# Patient Record
Sex: FEMALE | Race: WHITE | NOT HISPANIC OR LATINO | Employment: FULL TIME | URBAN - METROPOLITAN AREA
[De-identification: names, ages, dates, MRNs, and addresses within clinical notes are randomized per-mention and may not be internally consistent; named-entity substitution may affect disease eponyms.]

---

## 2017-01-10 ENCOUNTER — OFFICE VISIT (OUTPATIENT)
Dept: NEUROLOGY | Facility: CLINIC | Age: 42
End: 2017-01-10

## 2017-01-10 DIAGNOSIS — G56.02 CARPAL TUNNEL SYNDROME OF LEFT WRIST: Primary | ICD-10-CM

## 2017-01-10 NOTE — PROGRESS NOTES
[facility]  Electrodiagnostic Laboratory  Nerve Conduction & EMG Report          Patient: Irma Evans YOB: 1975  Patient ID: [id number] Age: 41 Years 0 Months  Gender: Female        Referring Physician: Liz Baker MD    History & Examination:    41 year old patient with left hand numbness. Query carpal tunnel syndrome.     Techniques: Motor and sensory conduction studies were done with surface recording electrodes.  Temperature was monitored and recorded throughout the study. Upper extremities were maintained at a temperature of 32 degrees Centigrade or higher. EMG was done with a concentric needle electrode.        Results:    Left median antidromic sensory NCS showed a markedly attenuated conduction velocity and SNAP amplitude. Left median orthodromic mixed NCS with stimulation at the palm showed no response. Left ulnar antidromic sensory and orthodromic mixed NCSs were normal. Left median motor NCS showed prolonged distal latency, normal CMAP amplitudes and conduction velocity. Left ulnar motor NCS was normal. Left median and ulnar second lumbrical/palmar interossei comparison motor NCSs showed a difference between the median and ulnar distal latency of 4.16 ms (normal is <0.5 ms). Left median F-wave latencies were mildly prolonged; left ulnar F-wave latencies were normal. EMG of left APB showed increased insertional activity and occasional fasciculations, but MUP morphology and recruitment patterns were normal.     Interpretation:    Left median neuropathy at the wrist as seen in carpal tunnel syndrome, of moderate severity.     EMG Physician:    Kieran Dickson MD        Sensory NCS      Nerve / Sites Rec. Site Onset Peak Ref. NP Amp Ref. PP Amp Dist Cj Ref. Temp     ms ms ms  V  V  V cm m/s m/s  C   L MEDIAN - Dig II Anti      Wrist Dig II 5.68 6.35  1.8 10.0  14 24.7 48.0 33   L ULNAR - Dig V Anti      Wrist Dig V 2.14 2.86  23.5 8.0 15.8 12.5 58.5  48.0 32.1   L MEDIAN - Ulnar - Palmar      Median Wrist NR NR        31.5      Ulnar Wrist 1.04 1.41 2.40 23.1  39.2 8 76.8  30.8       Motor NCS      Nerve / Sites Rec. Site Lat Ref. Amp Ref. Rel Amp Dist Cj Ref. Dur. Area Temp.     ms ms mV mV % cm m/s m/s ms %  C   L MEDIAN - APB      Wrist APB 6.20 4.40 7.5 5.0 100 8   8.85 100 31.5      Elbow APB 10.47  7.3  97.4 22 51.5 48.0 8.91 99 32.1   L ULNAR - ADM      Wrist ADM 2.29 3.50 11.8 5.0 100 8   8.07 100 32.1      B.Elbow ADM 5.26  11.9  100 17 57.3 48.0 8.39 97.4 32.2      A.Elbow ADM 7.14  11.8  99.3 10 53.3 48.0 8.65 99.1 32.5   L MEDIAN - II Lumb      Median II Lumb 6.61  1.7  100 10   7.60 100 31.7      Ulnar Palm Int 2.45  6.8  406 10   6.09 275 31.7       F  Wave      Nerve Min F Lat Max F Lat Mean FLat Temp.    ms ms ms  C   L MEDIAN 29.22 30.63 29.99 32.2   L ULNAR 25.16 25.73 25.44 32.5       EMG Summary Table     Spontaneous MUAP Recruitment    IA Fib Fasc H.F. Amp Dur. PPP Pattern   L. ABD POLL BREVIS Increased None 1+ None N N None Normal

## 2017-01-10 NOTE — Clinical Note
1/10/2017       RE: Irma Evans  [address] [facility] [facility] [address]     Dear Colleague,    Thank you for referring your patient, Irma Evans, to the [facility] EMG at [facility]. Please see a copy of my visit note below.          [facility]  Electrodiagnostic Laboratory  Nerve Conduction & EMG Report          Patient: Irma Evans YOB: 1975  Patient ID: [id number] Age: 41 Years 0 Months  Gender: Female        Referring Physician: Liz Baker MD    History & Examination:    41 year old patient with left hand numbness. Query carpal tunnel syndrome.     Techniques: Motor and sensory conduction studies were done with surface recording electrodes.  Temperature was monitored and recorded throughout the study. Upper extremities were maintained at a temperature of 32 degrees Centigrade or higher. EMG was done with a concentric needle electrode.        Results:    Left median antidromic sensory NCS showed a markedly attenuated conduction velocity and SNAP amplitude. Left median orthodromic mixed NCS with stimulation at the palm showed no response. Left ulnar antidromic sensory and orthodromic mixed NCSs were normal. Left median motor NCS showed prolonged distal latency, normal CMAP amplitudes and conduction velocity. Left ulnar motor NCS was normal. Left median and ulnar second lumbrical/palmar interossei comparison motor NCSs showed a difference between the median and ulnar distal latency of 4.16 ms (normal is <0.5 ms). Left median F-wave latencies were mildly prolonged; left ulnar F-wave latencies were normal. EMG of left APB showed increased insertional activity and occasional fasciculations, but MUP morphology and recruitment patterns were normal.     Interpretation:    Left median neuropathy at the wrist as seen in carpal tunnel syndrome, of moderate severity.     EMG Physician:    Kieran Dickson MD        Sensory  NCS      Nerve / Sites Rec. Site Onset Peak Ref. NP Amp Ref. PP Amp Dist Cj Ref. Temp     ms ms ms  V  V  V cm m/s m/s  C   L MEDIAN - Dig II Anti      Wrist Dig II 5.68 6.35  1.8 10.0  14 24.7 48.0 33   L ULNAR - Dig V Anti      Wrist Dig V 2.14 2.86  23.5 8.0 15.8 12.5 58.5 48.0 32.1   L MEDIAN - Ulnar - Palmar      Median Wrist NR NR        31.5      Ulnar Wrist 1.04 1.41 2.40 23.1  39.2 8 76.8  30.8       Motor NCS      Nerve / Sites Rec. Site Lat Ref. Amp Ref. Rel Amp Dist Cj Ref. Dur. Area Temp.     ms ms mV mV % cm m/s m/s ms %  C   L MEDIAN - APB      Wrist APB 6.20 4.40 7.5 5.0 100 8   8.85 100 31.5      Elbow APB 10.47  7.3  97.4 22 51.5 48.0 8.91 99 32.1   L ULNAR - ADM      Wrist ADM 2.29 3.50 11.8 5.0 100 8   8.07 100 32.1      B.Elbow ADM 5.26  11.9  100 17 57.3 48.0 8.39 97.4 32.2      A.Elbow ADM 7.14  11.8  99.3 10 53.3 48.0 8.65 99.1 32.5   L MEDIAN - II Lumb      Median II Lumb 6.61  1.7  100 10   7.60 100 31.7      Ulnar Palm Int 2.45  6.8  406 10   6.09 275 31.7       F  Wave      Nerve Min F Lat Max F Lat Mean FLat Temp.    ms ms ms  C   L MEDIAN 29.22 30.63 29.99 32.2   L ULNAR 25.16 25.73 25.44 32.5       EMG Summary Table     Spontaneous MUAP Recruitment    IA Fib Fasc H.F. Amp Dur. PPP Pattern   L. ABD POLL BREVIS Increased None 1+ None N N None Normal                              Again, thank you for allowing me to participate in the care of your patient.      Sincerely,    Kieran Dickson MD

## 2017-01-10 NOTE — Clinical Note
January 10, 2017      Trevin Evans  [address] [facility] [address]        Dear Trevin    Thank you for getting your care at [facility]. Please see below for your test results.  This result came to me, rather than Dr. Parks, so I am sending you the result note.  Looks like the test confirms you have carpal tunnel syndrome and so I would keep the appointment you have with the sports medicine team to discuss options, including possible surgery.     Resulted Orders   Needle EMG Each Extremity w/Paraspinal Area Limited (73868)    [facility]  Electrodiagnostic Laboratory  Nerve Conduction & EMG Report    Patient: Irma Evans   YOB: 1975  Patient ID: [id number]   Age: 41 Years 0 Months  Gender: Female    Referring Physician: Liz Baker MD    History & Examination:    41 year old patient with left hand numbness. Query carpal tunnel  syndrome.     Techniques: Motor and sensory conduction studies were done with  surface recording electrodes.  Temperature was monitored and  recorded throughout the study. Upper extremities were maintained  at a temperature of 32 degrees Centigrade or higher. EMG was done  with a concentric needle electrode.     Results:    Left median antidromic sensory NCS showed a markedly attenuated  conduction velocity and SNAP amplitude. Left median orthodromic  mixed NCS with stimulation at the palm showed no response. Left  ulnar antidromic sensory and orthodromic mixed NCSs were normal.  Left median motor NCS showed prolonged distal latency, normal  CMAP amplitudes and conduction velocity. Left ulnar motor NCS was  normal. Left median and ulnar second lumbrical/palmar interossei  comparison motor NCSs showed a difference between the median and  ulnar distal latency of 4.16 ms (normal is <0.5 ms). Left median  F-wave latencies were mildly prolonged; left ulnar F-wave  latencies were normal. EMG of left APB showed  increased  insertional activity and occasional fasciculations, but MUP  morphology and recruitment patterns were normal.     Interpretation:    Left median neuropathy at the wrist as seen in carpal tunnel  syndrome, of moderate severity.     EMG Physician:    Kieran Dickson MD         If you have any concerns about these results please call and leave a message for me or send a MyChart message to the clinic.    Sincerely,    Grace Bennett MD

## 2017-01-17 ENCOUNTER — OFFICE VISIT (OUTPATIENT)
Dept: FAMILY MEDICINE | Facility: CLINIC | Age: 42
End: 2017-01-17

## 2017-01-17 VITALS
TEMPERATURE: 98 F | RESPIRATION RATE: 14 BRPM | DIASTOLIC BLOOD PRESSURE: 78 MMHG | SYSTOLIC BLOOD PRESSURE: 124 MMHG | HEART RATE: 69 BPM

## 2017-01-17 DIAGNOSIS — G56.02 CARPAL TUNNEL SYNDROME OF LEFT WRIST: Primary | ICD-10-CM

## 2017-01-17 DIAGNOSIS — G56.02 CARPAL TUNNEL SYNDROME OF LEFT WRIST: ICD-10-CM

## 2017-01-17 NOTE — MR AVS SNAPSHOT
After Visit Summary   1/17/2017    Irma Evans    MRN: [id number]           Patient Information     Date Of Birth          1975        Visit Information        Provider Department      1/17/2017 8:00 AM Kristan Turner MD [facility]        Today's Diagnoses     Carpal tunnel syndrome of left wrist    -  1        Follow-ups after your visit        Additional Services     [facility], PT, HAND AND CHIROPRACTIC REFERRAL - [facility]       **This order will print in the [facility] Scheduling Office**    Physical Therapy, Hand Therapy and Chiropractic Care are available through:    *[facility]  *[facility]  *[facility]    Call one number to schedule at any of the above locations: [phone].    Your provider has referred you to: Hand Therapy    Indication/Reason for Referral: left hand pain, carpal tunnel  Onset of Illness: 6-8 months ago  Therapy Orders: Evaluate and Treat  Special Programs: None  Special Request: None    Alyssa Shah      Additional Comments for the Therapist or Chiropractor: left carpal tunnel wrist injection performed    Please be aware that coverage of these services is subject to the terms and limitations of your health insurance plan.  Call member services at your health plan with any benefit or coverage questions.      Please bring the following to your appointment:    *Your personal calendar for scheduling future appointments  *Comfortable clothing                  Follow-up notes from your care team     Return in about 4 weeks (around 2/14/2017), or if symptoms worsen or fail to improve.      Who to contact     Please call your clinic at [phone] to:    Ask questions about your health    Make or cancel appointments    Discuss your medicines    Learn about your test results    Speak to your doctor   If you have compliments or concerns about an experience at your clinic, or if you wish to file a  complaint, please contact [facility] Patient Relations at [phone] or email us at [email or web address]         Additional Information About Your Visit        [employer] Information     [employer] gives you secure access to your electronic health record. If you see a primary care provider, you can also send messages to your care team and make appointments. If you have questions, please call your primary care clinic.  If you do not have a primary care provider, please call [phone] and they will assist you.      [employer] is an electronic gateway that provides easy, online access to your medical records. With [employer], you can request a clinic appointment, read your test results, renew a prescription or communicate with your care team.     To access your existing account, please contact your [facility] or call [phone] for assistance.        Care EveryWhere ID     This is your Care EveryWhere ID. This could be used by other organizations to access your [place] medical records  [id number]        Your Vitals Were     Pulse Temperature Respirations             69 98  F (36.7  C) (Oral) 14          Blood Pressure from Last 3 Encounters:   01/17/17 124/78   12/30/16 131/84   12/07/16 127/85    Weight from Last 3 Encounters:   12/07/16 271 lb 3.2 oz (123.016 kg)   11/08/16 262 lb 12.8 oz (119.205 kg)   10/19/16 261 lb (118.389 kg)              We Performed the Following     [facility], PT, HAND AND CHIROPRACTIC REFERRAL - [facility]        Primary Care Provider Office Phone # Fax #    Lizcelso Baker [phone]       [facility] [address] [address]        Thank you!     Thank you for choosing [facility]  for your care. Our goal is always to provide you with excellent care. Hearing back from our patients is one way we can continue to improve our services. Please take a few minutes to complete the written  "survey that you may receive in the mail after your visit with us. Thank you!             Your Updated Medication List - Protect others around you: Learn how to safely use, store and throw away your medicines at www.disposemymeds.org.          This list is accurate as of: 1/17/17  8:46 AM.  Always use your most recent med list.                   Brand Name Dispense Instructions for use    * amphetamine-dextroamphetamine 20 MG per 24 hr capsule    ADDERALL XR    60 capsule    Take 2 capsules (40 mg) by mouth daily       * amphetamine-dextroamphetamine 20 MG per 24 hr capsule   Start taking on:  1/30/2017    ADDERALL XR    60 capsule    Take 2 capsules (40 mg) by mouth daily       FLUoxetine 20 MG capsule    PROzac    90 capsule    Take 1 capsule (20 mg) by mouth daily       fluticasone 50 MCG/ACT spray    FLONASE    48 g    Spray 2 sprays into both nostrils daily       folic acid 1 MG tablet    FOLVITE    100 tablet    Take 1 tablet (1 mg) by mouth daily       minocycline 100 MG capsule    MINOCIN/DYNACIN    60 capsule    Take 1 capsule (100 mg) by mouth 2 times daily       montelukast 10 MG tablet    SINGULAIR    90 tablet    Take 1 tablet (10 mg) by mouth nightly as needed       * Needle (Disp) 18G X 1-1/2\" Misc    BD DISP NEEDLES    30 each    Use to administer IM medication as instructed       * needle (disp) 18G X 1\" Misc     15 each    1 each once a week       * Needle (Disp) 23G X 1\" Misc    BD DISP NEEDLE    30 each    Use to administer IM medication as instructed       * Needle (Disp) 23G X 1\" Misc     15 each    Use to inject testosterone into muscle weekly       olopatadine 0.1 % ophthalmic solution    PATANOL    5 mL    Place 1 drop into both eyes 2 times daily       polyethylene glycol powder    MIRALAX    850 g    Take 17 g (1 capful) by mouth daily       psyllium 0.52 G capsule     540 capsule    Take 1-2 capsules (0.52-1.04 g) by mouth daily       Sharps Container Misc     1 each    Dispose sharps    "    simvastatin 20 MG tablet    ZOCOR    90 tablet    Take 1 tablet (20 mg) by mouth daily       syringe (disposable) 1 ML Misc    BD TUBERCULIN SYRINGE    30 each    BD 1ml Tuberculing syringe SLIP TIP (no needle attached). Use to administer IM medications as instructed       testosterone cypionate 200 MG/ML injection    DEPOTESTOTERONE CYPIONATE    10 mL    Inject 0.4 mLs (80 mg) into the muscle once a week In cottonseed oil ok       topiramate 50 MG tablet    TOPAMAX    270 tablet    Take 3 tablets (150 mg) by mouth daily       triamcinolone 0.1 % cream    KENALOG    80 g    Apply sparingly to affected area two times daily as needed       * Notice:  This list has 6 medication(s) that are the same as other medications prescribed for you. Read the directions carefully, and ask your doctor or other care provider to review them with you.

## 2017-01-17 NOTE — PROGRESS NOTES
HPI:       Irma Evans is a right handed 41 year old who presents for the following  Patient presents with:  Musculoskeletal Problem: follow up from 1/10/17, possible injection  Pt will evidence of moderate carpal tunnel, left hand.  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  Gripping with left hand and  hard when driving.  Left hand white knuckle, had a car service, noticing hand issues with driving.  Happens daily.  Moved Mom back from [place], everyone in the family has had carpal tunnel surgery.  Pt has a history of prediabetes.  Pt reports that was going to be removed off problem list by Dr. Baker.    Concern: Left carpal tunnel    Description of the problem :Left hand with tingling and numbness, falls asleep at night and driving     When did it start?: 6-8 months    Intensity: moderate, 5/10    Progression of Symptoms:  worsening    Therapies Tried wearing wrist braces at night    What has worked?  Brace works while its on            No  was used for  this visit.      Problem, Medication and Allergy Lists were reviewed and are current.  Patient is an established patient of this clinic.         Review of Systems:   Review of Systems          Physical Exam:     Patient Vitals for the past 24 hrs:   BP Temp Temp src Pulse Resp Weight   01/17/17 0811 124/78 mmHg 98  F (36.7  C) Oral 69 14 -     There is no weight on file to calculate BMI.  Vitals were reviewed and were normal     Physical Exam  +Phalen's, + Tinel's, symptoms of numbness and tingling into second and third fingers  Full ROM of wrists and hands  Radial n., Median n., ulna n. intact      Results:      Results from the last 24 hoursNo results found for this or any previous visit (from the past 24 hour(s)).  Assessment and Plan     1. Carpal tunnel syndrome of left wrist  Left wrist carpal tunnel injection- risks and benefits discussed, no steroid allergies known, consented, prepped, anatomy located, 1 cc 40mg kenalog, 2 cc 1%  lidocaine, carpal tunnel injection delivered without any nerve symptoms towards 4th finger, full ROM of hand, no bleeding or complications, band aid  Brace and cut back on activities post injection for 7-10 days  Order for hand therapy  Recommended f/u with Dr. Parks in 4 weeks    There are no discontinued medications.  Options for treatment and follow-up care were reviewed with the patient. Irma Evans  engaged in the decision making process and verbalized understanding of the options discussed and agreed with the final plan.    Kristan Turner MD

## 2017-01-26 DIAGNOSIS — F64.9 GENDER IDENTITY DISORDER: Primary | ICD-10-CM

## 2017-01-26 NOTE — TELEPHONE ENCOUNTER
Date of last visit at clinic: 11.17.17    Please complete refill and CLOSE ENCOUNTER.  Closing the encounter signifies the refill is complete.

## 2017-01-27 RX ORDER — TESTOSTERONE CYPIONATE 200 MG/ML
80 INJECTION, SOLUTION INTRAMUSCULAR WEEKLY
Qty: 10 ML | Refills: 2 | Status: SHIPPED | OUTPATIENT
Start: 2017-01-27 | End: 2017-01-27

## 2017-01-27 RX ORDER — TESTOSTERONE CYPIONATE 200 MG/ML
80 INJECTION, SOLUTION INTRAMUSCULAR WEEKLY
Qty: 10 ML | Refills: 2 | Status: SHIPPED | OUTPATIENT
Start: 2017-01-27 | End: 2017-09-07

## 2017-01-27 NOTE — TELEPHONE ENCOUNTER
Please process per controlled substance protocol. I have reviewed pt's chart and this refill is appropriate. Please provide rx to preceptor to sign, then provide to patient's pharmacy and notify patient.   Liz Baker MD

## 2017-01-27 NOTE — TELEPHONE ENCOUNTER
Script printed for preceptor, Dr. Burks to sign. Faxed to patient's pharmacy. Fax successful.    Soniya Higginbotham RN

## 2017-02-20 ENCOUNTER — TELEPHONE (OUTPATIENT)
Dept: FAMILY MEDICINE | Facility: CLINIC | Age: 42
End: 2017-02-20

## 2017-02-20 DIAGNOSIS — F90.9 ATTENTION DEFICIT HYPERACTIVITY DISORDER (ADHD), UNSPECIFIED ADHD TYPE: ICD-10-CM

## 2017-02-20 NOTE — TELEPHONE ENCOUNTER
[facility] phone call message- patient requesting a refill:    Full Medication Name: amphetamine-dextroamphetamine (ADDERALL XR) 20 MG per 24 hr capsule    Dose:     Pharmacy confirmed as   [facility] - [place], [place] - [address]  [address]  [facility] [address]  Phone: [phone] Fax: [phone]    Additional Comments: patient is requesting refill of above medication.     OK to leave a message on voice mail? Yes    Primary language: English      needed? No    Call taken on February 20, 2017 at 12:29 PM by Shahida Taylor

## 2017-02-20 NOTE — TELEPHONE ENCOUNTER
Date of last visit at clinic: 1/17/2017    Please complete refill and CLOSE ENCOUNTER.  Closing the encounter signifies the refill is complete.

## 2017-02-22 RX ORDER — DEXTROAMPHETAMINE SACCHARATE, AMPHETAMINE ASPARTATE MONOHYDRATE, DEXTROAMPHETAMINE SULFATE AND AMPHETAMINE SULFATE 5; 5; 5; 5 MG/1; MG/1; MG/1; MG/1
40 CAPSULE, EXTENDED RELEASE ORAL DAILY
Qty: 60 CAPSULE | Refills: 0 | Status: SHIPPED | OUTPATIENT
Start: 2017-02-22 | End: 2017-04-21

## 2017-02-22 RX ORDER — DEXTROAMPHETAMINE SACCHARATE, AMPHETAMINE ASPARTATE MONOHYDRATE, DEXTROAMPHETAMINE SULFATE AND AMPHETAMINE SULFATE 5; 5; 5; 5 MG/1; MG/1; MG/1; MG/1
20 CAPSULE, EXTENDED RELEASE ORAL DAILY
Qty: 60 CAPSULE | Refills: 0 | Status: SHIPPED | OUTPATIENT
Start: 2017-03-23 | End: 2017-03-31

## 2017-02-22 NOTE — TELEPHONE ENCOUNTER
Scripts printed for preceptor, Dr. IESHA Dumont, to sign. Placed in lockbox to await . Called patient to update, unable to reach. Left VM that requested scripts are ready to be picked up at clinic.    Soniya Higginbotham RN

## 2017-02-22 NOTE — TELEPHONE ENCOUNTER
Please process per controlled substance protocol. I have reviewed pt's chart and this refill (2 months, 2 separate fills) is appropriate. Please provide rx to preceptor to sign, then provide to patient's pharmacy and notify patient.   Liz Baker MD

## 2017-03-30 ENCOUNTER — TELEPHONE (OUTPATIENT)
Dept: FAMILY MEDICINE | Facility: CLINIC | Age: 42
End: 2017-03-30

## 2017-03-30 DIAGNOSIS — F90.9 ATTENTION DEFICIT HYPERACTIVITY DISORDER (ADHD), UNSPECIFIED ADHD TYPE: ICD-10-CM

## 2017-03-30 NOTE — TELEPHONE ENCOUNTER
Message routed to PCP to update if appropriate. 2/22 script is written as requested, 3/23 script is written for one tablet.    Soniya Higginbotham RN

## 2017-03-30 NOTE — TELEPHONE ENCOUNTER
[facility] phone call message- medication clarification/question:    Full Medication Name: amphetamine-dextroamphetamine (ADDERALL XR) 20 MG per 24 hr capsule    Question: patient states the medication instruction says to take 1 tablet 20 mg per day but pt was suppose to take 2 tablets per day. Patient is requesting new prescription with correct instructions.      Pharmacy confirmed as    [email or web address] [address] [place] [address] [address]VE [address] AT [facility] [address]: Yes    OK to leave a message on voice mail? Yes    Primary language: English      needed? No    Call taken on March 30, 2017 at 2:25 PM by Shahida Taylor

## 2017-03-31 RX ORDER — DEXTROAMPHETAMINE SACCHARATE, AMPHETAMINE ASPARTATE MONOHYDRATE, DEXTROAMPHETAMINE SULFATE AND AMPHETAMINE SULFATE 5; 5; 5; 5 MG/1; MG/1; MG/1; MG/1
40 CAPSULE, EXTENDED RELEASE ORAL DAILY
Qty: 60 CAPSULE | Refills: 0 | Status: SHIPPED | OUTPATIENT
Start: 2017-03-31 | End: 2017-04-21

## 2017-03-31 NOTE — TELEPHONE ENCOUNTER
Message routed to PCP at high priority to address instructions for 3/23 script and request for next month's refill.    Soniya Higginbotham RN

## 2017-03-31 NOTE — TELEPHONE ENCOUNTER
Patient calling to check the status of the request for the script to be re-written correctly.  Patient states they are out of their medication and would like to pick that up as soon as possible.  Additionally since the patient will be returning to the clinic to  the corrected script, they are requesting to  next months as well.  He states that usually he picks up 2 scripts at a time usually and this would save our patient a trip.  Please call the patient to advise.  Thank you!

## 2017-03-31 NOTE — TELEPHONE ENCOUNTER
Preceptor amended March script. Additional refills to be addressed by PCP.    Soniya Higginbotham RN

## 2017-04-19 DIAGNOSIS — F41.1 GAD (GENERALIZED ANXIETY DISORDER): ICD-10-CM

## 2017-04-19 DIAGNOSIS — J31.0 CHRONIC RHINITIS: ICD-10-CM

## 2017-04-19 DIAGNOSIS — F90.9 ATTENTION DEFICIT HYPERACTIVITY DISORDER (ADHD), UNSPECIFIED ADHD TYPE: ICD-10-CM

## 2017-04-19 DIAGNOSIS — E78.5 HYPERLIPIDEMIA LDL GOAL <100: Primary | ICD-10-CM

## 2017-04-19 DIAGNOSIS — E11.9 TYPE 2 DIABETES MELLITUS WITHOUT COMPLICATION (H): ICD-10-CM

## 2017-04-19 RX ORDER — DEXTROAMPHETAMINE SACCHARATE, AMPHETAMINE ASPARTATE MONOHYDRATE, DEXTROAMPHETAMINE SULFATE AND AMPHETAMINE SULFATE 5; 5; 5; 5 MG/1; MG/1; MG/1; MG/1
40 CAPSULE, EXTENDED RELEASE ORAL DAILY
Qty: 60 CAPSULE | Refills: 0 | OUTPATIENT
Start: 2017-04-19

## 2017-04-19 NOTE — TELEPHONE ENCOUNTER
[facility] phone call message- patient requesting a refill:    Full Medication Name: Adderall XR 20mg caps 24 hour    Dose: Take two capsules by mouth daily.    Pharmacy confirmed as     : Yes    Additional Comments:   Patient will  hard copy.  Please call when ready     OK to leave a message on voice mail? Yes    Primary language: English      needed? No    Call taken on April 19, 2017 at 8:36 AM by Fanta Estrada

## 2017-04-19 NOTE — TELEPHONE ENCOUNTER
Request for medication refill:    Date of last visit at clinic: 1/17/17    Please complete refill if appropriate and CLOSE ENCOUNTER.    Closing the encounter signifies the refill is complete.    If refill has been denied, please complete the smart phrase .smirefuse and route it to the [facility] RN TRIAGE pool to inform the patient and the pharmacy.    Aleksandra Higgins

## 2017-04-20 NOTE — TELEPHONE ENCOUNTER
Medication Refill Denied  Reason: Patient needs: provider visit with dr. Parks - had dose change in [place] and needs reassessment. If stable could make a plan to get 6 months worth of meds  Provider: I have not called the patient about the Rx denial, please call or use Identifiedt. Trevin is germán - but hopefully you can help him sched at same time as outreach  PCS: Please notify the pharmacy  RN: Please contact the patient to explain reasoning provided above and to schedule the patient for a provider visit with Charly.    RN may not order temporary refill so that the patient will not run out of medication prior to the scheduled visit.    Liz Baker MD

## 2017-04-20 NOTE — TELEPHONE ENCOUNTER
Patient returned phone call. Patient was upset that he had to make an appointment. I offered times when Dr. Parks is available through May but those times didn't work for the patient. Patient became upset again and declined to schedule an appointment with another provider. They requested that Dr. Larson call them back.

## 2017-04-21 RX ORDER — DEXTROAMPHETAMINE SACCHARATE, AMPHETAMINE ASPARTATE MONOHYDRATE, DEXTROAMPHETAMINE SULFATE AND AMPHETAMINE SULFATE 5; 5; 5; 5 MG/1; MG/1; MG/1; MG/1
40 CAPSULE, EXTENDED RELEASE ORAL DAILY
Qty: 60 CAPSULE | Refills: 0 | Status: SHIPPED | OUTPATIENT
Start: 2017-04-21 | End: 2017-04-21

## 2017-04-21 RX ORDER — DEXTROAMPHETAMINE SACCHARATE, AMPHETAMINE ASPARTATE MONOHYDRATE, DEXTROAMPHETAMINE SULFATE AND AMPHETAMINE SULFATE 5; 5; 5; 5 MG/1; MG/1; MG/1; MG/1
40 CAPSULE, EXTENDED RELEASE ORAL DAILY
Qty: 60 CAPSULE | Refills: 0 | Status: SHIPPED | OUTPATIENT
Start: 2017-05-21 | End: 2017-05-31

## 2017-04-21 RX ORDER — DEXTROAMPHETAMINE SACCHARATE, AMPHETAMINE ASPARTATE MONOHYDRATE, DEXTROAMPHETAMINE SULFATE AND AMPHETAMINE SULFATE 5; 5; 5; 5 MG/1; MG/1; MG/1; MG/1
40 CAPSULE, EXTENDED RELEASE ORAL DAILY
Qty: 60 CAPSULE | Refills: 0 | Status: SHIPPED | OUTPATIENT
Start: 2017-05-21 | End: 2017-04-21

## 2017-04-21 RX ORDER — DEXTROAMPHETAMINE SACCHARATE, AMPHETAMINE ASPARTATE MONOHYDRATE, DEXTROAMPHETAMINE SULFATE AND AMPHETAMINE SULFATE 5; 5; 5; 5 MG/1; MG/1; MG/1; MG/1
40 CAPSULE, EXTENDED RELEASE ORAL DAILY
Qty: 60 CAPSULE | Refills: 0 | Status: SHIPPED | OUTPATIENT
Start: 2017-04-21 | End: 2017-05-31

## 2017-04-21 NOTE — TELEPHONE ENCOUNTER
Talked with patient, he just started new job where hours are 8:30am-5pm.  Called Liz Baker and discussed situation with her.  Patient doing well on new dose.  No concerns.  Will give refill of medication until Trevin able to make an appointment in the next month or two.    Prescriptions pended.  Will route to triage RN to print, have preceptor sign, and leave up front for patient to .    Aurora Parks, DO  Pager [phone]

## 2017-04-21 NOTE — TELEPHONE ENCOUNTER
Script printed for preceptor, Dr. Riddle, to sign. Script placed in RN lockbox to await patinet .    Soniya Higginbotham RN

## 2017-04-24 NOTE — TELEPHONE ENCOUNTER
Date of last visit at clinic: 1-17-17    Please complete refill and CLOSE ENCOUNTER.  Closing the encounter signifies the refill is complete.

## 2017-04-25 RX ORDER — SIMVASTATIN 20 MG
20 TABLET ORAL AT BEDTIME
Qty: 90 TABLET | Refills: 3 | Status: SHIPPED | OUTPATIENT
Start: 2017-04-25 | End: 2017-10-04

## 2017-04-25 RX ORDER — FLUTICASONE PROPIONATE 50 MCG
2 SPRAY, SUSPENSION (ML) NASAL DAILY
Qty: 48 G | Refills: 3 | Status: SHIPPED | OUTPATIENT
Start: 2017-04-25 | End: 2022-08-17

## 2017-05-25 ENCOUNTER — TELEPHONE (OUTPATIENT)
Dept: FAMILY MEDICINE | Facility: CLINIC | Age: 42
End: 2017-05-25

## 2017-05-25 DIAGNOSIS — J30.1 SEASONAL ALLERGIC RHINITIS DUE TO POLLEN: ICD-10-CM

## 2017-05-25 DIAGNOSIS — L70.0 ACNE VULGARIS: ICD-10-CM

## 2017-05-30 RX ORDER — MONTELUKAST SODIUM 10 MG/1
10 TABLET ORAL
Qty: 90 TABLET | Refills: 3 | Status: SHIPPED | OUTPATIENT
Start: 2017-05-30 | End: 2019-06-21

## 2017-05-30 RX ORDER — MINOCYCLINE HYDROCHLORIDE 100 MG/1
100 CAPSULE ORAL 2 TIMES DAILY
Qty: 60 CAPSULE | Refills: 3 | Status: SHIPPED | OUTPATIENT
Start: 2017-05-30 | End: 2017-10-04

## 2017-05-31 ENCOUNTER — OFFICE VISIT (OUTPATIENT)
Dept: FAMILY MEDICINE | Facility: CLINIC | Age: 42
End: 2017-05-31

## 2017-05-31 ENCOUNTER — TELEPHONE (OUTPATIENT)
Dept: FAMILY MEDICINE | Facility: CLINIC | Age: 42
End: 2017-05-31

## 2017-05-31 VITALS
TEMPERATURE: 98.6 F | BODY MASS INDEX: 43.77 KG/M2 | WEIGHT: 263 LBS | OXYGEN SATURATION: 98 % | SYSTOLIC BLOOD PRESSURE: 125 MMHG | RESPIRATION RATE: 18 BRPM | HEART RATE: 83 BPM | DIASTOLIC BLOOD PRESSURE: 83 MMHG

## 2017-05-31 DIAGNOSIS — G56.01 CARPAL TUNNEL SYNDROME OF RIGHT WRIST: ICD-10-CM

## 2017-05-31 DIAGNOSIS — F64.0 GENDER DYSPHORIA IN ADOLESCENT AND ADULT: Primary | ICD-10-CM

## 2017-05-31 DIAGNOSIS — F90.9 ATTENTION DEFICIT HYPERACTIVITY DISORDER (ADHD), UNSPECIFIED ADHD TYPE: ICD-10-CM

## 2017-05-31 DIAGNOSIS — Z13.9 SCREENING FOR CONDITION: ICD-10-CM

## 2017-05-31 LAB
% GRANULOCYTES: 64.8 %G (ref 40–75)
ALBUMIN SERPL-MCNC: 4.1 MG/DL (ref 3.8–5)
ALP SERPL-CCNC: 69.4 U/L (ref 31.7–110.5)
ALT SERPL-CCNC: 116.8 U/L (ref 0–45)
AST SERPL-CCNC: 55.6 U/L (ref 0–45)
BILIRUB SERPL-MCNC: 0.5 MG/DL (ref 0.2–1.3)
BILIRUBIN DIRECT: 0.2 MG/DL (ref 0.1–0.3)
CHOLEST SERPL-MCNC: 140.6 MG/DL (ref 0–200)
CHOLEST/HDLC SERPL: 4.8 {RATIO} (ref 0–5)
GLUCOSE SERPL-MCNC: 115 MG'DL (ref 70–99)
GRANULOCYTES #: 5.1 K/UL (ref 1.6–8.3)
HBA1C MFR BLD: 5.6 % (ref 4.1–5.7)
HCT VFR BLD AUTO: 52.8 % (ref 35–47)
HDLC SERPL-MCNC: 29.6 MG/DL
HEMOGLOBIN: 17.3 G/DL (ref 11.7–15.7)
LDLC SERPL CALC-MCNC: 88 MG/DL (ref 0–129)
LYMPHOCYTES # BLD AUTO: 2.1 K/UL (ref 0.8–5.3)
LYMPHOCYTES NFR BLD AUTO: 26.5 %L (ref 20–48)
MCH RBC QN AUTO: 31.6 PG (ref 26.5–35)
MCHC RBC AUTO-ENTMCNC: 32.8 G/DL (ref 32–36)
MCV RBC AUTO: 96.4 FL (ref 78–100)
MID #: 0.7 K/UL (ref 0–2.2)
MID %: 8.7 %M (ref 0–20)
PLATELET # BLD AUTO: 126 K/UL (ref 150–450)
PROT SERPL-MCNC: 7.1 G/DL (ref 6.8–8.8)
RBC # BLD AUTO: 5.48 M/UL (ref 3.8–5.2)
TRIGL SERPL-MCNC: 114.5 MG/DL (ref 0–150)
TSH SERPL DL<=0.005 MIU/L-ACNC: 1.95 MU/L (ref 0.4–4)
VLDL CHOLESTEROL: 22.9 MG/DL (ref 7–32)
WBC # BLD AUTO: 7.9 K/UL (ref 4–11)

## 2017-05-31 RX ORDER — DEXTROAMPHETAMINE SACCHARATE, AMPHETAMINE ASPARTATE MONOHYDRATE, DEXTROAMPHETAMINE SULFATE AND AMPHETAMINE SULFATE 5; 5; 5; 5 MG/1; MG/1; MG/1; MG/1
40 CAPSULE, EXTENDED RELEASE ORAL DAILY
Qty: 60 CAPSULE | Refills: 0 | Status: SHIPPED | OUTPATIENT
Start: 2017-07-30 | End: 2017-08-28

## 2017-05-31 RX ORDER — DEXTROAMPHETAMINE SACCHARATE, AMPHETAMINE ASPARTATE MONOHYDRATE, DEXTROAMPHETAMINE SULFATE AND AMPHETAMINE SULFATE 5; 5; 5; 5 MG/1; MG/1; MG/1; MG/1
40 CAPSULE, EXTENDED RELEASE ORAL DAILY
Qty: 60 CAPSULE | Refills: 0 | Status: SHIPPED | OUTPATIENT
Start: 2017-05-31 | End: 2017-08-28

## 2017-05-31 RX ORDER — DEXTROAMPHETAMINE SACCHARATE, AMPHETAMINE ASPARTATE MONOHYDRATE, DEXTROAMPHETAMINE SULFATE AND AMPHETAMINE SULFATE 5; 5; 5; 5 MG/1; MG/1; MG/1; MG/1
40 CAPSULE, EXTENDED RELEASE ORAL DAILY
Qty: 60 CAPSULE | Refills: 0 | Status: SHIPPED | OUTPATIENT
Start: 2017-06-30 | End: 2017-05-31

## 2017-05-31 NOTE — TELEPHONE ENCOUNTER
Called patient to schedule sports med appointment. No answer, left voicemail.      Reason: right wrist carpal tunnel (new)  Urgency of Appointment: Next Available  Length of Problem: Sub-Acute (3-12 weeks since onset).  What are you requesting be done? Management Recommendations and Injection

## 2017-05-31 NOTE — PROGRESS NOTES
HPI:       Irma Evans is a 41 year old who presents for the following  Patient presents with:  Recheck Medication  Musculoskeletal Problem: Right wrist pain      Trevin came in months ago for left carpal tunnel.  He didn't do official hand therapy.  He looked up online exercises and did those which helped for a few hours after doing it.  He had an injection on 1/17/17 which helped but he's started noticing tingling come back recently.    Today he comes back complaining of right wrist pain x 1 month.  Similar to his left wrist symptoms except for more pain instead of just the tingling.  He has never had his right wrist diagnosed.  He tried to do hand exercises but it only helped for a little bit and then came back so he went to buy a brace for his hand which helped a good amount.  He has to wear the brace all the time on his right wrist or else it starts to hurt.  Family history of early carpal tunnel requiring surgery at age 40s in mother.      Also complaining of excessive sweating since he started ADD medications (first ritalin and now adderall).  Sheets at night are wet.  During day with new job drenched in sweat.    Adderall f/u  40mg dose is better.  Lasting most of day.  No sleep problems at night.  Appetite is good.  Work and friends think going ok.    Had therapist, quit therapist because of the hours when started new job.  Going ok.       ADHD Follow-Up (Adult)    Concerns    Changes since last visit: No.  40mg dose going well.  Status since last visit: Improving  Taking controlled (daily) medications as prescribed: Yes  Sleep: no problems  Adult ADHD Self-Reporting form given to patient?:  No  Currently in counseling: Had therapist but just quit because the hours were not doable with new job.    Medication Benefits:   Controlled symptoms: Distractability, Frustration tolerance, Accepting limits  Uncontrolled symptoms:  Attention span    Medication Side Effects:  Reports:  Excessive  sweating  Sleep Problems? no  Appetite Suppression? no  ++++++++++++++++++++++++++++++++++++++++++++++++ Employer Concerns/Feedback: Improving  Coworker Concerns:   Improving  Home/Family Concerns: Improving      +++++++++++++++++++++++++++++++++++++++++           Adherence and Exercise  Medication side effects: excessive sweating  How often is a medication missed? Rarely  Are you able to follow the treatment plan? Yes  Exercise: his work is physical (unloading boxes, etc)  Problem, Medication and Allergy Lists were reviewed and are current.  Patient is an established patient of this clinic.         Review of Systems:   Review of Systems          Physical Exam:   Patient Vitals for the past 24 hrs:   BP Temp Temp src Pulse Resp SpO2 Weight   05/31/17 0806 125/83 98.6  F (37  C) Oral 83 18 98 % 263 lb (119.3 kg)     Body mass index is 43.77 kg/(m^2).  Vitals were reviewed and were normal     Physical Exam   Constitutional: She is oriented to person, place, and time. She appears well-developed and well-nourished. No distress.   Cardiovascular: Normal rate and regular rhythm.    No murmur heard.  Pulmonary/Chest: Effort normal and breath sounds normal. She has no wheezes.   Musculoskeletal: She exhibits no edema.   Right wrist exam: normal strength.  Normal range of motion.  positive Phalen's test.   Neurological: She is alert and oriented to person, place, and time.   Skin:   Sweaty   Psychiatric: She has a normal mood and affect. Her behavior is normal. Judgment and thought content normal.       Results:      Results from the last 24 hours  Results for orders placed or performed in visit on 05/31/17 (from the past 24 hour(s))   Hepatic Panel   Result Value Ref Range    Albumin 4.1 3.8 - 5.0 mg/dL    Alkaline Phosphatase 69.4 31.7 - 110.5 U/L    .8 (H) 0.0 - 45.0 U/L    AST 55.6 (H) 0.0 - 45.0 U/L    Bilirubin Direct 0.2 0.1 - 0.3 mg/dL    Bilirubin Total 0.5 0.2 - 1.3 mg/dL    Protein Total 7.1 6.8 - 8.8 g/dL    CBC with Diff Plt   Result Value Ref Range    WBC 7.9 4.0 - 11.0 K/uL    Lymphocytes # 2.1 0.8 - 5.3 K/uL    % Lymphocytes 26.5 20.0 - 48.0 %L    Mid # 0.7 0.0 - 2.2 K/uL    Mid % 8.7 0.0 - 20.0 %M    GRANULOCYTES # 5.1 1.6 - 8.3 K/uL    % Granulocytes 64.8 40.0 - 75.0 %G    RBC 5.48 (H) 3.80 - 5.20 M/uL    Hemoglobin 17.3 (H) 11.7 - 15.7 g/dL    Hematocrit 52.8 (H) 35.0 - 47.0 %    MCV 96.4 78.0 - 100.0 fL    MCH 31.6 26.5 - 35.0 pg    MCHC 32.8 32.0 - 36.0 g/dL    Platelets 126.0 (L) 150.0 - 450.0 K/uL   Lipid Cascade   Result Value Ref Range    Cholesterol 140.6 0.0 - 200.0 mg/dL    Cholesterol/HDL Ratio 4.8 0.0 - 5.0    HDL Cholesterol 29.6 (L) >40.0 mg/dL    LDL Cholesterol Calculated 88 0 - 129 mg/dL    Triglycerides 114.5 0.0 - 150.0 mg/dL    VLDL Cholesterol 22.9 7.0 - 32.0 mg/dL   Glucose   Result Value Ref Range    Glucose 115.0 (H) 70.0 - 99.0 mg'dL   Hemoglobin A1c ([facility])   Result Value Ref Range    Hemoglobin A1C 5.6 4.1 - 5.7 %     Assessment and Plan     Gender dysphoria in adolescent and adult  Doing well on 80mg weekly testosterone.  Mid-shot (last shot 3-4 days ago).  Due for labs.  -     Testosterone Total  -     Hepatic Panel  -     CBC with Diff Plt  -     Lipid Cascade  -     Glucose    Carpal tunnel syndrome of right wrist  Positive phalen's.  EMG ordered for the right wrist.  Recommend follow up with Dr. Turner regarding potential injection of the right wrist vs other management.  Right wrist splint given.    -     [facility] ([facility]); Future  -     [facility] Medicine Clinic-[facility] INTERNAL REFERRAL  -     WRIST SPLINT    Attention deficit hyperactivity disorder (ADHD), unspecified ADHD type  Doing well on 40mg daily dose.  3 month supply given.  -     amphetamine-dextroamphetamine (ADDERALL XR) 20 MG per 24 hr capsule; Take 2 capsules (40 mg) by mouth daily  -     amphetamine-dextroamphetamine (ADDERALL XR) 20 MG per 24 hr capsule; Take 2 capsules (40 mg) by mouth daily  -      amphetamine-dextroamphetamine (ADDERALL XR) 20 MG per 24 hr capsule; Take 2 capsules (40 mg) by mouth daily    Screening for condition  -     Hemoglobin A1c ([facility])  -     TSH with free T4 reflex  -     Neisseria gonorrhoeae PCR  -     Chlamydia trachomatis PCR  -     Anti Treponema  -     HIV Antigen Antibody Combo  -     Hepatitis B Surface Antibody  -     Hepatitis B surface antigen  -     Hepatitis C antibody      Follow up in 3 months for ADD follow-up.    Follow up with sports medicine after right hand EMG.      There are no discontinued medications.  Options for treatment and follow-up care were reviewed with the patient. Irma Evans  engaged in the decision making process and verbalized understanding of the options discussed and agreed with the final plan.    Lenin Parks, DO

## 2017-05-31 NOTE — LETTER
June 5, 2017    Irma Evans  [address] [address]      Dear: Irma Evans    You were recently referred for a Sports Medicine appointment by your primary care provider at [facility].  We have called twice to schedule this appointment, but have been unable to reach you.  If you are interested in receiving this service, please call [facility] at [phone].  We would be happy to schedule this appointment for you.      Thank you.      Sincerely,    Patient Representative    [facility]  Hours:  Monday-Friday 8:00 am - 5:00 pm   Phone Number: [phone]

## 2017-05-31 NOTE — MR AVS SNAPSHOT
After Visit Summary   5/31/2017    Irma Evans    MRN: [id number]           Patient Information     Date Of Birth          1975        Visit Information        Provider Department      5/31/2017 8:00 AM Lenin Parks DO [facility]        Today's Diagnoses     Gender dysphoria in adolescent and adult    -  1    Carpal tunnel syndrome of right wrist        Attention deficit hyperactivity disorder (ADHD), unspecified ADHD type        Screening for condition          Care Instructions    Here is the plan from today's visit    1. Carpal tunnel syndrome of right wrist  - EMG ([facility]); Future  - Sports Medicine Clinic-[facility] INTERNAL REFERRAL    2. Attention deficit hyperactivity disorder (ADHD), unspecified ADHD type  - amphetamine-dextroamphetamine (ADDERALL XR) 20 MG per 24 hr capsule; Take 2 capsules (40 mg) by mouth daily  Dispense: 60 capsule; Refill: 0  - amphetamine-dextroamphetamine (ADDERALL XR) 20 MG per 24 hr capsule; Take 2 capsules (40 mg) by mouth daily  Dispense: 60 capsule; Refill: 0    3. HRT labs  - Testosterone Total  - Hepatic Panel  - CBC with Diff Plt  - Lipid Cascade  - Glucose    4. Screening for condition  - Hemoglobin A1c ([facility])  - TSH with free T4 reflex  - Neisseria gonorrhoeae PCR  - Chlamydia trachomatis PCR  - Anti Treponema  - HIV Antigen Antibody Combo  - Hepatitis B Surface Antibody  - Hepatitis B surface antigen  - Hepatitis C antibody        Follow up plan: in 3 months or as needed      Thank you for coming to [facility] today.  Lab Testing:  **If you had lab testing today and your results are reassuring or normal they will be mailed to you or sent through [employer] within 7 days.   **If the lab tests need quick action we will call you with the results.  The phone number we will call with results is # [phone] (home) [phone] (work). If this is not the best number please call our clinic and change the  number.  Medication Refills:  If you need any refills please call your pharmacy and they will contact us.   If you need to  your refill at a new pharmacy, please contact the new pharmacy directly. The new pharmacy will help you get your medications transferred faster.   Scheduling:  If you have any concerns about today's visit or wish to schedule another appointment please call our office during normal business hours [phone] (8-5:00 M-F)  If a referral was made to a [facility] and you don't get a call from central scheduling please call [phone].  If a Mammogram was ordered for you at [facility] call [phone] to schedule or change your appointment.  If you had an XRay/CT/Ultrasound/MRI ordered the number is [phone] to schedule or change your radiology appointment.   Medical Concerns:  If you have urgent medical concerns please call [phone] at any time of the day.  If you have a medical emergency please call [phone].            Follow-ups after your visit        Additional Services     Sports Medicine Clinic-[facility] INTERNAL REFERRAL       Reason: right wrist carpal tunnel (new)  Urgency of Appointment: Next Available  Length of Problem: Sub-Acute (3-12 weeks since onset).  What are you requesting be done? Management Recommendations and Injection                  Future tests that were ordered for you today     Open Future Orders        Priority Expected Expires Ordered    EMG ([facility]) Routine  5/26/2018 5/31/2017            Who to contact     Please call your clinic at [phone] to:    Ask questions about your health    Make or cancel appointments    Discuss your medicines    Learn about your test results    Speak to your doctor   If you have compliments or concerns about an experience at your clinic, or if you wish to file a complaint, please contact [facility] Patient Relations at [phone] or email us at  [email or web address]         Additional Information About Your Visit        [employer] Information     [employer] gives you secure access to your electronic health record. If you see a primary care provider, you can also send messages to your care team and make appointments. If you have questions, please call your primary care clinic.  If you do not have a primary care provider, please call [phone] and they will assist you.      [employer] is an electronic gateway that provides easy, online access to your medical records. With [employer], you can request a clinic appointment, read your test results, renew a prescription or communicate with your care team.     To access your existing account, please contact your [facility] or call [phone] for assistance.        Care EveryWhere ID     This is your Care EveryWhere ID. This could be used by other organizations to access your [facility] medical records  [id number]        Your Vitals Were     Pulse Temperature Respirations Pulse Oximetry Breastfeeding? BMI (Body Mass Index)    83 98.6  F (37  C) (Oral) 18 98% No 43.77 kg/m2       Blood Pressure from Last 3 Encounters:   05/31/17 125/83   01/17/17 124/78   12/30/16 131/84    Weight from Last 3 Encounters:   05/31/17 263 lb (119.3 kg)   12/07/16 271 lb 3.2 oz (123 kg)   11/08/16 262 lb 12.8 oz (119.2 kg)              We Performed the Following     Anti Treponema     CBC with Diff Plt     Chlamydia trachomatis PCR     Glucose     Hemoglobin A1c ([facility])     Hepatic Panel     Hepatitis B Surface Antibody     Hepatitis B surface antigen     Hepatitis C antibody     HIV Antigen Antibody Combo     Lipid [place]     Neisseria gonorrhoeae PCR     [facility] INTERNAL REFERRAL     Testosterone Total     TSH with free T4 reflex          Where to get your medicines      Some of these will need a paper prescription and others can be bought over the counter.  Ask your  "nurse if you have questions.     Bring a paper prescription for each of these medications     amphetamine-dextroamphetamine 20 MG per 24 hr capsule    amphetamine-dextroamphetamine 20 MG per 24 hr capsule          Primary Care Provider Office Phone # Fax #    Lizcelso Baker [phone]       [facility] [address] [facility] [address]        Thank you!     Thank you for choosing [facility]  for your care. Our goal is always to provide you with excellent care. Hearing back from our patients is one way we can continue to improve our services. Please take a few minutes to complete the written survey that you may receive in the mail after your visit with us. Thank you!             Your Updated Medication List - Protect others around you: Learn how to safely use, store and throw away your medicines at www.disposemymeds.org.          This list is accurate as of: 5/31/17  8:34 AM.  Always use your most recent med list.                   Brand Name Dispense Instructions for use    * amphetamine-dextroamphetamine 20 MG per 24 hr capsule    ADDERALL XR    60 capsule    Take 2 capsules (40 mg) by mouth daily       * amphetamine-dextroamphetamine 20 MG per 24 hr capsule   Start taking on:  6/30/2017    ADDERALL XR    60 capsule    Take 2 capsules (40 mg) by mouth daily       FLUoxetine 20 MG capsule    PROzac    90 capsule    Take 1 capsule (20 mg) by mouth daily       fluticasone 50 MCG/ACT spray    FLONASE    48 g    Spray 2 sprays into both nostrils daily       folic acid 1 MG tablet    FOLVITE    100 tablet    Take 1 tablet (1 mg) by mouth daily       minocycline 100 MG capsule    MINOCIN/DYNACIN    60 capsule    Take 1 capsule (100 mg) by mouth 2 times daily       montelukast 10 MG tablet    SINGULAIR    90 tablet    Take 1 tablet (10 mg) by mouth nightly as needed       * Needle (Disp) 18G X 1-1/2\" Misc    BD DISP NEEDLES    30 each    Use to administer IM medication as " "instructed       * needle (disp) 18G X 1\" Misc     15 each    1 each once a week       * Needle (Disp) 23G X 1\" Misc    BD DISP NEEDLE    30 each    Use to administer IM medication as instructed       * Needle (Disp) 23G X 1\" Misc     15 each    Use to inject testosterone into muscle weekly       olopatadine 0.1 % ophthalmic solution    PATANOL    5 mL    Place 1 drop into both eyes 2 times daily       polyethylene glycol powder    MIRALAX    850 g    Take 17 g (1 capful) by mouth daily       psyllium 0.52 G capsule     540 capsule    Take 1-2 capsules (0.52-1.04 g) by mouth daily       Sharps Container Misc     1 each    Dispose sharps       simvastatin 20 MG tablet    ZOCOR    90 tablet    Take 1 tablet (20 mg) by mouth At Bedtime       syringe (disposable) 1 ML [facility]    BD TUBERCULIN SYRINGE    30 each    BD 1ml Tuberculing syringe SLIP TIP (no needle attached). Use to administer IM medications as instructed       testosterone cypionate 200 MG/ML injection    DEPOTESTOTERONE    10 mL    Inject 0.4 mLs (80 mg) into the muscle once a week In cottonseed oil ok       topiramate 50 MG tablet    TOPAMAX    270 tablet    Take 3 tablets (150 mg) by mouth daily       triamcinolone 0.1 % cream    KENALOG    80 g    Apply sparingly to affected area two times daily as needed       * Notice:  This list has 6 medication(s) that are the same as other medications prescribed for you. Read the directions carefully, and ask your doctor or other care provider to review them with you.      "

## 2017-05-31 NOTE — PATIENT INSTRUCTIONS
Here is the plan from today's visit    1. Carpal tunnel syndrome of right wrist  - EMG ([facility]); Future  - Sports Medicine Clinic-[facility] INTERNAL REFERRAL    2. Attention deficit hyperactivity disorder (ADHD), unspecified ADHD type  - amphetamine-dextroamphetamine (ADDERALL XR) 20 MG per 24 hr capsule; Take 2 capsules (40 mg) by mouth daily  Dispense: 60 capsule; Refill: 0  - amphetamine-dextroamphetamine (ADDERALL XR) 20 MG per 24 hr capsule; Take 2 capsules (40 mg) by mouth daily  Dispense: 60 capsule; Refill: 0    3. HRT labs  - Testosterone Total  - Hepatic Panel  - CBC with Diff Plt  - Lipid Cascade  - Glucose    4. Screening for condition  - Hemoglobin A1c ([facility])  - TSH with free T4 reflex  - Neisseria gonorrhoeae PCR  - Chlamydia trachomatis PCR  - Anti Treponema  - HIV Antigen Antibody Combo  - Hepatitis B Surface Antibody  - Hepatitis B surface antigen  - Hepatitis C antibody        Follow up plan: in 3 months or as needed      Thank you for coming to [facility] today.  Lab Testing:  **If you had lab testing today and your results are reassuring or normal they will be mailed to you or sent through [employer] within 7 days.   **If the lab tests need quick action we will call you with the results.  The phone number we will call with results is # [phone] (home) [phone] (work). If this is not the best number please call our clinic and change the number.  Medication Refills:  If you need any refills please call your pharmacy and they will contact us.   If you need to  your refill at a new pharmacy, please contact the new pharmacy directly. The new pharmacy will help you get your medications transferred faster.   Scheduling:  If you have any concerns about today's visit or wish to schedule another appointment please call our office during normal business hours [phone] (8-5:00 M-F)  If a referral was made to a [facility] and you don't get a call  from central scheduling please call [phone].  If a Mammogram was ordered for you at [facility] call [phone] to schedule or change your appointment.  If you had an XRay/CT/Ultrasound/MRI ordered the number is [phone] to schedule or change your radiology appointment.   Medical Concerns:  If you have urgent medical concerns please call [phone] at any time of the day.  If you have a medical emergency please call [phone].

## 2017-06-01 LAB
C TRACH DNA SPEC QL NAA+PROBE: NORMAL
HBV SURFACE AB SERPL IA-ACNC: 59.92 M[IU]/ML
HBV SURFACE AG SERPL QL IA: NONREACTIVE
HCV AB SERPL QL IA: NORMAL
HIV 1+2 AB+HIV1 P24 AG SERPL QL IA: NORMAL
N GONORRHOEA DNA SPEC QL NAA+PROBE: NORMAL
SPECIMEN SOURCE: NORMAL
SPECIMEN SOURCE: NORMAL
T PALLIDUM IGG+IGM SER QL: NEGATIVE

## 2017-06-01 NOTE — PROGRESS NOTES
Preceptor Attestation:   Patient seen and discussed with the resident. Assessment and plan reviewed with resident and agreed upon.   Supervising Physician:  Lisandro Hopkins MD  [facility]

## 2017-06-02 LAB — TESTOST SERPL-MCNC: 467 NG/DL (ref 8–60)

## 2017-06-05 NOTE — TELEPHONE ENCOUNTER
Second call out attempt to schedule sports med appointment. No answer, left voicemail. Sending letter.

## 2017-06-28 ENCOUNTER — OFFICE VISIT (OUTPATIENT)
Dept: NEUROLOGY | Facility: CLINIC | Age: 42
End: 2017-06-28

## 2017-06-28 DIAGNOSIS — G56.01 CARPAL TUNNEL SYNDROME OF RIGHT WRIST: ICD-10-CM

## 2017-06-28 NOTE — LETTER
6/28/2017       RE: Irma Evans  [address] [facility] [address]     Dear Colleague,    Thank you for referring your patient, Irma Evans, to the [facility] EMG at [facility]. Please see a copy of my visit note below.        [facility]  Electrodiagnostic Laboratory    Nerve Conduction & EMG Report          Patient: Roxie Evans YOB: 1975  Patient ID: [id number] Age: 41 Years 5 Months  Gender: Female        History & Examination:  41 year old with pain and numbness in right hand. Onset a couple months ago. Eval for focal neuropathy.     Techniques: Motor and sensory conduction studies were done with surface recording electrodes. EMG was done with a concentric needle electrode.      Results:  Nerve conduction studies:  1. Right median-D2 sensory responses shows normal amplitude and severe CV slowing.   2. Right ulnar-D5 and radial sensory responses were normal.   3. Right median ulnar palmar interlatency difference is prolonged.   4. Right median-APB motor response shows moderately prolonged DL, normal amplitude, and normal CV in the forearm.   5. Right ulnar-ADM motor response is normal.     Needle EMG of selected proximal and distal right arm muscles was performed as tabulated below. No abnormal spontaneous activity was observed in the sampled muscles. Motor unit potential morphology and recruitment patterns were normal.     Interpretation:  This is an abnormal study. There is electrophysiologic evidence of a moderate right-sided median neuropathy at the wrist (e.g, carpal tunnel syndrome). Clinical correlation is recommended.     Caleb Anderson MD  Department of Neurology               Sensory NCS      Nerve / Sites Rec. Site Onset Peak Ref. NP Amp Ref. PP Amp Dist Cj Ref. Temp     ms ms ms  V  V  V cm m/s m/s  C   R MEDIAN - Dig II Anti      Wrist Dig II 4.01 5.31  11.2 10.0 18.9 13 32.4 48.0 33.4   R ULNAR - Dig V Anti       Wrist Dig V 2.19 2.76  17.1 8.0 14.9 12.5 57.1 48.0 33.5   R RADIAL - Snuff      Forearm Snuff 1.56 2.08  20.8 15.0 29.4 12.5 80.0 48.0 33.4   R MEDIAN - Ulnar - Palmar      Median Wrist 1.72 2.34 2.40 3.5  6.5 8 46.5  33      Ulnar Wrist 1.09 1.61 2.40 8.9  10.3 8 73.1  33.5       Motor NCS      Nerve / Sites Rec. Site Lat Ref. Amp Ref. Rel Amp Dist Cj Ref. Dur. Area Temp.     ms ms mV mV % cm m/s m/s ms %  C   R MEDIAN - APB      Wrist APB 6.15 4.40 8.8 5.0 100 8   8.18 100 32.9      Elbow APB 10.42  8.8  100 22 51.5 48.0 8.07 93.5 33.1   R ULNAR - ADM      Wrist ADM 2.29 3.50 9.6 5.0 100 8   7.66 100 32.7      B.Elbow ADM 5.42  9.3  96.9 19.5 62.4 48.0 7.45 96.9 32.5      A.Elbow ADM 6.98  9.7  101 10 64.0 48.0 7.40 96.2 32.4       F  Wave      Nerve Min F Lat Max F Lat Mean FLat Temp.    ms ms ms  C   R MEDIAN 26.46 31.82 29.20 33.5       EMG Summary Table     Spontaneous MUAP Recruitment    IA Fib Fasc H.F. Amp Dur. PPP Pattern   R. ABD POLL BREVIS Normal None None None N N None Normal   R. FIRST D INTEROSS Normal None None None N N None Normal   R. EXT INDICIS Normal None None None N N None Normal   R. BICEPS Normal None None None N N None Normal   R. TRICEPS Normal None None None N N None Normal                          Again, thank you for allowing me to participate in the care of your patient.      Sincerely,    Caleb Anderson MD

## 2017-06-28 NOTE — PROGRESS NOTES
[facility]  Electrodiagnostic Laboratory    Nerve Conduction & EMG Report          Patient: Roxie Evans YOB: 1975  Patient ID: [id number] Age: 41 Years 5 Months  Gender: Female        History & Examination:  41 year old with pain and numbness in right hand. Onset a couple months ago. Eval for focal neuropathy.     Techniques: Motor and sensory conduction studies were done with surface recording electrodes. EMG was done with a concentric needle electrode.      Results:  Nerve conduction studies:  1. Right median-D2 sensory responses shows normal amplitude and severe CV slowing.   2. Right ulnar-D5 and radial sensory responses were normal.   3. Right median ulnar palmar interlatency difference is prolonged.   4. Right median-APB motor response shows moderately prolonged DL, normal amplitude, and normal CV in the forearm.   5. Right ulnar-ADM motor response is normal.     Needle EMG of selected proximal and distal right arm muscles was performed as tabulated below. No abnormal spontaneous activity was observed in the sampled muscles. Motor unit potential morphology and recruitment patterns were normal.     Interpretation:  This is an abnormal study. There is electrophysiologic evidence of a moderate right-sided median neuropathy at the wrist (e.g, carpal tunnel syndrome). Clinical correlation is recommended.     Caleb Anderson MD  Department of Neurology               Sensory NCS      Nerve / Sites Rec. Site Onset Peak Ref. NP Amp Ref. PP Amp Dist Cj Ref. Temp     ms ms ms  V  V  V cm m/s m/s  C   R MEDIAN - Dig II Anti      Wrist Dig II 4.01 5.31  11.2 10.0 18.9 13 32.4 48.0 33.4   R ULNAR - Dig V Anti      Wrist Dig V 2.19 2.76  17.1 8.0 14.9 12.5 57.1 48.0 33.5   R RADIAL - Snuff      Forearm Snuff 1.56 2.08  20.8 15.0 29.4 12.5 80.0 48.0 33.4   R MEDIAN - Ulnar - Palmar      Median Wrist 1.72 2.34 2.40 3.5  6.5 8 46.5  33      Ulnar Wrist 1.09 1.61 2.40 8.9  10.3 8 73.1   33.5       Motor NCS      Nerve / Sites Rec. Site Lat Ref. Amp Ref. Rel Amp Dist Cj Ref. Dur. Area Temp.     ms ms mV mV % cm m/s m/s ms %  C   R MEDIAN - APB      Wrist APB 6.15 4.40 8.8 5.0 100 8   8.18 100 32.9      Elbow APB 10.42  8.8  100 22 51.5 48.0 8.07 93.5 33.1   R ULNAR - ADM      Wrist ADM 2.29 3.50 9.6 5.0 100 8   7.66 100 32.7      B.Elbow ADM 5.42  9.3  96.9 19.5 62.4 48.0 7.45 96.9 32.5      A.Elbow ADM 6.98  9.7  101 10 64.0 48.0 7.40 96.2 32.4       F  Wave      Nerve Min F Lat Max F Lat Mean FLat Temp.    ms ms ms  C   R MEDIAN 26.46 31.82 29.20 33.5       EMG Summary Table     Spontaneous MUAP Recruitment    IA Fib Fasc H.F. Amp Dur. PPP Pattern   R. ABD POLL BREVIS Normal None None None N N None Normal   R. FIRST D INTEROSS Normal None None None N N None Normal   R. EXT INDICIS Normal None None None N N None Normal   R. BICEPS Normal None None None N N None Normal   R. TRICEPS Normal None None None N N None Normal

## 2017-07-18 ENCOUNTER — OFFICE VISIT (OUTPATIENT)
Dept: FAMILY MEDICINE | Facility: CLINIC | Age: 42
End: 2017-07-18

## 2017-07-18 VITALS
RESPIRATION RATE: 18 BRPM | WEIGHT: 261.2 LBS | DIASTOLIC BLOOD PRESSURE: 75 MMHG | BODY MASS INDEX: 43.47 KG/M2 | HEART RATE: 77 BPM | SYSTOLIC BLOOD PRESSURE: 111 MMHG | OXYGEN SATURATION: 100 % | TEMPERATURE: 98.2 F

## 2017-07-18 DIAGNOSIS — G56.01 CARPAL TUNNEL SYNDROME OF RIGHT WRIST: Primary | ICD-10-CM

## 2017-07-18 ASSESSMENT — ENCOUNTER SYMPTOMS
NUMBNESS: 0
ARTHRALGIAS: 1
WEAKNESS: 0

## 2017-07-18 NOTE — PROGRESS NOTES
HPI:       Irma Evans is a 41 year old who presents for the following  Patient presents with:  Musculoskeletal Problem: right carpaltunle pain    Symptoms started 3-4 months ago.   June 28th: EMG with Dr Anderson: similar findings as on the left hand. Last time got a cortisone shot to the left wrist which helped in 1/17. Doing exercises at home as well as using a splint. States that this time it is more painful and less numb. Has a physically demending job so is hoping to get an injection today. Stating that would be able to incorporate hand therapy in his schedule.     Problem, Medication and Allergy Lists were reviewed and are current.  Patient is an established patient of this clinic.         Review of Systems:   Review of Systems   Musculoskeletal: Positive for arthralgias (R wrist).   Neurological: Negative for weakness and numbness.   All other systems reviewed and are negative.            Physical Exam:   Patient Vitals for the past 24 hrs:   BP Temp Temp src Pulse Resp SpO2 Weight   07/18/17 0806 111/75 98.2  F (36.8  C) Oral 77 18 100 % 261 lb 3.2 oz (118.5 kg)     Body mass index is 43.47 kg/(m^2).  Vitals were reviewed and were normal     Physical Exam   Constitutional: She appears well-developed and well-nourished. No distress.   HENT:   Head: Normocephalic and atraumatic.   Eyes: Conjunctivae are normal.   Neck: Normal range of motion. Neck supple.   Musculoskeletal:        Right wrist: She exhibits tenderness (in the thumb musculature).        Left wrist: Normal.        Arms:  Skin: She is not diaphoretic.       PROCEDURE:  R carpal tunnel injection    After a discussion of risks, benefits and side effects of procedure, informed patient consent was obtained.  The right wrist was prepped.    INJECTION:  Using 2.5 cc of 1 % lidocaine mixed with 20 mg of Kenolog, the R carpal tunnel was successfully injected without complication.  Patient did not experience pain relief following  injection.    Invasive Procedure Safety Checklist completed by Dr Turner and this writer    Dr Turner was performing the procedure.     Results:     No pending results.   Assessment and Plan     1. Carpal tunnel syndrome of right wrist. S/p splint usage and home exercise. H/o great symptoms improvement on the L wrist for the same reason.     - see details in procedure note.   - Medium Joint/Bursa injection and/or drainage - Unilateral (Elbow, TM, Wrist, Ankle) [20605]  - triamcinolone acetonide (KENALOG) 10 MG/ML injection; 2 mLs (20 mg) by INTRA-ARTICULAR route once for 1 dose  Dispense: 2 mL; Refill: 0  - TRIAMCINOLONE ACET INJ NOS  - Hand therapy referral   - letter for work provided: light duty for this week.   - follow up in 1 months after the injection      There are no discontinued medications.  Options for treatment and follow-up care were reviewed with the patient. Irma Evans  engaged in the decision making process and verbalized understanding of the options discussed and agreed with the final plan.    Opal Matos MD  [facility],  G2 Family Medicine Resident  #[id number]

## 2017-07-18 NOTE — PROGRESS NOTES
Preceptor Attestation:   Patient seen and discussed with the resident. Assessment and plan reviewed with resident and agreed upon.  I performed this right carpal tunnel injection.  Pt informed of bleeding, infection, median n damage.  Pt denied any parethesias at time of injection, band aid placed, no complications   Supervising Physician:  Kristan Turner MD  [facility]

## 2017-07-18 NOTE — MR AVS SNAPSHOT
After Visit Summary   7/18/2017    Irma Evans    MRN: [id number]           Patient Information     Date Of Birth          1975        Visit Information        Provider Department      7/18/2017 8:00 AM Kristan Turner MD [facility]        Today's Diagnoses     Carpal tunnel syndrome of right wrist    -  1       Follow-ups after your visit        Additional Services     [facility] PT, HAND, AND CHIROPRACTIC REFERRAL       **This order will print in the [facility] Scheduling Office**    Physical Therapy, Hand Therapy and Chiropractic Care are available through:    *[facility]  *[facility]  *[facility]    Call one number to schedule at any of the above locations: [phone].    Your provider has referred you to: Hand Therapy    Indication/Reason for Referral: R carpal tunnel syndrome  Onset of Illness: 3-4 months ago  Therapy Orders: Evaluate and Treat  Special Programs: None  Special Request: None    Alyssa Shah      Additional Comments for the Therapist or Chiropractor: S/p R carpal tunnel injection on 7/18/17.     Please be aware that coverage of these services is subject to the terms and limitations of your health insurance plan.  Call member services at your health plan with any benefit or coverage questions.      Please bring the following to your appointment:    *Your personal calendar for scheduling future appointments  *Comfortable clothing                  Follow-up notes from your care team     Return in about 4 weeks (around 8/15/2017) for after R carpal tunnel injection.      Who to contact     Please call your clinic at [phone] to:    Ask questions about your health    Make or cancel appointments    Discuss your medicines    Learn about your test results    Speak to your doctor   If you have compliments or concerns about an experience at your clinic, or if you wish to file a complaint, please  contact [facility] Patient Relations at [phone] or email us at [email or web address]         Additional Information About Your Visit        GFRAN[employer]harkristie Information     [employer] gives you secure access to your electronic health record. If you see a primary care provider, you can also send messages to your care team and make appointments. If you have questions, please call your primary care clinic.  If you do not have a primary care provider, please call [phone] and they will assist you.      [employer] is an electronic gateway that provides easy, online access to your medical records. With [employer], you can request a clinic appointment, read your test results, renew a prescription or communicate with your care team.     To access your existing account, please contact your [facility] or call [phone] for assistance.        Care EveryWhere ID     This is your Care EveryWhere ID. This could be used by other organizations to access your [facility] medical records  [id number]        Your Vitals Were     Pulse Temperature Respirations Pulse Oximetry Breastfeeding? BMI (Body Mass Index)    77 98.2  F (36.8  C) (Oral) 18 100% No 43.47 kg/m2       Blood Pressure from Last 3 Encounters:   07/18/17 111/75   05/31/17 125/83   01/17/17 124/78    Weight from Last 3 Encounters:   07/18/17 261 lb 3.2 oz (118.5 kg)   05/31/17 263 lb (119.3 kg)   12/07/16 271 lb 3.2 oz (123 kg)              We Performed the Following     [facility], HAND, AND CHIROPRACTIC REFERRAL     Medium Joint/Bursa injection and/or drainage - Unilateral (Elbow, TM, Wrist, Ankle) [94853]     TRIAMCINOLONE ACET INJ NOS          Today's Medication Changes          These changes are accurate as of: 7/18/17 11:59 PM.  If you have any questions, ask your nurse or doctor.               Start taking these medicines.        Dose/Directions    triamcinolone acetonide 10 MG/ML injection   Commonly known  as:  KENALOG   Used for:  Carpal tunnel syndrome of right wrist   Started by:  Kristan Turner MD        Dose:  20 mg   2 mLs (20 mg) by INTRA-ARTICULAR route once for 1 dose   Quantity:  2 mL   Refills:  0            Where to get your medicines      Some of these will need a paper prescription and others can be bought over the counter.  Ask your nurse if you have questions.     You don't need a prescription for these medications     triamcinolone acetonide 10 MG/ML injection                Primary Care Provider Office Phone # Fax #    Lizcelso Baker [phone]       [facility] [address] [address]        Equal Access to Services     [facility] DARLINE : Hadii stephany guerra hadasho Sochapito, waaxda luqadaha, qaybta kaalmada meenuyada, bogdan gary . So [facility] [phone].    ATENCIÓN: Si habla español, tiene a schuler disposición servicios gratuitos de asistencia lingüística. [facility] [phone].    We comply with applicable federal civil rights laws and [place] laws. We do not discriminate on the basis of race, color, national origin, age, disability sex, sexual orientation or gender identity.            Thank you!     Thank you for choosing [facility]  for your care. Our goal is always to provide you with excellent care. Hearing back from our patients is one way we can continue to improve our services. Please take a few minutes to complete the written survey that you may receive in the mail after your visit with us. Thank you!             Your Updated Medication List - Protect others around you: Learn how to safely use, store and throw away your medicines at www.disposemymeds.org.          This list is accurate as of: 7/18/17 11:59 PM.  Always use your most recent med list.                   Brand Name Dispense Instructions for use Diagnosis    * amphetamine-dextroamphetamine 20 MG per 24 hr capsule    ADDERALL XR    60 capsule     "Take 2 capsules (40 mg) by mouth daily    Attention deficit hyperactivity disorder (ADHD), unspecified ADHD type       * amphetamine-dextroamphetamine 20 MG per 24 hr capsule   Start taking on:  7/30/2017    ADDERALL XR    60 capsule    Take 2 capsules (40 mg) by mouth daily    Attention deficit hyperactivity disorder (ADHD), unspecified ADHD type       FLUoxetine 20 MG capsule    PROzac    90 capsule    Take 1 capsule (20 mg) by mouth daily    SHANA (generalized anxiety disorder)       fluticasone 50 MCG/ACT spray    FLONASE    48 g    Spray 2 sprays into both nostrils daily    Chronic rhinitis       folic acid 1 MG tablet    FOLVITE    100 tablet    Take 1 tablet (1 mg) by mouth daily    HPV test positive       minocycline 100 MG capsule    MINOCIN/DYNACIN    60 capsule    Take 1 capsule (100 mg) by mouth 2 times daily    Acne vulgaris       montelukast 10 MG tablet    SINGULAIR    90 tablet    Take 1 tablet (10 mg) by mouth nightly as needed    Seasonal allergic rhinitis due to pollen       * Needle (Disp) 18G X 1-1/2\" Misc    BD DISP NEEDLES    30 each    Use to administer IM medication as instructed    Medication management       * needle (disp) 18G X 1\" Misc     15 each    1 each once a week    Gender identity disorder       * Needle (Disp) 23G X 1\" Misc    BD DISP NEEDLE    30 each    Use to administer IM medication as instructed    Medication management       * Needle (Disp) 23G X 1\" Misc     15 each    Use to inject testosterone into muscle weekly    Gender identity disorder       olopatadine 0.1 % ophthalmic solution    PATANOL    5 mL    Place 1 drop into both eyes 2 times daily    Chronic allergic conjunctivitis       polyethylene glycol powder    MIRALAX    850 g    Take 17 g (1 capful) by mouth daily    Constipation, unspecified constipation type       psyllium 0.52 G capsule     540 capsule    Take 1-2 capsules (0.52-1.04 g) by mouth daily    Constipation, unspecified constipation type       Sharps " Container Misc     1 each    Dispose sharps    Gender dysphoria       simvastatin 20 MG tablet    ZOCOR    90 tablet    Take 1 tablet (20 mg) by mouth At Bedtime    Hyperlipidemia LDL goal <100       syringe (disposable) 1 ML Misc    BD TUBERCULIN SYRINGE    30 each    BD 1ml Tuberculing syringe SLIP TIP (no needle attached). Use to administer IM medications as instructed    Medication management       testosterone cypionate 200 MG/ML injection    DEPOTESTOTERONE    10 mL    Inject 0.4 mLs (80 mg) into the muscle once a week In cottonseed oil ok    Gender identity disorder       topiramate 50 MG tablet    TOPAMAX    270 tablet    Take 3 tablets (150 mg) by mouth daily    Morbid obesity due to excess calories (H)       triamcinolone 0.1 % cream    KENALOG    80 g    Apply sparingly to affected area two times daily as needed    Dermatitis       triamcinolone acetonide 10 MG/ML injection    KENALOG    2 mL    2 mLs (20 mg) by INTRA-ARTICULAR route once for 1 dose    Carpal tunnel syndrome of right wrist       * Notice:  This list has 6 medication(s) that are the same as other medications prescribed for you. Read the directions carefully, and ask your doctor or other care provider to review them with you.

## 2017-07-18 NOTE — LETTER
[facility]   [address],  [address]  [facility] [address]  [phone]    2017    Irma Evans  [address] [place] [address] [id number] [phone] (home)     : 1975      To Whom it may concern:    Irma Evans was seen in our Clinic Department today, 2017.  We recommend light duty work for the next week due to performed procedure.     If you have any question please do not hesitate to contact us.   Sincerely,        Opal Matos MD

## 2017-08-27 ENCOUNTER — MYC MEDICAL ADVICE (OUTPATIENT)
Dept: FAMILY MEDICINE | Facility: CLINIC | Age: 42
End: 2017-08-27

## 2017-08-27 DIAGNOSIS — F90.9 ATTENTION DEFICIT HYPERACTIVITY DISORDER (ADHD), UNSPECIFIED ADHD TYPE: ICD-10-CM

## 2017-08-28 RX ORDER — DEXTROAMPHETAMINE SACCHARATE, AMPHETAMINE ASPARTATE MONOHYDRATE, DEXTROAMPHETAMINE SULFATE AND AMPHETAMINE SULFATE 5; 5; 5; 5 MG/1; MG/1; MG/1; MG/1
40 CAPSULE, EXTENDED RELEASE ORAL DAILY
Qty: 60 CAPSULE | Refills: 0 | Status: SHIPPED | OUTPATIENT
Start: 2017-09-27 | End: 2017-10-04

## 2017-08-28 RX ORDER — DEXTROAMPHETAMINE SACCHARATE, AMPHETAMINE ASPARTATE MONOHYDRATE, DEXTROAMPHETAMINE SULFATE AND AMPHETAMINE SULFATE 5; 5; 5; 5 MG/1; MG/1; MG/1; MG/1
40 CAPSULE, EXTENDED RELEASE ORAL DAILY
Qty: 60 CAPSULE | Refills: 0 | Status: SHIPPED | OUTPATIENT
Start: 2017-08-28 | End: 2017-10-04

## 2017-08-28 NOTE — TELEPHONE ENCOUNTER
Date of last visit at clinic: 7/18/17    Please complete refill and CLOSE ENCOUNTER.  Closing the encounter signifies the refill is complete.      Lupe Mosley RN

## 2017-08-30 NOTE — TELEPHONE ENCOUNTER
Pt picked up both prescriptions. RN obtained from lock box and check ID and signed out    Annabelle Dawn RN

## 2017-09-05 ENCOUNTER — TELEPHONE (OUTPATIENT)
Dept: FAMILY MEDICINE | Facility: CLINIC | Age: 42
End: 2017-09-05

## 2017-09-05 NOTE — TELEPHONE ENCOUNTER
Called patient and offered a follow up appointment with OPAL Younger. If patient returns call, please transfer to Chanda to schedule.

## 2017-09-07 DIAGNOSIS — F64.9 GENDER IDENTITY DISORDER: ICD-10-CM

## 2017-09-07 RX ORDER — TESTOSTERONE CYPIONATE 200 MG/ML
80 INJECTION, SOLUTION INTRAMUSCULAR WEEKLY
Qty: 10 ML | Refills: 2 | Status: SHIPPED | OUTPATIENT
Start: 2017-09-07 | End: 2018-11-29

## 2017-09-07 NOTE — TELEPHONE ENCOUNTER
Request for medication refill:    Date of last visit at clinic: 07/18/17    Please complete refill if appropriate and CLOSE ENCOUNTER.    Closing the encounter signifies the refill is complete.    If refill has been denied, please complete the smart phrase .smirefuse and route it to the [facility] RN TRIAGE pool to inform the patient and the pharmacy.    Shabnam Vela, CMA

## 2017-09-08 ENCOUNTER — OFFICE VISIT (OUTPATIENT)
Dept: FAMILY MEDICINE | Facility: CLINIC | Age: 42
End: 2017-09-08

## 2017-09-08 VITALS
HEART RATE: 107 BPM | OXYGEN SATURATION: 96 % | WEIGHT: 257 LBS | TEMPERATURE: 98.3 F | SYSTOLIC BLOOD PRESSURE: 130 MMHG | BODY MASS INDEX: 42.77 KG/M2 | DIASTOLIC BLOOD PRESSURE: 87 MMHG | RESPIRATION RATE: 18 BRPM

## 2017-09-08 DIAGNOSIS — M94.0 COSTOCHONDRITIS: Primary | ICD-10-CM

## 2017-09-08 RX ORDER — NAPROXEN 500 MG/1
500 TABLET ORAL 2 TIMES DAILY WITH MEALS
Qty: 60 TABLET | Refills: 1 | Status: SHIPPED | OUTPATIENT
Start: 2017-09-08 | End: 2017-10-04

## 2017-09-08 ASSESSMENT — ENCOUNTER SYMPTOMS
DIZZINESS: 0
SHORTNESS OF BREATH: 0
HEADACHES: 0
CHEST TIGHTNESS: 0
JOINT SWELLING: 0
EYES NEGATIVE: 1
ARTHRALGIAS: 1
WHEEZING: 0
DYSURIA: 0
ABDOMINAL PAIN: 0
DYSPHORIC MOOD: 0
PALPITATIONS: 0
CONSTIPATION: 0
COUGH: 0
FEVER: 0
NAUSEA: 0
DIARRHEA: 0
CHILLS: 0

## 2017-09-08 NOTE — MR AVS SNAPSHOT
After Visit Summary   9/8/2017    Irma Evans    MRN: [id number]           Patient Information     Date Of Birth          1975        Visit Information        Provider Department      9/8/2017 4:20 PM Lenin Parks DO [facility]        Today's Diagnoses     Costochondritis    -  1      Care Instructions    Here is the plan from today's visit    1. Costochondritis  - naproxen (NAPROSYN) 500 MG tablet; Take 1 tablet (500 mg) by mouth 2 times daily (with meals)  Dispense: 60 tablet; Refill: 1      Please call or return to clinic if your symptoms don't go away.    Follow up plan: with Liz Link (appt in 1 month)      Thank you for coming to [facility] today.  Lab Testing:  **If you had lab testing today and your results are reassuring or normal they will be mailed to you or sent through [employer] within 7 days.   **If the lab tests need quick action we will call you with the results.  The phone number we will call with results is # [phone] (home) . If this is not the best number please call our clinic and change the number.  Medication Refills:  If you need any refills please call your pharmacy and they will contact us.   If you need to  your refill at a new pharmacy, please contact the new pharmacy directly. The new pharmacy will help you get your medications transferred faster.   Scheduling:  If you have any concerns about today's visit or wish to schedule another appointment please call our office during normal business hours [phone] (8-5:00 M-F)  If a referral was made to a [facility] and you don't get a call from central scheduling please call [phone].  If a Mammogram was ordered for you at [facility] call [phone] to schedule or change your appointment.  If you had an XRay/CT/Ultrasound/MRI ordered the number is [phone] to schedule or change your radiology appointment.   Medical  Concerns:  If you have urgent medical concerns please call [phone] at any time of the day.  If you have a medical emergency please call 911.          Costochondritis    Costochondritis is inflammation of a rib or the cartilage that connects a rib to your breastbone (sternum). It causes tenderness, and sometimes chest pain may be sharp or aching, or it may feel like pressure. Pain may get worse with deep breathing, movement, or exercise. In some cases, the pain is mistaken for a heart attack. Despite this, the condition is not serious. Read on to learn more about the condition and how it can be treated.  What causes costochondritis?  The cause of costochondritis is not completely clear, but it may happen after a chest injury, chest infection, or coughing episode. Some physical activities can sometimes lead to costochondritis. Large-breasted women may be more likely to have the condition. Often, the reason for the inflammation is unknown.  Diagnosing costochondritis  There is no test for costochondritis. The condition is diagnosed by the symptoms you have. Your healthcare provider will perform a physical exam. He or she will ask you about your symptoms and examine your chest for tenderness. In some cases, tests are done to rule out more serious problems. These tests may include imaging tests such as chest X-ray, CT scan, or an ECG.  Treating costochondritis  If an underlying cause is found, treatment for that will likely relieve the problem. Costochondritis often goes away on its own. The course of the condition varies from person to person. It usually lasts from weeks to months. In some cases, mild symptoms continue for months to years. To ease symptoms:    Take medicine as directed. These relieve pain and swelling. Ibuprofen or other NSAIDs are often recommended. In some cases, you may be given prescription medicine, such as muscle relaxants.    Avoid activities that put stress on the chest or spine.    Apply a  heating pad (set to warm, not too high, heat) to the breastbone several times a day.    Perform stretching exercises as directed.  Call the healthcare provider right away if you have any of the following:    Pain that is not relieved by medicine    Shortness of breath    Lightheadedness, dizziness, or fainting    Feeling of irregular heartbeat or fast pulse  Anyone with chest pain should see a healthcare provider, especially those who are older and may be at risk for heart disease.   Date Last Reviewed: 10/1/2016    2850-9283 [employer]. [address] [place] [address]. All rights reserved. This information is not intended as a substitute for professional medical care. Always follow your healthcare professional's instructions.                Follow-ups after your visit        Your next 10 appointments already scheduled     Oct 04, 2017  9:00 AM CDT   Return Visit with [facility] ([facility])    [address] [place] [address],  [address]  [place] [address]   [phone]              Who to contact     Please call your clinic at [phone] to:    Ask questions about your health    Make or cancel appointments    Discuss your medicines    Learn about your test results    Speak to your doctor   If you have compliments or concerns about an experience at your clinic, or if you wish to file a complaint, please contact [facility] Patient Relations at [phone] or email us at [email or web address]         Additional Information About Your Visit        MyChart Information     Epic!hart gives you secure access to your electronic health record. If you see a primary care provider, you can also send messages to your care team and make appointments. If you have questions, please call your primary care clinic.  If you do not have a primary care provider, please call [phone] and they will assist you.      [employer] is an  electronic gateway that provides easy, online access to your medical records. With [facility], you can request a clinic appointment, read your test results, renew a prescription or communicate with your care team.     To access your existing account, please contact your [facility] or call [phone] for assistance.        Care EveryWhere ID     This is your Care EveryWhere ID. This could be used by other organizations to access your [place] medical records  [id number]        Your Vitals Were     Pulse Temperature Respirations Pulse Oximetry Breastfeeding? BMI (Body Mass Index)    107 98.3  F (36.8  C) (Oral) 18 96% No 42.77 kg/m2       Blood Pressure from Last 3 Encounters:   09/08/17 130/87   07/18/17 111/75   05/31/17 125/83    Weight from Last 3 Encounters:   09/08/17 257 lb (116.6 kg)   07/18/17 261 lb 3.2 oz (118.5 kg)   05/31/17 263 lb (119.3 kg)              Today, you had the following     No orders found for display         Today's Medication Changes          These changes are accurate as of: 9/8/17  5:09 PM.  If you have any questions, ask your nurse or doctor.               Start taking these medicines.        Dose/Directions    naproxen 500 MG tablet   Commonly known as:  NAPROSYN   Used for:  Costochondritis   Started by:  Lenin Parks DO        Dose:  500 mg   Take 1 tablet (500 mg) by mouth 2 times daily (with meals)   Quantity:  60 tablet   Refills:  1            Where to get your medicines      [facility] [address] [place] [address] [address]VE [address] AT [place] [address] [address] [facility] [address]     Phone:  [phone]     naproxen 500 MG tablet                Primary Care Provider Office Phone # Fax #    Liz Baker [phone]       [address] [address]        Equal Access to Services     WILDA GREGORIO AH: najma Velasquez,  jeffry timelo nilebogdan dodson ah. So [facility] [phone].    ATENCIÓN: Si dedra garner, tiene a schuler disposición servicios gratuitos de asistencia lingüística. Katia al [phone].    We comply with applicable federal civil rights laws and [place] laws. We do not discriminate on the basis of race, color, national origin, age, disability sex, sexual orientation or gender identity.            Thank you!     Thank you for choosing [facility]  for your care. Our goal is always to provide you with excellent care. Hearing back from our patients is one way we can continue to improve our services. Please take a few minutes to complete the written survey that you may receive in the mail after your visit with us. Thank you!             Your Updated Medication List - Protect others around you: Learn how to safely use, store and throw away your medicines at www.disposemymeds.org.          This list is accurate as of: 9/8/17  5:09 PM.  Always use your most recent med list.                   Brand Name Dispense Instructions for use Diagnosis    * amphetamine-dextroamphetamine 20 MG per 24 hr capsule    ADDERALL XR    60 capsule    Take 2 capsules (40 mg) by mouth daily    Attention deficit hyperactivity disorder (ADHD), unspecified ADHD type       * amphetamine-dextroamphetamine 20 MG per 24 hr capsule   Start taking on:  9/27/2017    ADDERALL XR    60 capsule    Take 2 capsules (40 mg) by mouth daily    Attention deficit hyperactivity disorder (ADHD), unspecified ADHD type       FLUoxetine 20 MG capsule    PROzac    90 capsule    Take 1 capsule (20 mg) by mouth daily    SHANA (generalized anxiety disorder)       fluticasone 50 MCG/ACT spray    FLONASE    48 g    Spray 2 sprays into both nostrils daily    Chronic rhinitis       folic acid 1 MG tablet    FOLVITE    100 tablet    Take 1 tablet (1 mg) by mouth daily    HPV test positive       minocycline 100 MG capsule     "MINOCIN/DYNACIN    60 capsule    Take 1 capsule (100 mg) by mouth 2 times daily    Acne vulgaris       montelukast 10 MG tablet    SINGULAIR    90 tablet    Take 1 tablet (10 mg) by mouth nightly as needed    Seasonal allergic rhinitis due to pollen       naproxen 500 MG tablet    NAPROSYN    60 tablet    Take 1 tablet (500 mg) by mouth 2 times daily (with meals)    Costochondritis       needle (disp) 18G X 1\" Misc     15 each    1 each once a week    Gender identity disorder       Needle (Disp) 23G X 1\" Misc     15 each    Use to inject testosterone into muscle weekly    Gender identity disorder       olopatadine 0.1 % ophthalmic solution    PATANOL    5 mL    Place 1 drop into both eyes 2 times daily    Chronic allergic conjunctivitis       polyethylene glycol powder    MIRALAX    850 g    Take 17 g (1 capful) by mouth daily    Constipation, unspecified constipation type       psyllium 0.52 G capsule     540 capsule    Take 1-2 capsules (0.52-1.04 g) by mouth daily    Constipation, unspecified constipation type       Sharps Container Misc     1 each    Dispose sharps    Gender dysphoria       simvastatin 20 MG tablet    ZOCOR    90 tablet    Take 1 tablet (20 mg) by mouth At Bedtime    Hyperlipidemia LDL goal <100       syringe (disposable) 1 ML Misc    BD TUBERCULIN SYRINGE    30 each    BD 1ml Tuberculing syringe SLIP TIP (no needle attached). Use to administer IM medications as instructed    Medication management       testosterone cypionate 200 MG/ML injection    DEPOTESTOTERONE    10 mL    Inject 0.4 mLs (80 mg) into the muscle once a week In cottonseed oil ok    Gender identity disorder       topiramate 50 MG tablet    TOPAMAX    270 tablet    Take 3 tablets (150 mg) by mouth daily    Morbid obesity due to excess calories (H)       triamcinolone 0.1 % cream    KENALOG    80 g    Apply sparingly to affected area two times daily as needed    Dermatitis       * Notice:  This list has 2 medication(s) that are " the same as other medications prescribed for you. Read the directions carefully, and ask your doctor or other care provider to review them with you.

## 2017-09-08 NOTE — PROGRESS NOTES
Preceptor Attestation:   Patient seen and discussed with the resident. Assessment and plan reviewed with resident and agreed upon.   Supervising Physician:  Nemesio Noriega MD  [facility]

## 2017-09-08 NOTE — PROGRESS NOTES
HPI:       Irma Evans is a 41 year old who presents for the following  Patient presents with:  Rib Pain      Left Rib Pain      Onset: 9/2 (6 days ago). Patient woke up Saturday morning with this pain.     Injury?  No, He cannot recall any injury, excessive coughing or recent illness. He does state his allergies have been acting up, but denies any cold symptoms.    Description:   Location(s): Left lower ribs  Character: Sharp    Intensity: mild at rest, 7/10 with coughing and deep breathing    Progression of Symptoms: better    Accompanying Signs & Symptoms:  Other symptoms: none    History:   Previous similar pain: no      Worsened by    Overuse?: Yes Details: worse with movement    Morning Stiffness?:no    Alleviating factors:  Improved by: rest/inactivity and massage  Therapies Tried and outcome: Tylenol without much relief. Percocet with moderate relief.    Problem, Medication and Allergy Lists were reviewed and are current.  Patient is an established patient of this clinic.         Review of Systems:   Review of Systems   Constitutional: Negative for chills and fever.   Eyes: Negative.    Respiratory: Negative for cough, chest tightness, shortness of breath and wheezing.    Cardiovascular: Negative for chest pain, palpitations and leg swelling.   Gastrointestinal: Negative for abdominal pain, constipation, diarrhea and nausea.   Genitourinary: Negative for dysuria.   Musculoskeletal: Positive for arthralgias (pain along left lower ribs). Negative for joint swelling.   Skin: Negative for rash.   Neurological: Negative for dizziness and headaches.   Psychiatric/Behavioral: Negative for dysphoric mood.             Physical Exam:   Patient Vitals for the past 24 hrs:   BP Temp Temp src Pulse Resp SpO2 Weight   09/08/17 1634 130/87 - - - - - -   09/08/17 1633 (!) 137/92 98.3  F (36.8  C) Oral 107 18 96 % 257 lb (116.6 kg)     Body mass index is 42.77 kg/(m^2).  Vitals were reviewed.  BP elevated but  repeat BP normal.       Physical Exam   Constitutional: He appears well-developed and well-nourished. No distress.   HENT: Head: Normocephalic and atraumatic.   Eyes: Conjunctivae and EOM are normal. Pupils are equal, round, and reactive to light.   Cardiovascular: Normal rate, regular rhythm and normal heart sounds.  Exam reveals no gallop.  No murmur heard.  Pulmonary/Chest: Effort normal and breath sounds normal. No respiratory distress.   Abdominal: Soft. Bowel sounds are normal. There is no tenderness.   Musculoskeletal: Normal range of motion. He exhibits tenderness (along inferior border of left 10th rib). No edema or deformity.       Results:     No labs ordered.  Assessment and Plan     1. Costochondritis  Pain most likely due to inflammation of the muscles vs cartilage of the ribs. Tenderness noted with palpation of 10th rib. No history of trauma or fall so doubt fracture. Has already started to improve and will likely resolve on its own.  Recommend naproxen for pain relief.  - naproxen (NAPROSYN) 500 MG tablet; Take 1 tablet (500 mg) by mouth 2 times daily (with meals)  Dispense: 60 tablet; Refill: 1  - follow up with Dr. Liz Baker in 1 month    There are no discontinued medications.  Options for treatment and follow-up care were reviewed with the patient. Irma Evans  engaged in the decision making process and verbalized understanding of the options discussed and agreed with the final plan.    Lenin Parks,     This note is scribed by Christina Terry, medical student on behalf of LENIN PARKS.

## 2017-09-08 NOTE — PATIENT INSTRUCTIONS
Here is the plan from today's visit    1. Costochondritis  - naproxen (NAPROSYN) 500 MG tablet; Take 1 tablet (500 mg) by mouth 2 times daily (with meals)  Dispense: 60 tablet; Refill: 1      Please call or return to clinic if your symptoms don't go away.    Follow up plan: with Liz Link (appt in 1 month)      Thank you for coming to [facility] today.  Lab Testing:  **If you had lab testing today and your results are reassuring or normal they will be mailed to you or sent through [employer] within 7 days.   **If the lab tests need quick action we will call you with the results.  The phone number we will call with results is # [phone] (home) . If this is not the best number please call our clinic and change the number.  Medication Refills:  If you need any refills please call your pharmacy and they will contact us.   If you need to  your refill at a new pharmacy, please contact the new pharmacy directly. The new pharmacy will help you get your medications transferred faster.   Scheduling:  If you have any concerns about today's visit or wish to schedule another appointment please call our office during normal business hours [phone] (8-5:00 M-F)  If a referral was made to a [facility] and you don't get a call from central scheduling please call [phone].  If a Mammogram was ordered for you at [facility] call [phone] to schedule or change your appointment.  If you had an XRay/CT/Ultrasound/MRI ordered the number is [phone] to schedule or change your radiology appointment.   Medical Concerns:  If you have urgent medical concerns please call [phone] at any time of the day.  If you have a medical emergency please call [phone].          Costochondritis    Costochondritis is inflammation of a rib or the cartilage that connects a rib to your breastbone (sternum). It causes tenderness, and sometimes chest pain may be sharp or aching, or it may feel like  pressure. Pain may get worse with deep breathing, movement, or exercise. In some cases, the pain is mistaken for a heart attack. Despite this, the condition is not serious. Read on to learn more about the condition and how it can be treated.  What causes costochondritis?  The cause of costochondritis is not completely clear, but it may happen after a chest injury, chest infection, or coughing episode. Some physical activities can sometimes lead to costochondritis. Large-breasted women may be more likely to have the condition. Often, the reason for the inflammation is unknown.  Diagnosing costochondritis  There is no test for costochondritis. The condition is diagnosed by the symptoms you have. Your healthcare provider will perform a physical exam. He or she will ask you about your symptoms and examine your chest for tenderness. In some cases, tests are done to rule out more serious problems. These tests may include imaging tests such as chest X-ray, CT scan, or an ECG.  Treating costochondritis  If an underlying cause is found, treatment for that will likely relieve the problem. Costochondritis often goes away on its own. The course of the condition varies from person to person. It usually lasts from weeks to months. In some cases, mild symptoms continue for months to years. To ease symptoms:    Take medicine as directed. These relieve pain and swelling. Ibuprofen or other NSAIDs are often recommended. In some cases, you may be given prescription medicine, such as muscle relaxants.    Avoid activities that put stress on the chest or spine.    Apply a heating pad (set to warm, not too high, heat) to the breastbone several times a day.    Perform stretching exercises as directed.  Call the healthcare provider right away if you have any of the following:    Pain that is not relieved by medicine    Shortness of breath    Lightheadedness, dizziness, or fainting    Feeling of irregular heartbeat or fast pulse  Anyone with  chest pain should see a healthcare provider, especially those who are older and may be at risk for heart disease.   Date Last Reviewed: 10/1/2016    9967-1981 [employer] [address] [place] [address], [place] [address]. All rights reserved. This information is not intended as a substitute for professional medical care. Always follow your healthcare professional's instructions.

## 2017-09-25 PROBLEM — E88.810 DYSMETABOLIC SYNDROME X: Status: ACTIVE | Noted: 2017-09-25

## 2017-10-04 ENCOUNTER — OFFICE VISIT (OUTPATIENT)
Dept: FAMILY MEDICINE | Facility: CLINIC | Age: 42
End: 2017-10-04

## 2017-10-04 VITALS
RESPIRATION RATE: 20 BRPM | OXYGEN SATURATION: 100 % | HEART RATE: 82 BPM | SYSTOLIC BLOOD PRESSURE: 121 MMHG | TEMPERATURE: 98.4 F | DIASTOLIC BLOOD PRESSURE: 86 MMHG | BODY MASS INDEX: 43.5 KG/M2 | WEIGHT: 261.4 LBS

## 2017-10-04 DIAGNOSIS — Z23 NEEDS FLU SHOT: ICD-10-CM

## 2017-10-04 DIAGNOSIS — F90.2 ATTENTION DEFICIT HYPERACTIVITY DISORDER (ADHD), COMBINED TYPE: Primary | ICD-10-CM

## 2017-10-04 DIAGNOSIS — L70.0 ACNE VULGARIS: ICD-10-CM

## 2017-10-04 DIAGNOSIS — M77.11 LATERAL EPICONDYLITIS OF RIGHT ELBOW: ICD-10-CM

## 2017-10-04 DIAGNOSIS — E88.810 DYSMETABOLIC SYNDROME X: ICD-10-CM

## 2017-10-04 DIAGNOSIS — Z23 ENCOUNTER FOR IMMUNIZATION: ICD-10-CM

## 2017-10-04 PROBLEM — G56.01 CARPAL TUNNEL SYNDROME OF RIGHT WRIST: Status: RESOLVED | Noted: 2017-07-18 | Resolved: 2017-10-04

## 2017-10-04 RX ORDER — DEXTROAMPHETAMINE SACCHARATE, AMPHETAMINE ASPARTATE, DEXTROAMPHETAMINE SULFATE AND AMPHETAMINE SULFATE 5; 5; 5; 5 MG/1; MG/1; MG/1; MG/1
20 TABLET ORAL 2 TIMES DAILY
Qty: 60 TABLET | Refills: 0 | Status: SHIPPED | OUTPATIENT
Start: 2017-12-29 | End: 2018-01-12

## 2017-10-04 RX ORDER — DEXTROAMPHETAMINE SACCHARATE, AMPHETAMINE ASPARTATE, DEXTROAMPHETAMINE SULFATE AND AMPHETAMINE SULFATE 5; 5; 5; 5 MG/1; MG/1; MG/1; MG/1
20 TABLET ORAL 2 TIMES DAILY
Qty: 60 TABLET | Refills: 0 | Status: SHIPPED | OUTPATIENT
Start: 2017-11-30 | End: 2017-12-19

## 2017-10-04 RX ORDER — DEXTROAMPHETAMINE SACCHARATE, AMPHETAMINE ASPARTATE, DEXTROAMPHETAMINE SULFATE AND AMPHETAMINE SULFATE 5; 5; 5; 5 MG/1; MG/1; MG/1; MG/1
20 TABLET ORAL 2 TIMES DAILY
Qty: 60 TABLET | Refills: 0 | Status: SHIPPED | OUTPATIENT
Start: 2017-10-31 | End: 2017-11-30

## 2017-10-04 RX ORDER — MINOCYCLINE HYDROCHLORIDE 100 MG/1
100 CAPSULE ORAL 2 TIMES DAILY
Qty: 180 CAPSULE | Refills: 3 | Status: SHIPPED | OUTPATIENT
Start: 2017-10-04 | End: 2018-11-15

## 2017-10-04 ASSESSMENT — ENCOUNTER SYMPTOMS
NERVOUS/ANXIOUS: 0
APPETITE CHANGE: 0
AGITATION: 0
HEADACHES: 0
DYSPHORIC MOOD: 0
ARTHRALGIAS: 1
ACTIVITY CHANGE: 1
JOINT SWELLING: 1
SLEEP DISTURBANCE: 0

## 2017-10-04 NOTE — PATIENT INSTRUCTIONS
Here is the plan from today's visit    1. Attention deficit hyperactivity disorder (ADHD), combined type    -3 MONTH SUPPLY                                                               [place]  Message     10 days in advance for 3 Month Script for up to 1 year     -Complete the resent month supply until gone.    -IF SA is not effective consider 5 mg  In afternoon.    - amphetamine-dextroamphetamine (ADDERALL) 20 MG per tablet; Take 1 tablet (20 mg) by mouth 2 times daily  Dispense: 60 tablet; Refill: 0  - amphetamine-dextroamphetamine (ADDERALL) 20 MG per tablet; Take 1 tablet (20 mg) by mouth 2 times daily  Dispense: 60 tablet; Refill: 0  - amphetamine-dextroamphetamine (ADDERALL) 20 MG per tablet; Take 1 tablet (20 mg) by mouth 2 times daily  Dispense: 60 tablet; Refill: 0    2. Acne vulgaris    -Restart Medication    -Derm is an alternative option    - minocycline (MINOCIN/DYNACIN) 100 MG capsule; Take 1 capsule (100 mg) by mouth 2 times daily  Dispense: 180 capsule; Refill: 3    3. Lateral epicondylitis of right elbow    -Tiger Balm    -Icing     -Wear the band          Please call or return to clinic if your symptoms don't go away.    Follow up plan      Please make a clinic appointment for follow up with me (LINK, DASHAWN BURKS) in 1 Year for Medication.    Thank you for coming to [facility] today.  Lab Testing:  **If you had lab testing today and your results are reassuring or normal they will be mailed to you or sent through [employer] within 7 days.   **If the lab tests need quick action we will call you with the results.  The phone number we will call with results is # [phone] (home) . If this is not the best number please call our clinic and change the number.  Medication Refills:  If you need any refills please call your pharmacy and they will contact us.   If you need to  your refill at a new pharmacy, please contact the new pharmacy directly. The new pharmacy will help you get your  medications transferred faster.   Scheduling:  If you have any concerns about today's visit or wish to schedule another appointment please call our office during normal business hours [phone] (8-5:00 M-F)  If a referral was made to a [facility] and you don't get a call from central scheduling please call [phone].  If a Mammogram was ordered for you at [facility] call [phone] to schedule or change your appointment.  If you had an XRay/CT/Ultrasound/MRI ordered the number is [phone] to schedule or change your radiology appointment.   Medical Concerns:  If you have urgent medical concerns please call [phone] at any time of the day.  If you have a medical emergency please call [phone].

## 2017-10-04 NOTE — PROGRESS NOTES
HPI:       Trevin james a 41 year old who presents for the following  Patient presents with:  RECHECK: ADD         ADD Follow-Up (Adult)   Concerns   Changes since last visit: Stable and None  Taking controlled (daily) medications as prescribed: Yes  Sleep: no problems  Adult ADHD Self-Reporting form given to patient?:  Yes - very often scores in areas of tasks requiring a lot of thought, tasks that are boring, and feeling driven by a motor. Others are sometimes to often. Would rate all symptoms in very often category previous to med.   Currently in counseling: No    Medication Benefits:   Controlled symptoms: Attention span, ability to complete tasks, manage a difficult and changing schedule, less fidgety, able to remember better  Uncontrolled symptoms:  Sweating increased,States drenched, this is new. Has to change shirts x2 during day    Medication Side Effects:  Reports:  Sweating  Sleep Problems? no  Appetite Suppression? No  No weight loss  ++++++++++++++++++++++++++++++++++++++++++++++++    Employer Concerns/Feedback: Works early morning and Late Nights but able to manage. Employer loves him and work ethic.  Coworker Concerns:   Stable  Home/Family Concerns: Stable. Partner is famous, touring. Difficult, but managing.     Adult ADHD Self-Reporting form reviewed score 15 scores are in the darkly shaded boxes, out of 18. This is highly indicative of ADHD and could indicate that the patient could beneift from enhanced control pharmacologically, however he now has to change his adderal XR to short acting due to insurance change to employers insurance and no coverage to XR. Ritalin short and long acting previously were ineffective.          Symptoms     1.Controlled-by motor, improved attention span,Pt states 70% improvement over prior to meds    2.Uncontrolled- Driven by motor, unable to focus on boring stuff.      Adherence and Exercise  Medication side effects: yes the increased sweating  How often is a  medication missed? Never  Exercise:walking  4-5 days/week for an average of 30,000 steps.          Concern: Elbow Pain    Right Elbow-Patient states sore with ROM. This occurs intermit.Pt states 4 months. Lifting a ton at work, Using tiger balm helps.       Problem, Medication and Allergy Lists were reviewed and are current.  Patient is   an established patient of this clinic., I reviewed  Past Medical History, Family History and Social History..,   Stopped multiple meds, see below.   Past Medical History:   Diagnosis Date     Broken foot 1-2012     Depressive disorder      Hypertension      Migraines since [employer]     Patient overweight     BMI 56     Seasonal allergies     spring   ,   Family History     Problem (# of Occurrences) Relation (Name,Age of Onset)    Alcohol/Drug (2) Mother, Father    Arthritis (2) Mother, Maternal Grandfather    Asthma (1) Mother    Depression (1) Mother: and many on mom's side of family    GASTROINTESTINAL DISEASE (1) Sister: CROHN disease    Hypertension (4) Mother, Father, Maternal Uncle, Maternal Grandfather    Obesity (1) Father    Thyroid Disease (1) Mother: s/p removal       Negative family history of: CANCER       and   Social History     Social History     Social History     Marital status: Single     Spouse name: N/A     Number of children: N/A     Years of education: N/A     Social History Main Topics     Smoking status: Former Smoker     Packs/day: 1.00     Years: 12.00     Types: Cigarettes     Smokeless tobacco: Never Used      Comment: quit 6/29/13     Alcohol use Yes      Comment: Occ     Drug use: Yes     Special: Marijuana      Comment: THC only     Sexual activity: Not Currently     Partners: Female, Male     Birth control/ protection: Implant, None     Other Topics Concern     Parent/Sibling W/ Cabg, Mi Or Angioplasty Before 65f 55m? No     Social History Narrative    Works in event planning/set up. Lifts a lot at work.    Frequent exercise.        Review of  Systems   Constitutional: Positive for activity change (more, exercising). Negative for appetite change.   Musculoskeletal: Positive for arthralgias and joint swelling (toes, but realized it was due to toe walking up ramps carrying heavy loads. now resolved).   Skin: Negative for rash.        Acne worse, stopped minocycline due to cost     Neurological: Negative for headaches.   Psychiatric/Behavioral: Negative for agitation, dysphoric mood and sleep disturbance. The patient is not nervous/anxious.              Physical Exam:   Patient Vitals for the past 24 hrs:   BP Temp Temp src Pulse Resp SpO2 Weight   10/04/17 0911 121/86 98.4  F (36.9  C) Oral 82 20 100 % 261 lb 6.4 oz (118.6 kg)     Body mass index is 43.5 kg/(m^2).  Vitals were reviewed and were normal  Wt Readings from Last 10 Encounters:   10/04/17 261 lb 6.4 oz (118.6 kg)   09/08/17 257 lb (116.6 kg)   07/18/17 261 lb 3.2 oz (118.5 kg)   05/31/17 263 lb (119.3 kg)   12/07/16 271 lb 3.2 oz (123 kg)   11/08/16 262 lb 12.8 oz (119.2 kg)   10/19/16 261 lb (118.4 kg)   09/29/16 258 lb (117 kg)   09/15/16 257 lb (116.6 kg)   07/26/16 257 lb 1.6 oz (116.6 kg)     Pronouns should be HE/HIM  Physical Exam   Constitutional: She appears well-developed and well-nourished. No distress.   Musculoskeletal:        Right forearm: She exhibits tenderness (Right epicondyle. but FROM) and bony tenderness. She exhibits no swelling, no edema, no deformity and no laceration.   Psychiatric: She has a normal mood and affect. Her speech is normal. Judgment and thought content normal. Her affect is not inappropriate. She is hyperactive. She exhibits normal recent memory.         Results:     Hep b immune by serology  Assessment and Plan     Trevin was seen today for recheck.    Diagnoses and all orders for this visit:    Attention deficit hyperactivity disorder (ADHD), combined type  -     amphetamine-dextroamphetamine (ADDERALL) 20 MG per tablet; Take 1 tablet (20 mg) by mouth 2  "times daily  -     amphetamine-dextroamphetamine (ADDERALL) 20 MG per tablet; Take 1 tablet (20 mg) by mouth 2 times daily  -     amphetamine-dextroamphetamine (ADDERALL) 20 MG per tablet; Take 1 tablet (20 mg) by mouth 2 times daily  -3 MONTH SUPPLY   MYCHART  Message 10 days in advance for 3 Month Script for up to 1 year from today  -Complete the current [place] adderall supply until gone., then start SA  -IF SA is not effective consider additional 5 mg  In afternoon.  Will document in problem list for other providers    Acne vulgaris  -     minocycline (MINOCIN/DYNACIN) 100 MG capsule; Take 1 capsule (100 mg) by mouth 2 times daily  -Restart Medication, cheaper at [employer]  -Derm is an alternative option    Lateral epicondylitis of right elbow  -Tiger Balm  -Icing   -Wear the band    Encounter for immunization  -     ADMIN VACCINE, INITIAL  -     FLU VAC PRESRV FREE QUAD SPLIT VIR 3+YRS IM, 0.5 mL dosage    Needs flu shot, health care maintenance  Refills as below  -     needle, disp, 18G X 1\" MISC; 1 each once a week  -     Needle, Disp, 23G X 1\" MISC; Use to inject testosterone into muscle weekly  -     syringe, disposable, (BD TUBERCULIN SYRINGE) 1 ML MISC; BD 1ml Tuberculing syringe SLIP TIP (no needle attached). Use to administer IM medications as instructed    Dysmetabolic syndrome X  DC Simvastatin, topamax and others below. NO LONGER DIABETIC. Reviewed 3 yrs of A1C's in 4-5 range. Changed dx to dysmetbolic syndrome and educated pt on it.   Continue exrcise  Still at risk for DM recurrence    F/u one year for ADD  F/u May 2018 for testosterone management        Medications Discontinued During This Encounter   Medication Reason     minocycline (MINOCIN/DYNACIN) 100 MG capsule Reorder     naproxen (NAPROSYN) 500 MG tablet      polyethylene glycol (MIRALAX) powder      psyllium 0.52 G capsule      simvastatin (ZOCOR) 20 MG tablet      topiramate (TOPAMAX) 50 MG tablet      folic acid (FOLVITE) 1 MG tablet      " "amphetamine-dextroamphetamine (ADDERALL XR) 20 MG per 24 hr capsule      amphetamine-dextroamphetamine (ADDERALL XR) 20 MG per 24 hr capsule      needle, disp, 18G X 1\" MISC Reorder     Needle, Disp, 23G X 1\" MISC Reorder     syringe, disposable, (BD TUBERCULIN SYRINGE) 1 ML MISC Reorder     I spent 49 min face to face with the patient and >50% was spent counselling the patient about the above medical conditions, educating patient  .      Options for treatment and follow-up care were reviewed with the patient. Irma Evans  engaged in the decision making process and verbalized understanding of the options discussed and agreed with the final plan.    Liz Baker MD      "

## 2017-10-04 NOTE — MR AVS SNAPSHOT
After Visit Summary   10/4/2017    Irma Evans    MRN: [id number]           Patient Information     Date Of Birth          1975        Visit Information        Provider Department      10/4/2017 9:00 AM Liz Raygoza MD [facility]        Today's Diagnoses     Attention deficit hyperactivity disorder (ADHD), combined type    -  1    Acne vulgaris        Lateral epicondylitis of right elbow        Encounter for immunization          Care Instructions    Here is the plan from today's visit    1. Attention deficit hyperactivity disorder (ADHD), combined type    -3 MONTH SUPPLY                                                               [place]  Message     10 days in advance for 3 Month Script for up to 1 year     -Complete the resent month supply until gone.    -IF SA is not effective consider 5 mg  In afternoon.    - amphetamine-dextroamphetamine (ADDERALL) 20 MG per tablet; Take 1 tablet (20 mg) by mouth 2 times daily  Dispense: 60 tablet; Refill: 0  - amphetamine-dextroamphetamine (ADDERALL) 20 MG per tablet; Take 1 tablet (20 mg) by mouth 2 times daily  Dispense: 60 tablet; Refill: 0  - amphetamine-dextroamphetamine (ADDERALL) 20 MG per tablet; Take 1 tablet (20 mg) by mouth 2 times daily  Dispense: 60 tablet; Refill: 0    2. Acne vulgaris    -Restart Medication    -Derm is an alternative option    - minocycline (MINOCIN/DYNACIN) 100 MG capsule; Take 1 capsule (100 mg) by mouth 2 times daily  Dispense: 180 capsule; Refill: 3    3. Lateral epicondylitis of right elbow    -Tiger Balm    -Icing     -Wear the band          Please call or return to clinic if your symptoms don't go away.    Follow up plan      Please make a clinic appointment for follow up with me (LIZ RAYGOZA) in 1 Year for Medication.    Thank you for coming to [facility] today.  Lab Testing:  **If you had lab testing today and your results are reassuring or normal they will be mailed to you or  sent through [employer] within 7 days.   **If the lab tests need quick action we will call you with the results.  The phone number we will call with results is # [phone] (home) . If this is not the best number please call our clinic and change the number.  Medication Refills:  If you need any refills please call your pharmacy and they will contact us.   If you need to  your refill at a new pharmacy, please contact the new pharmacy directly. The new pharmacy will help you get your medications transferred faster.   Scheduling:  If you have any concerns about today's visit or wish to schedule another appointment please call our office during normal business hours [phone] (8-5:00 M-F)  If a referral was made to a [facility] and you don't get a call from central scheduling please call [phone].  If a Mammogram was ordered for you at [facility] call [phone] to schedule or change your appointment.  If you had an XRay/CT/Ultrasound/MRI ordered the number is [phone] to schedule or change your radiology appointment.   Medical Concerns:  If you have urgent medical concerns please call [phone] at any time of the day.  If you have a medical emergency please call [phone].            Follow-ups after your visit        Who to contact     Please call your clinic at [phone] to:    Ask questions about your health    Make or cancel appointments    Discuss your medicines    Learn about your test results    Speak to your doctor   If you have compliments or concerns about an experience at your clinic, or if you wish to file a complaint, please contact [facility] Patient Relations at [phone] or email us at [email or web address]         Additional Information About Your Visit        [employer] Information     [employer] gives you secure access to your electronic health record. If you see a primary care provider, you can also send messages to  your care team and make appointments. If you have questions, please call your primary care clinic.  If you do not have a primary care provider, please call [phone] and they will assist you.      [employer] is an electronic gateway that provides easy, online access to your medical records. With [employer], you can request a clinic appointment, read your test results, renew a prescription or communicate with your care team.     To access your existing account, please contact your [facility] or call [phone] for assistance.        Care EveryWhere ID     This is your Care EveryWhere ID. This could be used by other organizations to access your [place] medical records  [id number]        Your Vitals Were     Pulse Temperature Respirations Pulse Oximetry BMI (Body Mass Index)       82 98.4  F (36.9  C) (Oral) 20 100% 43.5 kg/m2        Blood Pressure from Last 3 Encounters:   10/04/17 121/86   09/08/17 130/87   07/18/17 111/75    Weight from Last 3 Encounters:   10/04/17 261 lb 6.4 oz (118.6 kg)   09/08/17 257 lb (116.6 kg)   07/18/17 261 lb 3.2 oz (118.5 kg)              We Performed the Following     ADMIN VACCINE, INITIAL     FLU VAC PRESRV FREE QUAD SPLIT VIR 3+YRS IM, 0.5 mL dosage          Today's Medication Changes          These changes are accurate as of: 10/4/17  9:59 AM.  If you have any questions, ask your nurse or doctor.               Start taking these medicines.        Dose/Directions    * amphetamine-dextroamphetamine 20 MG per tablet   Commonly known as:  ADDERALL   Used for:  Attention deficit hyperactivity disorder (ADHD), combined type   Replaces:  amphetamine-dextroamphetamine 20 MG per 24 hr capsule   Started by:  Liz Baker MD        Dose:  20 mg   Start taking on:  10/31/2017   Take 1 tablet (20 mg) by mouth 2 times daily   Quantity:  60 tablet   Refills:  0       * amphetamine-dextroamphetamine 20 MG per tablet   Commonly known as:  ADDERALL   Used for:   Attention deficit hyperactivity disorder (ADHD), combined type   Replaces:  amphetamine-dextroamphetamine 20 MG per 24 hr capsule   Started by:  Liz Baker MD        Dose:  20 mg   Start taking on:  11/30/2017   Take 1 tablet (20 mg) by mouth 2 times daily   Quantity:  60 tablet   Refills:  0       * amphetamine-dextroamphetamine 20 MG per tablet   Commonly known as:  ADDERALL   Used for:  Attention deficit hyperactivity disorder (ADHD), combined type   Started by:  Liz Baker MD        Dose:  20 mg   Start taking on:  12/29/2017   Take 1 tablet (20 mg) by mouth 2 times daily   Quantity:  60 tablet   Refills:  0       * Notice:  This list has 3 medication(s) that are the same as other medications prescribed for you. Read the directions carefully, and ask your doctor or other care provider to review them with you.      Stop taking these medicines if you haven't already. Please contact your care team if you have questions.     amphetamine-dextroamphetamine 20 MG per 24 hr capsule   Commonly known as:  ADDERALL XR   Replaced by:  amphetamine-dextroamphetamine 20 MG per tablet   Stopped by:  Liz Baker MD           amphetamine-dextroamphetamine 20 MG per 24 hr capsule   Commonly known as:  ADDERALL XR   Replaced by:  amphetamine-dextroamphetamine 20 MG per tablet   Stopped by:  Liz Baker MD           folic acid 1 MG tablet   Commonly known as:  FOLVITE   Stopped by:  Liz Baker MD           naproxen 500 MG tablet   Commonly known as:  NAPROSYN   Stopped by:  Liz Baker MD           polyethylene glycol powder   Commonly known as:  MIRALAX   Stopped by:  Liz Baker MD           psyllium 0.52 G capsule   Stopped by:  Liz Baker MD           simvastatin 20 MG tablet   Commonly known as:  ZOCOR   Stopped by:  Liz Baker MD           topiramate 50 MG tablet   Commonly known as:  TOPAMAX   Stopped by:  Liz Baker MD                Where to get your medicines      Some of these will need a paper  "prescription and others can be bought over the counter.  Ask your nurse if you have questions.     Bring a paper prescription for each of these medications     amphetamine-dextroamphetamine 20 MG per tablet    amphetamine-dextroamphetamine 20 MG per tablet    amphetamine-dextroamphetamine 20 MG per tablet    minocycline 100 MG capsule    needle (disp) 18G X 1\" Misc    Needle (Disp) 23G X 1\" Misc    syringe (disposable) 1 ML Misc                Primary Care Provider Office Phone # Fax #    Liz Baker [phone]       [address] [address]        Equal Access to Services     [facility]: Hadii aad ku hadasho Soomaali, waaxda luqadaha, qaybta kaalmada adeegyada, bogdan gary . So [facility] [phone].    ATENCIÓN: Si habla español, tiene a schuler disposición servicios gratuitos de asistencia lingüística. [facility] [phone].    We comply with applicable federal civil rights laws and [place] laws. We do not discriminate on the basis of race, color, national origin, age, disability, sex, sexual orientation, or gender identity.            Thank you!     Thank you for choosing [facility]  for your care. Our goal is always to provide you with excellent care. Hearing back from our patients is one way we can continue to improve our services. Please take a few minutes to complete the written survey that you may receive in the mail after your visit with us. Thank you!             Your Updated Medication List - Protect others around you: Learn how to safely use, store and throw away your medicines at www.disposemymeds.org.          This list is accurate as of: 10/4/17  9:59 AM.  Always use your most recent med list.                   Brand Name Dispense Instructions for use Diagnosis    * amphetamine-dextroamphetamine 20 MG per tablet   Start taking on:  10/31/2017    ADDERALL    60 tablet    Take 1 tablet (20 mg) by mouth 2 times daily    " "Attention deficit hyperactivity disorder (ADHD), combined type       * amphetamine-dextroamphetamine 20 MG per tablet   Start taking on:  11/30/2017    ADDERALL    60 tablet    Take 1 tablet (20 mg) by mouth 2 times daily    Attention deficit hyperactivity disorder (ADHD), combined type       * amphetamine-dextroamphetamine 20 MG per tablet   Start taking on:  12/29/2017    ADDERALL    60 tablet    Take 1 tablet (20 mg) by mouth 2 times daily    Attention deficit hyperactivity disorder (ADHD), combined type       FLUoxetine 20 MG capsule    PROzac    90 capsule    Take 1 capsule (20 mg) by mouth daily    SHANA (generalized anxiety disorder)       fluticasone 50 MCG/ACT spray    FLONASE    48 g    Spray 2 sprays into both nostrils daily    Chronic rhinitis       minocycline 100 MG capsule    MINOCIN/DYNACIN    180 capsule    Take 1 capsule (100 mg) by mouth 2 times daily    Acne vulgaris       montelukast 10 MG tablet    SINGULAIR    90 tablet    Take 1 tablet (10 mg) by mouth nightly as needed    Seasonal allergic rhinitis due to pollen       needle (disp) 18G X 1\" Misc     15 each    1 each once a week        Needle (Disp) 23G X 1\" Misc     15 each    Use to inject testosterone into muscle weekly        olopatadine 0.1 % ophthalmic solution    PATANOL    5 mL    Place 1 drop into both eyes 2 times daily    Chronic allergic conjunctivitis       Sharps Container Misc     1 each    Dispose sharps    Gender dysphoria       syringe (disposable) 1 ML Misc    BD TUBERCULIN SYRINGE    15 each    BD 1ml Tuberculing syringe SLIP TIP (no needle attached). Use to administer IM medications as instructed        testosterone cypionate 200 MG/ML injection    DEPOTESTOTERONE    10 mL    Inject 0.4 mLs (80 mg) into the muscle once a week In cottonseed oil ok    Gender identity disorder       triamcinolone 0.1 % cream    KENALOG    80 g    Apply sparingly to affected area two times daily as needed    Dermatitis       * Notice:  This " list has 3 medication(s) that are the same as other medications prescribed for you. Read the directions carefully, and ask your doctor or other care provider to review them with you.

## 2017-11-15 DIAGNOSIS — M94.0 COSTOCHONDRITIS: ICD-10-CM

## 2017-11-15 NOTE — TELEPHONE ENCOUNTER
Request for medication refill:    Date of last visit at clinic: 10-4-17    Please complete refill if appropriate and CLOSE ENCOUNTER.    Closing the encounter signifies the refill is complete.    If refill has been denied, please complete the smart phrase .smirefuse and route it to the [facility] RN TRIAGE pool to inform the patient and the pharmacy.    Janki Haro MA

## 2017-11-20 RX ORDER — NAPROXEN 500 MG/1
500 TABLET ORAL 2 TIMES DAILY PRN
Qty: 60 TABLET | Refills: 1 | Status: SHIPPED | OUTPATIENT
Start: 2017-11-20 | End: 2018-01-15

## 2017-12-04 DIAGNOSIS — G56.03 BILATERAL CARPAL TUNNEL SYNDROME: Primary | ICD-10-CM

## 2017-12-05 ENCOUNTER — OFFICE VISIT (OUTPATIENT)
Dept: FAMILY MEDICINE | Facility: CLINIC | Age: 42
End: 2017-12-05

## 2017-12-05 VITALS
WEIGHT: 273.4 LBS | SYSTOLIC BLOOD PRESSURE: 152 MMHG | DIASTOLIC BLOOD PRESSURE: 106 MMHG | RESPIRATION RATE: 18 BRPM | TEMPERATURE: 98.3 F | BODY MASS INDEX: 45.5 KG/M2 | OXYGEN SATURATION: 99 % | HEART RATE: 90 BPM

## 2017-12-05 DIAGNOSIS — Z20.2 EXPOSURE TO SYPHILIS: Primary | ICD-10-CM

## 2017-12-05 ASSESSMENT — ENCOUNTER SYMPTOMS
CONSTIPATION: 0
CHEST TIGHTNESS: 0
HEMATURIA: 0
POLYDIPSIA: 0
SLEEP DISTURBANCE: 0
DIFFICULTY URINATING: 0
DYSURIA: 0
COLOR CHANGE: 0
FREQUENCY: 0
ABDOMINAL PAIN: 0
ARTHRALGIAS: 0
MYALGIAS: 0
NUMBNESS: 0
DIARRHEA: 0
FEVER: 0
SHORTNESS OF BREATH: 0
FATIGUE: 0
ABDOMINAL DISTENTION: 0
DYSPHORIC MOOD: 0
VOMITING: 0
NERVOUS/ANXIOUS: 0
PALPITATIONS: 0
BLOOD IN STOOL: 0
COUGH: 0
EYES NEGATIVE: 1

## 2017-12-05 NOTE — PROGRESS NOTES
HPI:       Trevin Evans is a 41 year old who presents for the following  Patient presents with:  STD: Exposure to Syphillis       Concern: STD Check     1. Exposure Syphilis Trevin was recently informed that a sexual partner of his has been diagnosed with syphilis, he denies any lesions or symptoms of discharge, pain or dysuria.  Anand is a transgender male who has used testosterone for 4 years and has had no surgery.  He declined a genital exam.           Problem, Medication and Allergy Lists were reviewed and are current.  Patient is an established patient of this clinic.         Review of Systems:   Review of Systems   Constitutional: Negative for fatigue and fever.   HENT: Negative.    Eyes: Negative.  Negative for visual disturbance.   Respiratory: Negative for cough, chest tightness and shortness of breath.    Cardiovascular: Negative for chest pain and palpitations.   Gastrointestinal: Negative for abdominal distention, abdominal pain, blood in stool, constipation, diarrhea and vomiting.   Endocrine: Negative for polydipsia and polyuria.   Genitourinary: Negative for decreased urine volume, difficulty urinating, dysuria, frequency, genital sores, hematuria, pelvic pain, urgency and vaginal bleeding.   Musculoskeletal: Negative for arthralgias and myalgias.   Skin: Negative for color change and rash.   Neurological: Negative for numbness.   Psychiatric/Behavioral: Negative for dysphoric mood and sleep disturbance. The patient is not nervous/anxious.              Physical Exam:   Patient Vitals for the past 24 hrs:   BP Temp Temp src Pulse Resp SpO2 Weight   12/05/17 1616 (!) 150/106 98.3  F (36.8  C) Oral 90 18 99 % 273 lb 6.4 oz (124 kg)   12/05/17 1612 (!) 150/106 98.3  F (36.8  C) Oral 90 18 99 % 273 lb 6.4 oz (124 kg)     Body mass index is 45.5 kg/(m^2).  Vitals were reviewed and were normal except BP was high-likely related to stress last visit bp was 121/86 will monitor.     Physical Exam    Constitutional: She is oriented to person, place, and time. She appears well-developed and well-nourished. No distress.   Cardiovascular: Normal rate.    Genitourinary:   Genitourinary Comments: Genital exam declined by patient. Advised to follow up with provider of choice.   Neurological: She is alert and oriented to person, place, and time.   Skin: Skin is warm and dry. She is not diaphoretic.   Psychiatric: She has a normal mood and affect. Her behavior is normal. Thought content normal.   Nursing note and vitals reviewed.        Results:     Results for STI testing are pending.  Assessment and Plan       1. Exposure to syphilis  -CDC recommends immediate treatment regardless of serology.  - Neisseria gonorrhoeae PCR  - Chlamydia trachomatis PCR  - Anti Treponema  - HIV Antigen Antibody Combo  - Hepatitis C Screen Reflex to HCV RNA Quant and Genotype  - Benzathine PCN (Bicillin LA) 2.4 million U/ML IM (Charge)  - INJECTION INTRAMUSCULAR OR SUB-Q    I'll send the results or call with any positives,       Please call or return to clinic if your symptoms don't go away.    Follow up plan  follow up as needed        There are no discontinued medications.  Options for treatment and follow-up care were reviewed with the patient. Irma Evans  engaged in the decision making process and verbalized understanding of the options discussed and agreed with the final plan.    Nemesio Noriega MD

## 2017-12-05 NOTE — PATIENT INSTRUCTIONS
Here is the plan from today's visit    1. Exposure to syphilis  I'll send you the results,   - Neisseria gonorrhoeae PCR  - Chlamydia trachomatis PCR  - Anti Treponema  - HIV Antigen Antibody Combo  - Hepatitis C Screen Reflex to HCV RNA Quant and Genotype      Please call or return to clinic if your symptoms don't go away.    Follow up plan  follow up as needed    Thank you for coming to [facility] today.  Lab Testing:  **If you had lab testing today and your results are reassuring or normal they will be mailed to you or sent through [employer] within 7 days.   **If the lab tests need quick action we will call you with the results.  The phone number we will call with results is # [phone] (home) . If this is not the best number please call our clinic and change the number.  Medication Refills:  If you need any refills please call your pharmacy and they will contact us.   If you need to  your refill at a new pharmacy, please contact the new pharmacy directly. The new pharmacy will help you get your medications transferred faster.   Scheduling:  If you have any concerns about today's visit or wish to schedule another appointment please call our office during normal business hours [phone] (8-5:00 M-F)  If a referral was made to a [facility] and you don't get a call from central scheduling please call [phone].  If a Mammogram was ordered for you at [facility] call [phone] to schedule or change your appointment.  If you had an XRay/CT/Ultrasound/MRI ordered the number is [phone] to schedule or change your radiology appointment.   Medical Concerns:  If you have urgent medical concerns please call [phone] at any time of the day.

## 2017-12-05 NOTE — NURSING NOTE
I administered the following to Irma JOSE ALEJANDRO Evans.    MEDICATION: Bicillin LA 1.2  ROUTE: IM  SITE: LUQ - Gluteus, RUQ Gluteus  DOSE: 4ML  LOT #: [id number]  :  [employer]  EXPIRATION DATE:   4/20  NDC#: 22511-487-79     Was entire vial of medication used? Yes    Name of provider who requested the injection: Dr Noriega  Name of provider on site (faculty or community preceptor) at the time of performing the injection: Dr Dimitri Higgins, [facility]

## 2017-12-05 NOTE — MR AVS SNAPSHOT
After Visit Summary   12/5/2017    Irma Evans    MRN: [id number]           Patient Information     Date Of Birth          1975        Visit Information        Provider Department      12/5/2017 4:20 PM Nemesio Noriega MD [facility]        Today's Diagnoses     Exposure to syphilis    -  1      Care Instructions    Here is the plan from today's visit    1. Exposure to syphilis  I'll send you the results,   - Neisseria gonorrhoeae PCR  - Chlamydia trachomatis PCR  - Anti Treponema  - HIV Antigen Antibody Combo  - Hepatitis C Screen Reflex to HCV RNA Quant and Genotype      Please call or return to clinic if your symptoms don't go away.    Follow up plan  follow up as needed    Thank you for coming to [facility] today.  Lab Testing:  **If you had lab testing today and your results are reassuring or normal they will be mailed to you or sent through [facility] within 7 days.   **If the lab tests need quick action we will call you with the results.  The phone number we will call with results is # [phone] (home) . If this is not the best number please call our clinic and change the number.  Medication Refills:  If you need any refills please call your pharmacy and they will contact us.   If you need to  your refill at a new pharmacy, please contact the new pharmacy directly. The new pharmacy will help you get your medications transferred faster.   Scheduling:  If you have any concerns about today's visit or wish to schedule another appointment please call our office during normal business hours [phone] (8-5:00 M-F)  If a referral was made to a [facility] and you don't get a call from central scheduling please call [phone].  If a Mammogram was ordered for you at [facility] call [phone] to schedule or change your appointment.  If you had an XRay/CT/Ultrasound/MRI ordered the number is [phone] to schedule or change  your radiology appointment.   Medical Concerns:  If you have urgent medical concerns please call [phone] at any time of the day.              Follow-ups after your visit        Follow-up notes from your care team     Return in about 4 weeks (around 1/2/2018).      Who to contact     Please call your clinic at [phone] to:    Ask questions about your health    Make or cancel appointments    Discuss your medicines    Learn about your test results    Speak to your doctor   If you have compliments or concerns about an experience at your clinic, or if you wish to file a complaint, please contact [facility] Patient Relations at [phone] or email us at [email or web address]         Additional Information About Your Visit        [employer] Information     [employer] gives you secure access to your electronic health record. If you see a primary care provider, you can also send messages to your care team and make appointments. If you have questions, please call your primary care clinic.  If you do not have a primary care provider, please call [phone] and they will assist you.      [employer] is an electronic gateway that provides easy, online access to your medical records. With [employer], you can request a clinic appointment, read your test results, renew a prescription or communicate with your care team.     To access your existing account, please contact your [facility] or call [phone] for assistance.        Care EveryWhere ID     This is your Care EveryWhere ID. This could be used by other organizations to access your [place] medical records  [id number]        Your Vitals Were     Pulse Temperature Respirations Pulse Oximetry BMI (Body Mass Index)       90 98.3  F (36.8  C) (Oral) 18 99% 45.5 kg/m2        Blood Pressure from Last 3 Encounters:   12/05/17 (!) 152/106   10/04/17 121/86   09/08/17 130/87    Weight from Last 3 Encounters:    12/05/17 273 lb 6.4 oz (124 kg)   10/04/17 261 lb 6.4 oz (118.6 kg)   09/08/17 257 lb (116.6 kg)              We Performed the Following     Anti Treponema     Benzathine PCN (Bicillin LA) 2.4 million U/ML IM (Charge)     Chlamydia trachomatis PCR     Hepatitis C Screen Reflex to HCV RNA Quant and Genotype     HIV Antigen Antibody Combo     INJECTION INTRAMUSCULAR OR SUB-Q     Neisseria gonorrhoeae PCR          Today's Medication Changes          These changes are accurate as of: 12/5/17  5:23 PM.  If you have any questions, ask your nurse or doctor.               Stop taking these medicines if you haven't already. Please contact your care team if you have questions.     olopatadine 0.1 % ophthalmic solution   Commonly known as:  PATANOL   Stopped by:  Nemesio Noriega MD                    Primary Care Provider Office Phone # Fax #    Liz Baker [phone]       [address] [address]        Equal Access to Services     [facility]: Hadii stephany ku hadasho Soomaali, waaxda luqadaha, qaybta kaalmada adeegyada, waxay malcolm haysola gary . So [facility] [phone].    ATENCIÓN: Si dedra esphortencia, tiene a schuler disposición servicios gratuitos de asistencia lingüística. Llame al [phone].    We comply with applicable federal civil rights laws and [place] laws. We do not discriminate on the basis of race, color, national origin, age, disability, sex, sexual orientation, or gender identity.            Thank you!     Thank you for choosing [facility]  for your care. Our goal is always to provide you with excellent care. Hearing back from our patients is one way we can continue to improve our services. Please take a few minutes to complete the written survey that you may receive in the mail after your visit with us. Thank you!             Your Updated Medication List - Protect others around you: Learn how to safely use, store and throw away your  "medicines at www.disposemymeds.org.          This list is accurate as of: 12/5/17  5:23 PM.  Always use your most recent med list.                   Brand Name Dispense Instructions for use Diagnosis    * amphetamine-dextroamphetamine 20 MG per tablet    ADDERALL    60 tablet    Take 1 tablet (20 mg) by mouth 2 times daily    Attention deficit hyperactivity disorder (ADHD), combined type       * amphetamine-dextroamphetamine 20 MG per tablet   Start taking on:  12/29/2017    ADDERALL    60 tablet    Take 1 tablet (20 mg) by mouth 2 times daily    Attention deficit hyperactivity disorder (ADHD), combined type       FLUoxetine 20 MG capsule    PROzac    90 capsule    Take 1 capsule (20 mg) by mouth daily    SHANA (generalized anxiety disorder)       fluticasone 50 MCG/ACT spray    FLONASE    48 g    Spray 2 sprays into both nostrils daily    Chronic rhinitis       minocycline 100 MG capsule    MINOCIN/DYNACIN    180 capsule    Take 1 capsule (100 mg) by mouth 2 times daily    Acne vulgaris       montelukast 10 MG tablet    SINGULAIR    90 tablet    Take 1 tablet (10 mg) by mouth nightly as needed    Seasonal allergic rhinitis due to pollen       naproxen 500 MG tablet    NAPROSYN    60 tablet    Take 1 tablet (500 mg) by mouth 2 times daily as needed for moderate pain    Costochondritis       needle (disp) 18G X 1\" Misc     15 each    1 each once a week        Needle (Disp) 23G X 1\" Misc     15 each    Use to inject testosterone into muscle weekly        Sharps Container Misc     1 each    Dispose sharps    Gender dysphoria       syringe (disposable) 1 ML Misc    BD TUBERCULIN SYRINGE    15 each    BD 1ml Tuberculing syringe SLIP TIP (no needle attached). Use to administer IM medications as instructed        testosterone cypionate 200 MG/ML injection    DEPOTESTOTERONE    10 mL    Inject 0.4 mLs (80 mg) into the muscle once a week In cottonseed oil ok    Gender identity disorder       triamcinolone 0.1 % cream    " KENALOG    80 g    Apply sparingly to affected area two times daily as needed    Dermatitis       * Notice:  This list has 2 medication(s) that are the same as other medications prescribed for you. Read the directions carefully, and ask your doctor or other care provider to review them with you.

## 2017-12-06 LAB — HIV 1+2 AB+HIV1 P24 AG SERPL QL IA: NONREACTIVE

## 2017-12-07 LAB
C TRACH DNA SPEC QL NAA+PROBE: NEGATIVE
HCV AB SERPL QL IA: NONREACTIVE
N GONORRHOEA DNA SPEC QL NAA+PROBE: NEGATIVE
SPECIMEN SOURCE: NORMAL
SPECIMEN SOURCE: NORMAL
T PALLIDUM IGG+IGM SER QL: NEGATIVE

## 2017-12-08 NOTE — TELEPHONE ENCOUNTER
APPT INFO    Date /Time: 12/14/17 4:30PM   Reason for Appt: Bilat CTS    Ref Provider/Clinic: Dr. Liz Baker   Are there internal records? If yes, list: Yes -     [facility] -- Dr. Turner, Lenin Parks, , Dr. Liz Baker  [facility] -- Dr. Dickson and Dr. Anderson    EMG report 6/28/17 in [facility]   Patient Contact (Y/N) & Call Details: No - pt is referred   Action: --

## 2017-12-14 ENCOUNTER — OFFICE VISIT (OUTPATIENT)
Dept: ORTHOPEDICS | Facility: CLINIC | Age: 42
End: 2017-12-14
Payer: COMMERCIAL

## 2017-12-14 ENCOUNTER — PRE VISIT (OUTPATIENT)
Dept: ORTHOPEDICS | Facility: CLINIC | Age: 42
End: 2017-12-14

## 2017-12-14 VITALS — HEIGHT: 66 IN

## 2017-12-14 DIAGNOSIS — G56.03 BILATERAL CARPAL TUNNEL SYNDROME: Primary | ICD-10-CM

## 2017-12-14 DIAGNOSIS — G56.02 CARPAL TUNNEL SYNDROME OF LEFT WRIST: Primary | ICD-10-CM

## 2017-12-14 RX ORDER — CEFAZOLIN SODIUM 1 G/50ML
3 SOLUTION INTRAVENOUS
Status: CANCELLED | OUTPATIENT
Start: 2017-12-14

## 2017-12-14 ASSESSMENT — ENCOUNTER SYMPTOMS
SEIZURES: 0
TINGLING: 1
MEMORY LOSS: 0
TREMORS: 0
LOSS OF CONSCIOUSNESS: 0
HEADACHES: 0
SPEECH CHANGE: 0
WEAKNESS: 1
PARALYSIS: 0
DISTURBANCES IN COORDINATION: 0
NUMBNESS: 1
DIZZINESS: 0

## 2017-12-14 NOTE — NURSING NOTE
Teaching Flowsheet   Relevant Diagnosis: Left carpal tunnel syndrome  Teaching Topic: Left open carpal tunnel release     Person(s) involved in teaching:   Patient     Motivation Level:  Asks Questions: Yes  Eager to Learn: Yes  Cooperative: Yes  Receptive (willing/able to accept information): Yes  Any cultural factors/Mosque beliefs that may influence understanding or compliance? No       Patient demonstrates understanding of the following:  Reason for the appointment, diagnosis and treatment plan: Yes  Knowledge of proper use of medications and conditions for which they are ordered (with special attention to potential side effects or drug interactions): Yes  Which situations necessitate calling provider and whom to contact: Yes       Teaching Concerns Addressed:   None at this time. Patient just had a physical a couple weeks ago, he will call and get this updated.      Proper use and care of  (medical equip, care aids, etc.): Yes  Nutritional needs and diet plan: Yes  Pain management techniques: Yes  Wound Care: Yes  How and/when to access community resources: Yes     Instructional Materials Used/Given: Pre-operative teaching packet and anti-bacterial soap.     Time spent with patient: 10 minutes.

## 2017-12-14 NOTE — MR AVS SNAPSHOT
"              After Visit Summary   12/14/2017    Irma Evans    MRN: [id number]           Patient Information     Date Of Birth          1975        Visit Information        Provider Department      12/14/2017 4:30 PM Khoi Reyes MD [facility]        Today's Diagnoses     Bilateral carpal tunnel syndrome    -  1       Follow-ups after your visit        Who to contact     Please call your clinic at [phone] to:    Ask questions about your health    Make or cancel appointments    Discuss your medicines    Learn about your test results    Speak to your doctor   If you have compliments or concerns about an experience at your clinic, or if you wish to file a complaint, please contact [facility] Patient Relations at [phone] or email us at [email or web address]         Additional Information About Your Visit        MyChart Information     [employer] gives you secure access to your electronic health record. If you see a primary care provider, you can also send messages to your care team and make appointments. If you have questions, please call your primary care clinic.  If you do not have a primary care provider, please call [phone] and they will assist you.      [employer] is an electronic gateway that provides easy, online access to your medical records. With [employer] you can request a clinic appointment, read your test results, renew a prescription or communicate with your care team.     To access your existing account, please contact your [facility] or call [phone] for assistance.        Care EveryWhere ID     This is your Care EveryWhere ID. This could be used by other organizations to access your [place] medical records  [id number]        Your Vitals Were     Height                   5' 6.34\"            Blood Pressure from Last 3 Encounters:   12/05/17 (!) 152/106   10/04/17 121/86   09/08/17 130/87    " Weight from Last 3 Encounters:   12/14/17 (P) 273 lb 5.9 oz   12/05/17 273 lb 6.4 oz   10/04/17 261 lb 6.4 oz              Today, you had the following     No orders found for display       Primary Care Provider Office Phone # Fax #    Liz Baker [phone]       [address] [address]        Equal Access to Services     [facility] : Hadii aad ku hadasho Soomaali, waaxda luqadaha, qaybta kaalmada adeegyada, waxay idiin hayaan adeeg justosh la'aan ah. So [facility] [phone].    ATENCIÓN: Si habla español, tiene a schuler disposición servicios gratuitos de asistencia lingüística. Llame al [phone].    We comply with applicable federal civil rights laws and [place] laws. We do not discriminate on the basis of race, color, national origin, age, disability, sex, sexual orientation, or gender identity.            Thank you!     Thank you for choosing [facility]  for your care. Our goal is always to provide you with excellent care. Hearing back from our patients is one way we can continue to improve our services. Please take a few minutes to complete the written survey that you may receive in the mail after your visit with us. Thank you!             Your Updated Medication List - Protect others around you: Learn how to safely use, store and throw away your medicines at www.disposemymeds.org.          This list is accurate as of: 12/14/17 11:59 PM.  Always use your most recent med list.                   Brand Name Dispense Instructions for use Diagnosis    * amphetamine-dextroamphetamine 20 MG per tablet    ADDERALL    60 tablet    Take 1 tablet (20 mg) by mouth 2 times daily    Attention deficit hyperactivity disorder (ADHD), combined type       * amphetamine-dextroamphetamine 20 MG per tablet   Start taking on:  12/29/2017    ADDERALL    60 tablet    Take 1 tablet (20 mg) by mouth 2 times daily    Attention deficit hyperactivity disorder (ADHD), combined type        "FLUoxetine 20 MG capsule    PROzac    90 capsule    Take 1 capsule (20 mg) by mouth daily    SHANA (generalized anxiety disorder)       fluticasone 50 MCG/ACT spray    FLONASE    48 g    Spray 2 sprays into both nostrils daily    Chronic rhinitis       minocycline 100 MG capsule    MINOCIN/DYNACIN    180 capsule    Take 1 capsule (100 mg) by mouth 2 times daily    Acne vulgaris       montelukast 10 MG tablet    SINGULAIR    90 tablet    Take 1 tablet (10 mg) by mouth nightly as needed    Seasonal allergic rhinitis due to pollen       naproxen 500 MG tablet    NAPROSYN    60 tablet    Take 1 tablet (500 mg) by mouth 2 times daily as needed for moderate pain    Costochondritis       needle (disp) 18G X 1\" Misc     15 each    1 each once a week        Needle (Disp) 23G X 1\" Misc     15 each    Use to inject testosterone into muscle weekly        Sharps Container Misc     1 each    Dispose sharps    Gender dysphoria       syringe (disposable) 1 ML Misc    BD TUBERCULIN SYRINGE    15 each    BD 1ml Tuberculing syringe SLIP TIP (no needle attached). Use to administer IM medications as instructed        testosterone cypionate 200 MG/ML injection    DEPOTESTOTERONE    10 mL    Inject 0.4 mLs (80 mg) into the muscle once a week In cottonseed oil ok    Gender identity disorder       triamcinolone 0.1 % cream    KENALOG    80 g    Apply sparingly to affected area two times daily as needed    Dermatitis       * Notice:  This list has 2 medication(s) that are the same as other medications prescribed for you. Read the directions carefully, and ask your doctor or other care provider to review them with you.      "

## 2017-12-14 NOTE — PROGRESS NOTES
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 Is having sleep issues. Is also finding that he is having weakness as well in the past few weeks. Overall symptoms have been going on for 2 years. Steroid injection to the L wrist helped with the symptoms for about 4 months. R hand steroid injection gave no relief. Still wears night time splints. Has tried NSAID's with no relief in symptoms. No cervical spine issues. Many family members have had carpal tunnel release surgery.    MEDS:   Prior to Admission medications    Medication Sig Start Date End Date Taking? Authorizing Provider   naproxen (NAPROSYN) 500 MG tablet Take 1 tablet (500 mg) by mouth 2 times daily as needed for moderate pain 11/20/17  Yes Liz Baker MD   minocycline (MINOCIN/DYNACIN) 100 MG capsule Take 1 capsule (100 mg) by mouth 2 times daily 10/4/17  Yes Liz Baker MD   needle, disp, 18G X 1\" MISC 1 each once a week 10/4/17  Yes Liz Baker MD   Needle, Disp, 23G X 1\" MISC Use to inject testosterone into muscle weekly 10/4/17  Yes Liz Baker MD   syringe, disposable, (BD TUBERCULIN SYRINGE) 1 ML MISC BD 1ml Tuberculing syringe SLIP TIP (no needle attached). Use to administer IM medications as instructed 10/4/17  Yes Liz Baker MD   amphetamine-dextroamphetamine (ADDERALL) 20 MG per tablet Take 1 tablet (20 mg) by mouth 2 times daily 11/30/17 12/30/17 Yes Liz Baker MD   amphetamine-dextroamphetamine (ADDERALL) 20 MG per tablet Take 1 tablet (20 mg) by mouth 2 times daily 12/29/17 1/28/18 Yes Liz Baker MD   testosterone cypionate (DEPOTESTOTERONE) 200 MG/ML injection Inject 0.4 mLs (80 mg) into the muscle once a week In cottonseed oil ok 9/7/17  Yes Liz Baker MD   montelukast (SINGULAIR) 10 MG tablet Take 1 tablet (10 mg) by mouth nightly as needed " "5/30/17  Yes Nemesio Noriega MD   FLUoxetine (PROZAC) 20 MG capsule Take 1 capsule (20 mg) by mouth daily 4/25/17  Yes Liz Baker MD   fluticasone (FLONASE) 50 MCG/ACT spray Spray 2 sprays into both nostrils daily 4/25/17  Yes Liz Baker MD   Sharps Container MISC Dispose sharps 1/28/16  Yes Liz Baker MD   triamcinolone (KENALOG) 0.1 % cream Apply sparingly to affected area two times daily as needed 10/1/14  Yes Liz Baker MD       ALLERGIES: No Known Allergies    PMH:   Past Medical History:   Diagnosis Date     Broken foot 1-2012     Depressive disorder      Hypertension      Migraines since [employer]     Patient overweight     BMI 56     Seasonal allergies     spring       PSH:   Past Surgical History:   Procedure Laterality Date     C OPEN RX ANKLE DISLOCATN+FIXATN  01/09    right       SH:   Social History   Substance Use Topics     Smoking status: Former Smoker     Packs/day: 1.00     Years: 12.00     Types: Cigarettes     Smokeless tobacco: Never Used      Comment: quit 6/29/13     Alcohol use Yes      Comment: Occ       FH:   Family History   Problem Relation Age of Onset     Arthritis Mother      Alcohol/Drug Mother      Asthma Mother      Depression Mother      and many on mom's side of family     Hypertension Mother      Thyroid Disease Mother      s/p removal     Alcohol/Drug Father      Hypertension Father      Obesity Father      GASTROINTESTINAL DISEASE Sister      CROHN disease     Hypertension Maternal Uncle      Hypertension Maternal Grandfather      Arthritis Maternal Grandfather      CANCER No family hx of        ROS: Denies chest pain, shortness of breath, MI, CVA, diabetes, DVT, PE, and bleeding disorders.    PHYSICAL EXAMINATION: Ht 1.685 m (5' 6.34\")  Wt (P) 124 kg (273 lb 5.9 oz)  BMI (P) 43.67 kg/m2  General: In no acute distress.  On exam of the patient's left hand, there is no thenar atrophy.  At rest patient has tingling in his left thumb, index finger, long finger, and " radial aspect of the ring finger.  There is a positive Tinel sign at the volar wrist.  Carpal compression exam worsened symptoms within 10 seconds.  There is no Tinel's or tenderness along the volar proximal forearm.  There is no tenderness along the neck.  Patient is able to make a left composite fist and extend all digits.  On exam of the patient's right hand, there is no thenar atrophy.  At rest patient has tingling in his right thumb, index finger, long finger, and radial aspect of the ring finger.  There is no Tinel's at the volar wrist on the right.  Right carpal compression exam worsened symptoms within 10 seconds.  There is no Tinel's or tenderness along the right volar proximal forearm.    January 10, 2017 nerve conduction studies were reviewed.  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    ASSESSMENT: Bilateral carpal tunnel syndrome, left worse than right.    PLAN: Given that the patient has failed conservative treatment, he is a candidate for carpal tunnel release starting with the left side.  Risks benefits and alternatives were discussed for open carpal tunnel release.  I explained the risks to the surrounding structures including the tendons and nerves, especially the recurrent motor branch of the median nerve.  I also explained that patient may have postoperative pillar pain.  We had a discussion of open versus endoscopic carpal tunnel release and that I do not perform endoscopic carpal tunnel releases.  I offered referral to a partner who does perform endoscopic carpal tunnel releases, but the patient was not interested.  We will go ahead and perform the left open carpal tunnel release first followed by the right.  Photos and consents were obtained today.    Total time spent with patient was 30 min of which greater than 50% was in counseling.

## 2017-12-14 NOTE — NURSING NOTE
"Reason For Visit:   Chief Complaint   Patient presents with     Consult     Bialteral carpal tunnel syndrome.       Primary MD: Liz Baker  Ref. MD: Liz Baker    Age: 41 year old    ?  No      Ht 1.685 m (5' 6.34\")  Wt (P) 124 kg (273 lb 5.9 oz)  BMI (P) 43.67 kg/m2      Pain Assessment  Patient Currently in Pain: Yes  0-10 Pain Scale:  (5-6)  Primary Pain Location: Hand (left bothers more)  Pain Orientation: Left, Right  Pain Descriptors: Numbness, Pins and needles, Aching, Constant  Alleviating Factors: Other (comment) (bracing a little bit, left injection)  Aggravating Factors: Other (comment) (driving, using phone, sleeping)               QuickDASH Assessment  No flowsheet data found.       Current Outpatient Prescriptions   Medication Sig Dispense Refill     naproxen (NAPROSYN) 500 MG tablet Take 1 tablet (500 mg) by mouth 2 times daily as needed for moderate pain 60 tablet 1     minocycline (MINOCIN/DYNACIN) 100 MG capsule Take 1 capsule (100 mg) by mouth 2 times daily 180 capsule 3     needle, disp, 18G X 1\" MISC 1 each once a week 15 each 3     Needle, Disp, 23G X 1\" MISC Use to inject testosterone into muscle weekly 15 each 3     syringe, disposable, (BD TUBERCULIN SYRINGE) 1 ML MISC BD 1ml Tuberculing syringe SLIP TIP (no needle attached). Use to administer IM medications as instructed 15 each 3     amphetamine-dextroamphetamine (ADDERALL) 20 MG per tablet Take 1 tablet (20 mg) by mouth 2 times daily 60 tablet 0     [START ON 12/29/2017] amphetamine-dextroamphetamine (ADDERALL) 20 MG per tablet Take 1 tablet (20 mg) by mouth 2 times daily 60 tablet 0     testosterone cypionate (DEPOTESTOTERONE) 200 MG/ML injection Inject 0.4 mLs (80 mg) into the muscle once a week In cottonseed oil ok 10 mL 2     montelukast (SINGULAIR) 10 MG tablet Take 1 tablet (10 mg) by mouth nightly as needed 90 tablet 3     FLUoxetine (PROZAC) 20 MG capsule Take 1 capsule (20 mg) by mouth daily 90 capsule 3     " fluticasone (FLONASE) 50 MCG/ACT spray Spray 2 sprays into both nostrils daily 48 g 3     Sharps Container MISC Dispose sharps 1 each 3     triamcinolone (KENALOG) 0.1 % cream Apply sparingly to affected area two times daily as needed 80 g 2       No Known Allergies    Iona Haley, ATC

## 2017-12-14 NOTE — LETTER
"12/14/2017       RE: Irma Evans  [address] [facility] [id number]     Dear Colleague,    Thank you for referring your patient, Irma Evans, to the [facility] at [facility]. Please see a copy of my visit note below.    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 Is having sleep issues. Is also finding that he is having weakness as well in the past few weeks. Overall symptoms have been going on for 2 years. Steroid injection to the L wrist helped with the symptoms for about 4 months. R hand steroid injection gave no relief. Still wears night time splints. Has tried NSAID's with no relief in symptoms. No cervical spine issues. Many family members have had carpal tunnel release surgery.    MEDS:   Prior to Admission medications    Medication Sig Start Date End Date Taking? Authorizing Provider   naproxen (NAPROSYN) 500 MG tablet Take 1 tablet (500 mg) by mouth 2 times daily as needed for moderate pain 11/20/17  Yes Liz Baker MD   minocycline (MINOCIN/DYNACIN) 100 MG capsule Take 1 capsule (100 mg) by mouth 2 times daily 10/4/17  Yes Liz Baker MD   needle, disp, 18G X 1\" MISC 1 each once a week 10/4/17  Yes Liz Baker MD   Needle, Disp, 23G X 1\" MISC Use to inject testosterone into muscle weekly 10/4/17  Yes Liz Baker MD   syringe, disposable, (BD TUBERCULIN SYRINGE) 1 ML MISC BD 1ml Tuberculing syringe SLIP TIP (no needle attached). Use to administer IM medications as instructed 10/4/17  Yes Liz Baker MD   amphetamine-dextroamphetamine (ADDERALL) 20 MG per tablet Take 1 tablet (20 mg) by mouth 2 times daily 11/30/17 12/30/17 Yes Liz Baker MD   amphetamine-dextroamphetamine (ADDERALL) 20 MG per tablet Take 1 tablet (20 mg) by mouth 2 times daily " 12/29/17 1/28/18 Yes Liz Baker MD   testosterone cypionate (DEPOTESTOTERONE) 200 MG/ML injection Inject 0.4 mLs (80 mg) into the muscle once a week In cottonseed oil ok 9/7/17  Yes Liz Baker MD   montelukast (SINGULAIR) 10 MG tablet Take 1 tablet (10 mg) by mouth nightly as needed 5/30/17  Yes Nemesio Noriega MD   FLUoxetine (PROZAC) 20 MG capsule Take 1 capsule (20 mg) by mouth daily 4/25/17  Yes Liz Baker MD   fluticasone (FLONASE) 50 MCG/ACT spray Spray 2 sprays into both nostrils daily 4/25/17  Yes Liz Baker MD   Sharps Container MISC Dispose sharps 1/28/16  Yes Liz Baker MD   triamcinolone (KENALOG) 0.1 % cream Apply sparingly to affected area two times daily as needed 10/1/14  Yes Liz Baker MD       ALLERGIES: No Known Allergies    PMH:   Past Medical History:   Diagnosis Date     Broken foot 1-2012     Depressive disorder      Hypertension      Migraines since [employer]     Patient overweight     BMI 56     Seasonal allergies     spring       PSH:   Past Surgical History:   Procedure Laterality Date     C OPEN RX ANKLE DISLOCATN+FIXATN  01/09    right       SH:   Social History   Substance Use Topics     Smoking status: Former Smoker     Packs/day: 1.00     Years: 12.00     Types: Cigarettes     Smokeless tobacco: Never Used      Comment: quit 6/29/13     Alcohol use Yes      Comment: Occ       FH:   Family History   Problem Relation Age of Onset     Arthritis Mother      Alcohol/Drug Mother      Asthma Mother      Depression Mother      and many on mom's side of family     Hypertension Mother      Thyroid Disease Mother      s/p removal     Alcohol/Drug Father      Hypertension Father      Obesity Father      GASTROINTESTINAL DISEASE Sister      CROHN disease     Hypertension Maternal Uncle      Hypertension Maternal Grandfather      Arthritis Maternal Grandfather      CANCER No family hx of        ROS: Denies chest pain, shortness of breath, MI, CVA, diabetes, DVT, PE, and bleeding  "disorders.    PHYSICAL EXAMINATION: Ht 1.685 m (5' 6.34\")  Wt (P) 124 kg (273 lb 5.9 oz)  BMI (P) 43.67 kg/m2  General: In no acute distress.  On exam of the patient's left hand, there is no thenar atrophy.  At rest patient has tingling in his left thumb, index finger, long finger, and radial aspect of the ring finger.  There is a positive Tinel sign at the volar wrist.  Carpal compression exam worsened symptoms within 10 seconds.  There is no Tinel's or tenderness along the volar proximal forearm.  There is no tenderness along the neck.  Patient is able to make a left composite fist and extend all digits.  On exam of the patient's right hand, there is no thenar atrophy.  At rest patient has tingling in his right thumb, index finger, long finger, and radial aspect of the ring finger.  There is no Tinel's at the volar wrist on the right.  Right carpal compression exam worsened symptoms within 10 seconds.  There is no Tinel's or tenderness along the right volar proximal forearm.    January 10, 2017 nerve conduction studies were reviewed.  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    ASSESSMENT: Bilateral carpal tunnel syndrome, left worse than right.    PLAN: Given that the patient has failed conservative treatment, he is a candidate for carpal tunnel release starting with the left side.  Risks benefits and alternatives were discussed for open carpal tunnel release.  I explained the risks to the surrounding structures including the tendons and nerves, especially the recurrent motor branch of the median nerve.  I also explained that patient may have postoperative pillar pain.  We had a discussion of open versus endoscopic carpal tunnel release and that I do not perform endoscopic carpal tunnel releases.  I offered referral to a partner who does perform endoscopic carpal tunnel releases, but the patient was not interested.  We will go ahead and perform the left open carpal tunnel release " first followed by the right.  Photos and consents were obtained today.    Total time spent with patient was 30 min of which greater than 50% was in counseling.    Again, thank you for allowing me to participate in the care of your patient.      Sincerely,    Khoi Reyes MD

## 2017-12-18 ENCOUNTER — OFFICE VISIT (OUTPATIENT)
Dept: FAMILY MEDICINE | Facility: CLINIC | Age: 42
End: 2017-12-18
Payer: COMMERCIAL

## 2017-12-18 ENCOUNTER — ANESTHESIA EVENT (OUTPATIENT)
Dept: SURGERY | Facility: AMBULATORY SURGERY CENTER | Age: 42
End: 2017-12-18

## 2017-12-18 VITALS
OXYGEN SATURATION: 98 % | HEART RATE: 104 BPM | RESPIRATION RATE: 18 BRPM | BODY MASS INDEX: 43.2 KG/M2 | SYSTOLIC BLOOD PRESSURE: 138 MMHG | WEIGHT: 270.4 LBS | TEMPERATURE: 98.5 F | DIASTOLIC BLOOD PRESSURE: 101 MMHG

## 2017-12-18 DIAGNOSIS — R73.9 HYPERGLYCEMIA: ICD-10-CM

## 2017-12-18 DIAGNOSIS — R03.0 ELEVATED BLOOD PRESSURE READING WITHOUT DIAGNOSIS OF HYPERTENSION: ICD-10-CM

## 2017-12-18 DIAGNOSIS — K75.81 NASH (NONALCOHOLIC STEATOHEPATITIS): ICD-10-CM

## 2017-12-18 DIAGNOSIS — Z01.818 PREOP GENERAL PHYSICAL EXAM: Primary | ICD-10-CM

## 2017-12-18 DIAGNOSIS — D58.2 ELEVATED HEMOGLOBIN (H): ICD-10-CM

## 2017-12-18 LAB
APTT PPP: 30 SEC (ref 22–37)
HBA1C MFR BLD: 5.4 % (ref 4.1–5.7)
INR PPP: 1.01 (ref 0.86–1.14)

## 2017-12-18 RX ORDER — LISINOPRIL 5 MG/1
5 TABLET ORAL DAILY
Qty: 30 TABLET | Refills: 1 | Status: SHIPPED | OUTPATIENT
Start: 2017-12-18 | End: 2018-02-27

## 2017-12-18 NOTE — PROGRESS NOTES
[facility]  [address] [place] [address],  [address]  [facility] [address]  Phone: [phone]  Fax: [phone]    12/18/2017    Adult PRE-OP Evaluation:    Irma Evans, 1975 presents for pre-operative evaluation and assessment as requested by Dr. Reyes, prior to undergoing surgery/procedure for treatment of  Carpel Tunnel .    Proposed procedure: Repair Left Hand Carpal Tunnel    Date of Surgery/ Procedure: 12/19/17  Hospital/Surgical Facility:    [facility]      [address]      Fax: [phone]      Phone: [phone]       Primary Physician: Liz Baker  Type of Anesthesia Anticipated: General  History of anesthesia complications: NONE  History of  abnormal bleeding: NONE   History of blood transfusions: NO  Patient has a Health Care Directive or Living Will:  NO    Preoperative Questions   1. NO - Do you have a history of heart attack, stroke, stent, bypass or surgery on an artery in the head, neck, heart or legs?  2. NO - Do you ever have any pain or discomfort in your chest?  3. NO - Have you ever had a severe pain across the front of your chest lasting for half an hour or more?  4. NO - Do you have a history of Congestive Heart Failure?  5. NO - Are you troubled by shortness of breath when: walking on the level/ up a slight hill/ at night?  6. NO - Does your chest ever sound wheezy or whistling?  7. NO - Do you currently have a cold, bronchitis or other respiratory infection?  8. NO - Have you had a cold, bronchitis or other respiratory infection within the last 2 weeks?  9. NO - Do you usually have a cough?  10. NO - Do you sometimes get pains in the calves of your legs when you walk?  11. NO - Do you or anyone in your family have previous history of blood clots?  12. NO - Do you or does anyone in your family have a serious bleeding problem such as prolonged bleeding following surgeries or cuts?  13. NO - Have you ever had problems with anemia or been told to  "take iron pills?  14. NO - Have you had any abnormal blood loss such as black, tarry or bloody stools, or abnormal vaginal bleeding?  15. NO - Have you ever had a blood transfusion?  16. NO - Have you or any of your relatives ever had problems with anesthesia?  17. NO - Do you have sleep apnea, excessive snoring or daytime drowsiness?  18. NO - Do you have any prosthetic heart valves?  19. NO - Do you have prosthetic joints?  20. NO - Is there any chance that you may be pregnant?  Patient Active Problem List   Diagnosis     Hypertension goal BP (blood pressure) < 130/80     Generalized anxiety disorder     BMI 40.0-44.9, adult (H)     Gender dysphoria     Asymptomatic cholelithiasis     CASSIDY (nonalcoholic steatohepatitis)     Nexplanon insertion     Acne vulgaris     HPV test positive-cotesting due Oct 2015     Cat allergies     Attention deficit hyperactivity disorder (ADHD), combined type     Carpal tunnel syndrome of left wrist     Dysmetabolic syndrome X     Bilateral carpal tunnel syndrome         Current Outpatient Prescriptions on File Prior to Visit:  naproxen (NAPROSYN) 500 MG tablet Take 1 tablet (500 mg) by mouth 2 times daily as needed for moderate pain   minocycline (MINOCIN/DYNACIN) 100 MG capsule Take 1 capsule (100 mg) by mouth 2 times daily   needle, disp, 18G X 1\" MISC 1 each once a week   Needle, Disp, 23G X 1\" MISC Use to inject testosterone into muscle weekly   syringe, disposable, (BD TUBERCULIN SYRINGE) 1 ML MISC BD 1ml Tuberculing syringe SLIP TIP (no needle attached). Use to administer IM medications as instructed   amphetamine-dextroamphetamine (ADDERALL) 20 MG per tablet Take 1 tablet (20 mg) by mouth 2 times daily   [START ON 12/29/2017] amphetamine-dextroamphetamine (ADDERALL) 20 MG per tablet Take 1 tablet (20 mg) by mouth 2 times daily   testosterone cypionate (DEPOTESTOTERONE) 200 MG/ML injection Inject 0.4 mLs (80 mg) into the muscle once a week In cottonseed oil ok   montelukast " (SINGULAIR) 10 MG tablet Take 1 tablet (10 mg) by mouth nightly as needed   FLUoxetine (PROZAC) 20 MG capsule Take 1 capsule (20 mg) by mouth daily   fluticasone (FLONASE) 50 MCG/ACT spray Spray 2 sprays into both nostrils daily   Sharps Container MISC Dispose sharps   triamcinolone (KENALOG) 0.1 % cream Apply sparingly to affected area two times daily as needed     No current facility-administered medications on file prior to visit.     OTC products: None, except as noted above    No Known Allergies  Latex Allergy: NO    Social History     Social History     Marital status: Single     Spouse name: N/A     Number of children: N/A     Years of education: N/A     Social History Main Topics     Smoking status: Former Smoker     Packs/day: 1.00     Years: 12.00     Types: Cigarettes     Smokeless tobacco: Never Used      Comment: quit 6/29/13     Alcohol use Yes      Comment: Occ     Drug use: Yes     Special: Marijuana      Comment: THC only     Sexual activity: Not Currently     Partners: Female, Male     Birth control/ protection: Implant, None     Other Topics Concern     Parent/Sibling W/ Cabg, Mi Or Angioplasty Before 65f 55m? No     Social History Narrative    Works in event planning/set up. Lifts a lot at work.    Frequent exercise.        REVIEW OF SYSTEMS:   Constitutional, HEENT, cardiovascular, pulmonary, gi and gu systems are negative, except as otherwise noted.      EXAM:   Patient Vitals for the past 24 hrs:   BP Temp Temp src Pulse Resp SpO2 Weight   12/18/17 1615 (!) 138/101 98.5  F (36.9  C) Oral 104 18 98 % 270 lb 6.4 oz (122.7 kg)     Body mass index is 43.2 kg/(m^2).  GENERAL: healthy, alert and no distress  EYES: Eyes grossly normal to inspection, extraocular movements - intact, and PERRL  HENT: ear canals- normal; TMs- normal; Nose- normal; Mouth- no ulcers, no lesions, slight erythema of throat  NECK: no tenderness, no adenopathy, no asymmetry, no masses, no stiffness; thyroid- normal to  palpation  RESP: lungs clear to auscultation - no rales, no rhonchi, no wheezes  CV: regular rates and rhythm, normal S1 S2, no S3 or S4 and no murmur, no click or rub -  ABDOMEN: soft, no tenderness, no  hepatosplenomegaly, no masses, normal bowel sounds  MS: extremities- no gross deformities noted, no edema  SKIN: no suspicious lesions, no rashes  NEURO: strength and tone- normal, sensory exam- grossly normal, mentation- intact, speech- normal, reflexes- symmetric  BACK: no CVA tenderness, no paralumbar tenderness  PSYCH: Alert and oriented times 3; speech- coherent , normal rate and volume; able to articulate logical thoughts  LYMPHATICS: ant. cervical- normal, post. cervical- normal    DIAGNOSTICS:      Labs Drawn and in Process:   Unresulted Labs Ordered in the Past 30 Days of this Admission     No orders found from 10/19/2017 to 12/19/2017.          RISK ASSESSMENT:     Cardiovascular Risk:  -Patient is able to participate in strenuous activities without chest pain.  -The patient does not have chest pain with exertion.  -Patient does not have a history of congestive heart failure.    -The patient does not have a history of stroke and does not have a history of valvular disease.    Pulmonary Risk:  -In terms of risk factors for pulmonary complication, the patient has no risk factors    Perioperative Complications:  -The patient does not have a history of bleeding or clotting problems in the past.    -The patient has not had complications from surgeries.    -The patient does not have a family history of any anesthesia or surgical complications.      IMPRESSION:   Reason for surgery/procedure: Left hand carpal tunnel    The proposed surgical procedure is considered LOW risk.    For above listed surgery and anesthesia:   Patient is at low risk for surgery/procedure and perioperative/procedure complications.    RECOMMENDATIONS:     Labs:  Hgb, K+, Creatininge, A1c and Coags    Fasting:  Must be NPO for 6 hours  preoperatively.    Preop Plan:  --Approval given to proceed with proposed procedure, without further diagnostic evaluation    Medications:  Patient should take their regular medications the morning of surgery unless otherwise instructed.    Hold aspirin 7 days prior to surgery/ has been holding naproxen.    Has had some elevated hgb, lft's, glucose in past--will recheck today but shouldn't affect surgery.    BP is up.  Has been on lisinopril in past.  Will restart 5mg.    F/u with Dr Baker in about a month.  >25 minutes were spent with the pt with>50% in counseling and coordination of care    Aleksandra Higgins    Please contact our office if there are any further questions or information required about this patient.

## 2017-12-18 NOTE — LETTER
December 19, 2017      Irma JOSE ALEJANDRO Nathan  [address] [facility] [address]        Dear Irma,    Thank you for getting your care at [facility]. Please see below for your test results.    Resulted Orders   Comprehensive Metabolic Panel ([facility])   Result Value Ref Range    Albumin 4.2 3.8 - 5.0 mg/dL    Alkaline Phosphatase 50.2 31.7 - 110.5 U/L    ALT 41.1 0.0 - 45.0 U/L    AST 30.3 0.0 - 45.0 U/L    Bilirubin Total <0.4 0.2 - 1.3 mg/dL    Urea Nitrogen 15.1 7.0 - 19.0 mg/dL    Calcium 9.4 8.5 - 10.1 mg/dL    Chloride 102.6 98.0 - 110.0 mmol/L    Carbon Dioxide 27.7 20.0 - 32.0 mmol/L    Creatinine 0.7 0.5 - 1.0 mg/dL    Glucose 96.3 70.0 - 99.0 mg'dL    Potassium 3.7 3.3 - 4.5 mmol/dL    Sodium 137.8 132.6 - 141.4 mmol/L    Protein Total 7.0 6.8 - 8.8 g/dL    GFR Estimate >90 >60.0 mL/min/1.7 m2    GFR Estimate If Black >90 >60.0 mL/min/1.7 m2   Hemoglobin A1c ([facility])   Result Value Ref Range    Hemoglobin A1C 5.4 4.1 - 5.7 %   INR   Result Value Ref Range    INR 1.01 0.86 - 1.14   Partial thromboplastin time   Result Value Ref Range    PTT 30 22 - 37 sec     These tests look good.  Not irritation of the liver like we saw last time.  Your kidneys are doing well.  Your blood clotting (INR/PTT) was normal, as was your testing for diabetes (Hgb A1c).  If you have any concerns about these results please call and leave a message for me or send a ProTip message to the clinic.  Plan to follow up with Liz Baker MD in about 4 weeks    Sincerely,    Vitaly Monique MD

## 2017-12-18 NOTE — PATIENT INSTRUCTIONS
Presurgery Checklist  You are scheduled to have surgery. The healthcare staff will try to make your stay comfortable. Use the guidelines below to remind yourself what to do before surgery. Be sure to follow any specific pre-op instructions from your surgeon or nurse.   Preparing for Surgery  Ask your surgeon if you ll need a blood transfusion during surgery and if so, how to prepare for it. In some cases, you can donate blood before surgery. If needed, this blood can be given back (transfused) to you during or after surgery.  If you are having abdominal surgery, ask what you need to do to clear your bowel.  Tell your surgeon if you have allergies to any medications or foods.  Arrange for an adult family member or friend to drive you home after surgery. If possible, have someone ready to help you at home as you recover.  Call the surgeon if you get a cold, fever, sore throat, diarrhea, or other health problem just before surgery. Your surgeon can decide whether or not to postpone the surgery.  Medications  Tell your surgeon about all medications you take, including prescription and over-the-counter products such as herbal remedies and vitamins. Ask if you should continue taking them.  If you take ibuprofen, naproxen, or  blood thinners  such as aspirin, clopidogrel (Plavix), or warfarin (Coumadin), ask your surgeon whether you should stop taking them and how long before surgery you should stop.  You may be told to take antibiotics just before surgery to prevent infection. If so, follow instructions carefully on how to take them.  If you are told to take medications called anticoagulants to prevent blood clots after surgery, be sure to follow the instructions on how to take them.  Stop Smoking  If you smoke, healing may take longer. So at least 2 week(s) before surgery, stop smoking.  Bathing or Showering Before Surgery  If instructed, wash with antibacterial soap. Afterward, do not use lotions or powders.  If you  are having surgery on the head, you may be asked to shampoo with antibacterial soap. Follow instructions for doing so.  Do Not Remove Hair from the Surgery Site  Do not shave hair from the incision site, unless you are given specific instructions to do so. Usually, if hair needs to be removed, it will be done at the hospital right before surgery.  Don t Eat or Drink  Your doctor will tell you when to stop eating and drinking. If you do not follow your doctor's instructions, your procedure may be postponed or rescheduled for another day.  If your surgeon tells you to continue any medications, take them with small sips of water.  You can brush your teeth and rinse your mouth, but don t swallow any water.  Day of Surgery  Do not wear makeup. Do not use perfume, deodorant, or hairspray. Remove nail polish and artificial nails.  Leave jewelry (including rings), watches, and other valuables at home.  Be sure to bring health insurance cards or forms and a photo ID.  Bring a list of your medications (include the name, dose, how often you take them, and the time last dose was taken).  Arrive on time at the hospital or surgery facility.  Presurgery Checklist  You are scheduled to have surgery. The healthcare staff will try to make your stay comfortable. Use the guidelines below to remind yourself what to do before surgery. Be sure to follow any specific pre-op instructions from your surgeon or nurse.   Preparing for Surgery  Ask your surgeon if you ll need a blood transfusion during surgery and if so, how to prepare for it. In some cases, you can donate blood before surgery. If needed, this blood can be given back (transfused) to you during or after surgery.  If you are having abdominal surgery, ask what you need to do to clear your bowel.  Tell your surgeon if you have allergies to any medications or foods.  Arrange for an adult family member or friend to drive you home after surgery. If possible, have someone ready to help  you at home as you recover.  Call the surgeon if you get a cold, fever, sore throat, diarrhea, or other health problem just before surgery. Your surgeon can decide whether or not to postpone the surgery.  Medications  Tell your surgeon about all medications you take, including prescription and over-the-counter products such as herbal remedies and vitamins. Ask if you should continue taking them.  If you take ibuprofen, naproxen, or  blood thinners  such as aspirin, clopidogrel (Plavix), or warfarin (Coumadin), ask your surgeon whether you should stop taking them and how long before surgery you should stop.  You may be told to take antibiotics just before surgery to prevent infection. If so, follow instructions carefully on how to take them.  If you are told to take medications called anticoagulants to prevent blood clots after surgery, be sure to follow the instructions on how to take them.  Stop Smoking  If you smoke, healing may take longer. So at least 2 week(s) before surgery, stop smoking.  Bathing or Showering Before Surgery  If instructed, wash with antibacterial soap. Afterward, do not use lotions or powders.  If you are having surgery on the head, you may be asked to shampoo with antibacterial soap. Follow instructions for doing so.  Do Not Remove Hair from the Surgery Site  Do not shave hair from the incision site, unless you are given specific instructions to do so. Usually, if hair needs to be removed, it will be done at the hospital right before surgery.  Don t Eat or Drink  Your doctor will tell you when to stop eating and drinking. If you do not follow your doctor's instructions, your procedure may be postponed or rescheduled for another day.  If your surgeon tells you to continue any medications, take them with small sips of water.  You can brush your teeth and rinse your mouth, but don t swallow any water.  Day of Surgery  Do not wear makeup. Do not use perfume, deodorant, or hairspray. Remove nail  polish and artificial nails.  Leave jewelry (including rings), watches, and other valuables at home.  Be sure to bring health insurance cards or forms and a photo ID.  Bring a list of your medications (include the name, dose, how often you take them, and the time last dose was taken).  Arrive on time at the hospital or surgery facility.

## 2017-12-18 NOTE — MR AVS SNAPSHOT
After Visit Summary   12/18/2017    Irma Evans    MRN: [id number]           Patient Information     Date Of Birth          1975        Visit Information        Provider Department      12/18/2017 4:20 PM Vitaly Monique MD [facility]        Today's Diagnoses     Preop general physical exam    -  1    Elevated blood pressure reading without diagnosis of hypertension        Elevated hemoglobin (H)        Hyperglycemia        CASSIDY (nonalcoholic steatohepatitis)          Care Instructions      Presurgery Checklist  You are scheduled to have surgery. The healthcare staff will try to make your stay comfortable. Use the guidelines below to remind yourself what to do before surgery. Be sure to follow any specific pre-op instructions from your surgeon or nurse.   Preparing for Surgery  Ask your surgeon if you ll need a blood transfusion during surgery and if so, how to prepare for it. In some cases, you can donate blood before surgery. If needed, this blood can be given back (transfused) to you during or after surgery.  If you are having abdominal surgery, ask what you need to do to clear your bowel.  Tell your surgeon if you have allergies to any medications or foods.  Arrange for an adult family member or friend to drive you home after surgery. If possible, have someone ready to help you at home as you recover.  Call the surgeon if you get a cold, fever, sore throat, diarrhea, or other health problem just before surgery. Your surgeon can decide whether or not to postpone the surgery.  Medications  Tell your surgeon about all medications you take, including prescription and over-the-counter products such as herbal remedies and vitamins. Ask if you should continue taking them.  If you take ibuprofen, naproxen, or  blood thinners  such as aspirin, clopidogrel (Plavix), or warfarin (Coumadin), ask your surgeon whether you should stop taking them and how long before surgery you  should stop.  You may be told to take antibiotics just before surgery to prevent infection. If so, follow instructions carefully on how to take them.  If you are told to take medications called anticoagulants to prevent blood clots after surgery, be sure to follow the instructions on how to take them.  Stop Smoking  If you smoke, healing may take longer. So at least 2 week(s) before surgery, stop smoking.  Bathing or Showering Before Surgery  If instructed, wash with antibacterial soap. Afterward, do not use lotions or powders.  If you are having surgery on the head, you may be asked to shampoo with antibacterial soap. Follow instructions for doing so.  Do Not Remove Hair from the Surgery Site  Do not shave hair from the incision site, unless you are given specific instructions to do so. Usually, if hair needs to be removed, it will be done at the hospital right before surgery.  Don t Eat or Drink  Your doctor will tell you when to stop eating and drinking. If you do not follow your doctor's instructions, your procedure may be postponed or rescheduled for another day.  If your surgeon tells you to continue any medications, take them with small sips of water.  You can brush your teeth and rinse your mouth, but don t swallow any water.  Day of Surgery  Do not wear makeup. Do not use perfume, deodorant, or hairspray. Remove nail polish and artificial nails.  Leave jewelry (including rings), watches, and other valuables at home.  Be sure to bring health insurance cards or forms and a photo ID.  Bring a list of your medications (include the name, dose, how often you take them, and the time last dose was taken).  Arrive on time at the hospital or surgery facility.  Presurgery Checklist  You are scheduled to have surgery. The healthcare staff will try to make your stay comfortable. Use the guidelines below to remind yourself what to do before surgery. Be sure to follow any specific pre-op instructions from your surgeon or  nurse.   Preparing for Surgery  Ask your surgeon if you ll need a blood transfusion during surgery and if so, how to prepare for it. In some cases, you can donate blood before surgery. If needed, this blood can be given back (transfused) to you during or after surgery.  If you are having abdominal surgery, ask what you need to do to clear your bowel.  Tell your surgeon if you have allergies to any medications or foods.  Arrange for an adult family member or friend to drive you home after surgery. If possible, have someone ready to help you at home as you recover.  Call the surgeon if you get a cold, fever, sore throat, diarrhea, or other health problem just before surgery. Your surgeon can decide whether or not to postpone the surgery.  Medications  Tell your surgeon about all medications you take, including prescription and over-the-counter products such as herbal remedies and vitamins. Ask if you should continue taking them.  If you take ibuprofen, naproxen, or  blood thinners  such as aspirin, clopidogrel (Plavix), or warfarin (Coumadin), ask your surgeon whether you should stop taking them and how long before surgery you should stop.  You may be told to take antibiotics just before surgery to prevent infection. If so, follow instructions carefully on how to take them.  If you are told to take medications called anticoagulants to prevent blood clots after surgery, be sure to follow the instructions on how to take them.  Stop Smoking  If you smoke, healing may take longer. So at least 2 week(s) before surgery, stop smoking.  Bathing or Showering Before Surgery  If instructed, wash with antibacterial soap. Afterward, do not use lotions or powders.  If you are having surgery on the head, you may be asked to shampoo with antibacterial soap. Follow instructions for doing so.  Do Not Remove Hair from the Surgery Site  Do not shave hair from the incision site, unless you are given specific instructions to do so.  Usually, if hair needs to be removed, it will be done at the hospital right before surgery.  Don t Eat or Drink  Your doctor will tell you when to stop eating and drinking. If you do not follow your doctor's instructions, your procedure may be postponed or rescheduled for another day.  If your surgeon tells you to continue any medications, take them with small sips of water.  You can brush your teeth and rinse your mouth, but don t swallow any water.  Day of Surgery  Do not wear makeup. Do not use perfume, deodorant, or hairspray. Remove nail polish and artificial nails.  Leave jewelry (including rings), watches, and other valuables at home.  Be sure to bring health insurance cards or forms and a photo ID.  Bring a list of your medications (include the name, dose, how often you take them, and the time last dose was taken).  Arrive on time at the hospital or surgery facility.          Follow-ups after your visit        Your next 10 appointments already scheduled     Dec 26, 2017  6:15 PM CST   (Arrive by 6:00 PM)   Return Plastic Surgery with Khoi Reyes MD   [facility] ([email or web address])    [address] [place] [address] [phone]           Do not wear perfume.              Who to contact     Please call your clinic at [phone] to:    Ask questions about your health    Make or cancel appointments    Discuss your medicines    Learn about your test results    Speak to your doctor   If you have compliments or concerns about an experience at your clinic, or if you wish to file a complaint, please contact [facility] Patient Relations at [phone] or email us at [email or web address]         Additional Information About Your Visit        [employer] Information     [employer] gives you secure access to your electronic health record. If you see a primary care provider, you can also send messages to  your care team and make appointments. If you have questions, please call your primary care clinic.  If you do not have a primary care provider, please call [phone] and they will assist you.      "[employer] is an electronic gateway that provides easy, online access to your medical records. With "[employer] you can request a clinic appointment, read your test results, renew a prescription or communicate with your care team.     To access your existing account, please contact your [facility] or call [phone] for assistance.        Care EveryWhere ID     This is your Care EveryWhere ID. This could be used by other organizations to access your [place] medical records  [id number]        Your Vitals Were     Pulse Temperature Respirations Pulse Oximetry BMI (Body Mass Index)       104 98.5  F (36.9  C) (Oral) 18 98% 43.2 kg/m2        Blood Pressure from Last 3 Encounters:   12/19/17 125/82   12/18/17 (!) 138/101   12/05/17 (!) 152/106    Weight from Last 3 Encounters:   12/19/17 265 lb (120.2 kg)   12/18/17 270 lb 6.4 oz (122.7 kg)   12/14/17 (P) 273 lb 5.9 oz (124 kg)              We Performed the Following     CBC with platelets differential     Comprehensive Metabolic Panel ([facility])     Hemoglobin A1c ([facility])     INR     Partial thromboplastin time          Today's Medication Changes          These changes are accurate as of: 12/18/17 11:59 PM.  If you have any questions, ask your nurse or doctor.               Start taking these medicines.        Dose/Directions    lisinopril 5 MG tablet   Commonly known as:  PRINIVIL/ZESTRIL   Used for:  Elevated blood pressure reading without diagnosis of hypertension   Started by:  Vitaly Monique MD        Dose:  5 mg   Take 1 tablet (5 mg) by mouth daily   Quantity:  30 tablet   Refills:  1            Where to get your medicines      These medications were sent to [facility] [address] [place] [address], [place] - [address] [place] [address] [place] [facility]  [place] [address] [facility], [facility] [address]     Phone:  [phone]     lisinopril 5 MG tablet                Primary Care Provider Office Phone # Fax #    Liz Baker [phone]       [address] [place] [id number]        Equal Access to Services     WILDA GREGORIO : Hadii aad ku hadasho Soomaali, waaxda luqadaha, qaybta kaalmada adeegyada, waxay bekahin hayaan adeeg justolamin lanathalia . So [facility] [phone].    ATENCIÓN: Si habla español, tiene a schuler disposición servicios gratuitos de asistencia lingüística. [facility] [phone].    We comply with applicable federal civil rights laws and [place] laws. We do not discriminate on the basis of race, color, national origin, age, disability, sex, sexual orientation, or gender identity.            Thank you!     Thank you for choosing [facility]  for your care. Our goal is always to provide you with excellent care. Hearing back from our patients is one way we can continue to improve our services. Please take a few minutes to complete the written survey that you may receive in the mail after your visit with us. Thank you!             Your Updated Medication List - Protect others around you: Learn how to safely use, store and throw away your medicines at www.disposemymeds.org.          This list is accurate as of: 12/18/17 11:59 PM.  Always use your most recent med list.                   Brand Name Dispense Instructions for use Diagnosis    amphetamine-dextroamphetamine 20 MG per tablet   Start taking on:  12/29/2017    ADDERALL    60 tablet    Take 1 tablet (20 mg) by mouth 2 times daily    Attention deficit hyperactivity disorder (ADHD), combined type       FLUoxetine 20 MG capsule    PROzac    90 capsule    Take 1 capsule (20 mg) by mouth daily    SHANA (generalized anxiety disorder)       fluticasone 50 MCG/ACT spray    FLONASE    48 g    Spray 2 sprays into both nostrils daily    Chronic rhinitis        "lisinopril 5 MG tablet    PRINIVIL/ZESTRIL    30 tablet    Take 1 tablet (5 mg) by mouth daily    Elevated blood pressure reading without diagnosis of hypertension       minocycline 100 MG capsule    MINOCIN/DYNACIN    180 capsule    Take 1 capsule (100 mg) by mouth 2 times daily    Acne vulgaris       montelukast 10 MG tablet    SINGULAIR    90 tablet    Take 1 tablet (10 mg) by mouth nightly as needed    Seasonal allergic rhinitis due to pollen       naproxen 500 MG tablet    NAPROSYN    60 tablet    Take 1 tablet (500 mg) by mouth 2 times daily as needed for moderate pain    Costochondritis       needle (disp) 18G X 1\" Misc     15 each    1 each once a week        Needle (Disp) 23G X 1\" Misc     15 each    Use to inject testosterone into muscle weekly        oxyCODONE-acetaminophen 5-325 MG per tablet    PERCOCET    20 tablet    Take 1-2 tablets by mouth every 4 hours as needed for pain    Carpal tunnel syndrome of left wrist       Sharps Container Misc     1 each    Dispose sharps    Gender dysphoria       syringe (disposable) 1 ML Misc    BD TUBERCULIN SYRINGE    15 each    BD 1ml Tuberculing syringe SLIP TIP (no needle attached). Use to administer IM medications as instructed        testosterone cypionate 200 MG/ML injection    DEPOTESTOTERONE    10 mL    Inject 0.4 mLs (80 mg) into the muscle once a week In cottonseed oil ok    Gender identity disorder       triamcinolone 0.1 % cream    KENALOG    80 g    Apply sparingly to affected area two times daily as needed    Dermatitis         "

## 2017-12-19 ENCOUNTER — HOSPITAL ENCOUNTER (OUTPATIENT)
Facility: AMBULATORY SURGERY CENTER | Age: 42
End: 2017-12-19
Payer: COMMERCIAL

## 2017-12-19 ENCOUNTER — SURGERY (OUTPATIENT)
Age: 42
End: 2017-12-19

## 2017-12-19 ENCOUNTER — ANESTHESIA (OUTPATIENT)
Dept: SURGERY | Facility: AMBULATORY SURGERY CENTER | Age: 42
End: 2017-12-19

## 2017-12-19 VITALS
HEIGHT: 66 IN | HEART RATE: 70 BPM | RESPIRATION RATE: 16 BRPM | DIASTOLIC BLOOD PRESSURE: 97 MMHG | OXYGEN SATURATION: 100 % | SYSTOLIC BLOOD PRESSURE: 129 MMHG | BODY MASS INDEX: 42.59 KG/M2 | TEMPERATURE: 97.3 F | WEIGHT: 265 LBS

## 2017-12-19 DIAGNOSIS — G56.02 CARPAL TUNNEL SYNDROME OF LEFT WRIST: Primary | ICD-10-CM

## 2017-12-19 LAB
ALBUMIN SERPL-MCNC: 4.2 MG/DL (ref 3.8–5)
ALP SERPL-CCNC: 50.2 U/L (ref 31.7–110.5)
ALT SERPL-CCNC: 41.1 U/L (ref 0–45)
AST SERPL-CCNC: 30.3 U/L (ref 0–45)
BASOPHILS # BLD AUTO: 0 10E9/L (ref 0–0.2)
BASOPHILS NFR BLD AUTO: 0.5 %
BILIRUB SERPL-MCNC: <0.4 MG/DL (ref 0.2–1.3)
BUN SERPL-MCNC: 15.1 MG/DL (ref 7–19)
CALCIUM SERPL-MCNC: 9.4 MG/DL (ref 8.5–10.1)
CHLORIDE SERPLBLD-SCNC: 102.6 MMOL/L (ref 98–110)
CO2 SERPL-SCNC: 27.7 MMOL/L (ref 20–32)
CREAT SERPL-MCNC: 0.7 MG/DL (ref 0.5–1)
DIFFERENTIAL METHOD BLD: ABNORMAL
EOSINOPHIL # BLD AUTO: 0.2 10E9/L (ref 0–0.7)
EOSINOPHIL NFR BLD AUTO: 2.3 %
ERYTHROCYTE [DISTWIDTH] IN BLOOD BY AUTOMATED COUNT: 13.3 % (ref 10–15)
GFR SERPL CREATININE-BSD FRML MDRD: >90 ML/MIN/1.7 M2
GLUCOSE SERPL-MCNC: 96.3 MG'DL (ref 70–99)
HCG UR QL: NEGATIVE
HCT VFR BLD AUTO: 52.4 % (ref 35–47)
HGB BLD-MCNC: 17.4 G/DL (ref 11.7–15.7)
IMM GRANULOCYTES # BLD: 0.1 10E9/L (ref 0–0.4)
IMM GRANULOCYTES NFR BLD: 0.8 %
INTERNAL QC OK POCT: YES
LYMPHOCYTES # BLD AUTO: 2.4 10E9/L (ref 0.8–5.3)
LYMPHOCYTES NFR BLD AUTO: 36 %
MCH RBC QN AUTO: 30.4 PG (ref 26.5–33)
MCHC RBC AUTO-ENTMCNC: 33.2 G/DL (ref 31.5–36.5)
MCV RBC AUTO: 91 FL (ref 78–100)
MONOCYTES # BLD AUTO: 0.7 10E9/L (ref 0–1.3)
MONOCYTES NFR BLD AUTO: 10.3 %
NEUTROPHILS # BLD AUTO: 3.3 10E9/L (ref 1.6–8.3)
NEUTROPHILS NFR BLD AUTO: 50.1 %
NRBC # BLD AUTO: 0 10*3/UL
NRBC BLD AUTO-RTO: 0 /100
PLATELET # BLD AUTO: 172 10E9/L (ref 150–450)
POTASSIUM SERPL-SCNC: 3.7 MMOL/DL (ref 3.3–4.5)
PROT SERPL-MCNC: 7 G/DL (ref 6.8–8.8)
RBC # BLD AUTO: 5.73 10E12/L (ref 3.8–5.2)
SODIUM SERPL-SCNC: 137.8 MMOL/L (ref 132.6–141.4)
WBC # BLD AUTO: 6.6 10E9/L (ref 4–11)

## 2017-12-19 RX ORDER — GABAPENTIN 300 MG/1
300 CAPSULE ORAL ONCE
Status: COMPLETED | OUTPATIENT
Start: 2017-12-19 | End: 2017-12-19

## 2017-12-19 RX ORDER — ONDANSETRON 2 MG/ML
4 INJECTION INTRAMUSCULAR; INTRAVENOUS EVERY 30 MIN PRN
Status: DISCONTINUED | OUTPATIENT
Start: 2017-12-19 | End: 2017-12-20 | Stop reason: HOSPADM

## 2017-12-19 RX ORDER — SODIUM CHLORIDE, SODIUM LACTATE, POTASSIUM CHLORIDE, CALCIUM CHLORIDE 600; 310; 30; 20 MG/100ML; MG/100ML; MG/100ML; MG/100ML
INJECTION, SOLUTION INTRAVENOUS CONTINUOUS
Status: DISCONTINUED | OUTPATIENT
Start: 2017-12-19 | End: 2017-12-19 | Stop reason: HOSPADM

## 2017-12-19 RX ORDER — ACETAMINOPHEN 325 MG/1
650 TABLET ORAL
Status: DISCONTINUED | OUTPATIENT
Start: 2017-12-19 | End: 2017-12-20 | Stop reason: HOSPADM

## 2017-12-19 RX ORDER — PROPOFOL 10 MG/ML
INJECTION, EMULSION INTRAVENOUS PRN
Status: DISCONTINUED | OUTPATIENT
Start: 2017-12-19 | End: 2017-12-19

## 2017-12-19 RX ORDER — CEFAZOLIN SODIUM 1 G/50ML
3 SOLUTION INTRAVENOUS
Status: COMPLETED | OUTPATIENT
Start: 2017-12-19 | End: 2017-12-19

## 2017-12-19 RX ORDER — LIDOCAINE HYDROCHLORIDE 20 MG/ML
INJECTION, SOLUTION INFILTRATION; PERINEURAL PRN
Status: DISCONTINUED | OUTPATIENT
Start: 2017-12-19 | End: 2017-12-19

## 2017-12-19 RX ORDER — DEXAMETHASONE SODIUM PHOSPHATE 4 MG/ML
INJECTION, SOLUTION INTRA-ARTICULAR; INTRALESIONAL; INTRAMUSCULAR; INTRAVENOUS; SOFT TISSUE PRN
Status: DISCONTINUED | OUTPATIENT
Start: 2017-12-19 | End: 2017-12-19

## 2017-12-19 RX ORDER — FENTANYL CITRATE 50 UG/ML
25-50 INJECTION, SOLUTION INTRAMUSCULAR; INTRAVENOUS
Status: DISCONTINUED | OUTPATIENT
Start: 2017-12-19 | End: 2017-12-20 | Stop reason: HOSPADM

## 2017-12-19 RX ORDER — OXYCODONE HYDROCHLORIDE 5 MG/1
5 TABLET ORAL
Status: DISCONTINUED | OUTPATIENT
Start: 2017-12-19 | End: 2017-12-20 | Stop reason: HOSPADM

## 2017-12-19 RX ORDER — PROPOFOL 10 MG/ML
INJECTION, EMULSION INTRAVENOUS CONTINUOUS PRN
Status: DISCONTINUED | OUTPATIENT
Start: 2017-12-19 | End: 2017-12-19

## 2017-12-19 RX ORDER — OXYCODONE AND ACETAMINOPHEN 5; 325 MG/1; MG/1
1-2 TABLET ORAL EVERY 4 HOURS PRN
Qty: 20 TABLET | Refills: 0 | Status: SHIPPED | OUTPATIENT
Start: 2017-12-19 | End: 2017-12-26

## 2017-12-19 RX ORDER — KETAMINE HYDROCHLORIDE 10 MG/ML
INJECTION, SOLUTION INTRAMUSCULAR; INTRAVENOUS PRN
Status: DISCONTINUED | OUTPATIENT
Start: 2017-12-19 | End: 2017-12-19

## 2017-12-19 RX ORDER — NALOXONE HYDROCHLORIDE 0.4 MG/ML
.1-.4 INJECTION, SOLUTION INTRAMUSCULAR; INTRAVENOUS; SUBCUTANEOUS
Status: DISCONTINUED | OUTPATIENT
Start: 2017-12-19 | End: 2017-12-20 | Stop reason: HOSPADM

## 2017-12-19 RX ORDER — ONDANSETRON 4 MG/1
4 TABLET, ORALLY DISINTEGRATING ORAL EVERY 30 MIN PRN
Status: DISCONTINUED | OUTPATIENT
Start: 2017-12-19 | End: 2017-12-20 | Stop reason: HOSPADM

## 2017-12-19 RX ORDER — LIDOCAINE HYDROCHLORIDE AND EPINEPHRINE 10; 10 MG/ML; UG/ML
INJECTION, SOLUTION INFILTRATION; PERINEURAL PRN
Status: DISCONTINUED | OUTPATIENT
Start: 2017-12-19 | End: 2017-12-19 | Stop reason: HOSPADM

## 2017-12-19 RX ORDER — SODIUM CHLORIDE, SODIUM LACTATE, POTASSIUM CHLORIDE, CALCIUM CHLORIDE 600; 310; 30; 20 MG/100ML; MG/100ML; MG/100ML; MG/100ML
INJECTION, SOLUTION INTRAVENOUS CONTINUOUS
Status: DISCONTINUED | OUTPATIENT
Start: 2017-12-19 | End: 2017-12-20 | Stop reason: HOSPADM

## 2017-12-19 RX ORDER — ONDANSETRON 2 MG/ML
INJECTION INTRAMUSCULAR; INTRAVENOUS PRN
Status: DISCONTINUED | OUTPATIENT
Start: 2017-12-19 | End: 2017-12-19

## 2017-12-19 RX ORDER — ACETAMINOPHEN 325 MG/1
975 TABLET ORAL ONCE
Status: COMPLETED | OUTPATIENT
Start: 2017-12-19 | End: 2017-12-19

## 2017-12-19 RX ADMIN — LIDOCAINE HYDROCHLORIDE 80 MG: 20 INJECTION, SOLUTION INFILTRATION; PERINEURAL at 10:24

## 2017-12-19 RX ADMIN — PROPOFOL 50 MG: 10 INJECTION, EMULSION INTRAVENOUS at 10:24

## 2017-12-19 RX ADMIN — ACETAMINOPHEN 975 MG: 325 TABLET ORAL at 07:43

## 2017-12-19 RX ADMIN — CEFAZOLIN SODIUM 3 G: 1 SOLUTION INTRAVENOUS at 10:25

## 2017-12-19 RX ADMIN — KETAMINE HYDROCHLORIDE 20 MG: 10 INJECTION, SOLUTION INTRAMUSCULAR; INTRAVENOUS at 10:30

## 2017-12-19 RX ADMIN — DEXAMETHASONE SODIUM PHOSPHATE 4 MG: 4 INJECTION, SOLUTION INTRA-ARTICULAR; INTRALESIONAL; INTRAMUSCULAR; INTRAVENOUS; SOFT TISSUE at 10:40

## 2017-12-19 RX ADMIN — PROPOFOL 100 MCG/KG/MIN: 10 INJECTION, EMULSION INTRAVENOUS at 10:24

## 2017-12-19 RX ADMIN — PROPOFOL 50 MG: 10 INJECTION, EMULSION INTRAVENOUS at 10:33

## 2017-12-19 RX ADMIN — LIDOCAINE HYDROCHLORIDE AND EPINEPHRINE 10 ML: 10; 10 INJECTION, SOLUTION INFILTRATION; PERINEURAL at 10:24

## 2017-12-19 RX ADMIN — SODIUM CHLORIDE, SODIUM LACTATE, POTASSIUM CHLORIDE, CALCIUM CHLORIDE: 600; 310; 30; 20 INJECTION, SOLUTION INTRAVENOUS at 07:43

## 2017-12-19 RX ADMIN — ONDANSETRON 4 MG: 2 INJECTION INTRAMUSCULAR; INTRAVENOUS at 10:40

## 2017-12-19 RX ADMIN — GABAPENTIN 300 MG: 300 CAPSULE ORAL at 07:43

## 2017-12-19 NOTE — ANESTHESIA PREPROCEDURE EVALUATION
Irma Evans is a 41 year old female with a PMH of  Carpal Tunnel Syndrome who is scheduled for Procedure(s):  Left Open Carpal Tunnel Release  ([facility]) - Wound Class: I-Clean    NPO Status: Adequate.  > 6 hours solids, > 2 hours clear liquids.       Past Surgical History:   Procedure Laterality Date     C OPEN RX ANKLE DISLOCATN+FIXATN  01/09    right       Anesthesia Evaluation     . Pt has had prior anesthetic. Type: General    No history of anesthetic complications          ROS/MED HX    ENT/Pulmonary:  - neg pulmonary ROS     Neurologic:  - neg neurologic ROS     Cardiovascular:     (+) hypertension----. : . . . :. . No previous cardiac testing       METS/Exercise Tolerance:  >4 METS   Hematologic:  - neg hematologic  ROS       Musculoskeletal:  - neg musculoskeletal ROS       GI/Hepatic:     (+) liver disease, Other GI/Hepatic CASSIDY      Renal/Genitourinary:  - ROS Renal section negative       Endo:     (+) Obesity (BMI 42), .      Psychiatric:     (+) psychiatric history anxiety and depression      Infectious Disease:  - neg infectious disease ROS       Malignancy:      - no malignancy   Other:    - neg other ROS                                Anesthesia Plan      History & Physical Review  History and physical reviewed and following examination; no interval change.    ASA Status:  3 .        Plan for MAC and Peripheral Nerve Block with Propofol induction. Maintenance will be TIVA.  Reason for MAC:  Deep or markedly invasive procedure (G8)  PONV prophylaxis:  Ondansetron (or other 5HT-3) and Dexamethasone or Solumedrol       Postoperative Care  Postoperative pain management:  Oral pain medications and Multi-modal analgesia.      Consents          Rohan Wynn MD  8:37 AM December 19, 2017                     .

## 2017-12-19 NOTE — ANESTHESIA CARE TRANSFER NOTE
Patient: Irma Evans    Procedure(s):  Left Open Carpal Tunnel Release   - Wound Class: I-Clean    Diagnosis: Carpal Tunnel Syndrome  Diagnosis Additional Information: No value filed.    Anesthesia Type:   MAC, Peripheral Nerve Block     Note:  Airway :Nasal Cannula  Patient transferred to:Phase II  Comments: Patient to Phase II on 2L O2 NC and is awake. Report to RN and transfer of care. BP:120/83 HR:86  Temp:97.1 SpO2:97 RR: 20      Vitals: (Last set prior to Anesthesia Care Transfer)    CRNA VITALS  12/19/2017 1042 - 12/19/2017 1112      12/19/2017             Resp Rate (set): 10                Electronically Signed By: MACO Craig CRNA  December 19, 2017  11:12 AM

## 2017-12-19 NOTE — IP AVS SNAPSHOT
[facility]    [address] [place] [id number]    Phone:  [phone]    Fax:  [phone]                                       After Visit Summary   12/19/2017    Irma Evans    MRN: [id number]           After Visit Summary Signature Page     I have received my discharge instructions, and my questions have been answered. I have discussed any challenges I see with this plan with the nurse or doctor.    ..........................................................................................................................................  Patient/Patient Representative Signature      ..........................................................................................................................................  Patient Representative Print Name and Relationship to Patient    ..................................................               ................................................  Date                                            Time    ..........................................................................................................................................  Reviewed by Signature/Title    ...................................................              ..............................................  Date                                                            Time

## 2017-12-19 NOTE — DISCHARGE INSTRUCTIONS
[facility]  Home Care Following Anesthesia  For 24 hours after surgery:  1. Get plenty of rest.  A responsible adult must stay with you for at least 24 hours after you leave the surgery center.  2. Do not drive or use heavy equipment.  If you have weakness or tingling, don't drive or use heavy equipment until this feeling goes away.   3. Do not drink alcohol.   4. Avoid strenuous or risky activities.  Ask for help when climbing stairs.  5. You may feel lightheaded.  IF so, sit for a few minutes before standing.  Have someone help you get up.   6. If you have nausea (feel sick to your stomach): Drink only clear liquids such as apple juice, ginger ale, broth or 7-Up.  Rest may also help.  Be sure to drink enough fluids.  Move to a regular diet as you feel able.   7. You may have a slight fever.  Call the doctor if your fever is over 100 F (37.7 C) (taken under the tongue) or lasts longer than 24 hours.  8. You may have a dry mouth, a sore throat, muscle aches or trouble sleeping. These should go away after 24 hours.  9. Do not make important or legal decisions.          Tips for taking pain medications  To get the best pain relief possible, remember these points:    Take pain medications as directed, before pain becomes severe.    Pain medication can upset your stomach: taking it with food may help.    Constipation is a common side effect of pain medication. Drink plenty of  fluids.    Eat foods high in fiber. Take a stool softener if recommended by your doctor or pharmacist.    Do not drink alcohol, drive or operate machinery while taking pain medications.    Ask about other ways to control pain, such as with heat, ice or relaxation.    Tylenol/Acetaminophen Consumption  To help encourage the safe use of acetaminophen, the makers of TYLENOL  have lowered the maximum daily dose for single-ingredient Extra Strength TYLENOL  (acetaminophen) products sold in the [place] from 8 pills per  day (4,000 mg) to 6 pills per day (3,000 mg). The dosing interval has also changed from 2 pills every 4-6 hours to 2 pills every 6 hours.    If you feel your pain relief is insufficient, you may take Tylenol/Acetaminophen in addition to your narcotic pain medication.     Be careful not to exceed 3,000 mg of Tylenol/Acetaminophen in a 24 hour period from all sources.    If you are taking extra strength Tylenol/acetaminophen (500 mg), the maximum dose is 6 tablets in 24 hours.    If you are taking regular strength acetaminophen (325 mg), the maximum dose is 9 tablets in 24 hours.    Call a doctor for any of the followin. Signs of infection (fever, growing tenderness at the surgery site, a large amount of drainage or bleeding, severe pain, foul-smelling drainage, redness, swelling).  2. It has been over 8 to 10 hours since surgery and you are still not able to urinate (pass water).  3. Headache for over 24 hours.  Your doctor is:  Dr. Khoi Reyes, Plastic Surgery: [phone]                  Or dial [phone] and ask for the resident on call for:  Plastics  For emergency care, call the:  [facility]:  [phone] (TTY for hearing impaired: [phone])

## 2017-12-19 NOTE — PROGRESS NOTES
No interval change in H&P. I went over the planned procedure in detail today. All risks, benefits, and alternatives were explained in detail again. All questions were answered. The patient understands the plan and wishes to proceed with the procedure today. The patient understands the team approach to care in the operating room and agrees to the procedure as such. The patient has been cleared by Medicine for this procedure and all laboratory tests are stable.

## 2017-12-19 NOTE — LETTER
Return to Work  2017     Seen today: yes    Patient:  Irma Evans  :   1975  MRN:     [id number]  Physician: Data Unavailable    Irma Evans may return to work on Date: 2017.      The next clinic appointment is scheduled for (date/time) 2017.    Patient limitations:  No lifting greater than 5 lbs with left hand. Keep left hand elevated above heart level at all times.          Electronically signed by Khoi Reyes MD.

## 2017-12-19 NOTE — ANESTHESIA POSTPROCEDURE EVALUATION
Patient: Irma Evans    Procedure(s):  Left Open Carpal Tunnel Release   - Wound Class: I-Clean    Diagnosis:Carpal Tunnel Syndrome  Diagnosis Additional Information: No value filed.    Anesthesia Type:  MAC    Note:  Anesthesia Post Evaluation    Patient location during evaluation: Phase 2  Patient participation: Able to fully participate in evaluation  Level of consciousness: awake and alert  Pain management: adequate  Airway patency: patent  Cardiovascular status: acceptable  Respiratory status: acceptable  Hydration status: acceptable  PONV: none     Anesthetic complications: None          Last vitals:  Vitals:    12/19/17 1115 12/19/17 1130 12/19/17 1131   BP: 120/83 116/80 (!) 129/97   Pulse:      Resp: 16 16 16   Temp: 36.2  C (97.1  F)  36.3  C (97.3  F)   SpO2: 94% 100% 100%         Electronically Signed By: Rohan Wynn MD  December 19, 2017  12:18 PM

## 2017-12-19 NOTE — OP NOTE
DATE OF OPERATION: December 19, 2017    PREOPERATIVE DIAGNOSIS: Left carpal tunnel syndrome.    POSTOPERATIVE DIAGNOSIS: Left carpal tunnel syndrome.    PROCEDURES PERFORMED: Left open carpal tunnel release.    SURGEON: Khoi Reyes MD    ANESTHESIA: Monitored anesthesia care with local.    ESTIMATED BLOOD LOSS: 5 mL    TOURNIQUET TIME: 15 min    FINDINGS: Flattened median nerve at the wrist.    SPECIMENS: None.    COMPLICATIONS: None.    DRAINS: None.    IMPLANTS: None.    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  His left hand had more symptoms in the right.  Patient had persistent nighttime sensory symptoms despite splinting, nonsteroidal anti-inflammatory medications, and a trial of a steroid injection to the wrist.  Of note the steroid injection was helpful.  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  Risks benefits and alternatives were discussed for open carpal tunnel release.  Patient accepted the associated risks and wished to proceed with surgery.    DESCRIPTION OF PROCEDURE: Patient was greeted in the preoperative holding area with his mother.  His informed consent was reviewed.  He had no further questions and therefore his left wrist was marked and was taken to the operating room.  He was placed in a supine position with his left wrist on a hand table.  Sedation was achieved.  A timeout was performed.  The site of the incision was marked to normal fashion along the proximal palm within the vector of the ring finger when it is flexed.  10 mL of 1% lidocaine with 1:100,000 epinephrine was injected into the surgical site and also in the distribution of the palmar cutaneous nerve median nerve for anesthesia.  Left upper extremity was then prepped and draped circumferentially in sterile fashion.  After the local anesthesia was tested, the left upper extremity was  exsanguinated using an Esmarch and a tourniquet of forearm was brought to 250.  The proposed incision was made with 15 blade scalpel.  This was brought down through subcutaneous tissue onto the palmar fascia with bipolar cautery.  The palmar fascia was divided longitudinally with a 15 blade scalpel.  Continued dissection performed deeper.  Palmaris brevis was encountered and cauterized using bipolar cautery.  Transverse carpal ligament was divided and the carpal tunnel entered at the mid point with a 15 blade scalpel.  Proximal release of the transverse carpal ligament was performed first under direct visualization.  The distal aspect of the antebrachial fascia was released as well.  Attention was then turned distally, and the distal portion of the transverse carpal ligament was divided until bulging fat was met under directi visualization.  The median nerve was inspected and was found to be flattened in appearance.  The wound was thoroughly irrigated.  The tourniquet was brought down and tourniquet time was 15 minutes.  Hemostasis was achieved using bipolar cautery.  The incision was then closed with 4-0 nylon in a mattress fashion.  A soft dressing was applied.  All counts were correct at the end of case.  The patient had a well perfused left hand at the end of the case.    DISPOSITION: Home.

## 2017-12-19 NOTE — IP AVS SNAPSHOT
MRN:[id number]                      After Visit Summary   12/19/2017    Irma Evans    MRN: [id number]           Thank you!     Thank you for choosing [facility] for your care. Our goal is always to provide you with excellent care. Hearing back from our patients is one way we can continue to improve our services. Please take a few minutes to complete the written survey that you may receive in the mail after you visit with us. Thank you!        Patient Information     Date Of Birth          1975        About your hospital stay     You were admitted on:  December 19, 2017 You last received care in the:  [facility]    You were discharged on:  December 19, 2017       Who to Call     For medical emergencies, please call 911.  For non-urgent questions about your medical care, please call your primary care provider or clinic, [phone]  For questions related to your surgery, please call your surgery clinic         Primary Care Provider Office Phone # Fax #    Liz MD Samuel [phone]      After Care Instructions     Discharge Instructions       Resume pre procedure diet            Discharge Instructions       Lifting limit of  5  Pounds with left hand until seen at Post-op follow up appointment            Discharge Instructions       Leave ace wrap on for 48 hours, adjust if too tight or too loose.  Remove after 48 hours and may shower. Keep left hand elevated above heart level at all times.            Discharge Instructions       Follow up with Surgeon on Thu 12/28/2017.  Call clinic with any problems.            No driving or operating machinery       No driving or operating machinery while on opioid medication                  Your next 10 appointments already scheduled     Dec 26, 2017  6:15 PM CST   (Arrive by 6:00 PM)   Return Plastic Surgery with Khoi Reyes MD   [facility] ([facility]  [place])    [address]  [address] [facility] [address] [phone]           Do not wear perfume.              Further instructions from your care team       [facility]  Home Care Following Anesthesia  For 24 hours after surgery:  1. Get plenty of rest.  A responsible adult must stay with you for at least 24 hours after you leave the surgery center.  2. Do not drive or use heavy equipment.  If you have weakness or tingling, don't drive or use heavy equipment until this feeling goes away.   3. Do not drink alcohol.   4. Avoid strenuous or risky activities.  Ask for help when climbing stairs.  5. You may feel lightheaded.  IF so, sit for a few minutes before standing.  Have someone help you get up.   6. If you have nausea (feel sick to your stomach): Drink only clear liquids such as apple juice, ginger ale, broth or 7-Up.  Rest may also help.  Be sure to drink enough fluids.  Move to a regular diet as you feel able.   7. You may have a slight fever.  Call the doctor if your fever is over 100 F (37.7 C) (taken under the tongue) or lasts longer than 24 hours.  8. You may have a dry mouth, a sore throat, muscle aches or trouble sleeping. These should go away after 24 hours.  9. Do not make important or legal decisions.          Tips for taking pain medications  To get the best pain relief possible, remember these points:    Take pain medications as directed, before pain becomes severe.    Pain medication can upset your stomach: taking it with food may help.    Constipation is a common side effect of pain medication. Drink plenty of  fluids.    Eat foods high in fiber. Take a stool softener if recommended by your doctor or pharmacist.    Do not drink alcohol, drive or operate machinery while taking pain medications.    Ask about other ways to control pain, such as with heat, ice or relaxation.    Tylenol/Acetaminophen Consumption  To help encourage the safe use of  "acetaminophen, the makers of TYLENOL  have lowered the maximum daily dose for single-ingredient Extra Strength TYLENOL  (acetaminophen) products sold in the [place] from 8 pills per day (4,000 mg) to 6 pills per day (3,000 mg). The dosing interval has also changed from 2 pills every 4-6 hours to 2 pills every 6 hours.    If you feel your pain relief is insufficient, you may take Tylenol/Acetaminophen in addition to your narcotic pain medication.     Be careful not to exceed 3,000 mg of Tylenol/Acetaminophen in a 24 hour period from all sources.    If you are taking extra strength Tylenol/acetaminophen (500 mg), the maximum dose is 6 tablets in 24 hours.    If you are taking regular strength acetaminophen (325 mg), the maximum dose is 9 tablets in 24 hours.    Call a doctor for any of the followin. Signs of infection (fever, growing tenderness at the surgery site, a large amount of drainage or bleeding, severe pain, foul-smelling drainage, redness, swelling).  2. It has been over 8 to 10 hours since surgery and you are still not able to urinate (pass water).  3. Headache for over 24 hours.  Your doctor is:  Dr. Khoi Reyes, Plastic Surgery: [phone]                  Or dial [phone] and ask for the resident on call for:  Plastics  For emergency care, call the:  [facility]:  [phone] (TTY for hearing impaired: [phone])      Pending Results     No orders found from 2017 to 2017.            Admission Information     Date & Time Department Dept. Phone    2017 [facility] [phone]      Your Vitals Were     Blood Pressure Pulse Temperature Respirations Height Weight    125/82 70 98.6  F (37  C) (Oral) 16 1.685 m (5' 6.34\") 120.2 kg (265 lb)    Pulse Oximetry BMI (Body Mass Index)                98% 42.34 kg/m2          MyChart Information     [facility] gives you secure access to your electronic health record. If you see a primary care " provider, you can also send messages to your care team and make appointments. If you have questions, please call your primary care clinic.  If you do not have a primary care provider, please call [phone] and they will assist you.      [employer] is an electronic gateway that provides easy, online access to your medical records. With [employer], you can request a clinic appointment, read your test results, renew a prescription or communicate with your care team.     To access your existing account, please contact your [facility] or call [phone] for assistance.        Care EveryWhere ID     This is your Care EveryWhere ID. This could be used by other organizations to access your [place] medical records  [id number]        Equal Access to Services     WILDA GREGORIO : Arabella Schofield, najma campbell, jeffry gibbons, bogdan winters. [facility] [phone].    ATENCIÓN: Si habla español, tiene a schuler disposición servicios gratuitos de asistencia lingüística. Llame al [phone].    We comply with applicable federal civil rights laws and [place] laws. We do not discriminate on the basis of race, color, national origin, age, disability, sex, sexual orientation, or gender identity.               Review of your medicines      START taking        Dose / Directions    oxyCODONE-acetaminophen 5-325 MG per tablet   Commonly known as:  PERCOCET   Used for:  Carpal tunnel syndrome of left wrist        Dose:  1-2 tablet   Take 1-2 tablets by mouth every 4 hours as needed for pain   Quantity:  20 tablet   Refills:  0         CONTINUE these medicines which have NOT CHANGED        Dose / Directions    amphetamine-dextroamphetamine 20 MG per tablet   Commonly known as:  ADDERALL   Used for:  Attention deficit hyperactivity disorder (ADHD), combined type        Dose:  20 mg   Start taking on:  12/29/2017   Take 1 tablet (20 mg) by mouth 2 times daily  "  Quantity:  60 tablet   Refills:  0       FLUoxetine 20 MG capsule   Commonly known as:  PROzac   Used for:  SHANA (generalized anxiety disorder)        Dose:  20 mg   Take 1 capsule (20 mg) by mouth daily   Quantity:  90 capsule   Refills:  3       fluticasone 50 MCG/ACT spray   Commonly known as:  FLONASE   Used for:  Chronic rhinitis        Dose:  2 spray   Spray 2 sprays into both nostrils daily   Quantity:  48 g   Refills:  3       lisinopril 5 MG tablet   Commonly known as:  PRINIVIL/ZESTRIL   Used for:  Elevated blood pressure reading without diagnosis of hypertension        Dose:  5 mg   Take 1 tablet (5 mg) by mouth daily   Quantity:  30 tablet   Refills:  1       minocycline 100 MG capsule   Commonly known as:  MINOCIN/DYNACIN   Used for:  Acne vulgaris        Dose:  100 mg   Take 1 capsule (100 mg) by mouth 2 times daily   Quantity:  180 capsule   Refills:  3       montelukast 10 MG tablet   Commonly known as:  SINGULAIR   Used for:  Seasonal allergic rhinitis due to pollen        Dose:  10 mg   Take 1 tablet (10 mg) by mouth nightly as needed   Quantity:  90 tablet   Refills:  3       naproxen 500 MG tablet   Commonly known as:  NAPROSYN   Used for:  Costochondritis        Dose:  500 mg   Take 1 tablet (500 mg) by mouth 2 times daily as needed for moderate pain   Quantity:  60 tablet   Refills:  1       needle (disp) 18G X 1\" Misc        Dose:  1 each   1 each once a week   Quantity:  15 each   Refills:  3       Needle (Disp) 23G X 1\" Misc        Use to inject testosterone into muscle weekly   Quantity:  15 each   Refills:  3       Sharps Container Misc   Used for:  Gender dysphoria        Dispose sharps   Quantity:  1 each   Refills:  3       syringe (disposable) 1 ML Misc   Commonly known as:  BD TUBERCULIN SYRINGE        BD 1ml Tuberculing syringe SLIP TIP (no needle attached). Use to administer IM medications as instructed   Quantity:  15 each   Refills:  3       testosterone cypionate 200 MG/ML " injection   Commonly known as:  DEPOTESTOTERONE   Used for:  Gender identity disorder        Dose:  80 mg   Inject 0.4 mLs (80 mg) into the muscle once a week In cottonseed oil ok   Quantity:  10 mL   Refills:  2       triamcinolone 0.1 % cream   Commonly known as:  KENALOG   Used for:  Dermatitis        Apply sparingly to affected area two times daily as needed   Quantity:  80 g   Refills:  2            Where to get your medicines      Some of these will need a paper prescription and others can be bought over the counter. Ask your nurse if you have questions.     Bring a paper prescription for each of these medications     oxyCODONE-acetaminophen 5-325 MG per tablet                Protect others around you: Learn how to safely use, store and throw away your medicines at www.disposemymeds.org.             Medication List: This is a list of all your medications and when to take them. Check marks below indicate your daily home schedule. Keep this list as a reference.      Medications           Morning Afternoon Evening Bedtime As Needed    amphetamine-dextroamphetamine 20 MG per tablet   Commonly known as:  ADDERALL   Take 1 tablet (20 mg) by mouth 2 times daily   Start taking on:  12/29/2017                                FLUoxetine 20 MG capsule   Commonly known as:  PROzac   Take 1 capsule (20 mg) by mouth daily                                fluticasone 50 MCG/ACT spray   Commonly known as:  FLONASE   Spray 2 sprays into both nostrils daily                                lisinopril 5 MG tablet   Commonly known as:  PRINIVIL/ZESTRIL   Take 1 tablet (5 mg) by mouth daily                                minocycline 100 MG capsule   Commonly known as:  MINOCIN/DYNACIN   Take 1 capsule (100 mg) by mouth 2 times daily                                montelukast 10 MG tablet   Commonly known as:  SINGULAIR   Take 1 tablet (10 mg) by mouth nightly as needed                                naproxen 500 MG tablet   Commonly  "known as:  NAPROSYN   Take 1 tablet (500 mg) by mouth 2 times daily as needed for moderate pain                                needle (disp) 18G X 1\" Misc   1 each once a week                                Needle (Disp) 23G X 1\" Misc   Use to inject testosterone into muscle weekly                                oxyCODONE-acetaminophen 5-325 MG per tablet   Commonly known as:  PERCOCET   Take 1-2 tablets by mouth every 4 hours as needed for pain                                Sharps Container Misc   Dispose sharps                                syringe (disposable) 1 ML Misc   Commonly known as:  BD TUBERCULIN SYRINGE   BD 1ml Tuberculing syringe SLIP TIP (no needle attached). Use to administer IM medications as instructed                                testosterone cypionate 200 MG/ML injection   Commonly known as:  DEPOTESTOTERONE   Inject 0.4 mLs (80 mg) into the muscle once a week In cottonseed oil ok                                triamcinolone 0.1 % cream   Commonly known as:  KENALOG   Apply sparingly to affected area two times daily as needed                                  "

## 2017-12-24 ENCOUNTER — HEALTH MAINTENANCE LETTER (OUTPATIENT)
Age: 42
End: 2017-12-24

## 2017-12-28 ENCOUNTER — OFFICE VISIT (OUTPATIENT)
Dept: ORTHOPEDICS | Facility: CLINIC | Age: 42
End: 2017-12-28
Payer: COMMERCIAL

## 2017-12-28 DIAGNOSIS — G56.02 CARPAL TUNNEL SYNDROME OF LEFT WRIST: Primary | ICD-10-CM

## 2017-12-28 NOTE — NURSING NOTE
"Reason For Visit:   Chief Complaint   Patient presents with     Surgical Followup     Left carpal tunnel release.  DOS: 12/19/2017       Primary MD: Liz Baker  Ref. MD: Liz Baker    Age: 41 year old    ?  No      There were no vitals taken for this visit.      Pain Assessment  Patient Currently in Pain: Denies               QuickDASH Assessment  No flowsheet data found.       Current Outpatient Prescriptions   Medication Sig Dispense Refill     lisinopril (PRINIVIL/ZESTRIL) 5 MG tablet Take 1 tablet (5 mg) by mouth daily 30 tablet 1     naproxen (NAPROSYN) 500 MG tablet Take 1 tablet (500 mg) by mouth 2 times daily as needed for moderate pain 60 tablet 1     minocycline (MINOCIN/DYNACIN) 100 MG capsule Take 1 capsule (100 mg) by mouth 2 times daily 180 capsule 3     needle, disp, 18G X 1\" MISC 1 each once a week 15 each 3     Needle, Disp, 23G X 1\" MISC Use to inject testosterone into muscle weekly 15 each 3     syringe, disposable, (BD TUBERCULIN SYRINGE) 1 ML MISC BD 1ml Tuberculing syringe SLIP TIP (no needle attached). Use to administer IM medications as instructed 15 each 3     [START ON 12/29/2017] amphetamine-dextroamphetamine (ADDERALL) 20 MG per tablet Take 1 tablet (20 mg) by mouth 2 times daily 60 tablet 0     testosterone cypionate (DEPOTESTOTERONE) 200 MG/ML injection Inject 0.4 mLs (80 mg) into the muscle once a week In cottonseed oil ok 10 mL 2     montelukast (SINGULAIR) 10 MG tablet Take 1 tablet (10 mg) by mouth nightly as needed 90 tablet 3     FLUoxetine (PROZAC) 20 MG capsule Take 1 capsule (20 mg) by mouth daily 90 capsule 3     fluticasone (FLONASE) 50 MCG/ACT spray Spray 2 sprays into both nostrils daily 48 g 3     Sharps Container MISC Dispose sharps 1 each 3     triamcinolone (KENALOG) 0.1 % cream Apply sparingly to affected area two times daily as needed 80 g 2       No Known Allergies    Iona Haley, ATC    "

## 2017-12-28 NOTE — LETTER
12/28/2017       RE: Irma Evans  [address] [address]     Dear Colleague,    Thank you for referring your patient, Irma Evans, to the [facility] at [facility]. Please see a copy of my visit note below.    Patient returns for a postoperative visit following left carpal tunnel release.    INTERVAL HISTORY: Patient reports that his nighttime sensory symptoms have completely resolved. In fact he does not have any more sensory symptoms. He has been going to work and feels that he can lift greater than 5 pounds at this point. Pain is well-controlled.    PHYSICAL EXAMINATION:  Gen.: No acute distress.  On exam of the left hand, patient has an intact and healing proximal palm incision with mattress sutures still in place. He has sensation in the proximal palm, thumb tip, index finger tip, and long finger tip. He is able to bring his thumb up towards the ceiling and oppose to his digits.    ASSESSMENT: Postop day 9 status post left carpal tunnel release. Doing well.    PLAN: I recommend patient continue performing tendon gliding exercises with his left hand. A new work letter was given today increasing his lifting restrictions to 25 pounds on the left hand. Patient will return in 2 weeks for suture removal with Siomara Riggins. If everything looks good at that point he will be Steri-Stripped and will return to see me in 6 weeks.    Total time spent with patient was 15 min of which greater than 50% was in counseling.    Again, thank you for allowing me to participate in the care of your patient.      Sincerely,    Khoi Reyes MD

## 2017-12-28 NOTE — LETTER
Return to Work  2017     Seen today: yes    Patient:  Irma Evans  :   1975  MRN:     [id number]  Physician: KHOI WOOD may return to work on Date: 2017.      The next clinic appointment is scheduled for (date/time) 2018.    Patient limitations:  My not lift greater than 25 lbs with left hand.            Electronically signed by Khoi Wood MD

## 2017-12-28 NOTE — PROGRESS NOTES
Patient returns for a postoperative visit following left carpal tunnel release.    INTERVAL HISTORY: Patient reports that his nighttime sensory symptoms have completely resolved. In fact he does not have any more sensory symptoms. He has been going to work and feels that he can lift greater than 5 pounds at this point. Pain is well-controlled.    PHYSICAL EXAMINATION:  Gen.: No acute distress.  On exam of the left hand, patient has an intact and healing proximal palm incision with mattress sutures still in place. He has sensation in the proximal palm, thumb tip, index finger tip, and long finger tip. He is able to bring his thumb up towards the ceiling and oppose to his digits.    ASSESSMENT: Postop day 9 status post left carpal tunnel release. Doing well.    PLAN: I recommend patient continue performing tendon gliding exercises with his left hand. A new work letter was given today increasing his lifting restrictions to 25 pounds on the left hand. Patient will return in 2 weeks for suture removal with Siomara Riggins. If everything looks good at that point he will be Steri-Stripped and will return to see me in 6 weeks.    Total time spent with patient was 15 min of which greater than 50% was in counseling.

## 2017-12-28 NOTE — MR AVS SNAPSHOT
After Visit Summary   12/28/2017    Irma Evans    MRN: [id number]           Patient Information     Date Of Birth          1975        Visit Information        Provider Department      12/28/2017 4:15 PM Khoi Reyes MD [facility]        Today's Diagnoses     Carpal tunnel syndrome of left wrist    -  1       Follow-ups after your visit        Follow-up notes from your care team     Return in about 2 weeks (around 1/11/2018) for suture removal.      Your next 10 appointments already scheduled     Jan 11, 2018  4:30 PM CST   (Arrive by 4:15 PM)   Post-Op with  U ORTHO HAND POP   [facility] ([facility])    [address]  [address] [facility] [address] [phone]              Who to contact     Please call your clinic at [phone] to:    Ask questions about your health    Make or cancel appointments    Discuss your medicines    Learn about your test results    Speak to your doctor   If you have compliments or concerns about an experience at your clinic, or if you wish to file a complaint, please contact [facility] Patient Relations at [phone] or email us at [email or web address]         Additional Information About Your Visit        MyChart Information     Nf[employer]aret gives you secure access to your electronic health record. If you see a primary care provider, you can also send messages to your care team and make appointments. If you have questions, please call your primary care clinic.  If you do not have a primary care provider, please call [phone] and they will assist you.      Cre[employer]is is an electronic gateway that provides easy, online access to your medical records. With [employer], you can request a clinic appointment, read your test results, renew a prescription or communicate with your care team.     To access your existing account, please contact your [facility] or call [phone] for assistance.        Care EveryWhere ID     This is your Care EveryWhere ID. This could be used by other organizations to access your [facility] medical records  [id number]         Blood Pressure from Last 3 Encounters:   12/19/17 (!) 129/97   12/18/17 (!) 138/101   12/05/17 (!) 152/106    Weight from Last 3 Encounters:   12/19/17 120.2 kg (265 lb)   12/18/17 122.7 kg (270 lb 6.4 oz)   12/14/17 (P) 124 kg (273 lb 5.9 oz)              Today, you had the following     No orders found for display       Primary Care Provider Office Phone # Fax #    Liz Baker [phone]       [address] [address]        Equal Access to Services     WILDA GREGORIO : Hadii aad mari hadasho Soomaali, waaxda luqadaha, qaybta kaalmada adelolitayada, bogdan gary . So [facility] [phone].    ATENCIÓN: Si habla español, tiene a schuler disposición servicios gratuitos de asistencia lingüística. Katia al [phone].    We comply with applicable federal civil rights laws and [place] laws. We do not discriminate on the basis of race, color, national origin, age, disability, sex, sexual orientation, or gender identity.            Thank you!     Thank you for choosing [facility]  for your care. Our goal is always to provide you with excellent care. Hearing back from our patients is one way we can continue to improve our services. Please take a few minutes to complete the written survey that you may receive in the mail after your visit with us. Thank you!             Your Updated Medication List - Protect others around you: Learn how to safely use, store and throw away your medicines at www.disposemymeds.org.          This list is accurate as of: 12/28/17  4:31 PM.  Always use your most recent med list.                   Brand Name Dispense Instructions for use Diagnosis    amphetamine-dextroamphetamine 20 MG per tablet  "  Start taking on:  12/29/2017    ADDERALL    60 tablet    Take 1 tablet (20 mg) by mouth 2 times daily    Attention deficit hyperactivity disorder (ADHD), combined type       FLUoxetine 20 MG capsule    PROzac    90 capsule    Take 1 capsule (20 mg) by mouth daily    SHANA (generalized anxiety disorder)       fluticasone 50 MCG/ACT spray    FLONASE    48 g    Spray 2 sprays into both nostrils daily    Chronic rhinitis       lisinopril 5 MG tablet    PRINIVIL/ZESTRIL    30 tablet    Take 1 tablet (5 mg) by mouth daily    Elevated blood pressure reading without diagnosis of hypertension       minocycline 100 MG capsule    MINOCIN/DYNACIN    180 capsule    Take 1 capsule (100 mg) by mouth 2 times daily    Acne vulgaris       montelukast 10 MG tablet    SINGULAIR    90 tablet    Take 1 tablet (10 mg) by mouth nightly as needed    Seasonal allergic rhinitis due to pollen       naproxen 500 MG tablet    NAPROSYN    60 tablet    Take 1 tablet (500 mg) by mouth 2 times daily as needed for moderate pain    Costochondritis       needle (disp) 18G X 1\" Misc     15 each    1 each once a week        Needle (Disp) 23G X 1\" Misc     15 each    Use to inject testosterone into muscle weekly        Sharps Container Misc     1 each    Dispose sharps    Gender dysphoria       syringe (disposable) 1 ML [facility]    BD TUBERCULIN SYRINGE    15 each    BD 1ml Tuberculing syringe SLIP TIP (no needle attached). Use to administer IM medications as instructed        testosterone cypionate 200 MG/ML injection    DEPOTESTOTERONE    10 mL    Inject 0.4 mLs (80 mg) into the muscle once a week In cottonseed oil ok    Gender identity disorder       triamcinolone 0.1 % cream    KENALOG    80 g    Apply sparingly to affected area two times daily as needed    Dermatitis         "

## 2018-01-11 ENCOUNTER — OFFICE VISIT (OUTPATIENT)
Dept: ORTHOPEDICS | Facility: CLINIC | Age: 43
End: 2018-01-11
Payer: COMMERCIAL

## 2018-01-11 DIAGNOSIS — G56.02 CARPAL TUNNEL SYNDROME OF LEFT WRIST: Primary | ICD-10-CM

## 2018-01-11 ASSESSMENT — ENCOUNTER SYMPTOMS
BLOATING: 0
DYSPNEA ON EXERTION: 1
SINUS CONGESTION: 1
TROUBLE SWALLOWING: 0
FEVER: 1
DECREASED APPETITE: 1
POLYDIPSIA: 0
BLOOD IN STOOL: 0
ABDOMINAL PAIN: 0
BOWEL INCONTINENCE: 0
DIARRHEA: 1
WHEEZING: 1
HEARTBURN: 0
CHILLS: 1
POLYPHAGIA: 0
NAUSEA: 1
SORE THROAT: 0
CONSTIPATION: 0
TASTE DISTURBANCE: 0
COUGH DISTURBING SLEEP: 1
HEMOPTYSIS: 0
WEIGHT GAIN: 0
COUGH: 1
SINUS PAIN: 1
WEIGHT LOSS: 0
NECK MASS: 0
INCREASED ENERGY: 0
RECTAL PAIN: 0
NIGHT SWEATS: 1
VOMITING: 0
SPUTUM PRODUCTION: 1
FATIGUE: 1
HALLUCINATIONS: 0
ALTERED TEMPERATURE REGULATION: 1
SNORES LOUDLY: 1
SHORTNESS OF BREATH: 0
JAUNDICE: 0
POSTURAL DYSPNEA: 0
HOARSE VOICE: 0

## 2018-01-11 NOTE — MR AVS SNAPSHOT
After Visit Summary   1/11/2018    Irma Evans    MRN: [id number]           Patient Information     Date Of Birth          1975        Visit Information        Provider Department      1/11/2018 4:30 PM [facility]        Today's Diagnoses     Carpal tunnel syndrome of left wrist    -  1       Follow-ups after your visit        Follow-up notes from your care team     Return in about 3 weeks (around 2/1/2018).      Your next 10 appointments already scheduled     Jan 12, 2018  8:00 AM CST   Return Visit with [facility] ([facility])    [address],  [address]  [place] [address]   [phone]              Who to contact     Please call your clinic at [phone] to:    Ask questions about your health    Make or cancel appointments    Discuss your medicines    Learn about your test results    Speak to your doctor   If you have compliments or concerns about an experience at your clinic, or if you wish to file a complaint, please contact [facility] Patient Relations at [phone] or email us at [email or web address]         Additional Information About Your Visit        MyChart Information     MonoSp[employer]ret gives you secure access to your electronic health record. If you see a primary care provider, you can also send messages to your care team and make appointments. If you have questions, please call your primary care clinic.  If you do not have a primary care provider, please call [phone] and they will assist you.      [employer]oSp[employer]ret is an electronic gateway that provides easy, online access to your medical records. With [email or web address], you can request a clinic appointment, read your test results, renew a prescription or communicate with your care team.     To access your existing account, please contact your [facility] or call [phone] for  assistance.        Care EveryWhere ID     This is your Care EveryWhere ID. This could be used by other organizations to access your [facility] medical records  [id number]         Blood Pressure from Last 3 Encounters:   12/19/17 (!) 129/97   12/18/17 (!) 138/101   12/05/17 (!) 152/106    Weight from Last 3 Encounters:   12/19/17 120.2 kg (265 lb)   12/18/17 122.7 kg (270 lb 6.4 oz)   12/14/17 (P) 124 kg (273 lb 5.9 oz)              Today, you had the following     No orders found for display       Primary Care Provider Office Phone # Fax #    Liz Baker [phone]       [address] [address]        Equal Access to Services     WILDA GREGORIO : Hadii aad ku hadasho Sochapito, waaxda luqadaha, qaybta kaalmada adeegyada, bogdan gary . So [facility] [phone].    ATENCIÓN: Si habla español, tiene a schuler disposición servicios gratuitos de asistencia lingüística. Llame al [phone].    We comply with applicable federal civil rights laws and [place] laws. We do not discriminate on the basis of race, color, national origin, age, disability, sex, sexual orientation, or gender identity.            Thank you!     Thank you for choosing [facility]  for your care. Our goal is always to provide you with excellent care. Hearing back from our patients is one way we can continue to improve our services. Please take a few minutes to complete the written survey that you may receive in the mail after your visit with us. Thank you!             Your Updated Medication List - Protect others around you: Learn how to safely use, store and throw away your medicines at www.disposemymeds.org.          This list is accurate as of: 1/11/18  4:44 PM.  Always use your most recent med list.                   Brand Name Dispense Instructions for use Diagnosis    amphetamine-dextroamphetamine 20 MG per tablet    ADDERALL    60 tablet    Take 1 tablet (20 mg) by mouth 2  "times daily    Attention deficit hyperactivity disorder (ADHD), combined type       FLUoxetine 20 MG capsule    PROzac    90 capsule    Take 1 capsule (20 mg) by mouth daily    SHANA (generalized anxiety disorder)       fluticasone 50 MCG/ACT spray    FLONASE    48 g    Spray 2 sprays into both nostrils daily    Chronic rhinitis       lisinopril 5 MG tablet    PRINIVIL/ZESTRIL    30 tablet    Take 1 tablet (5 mg) by mouth daily    Elevated blood pressure reading without diagnosis of hypertension       minocycline 100 MG capsule    MINOCIN/DYNACIN    180 capsule    Take 1 capsule (100 mg) by mouth 2 times daily    Acne vulgaris       montelukast 10 MG tablet    SINGULAIR    90 tablet    Take 1 tablet (10 mg) by mouth nightly as needed    Seasonal allergic rhinitis due to pollen       naproxen 500 MG tablet    NAPROSYN    60 tablet    Take 1 tablet (500 mg) by mouth 2 times daily as needed for moderate pain    Costochondritis       needle (disp) 18G X 1\" Misc     15 each    1 each once a week        Needle (Disp) 23G X 1\" Misc     15 each    Use to inject testosterone into muscle weekly        Sharps Container Misc     1 each    Dispose sharps    Gender dysphoria       syringe (disposable) 1 ML [facility]    BD TUBERCULIN SYRINGE    15 each    BD 1ml Tuberculing syringe SLIP TIP (no needle attached). Use to administer IM medications as instructed        testosterone cypionate 200 MG/ML injection    DEPOTESTOTERONE    10 mL    Inject 0.4 mLs (80 mg) into the muscle once a week In cottonseed oil ok    Gender identity disorder       triamcinolone 0.1 % cream    KENALOG    80 g    Apply sparingly to affected area two times daily as needed    Dermatitis         "

## 2018-01-11 NOTE — PROGRESS NOTES
Reason for visit:    Irma Evans came in to the clinic for a 3 week post op check.    Her surgery was done 12/19/17 by Dr Reyes.  She had left open carpal tunnel release     Assessment:    Irma came into the clinic in nothing. She had taken all but 2 stitches out on her own. She denies numbness or tingling, all symptoms have resolved, denies pain. The fingers are pink and warm.     The Surgical wounds were exposed and found to be well-healed; so the sutures were removed.    Plan:     She was placed in nothing.  She was told to maintain lifting restriction of 5 pounds until next office visit, no soaking of the hand for 2 more weeks, and no direct pressure on the palm of the left hand for 2 more weeks.      She has an appointment to see Dr. Reyes on 2/1/18 at 4:15 at that time Dr. Reyes will determine further restrictions.    She has our phone number and will call with questions or problems.

## 2018-01-12 ENCOUNTER — OFFICE VISIT (OUTPATIENT)
Dept: FAMILY MEDICINE | Facility: CLINIC | Age: 43
End: 2018-01-12
Payer: COMMERCIAL

## 2018-01-12 VITALS
DIASTOLIC BLOOD PRESSURE: 79 MMHG | BODY MASS INDEX: 42.85 KG/M2 | TEMPERATURE: 98.2 F | RESPIRATION RATE: 16 BRPM | SYSTOLIC BLOOD PRESSURE: 113 MMHG | WEIGHT: 268.2 LBS | HEART RATE: 83 BPM | OXYGEN SATURATION: 100 %

## 2018-01-12 DIAGNOSIS — M79.671 RIGHT FOOT PAIN: Primary | ICD-10-CM

## 2018-01-12 DIAGNOSIS — F90.2 ATTENTION DEFICIT HYPERACTIVITY DISORDER (ADHD), COMBINED TYPE: ICD-10-CM

## 2018-01-12 DIAGNOSIS — F64.9 GENDER IDENTITY DISORDER: ICD-10-CM

## 2018-01-12 DIAGNOSIS — M67.40 GANGLION CYST: ICD-10-CM

## 2018-01-12 DIAGNOSIS — I10 HYPERTENSION GOAL BP (BLOOD PRESSURE) < 130/80: ICD-10-CM

## 2018-01-12 DIAGNOSIS — Z30.8 ENCOUNTER FOR OTHER CONTRACEPTIVE MANAGEMENT: ICD-10-CM

## 2018-01-12 RX ORDER — DEXTROAMPHETAMINE SACCHARATE, AMPHETAMINE ASPARTATE MONOHYDRATE, DEXTROAMPHETAMINE SULFATE AND AMPHETAMINE SULFATE 5; 5; 5; 5 MG/1; MG/1; MG/1; MG/1
20 CAPSULE, EXTENDED RELEASE ORAL 2 TIMES DAILY
Qty: 60 CAPSULE | Refills: 0 | Status: SHIPPED | OUTPATIENT
Start: 2018-02-28 | End: 2018-03-08

## 2018-01-12 RX ORDER — DEXTROAMPHETAMINE SACCHARATE, AMPHETAMINE ASPARTATE, DEXTROAMPHETAMINE SULFATE AND AMPHETAMINE SULFATE 5; 5; 5; 5 MG/1; MG/1; MG/1; MG/1
20 TABLET ORAL 2 TIMES DAILY
Qty: 60 TABLET | Refills: 0 | Status: SHIPPED | OUTPATIENT
Start: 2018-01-29 | End: 2018-02-28

## 2018-01-12 RX ORDER — DEXTROAMPHETAMINE SACCHARATE, AMPHETAMINE ASPARTATE MONOHYDRATE, DEXTROAMPHETAMINE SULFATE AND AMPHETAMINE SULFATE 5; 5; 5; 5 MG/1; MG/1; MG/1; MG/1
20 CAPSULE, EXTENDED RELEASE ORAL 2 TIMES DAILY
Qty: 60 CAPSULE | Refills: 0 | Status: SHIPPED | OUTPATIENT
Start: 2018-03-27 | End: 2018-03-08

## 2018-01-12 NOTE — PATIENT INSTRUCTIONS
Irma was seen today for recheck, medication refill and pain.    Diagnoses and all orders for this visit:    Right foot pain    Either mortons neuroma or     Attention deficit hyperactivity disorder (ADHD), combined type  -     amphetamine-dextroamphetamine (ADDERALL) 20 MG per tablet; Take 1 tablet (20 mg) by mouth 2 times daily    Gender dysphoria    Follow up with Dr. Reyes for consideration of top surgery. Will recommend other names if interested. Continue on same doses of hormones.   Labs in 1 year    Hypertension    Continue 5 mg of lisinopril. OK to stop if you get dizzy. As fitness increases, will discontinue blood pressure med.     Right Foot    Recommend super feet, spenco or birkenstock blue foot bed support. Be Presybeterian about wearing.      Left Ankle    Isometric dorsiflexion exercises and ankle brace     Follow up with Dr. Reyes for possibility of top surgery and possibility of ganglion cyst.

## 2018-01-12 NOTE — MR AVS SNAPSHOT
After Visit Summary   1/12/2018    Irma Evans    MRN: [id number]           Patient Information     Date Of Birth          1975        Visit Information        Provider Department      1/12/2018 8:00 AM Liz Baker MD [facility]        Today's Diagnoses     Right foot pain    -  1    Attention deficit hyperactivity disorder (ADHD), combined type        Gender dysphoria        Hypertension goal BP (blood pressure) < 130/80        Encounter for other contraceptive management        Ganglion cyst          Care Instructions    Irma was seen today for recheck, medication refill and pain.    Diagnoses and all orders for this visit:    Right foot pain    Either mortons neuroma or     Attention deficit hyperactivity disorder (ADHD), combined type  -     amphetamine-dextroamphetamine (ADDERALL) 20 MG per tablet; Take 1 tablet (20 mg) by mouth 2 times daily    Gender dysphoria    Follow up with Dr. Reyes for consideration of top surgery. Will recommend other names if interested. Continue on same doses of hormones.   Labs in 1 year    Hypertension    Continue 5 mg of lisinopril. OK to stop if you get dizzy. As fitness increases, will discontinue blood pressure med.     Right Foot    Recommend super feet, spenco or birkenstock blue foot bed support. Be Druze about wearing.      Left Ankle    Isometric dorsiflexion  exercises and ankle brace     Follow up with Dr. Reyes for possibility of top surgery and possibility of ganglion cyst.             Follow-ups after your visit        Who to contact     Please call your clinic at [phone] to:    Ask questions about your health    Make or cancel appointments    Discuss your medicines    Learn about your test results    Speak to your doctor   If you have compliments or concerns about an experience at your clinic, or if you wish to file a complaint, please contact [facility] Patient Relations at [phone]  or email us at [email or web address]         Additional Information About Your Visit        [employer] Information     [email or web address] gives you secure access to your electronic health record. If you see a primary care provider, you can also send messages to your care team and make appointments. If you have questions, please call your primary care clinic.  If you do not have a primary care provider, please call [phone] and they will assist you.      [email or web address] is an electronic gateway that provides easy, online access to your medical records. With [email or web address], you can request a clinic appointment, read your test results, renew a prescription or communicate with your care team.     To access your existing account, please contact your [facility] or call [phone] for assistance.        Care EveryWhere ID     This is your Care EveryWhere ID. This could be used by other organizations to access your [facility] medical records  [id number]        Your Vitals Were     Pulse Temperature Respirations Pulse Oximetry BMI (Body Mass Index)       83 98.2  F (36.8  C) 16 100% 42.85 kg/m2        Blood Pressure from Last 3 Encounters:   01/12/18 113/79   12/19/17 (!) 129/97   12/18/17 (!) 138/101    Weight from Last 3 Encounters:   01/12/18 268 lb 3.2 oz (121.7 kg)   12/19/17 265 lb (120.2 kg)   12/18/17 270 lb 6.4 oz (122.7 kg)              Today, you had the following     No orders found for display         Today's Medication Changes          These changes are accurate as of: 1/12/18  8:54 AM.  If you have any questions, ask your nurse or doctor.               These medicines have changed or have updated prescriptions.        Dose/Directions    * amphetamine-dextroamphetamine 20 MG per tablet   Commonly known as:  ADDERALL   This may have changed:  Another medication with the same name was added. Make sure you understand how and when to take each.   Used for:  Attention deficit hyperactivity  disorder (ADHD), combined type   Changed by:  Liz Baker MD        Dose:  20 mg   Start taking on:  1/29/2018   Take 1 tablet (20 mg) by mouth 2 times daily   Quantity:  60 tablet   Refills:  0       * amphetamine-dextroamphetamine 20 MG per 24 hr capsule   Commonly known as:  ADDERALL XR   This may have changed:  You were already taking a medication with the same name, and this prescription was added. Make sure you understand how and when to take each.   Used for:  Attention deficit hyperactivity disorder (ADHD), combined type   Changed by:  Liz Baker MD        Dose:  20 mg   Start taking on:  2/28/2018   Take 1 capsule (20 mg) by mouth 2 times daily   Quantity:  60 capsule   Refills:  0       * amphetamine-dextroamphetamine 20 MG per 24 hr capsule   Commonly known as:  ADDERALL XR   This may have changed:  You were already taking a medication with the same name, and this prescription was added. Make sure you understand how and when to take each.   Used for:  Attention deficit hyperactivity disorder (ADHD), combined type   Changed by:  Liz Baker MD        Dose:  20 mg   Start taking on:  3/27/2018   Take 1 capsule (20 mg) by mouth 2 times daily   Quantity:  60 capsule   Refills:  0       * Notice:  This list has 3 medication(s) that are the same as other medications prescribed for you. Read the directions carefully, and ask your doctor or other care provider to review them with you.         Where to get your medicines      Some of these will need a paper prescription and others can be bought over the counter.  Ask your nurse if you have questions.     Bring a paper prescription for each of these medications     amphetamine-dextroamphetamine 20 MG per 24 hr capsule    amphetamine-dextroamphetamine 20 MG per 24 hr capsule    amphetamine-dextroamphetamine 20 MG per tablet                Primary Care Provider Office Phone # Fax #    Liz Baker [phone]       [address]  [facility] [address]        Equal Access to Services     WILDA GREGORIO : Hadii aad ku hadranjeetbrandon Bassamali, waaxda luqadaha, qaybta kaalmaangelito gibbons, bogdan winters. So [facility] [phone].    ATENCIÓN: Si habla español, tiene a schuler disposición servicios gratuitos de asistencia lingüística. Llame al [phone].    We comply with applicable federal civil rights laws and [place] laws. We do not discriminate on the basis of race, color, national origin, age, disability, sex, sexual orientation, or gender identity.            Thank you!     Thank you for choosing [facility]  for your care. Our goal is always to provide you with excellent care. Hearing back from our patients is one way we can continue to improve our services. Please take a few minutes to complete the written survey that you may receive in the mail after your visit with us. Thank you!             Your Updated Medication List - Protect others around you: Learn how to safely use, store and throw away your medicines at www.disposemymeds.org.          This list is accurate as of: 1/12/18  8:54 AM.  Always use your most recent med list.                   Brand Name Dispense Instructions for use Diagnosis    * amphetamine-dextroamphetamine 20 MG per tablet   Start taking on:  1/29/2018    ADDERALL    60 tablet    Take 1 tablet (20 mg) by mouth 2 times daily    Attention deficit hyperactivity disorder (ADHD), combined type       * amphetamine-dextroamphetamine 20 MG per 24 hr capsule   Start taking on:  2/28/2018    ADDERALL XR    60 capsule    Take 1 capsule (20 mg) by mouth 2 times daily    Attention deficit hyperactivity disorder (ADHD), combined type       * amphetamine-dextroamphetamine 20 MG per 24 hr capsule   Start taking on:  3/27/2018    ADDERALL XR    60 capsule    Take 1 capsule (20 mg) by mouth 2 times daily    Attention deficit hyperactivity disorder (ADHD), combined type       etonogestrel 68 MG  "Impl    IMPLANON/NEXPLANON    1 each    1 each (68 mg) by Subdermal route once for 1 dose    Encounter for other contraceptive management       FLUoxetine 20 MG capsule    PROzac    90 capsule    Take 1 capsule (20 mg) by mouth daily    SHANA (generalized anxiety disorder)       fluticasone 50 MCG/ACT spray    FLONASE    48 g    Spray 2 sprays into both nostrils daily    Chronic rhinitis       lisinopril 5 MG tablet    PRINIVIL/ZESTRIL    30 tablet    Take 1 tablet (5 mg) by mouth daily    Elevated blood pressure reading without diagnosis of hypertension       minocycline 100 MG capsule    MINOCIN/DYNACIN    180 capsule    Take 1 capsule (100 mg) by mouth 2 times daily    Acne vulgaris       montelukast 10 MG tablet    SINGULAIR    90 tablet    Take 1 tablet (10 mg) by mouth nightly as needed    Seasonal allergic rhinitis due to pollen       naproxen 500 MG tablet    NAPROSYN    60 tablet    Take 1 tablet (500 mg) by mouth 2 times daily as needed for moderate pain    Costochondritis       needle (disp) 18G X 1\" Misc     15 each    1 each once a week        Needle (Disp) 23G X 1\" Misc     15 each    Use to inject testosterone into muscle weekly        Sharps Container Misc     1 each    Dispose sharps    Gender dysphoria       syringe (disposable) 1 ML Misc    BD TUBERCULIN SYRINGE    15 each    BD 1ml Tuberculing syringe SLIP TIP (no needle attached). Use to administer IM medications as instructed        testosterone cypionate 200 MG/ML injection    DEPOTESTOTERONE    10 mL    Inject 0.4 mLs (80 mg) into the muscle once a week In cottonseed oil ok    Gender identity disorder       triamcinolone 0.1 % cream    KENALOG    80 g    Apply sparingly to affected area two times daily as needed    Dermatitis       * Notice:  This list has 3 medication(s) that are the same as other medications prescribed for you. Read the directions carefully, and ask your doctor or other care provider to review them with you.      "

## 2018-01-12 NOTE — PROGRESS NOTES
"          ALY Zhong is a 42 year old individual that uses pronouns He/Him/His/Himself that presents today for follow up of:  masculinizing hormone therapy. Gender identity: male     Any special concerns today?  concerns about right foot pain.    On hormones?  YES +++       Due for labs?  No but last lab sans total testosterone level, unsure why. Labs reviewed for past 3 yrs      +++ Refills of meds needed?  Yes     Top surgery  Considering top surgery at the end of this year, Dec 2018. Working on insurance logistics and working out more in preparation for this surgery. Met with Dr. Reyes for carpal tunnel but didn't know he could do top surgery as well. Has scheduled f/u for his hand - will inquire then. Plans end of year.     Right foot pain  Patient complains of right foot pain on the \"ball of his foot.\" Has tried shoe insoles for relief but not providing much relief. Just started working out in the gym.    Area is tight and painful when walking up ramps for work as an  and does all the equipment set up. Tried OTC insoles w/o relief. Tarsal pad made it worse. Has tingling and pain into 3rd toe.     Recent surgery: left carpal tunnel release  Denies complications with recent wrist surgery. Numbness and tingling is resolved. Following up with igor on Feb 1. Stiches removed yesterday. Doing well. Plans on getting same surgery on his right wrist this year. Sx resolved!!    Right fifth digit    Bump on inner aspect of right fifth digit of mid finger. Complains of soreness upon pressure.   Otherwise doesn't bother him.     Elevated BP  Had been better in the past, but notes bp is high today. Hoping working out will help. Taking 5 mg lisinopril irreguarly. Noted dizziness with medication in the past, and he had to stop ACEi due to hypotension that developed when he started working out.     BP Readings from Last 6 Encounters:   01/12/18 113/79   12/19/17 (!) 129/97   12/18/17 (!) 138/101   12/05/17 (!) " 152/106   10/04/17 121/86   09/08/17 130/87     ADHD  Setting a timer for the adderall. Taking 1 pill in the morning and 1 pill at night. Able to tell when adderall wears off. Just picked up new refill. Feels dosing is right. Wants 3 new paper prescription's today.    FHx  Father diagnosed with stomach cancer in Nov. 2017      Social History     Social History     Marital status: Single     Spouse name: N/A     Number of children: N/A     Years of education: N/A     Social History Main Topics     Smoking status: Former Smoker     Packs/day: 1.00     Years: 12.00     Types: Cigarettes     Smokeless tobacco: Never Used      Comment: quit 6/29/13     Alcohol use Yes      Comment: Occ     Drug use: Yes     Special: Marijuana      Comment: THC only     Sexual activity: Not Currently     Partners: Female, Male     Birth control/ protection: Implant, None     Other Topics Concern     Parent/Sibling W/ Cabg, Mi Or Angioplasty Before 65f 55m? No     Social History Narrative    Works in event planning/set up. Lifts a lot at work.    Frequent exercise.        ---    Past Surgical History:   Procedure Laterality Date     C OPEN RX ANKLE DISLOCATN+FIXATN  01/09    right     RELEASE CARPAL TUNNEL Left 12/19/2017    Procedure: RELEASE CARPAL TUNNEL;  Left Open Carpal Tunnel Release  ;  Surgeon: Khoi Reyes MD;  Location:  OR       Patient Active Problem List   Diagnosis     Hypertension goal BP (blood pressure) < 130/80     Generalized anxiety disorder     BMI 40.0-44.9, adult (H)     Gender dysphoria     Asymptomatic cholelithiasis     CASSIDY (nonalcoholic steatohepatitis)     Nexplanon insertion     Acne vulgaris     HPV test positive-cotesting due Oct 2015     Cat allergies     Attention deficit hyperactivity disorder (ADHD), combined type     Dysmetabolic syndrome X     Bilateral carpal tunnel syndrome       Current Outpatient Prescriptions   Medication Sig Dispense Refill     [START ON 1/29/2018]  "amphetamine-dextroamphetamine (ADDERALL) 20 MG per tablet Take 1 tablet (20 mg) by mouth 2 times daily 60 tablet 0     [START ON 2/28/2018] amphetamine-dextroamphetamine (ADDERALL XR) 20 MG per 24 hr capsule Take 1 capsule (20 mg) by mouth 2 times daily 60 capsule 0     [START ON 3/27/2018] amphetamine-dextroamphetamine (ADDERALL XR) 20 MG per 24 hr capsule Take 1 capsule (20 mg) by mouth 2 times daily 60 capsule 0     etonogestrel (IMPLANON/NEXPLANON) 68 MG IMPL 1 each (68 mg) by Subdermal route once for 1 dose 1 each 0     lisinopril (PRINIVIL/ZESTRIL) 5 MG tablet Take 1 tablet (5 mg) by mouth daily 30 tablet 1     naproxen (NAPROSYN) 500 MG tablet Take 1 tablet (500 mg) by mouth 2 times daily as needed for moderate pain 60 tablet 1     minocycline (MINOCIN/DYNACIN) 100 MG capsule Take 1 capsule (100 mg) by mouth 2 times daily 180 capsule 3     needle, disp, 18G X 1\" MISC 1 each once a week 15 each 3     Needle, Disp, 23G X 1\" MISC Use to inject testosterone into muscle weekly 15 each 3     syringe, disposable, (BD TUBERCULIN SYRINGE) 1 ML MISC BD 1ml Tuberculing syringe SLIP TIP (no needle attached). Use to administer IM medications as instructed 15 each 3     testosterone cypionate (DEPOTESTOTERONE) 200 MG/ML injection Inject 0.4 mLs (80 mg) into the muscle once a week In cottonseed oil ok 10 mL 2     montelukast (SINGULAIR) 10 MG tablet Take 1 tablet (10 mg) by mouth nightly as needed 90 tablet 3     FLUoxetine (PROZAC) 20 MG capsule Take 1 capsule (20 mg) by mouth daily 90 capsule 3     fluticasone (FLONASE) 50 MCG/ACT spray Spray 2 sprays into both nostrils daily 48 g 3     Sharps Container MISC Dispose sharps 1 each 3     triamcinolone (KENALOG) 0.1 % cream Apply sparingly to affected area two times daily as needed 80 g 2       History   Smoking Status     Former Smoker     Packs/day: 1.00     Years: 12.00     Types: Cigarettes   Smokeless Tobacco     Never Used     Comment: quit 6/29/13        No Known " Allergies    Problem, Medication and Allergy Lists were reviewed and updated if needed..         Review of Systems:     Mood good  Denies abdominal pain   Denies vaginal bleeding   Sleep is ok - initial insomnia but wakes refreshed   Busy with work   Partner Pat minor    This document serves as a record of the services and decisions personally performed and made by Liz Baker MD. It was created on his/her behalf by Lorna Donald, a trained medical scribe. The creation of this document is based the provider's statements to the medical scribe.  Scribe Lorna Donald 8:04 AM, January 12, 2018         Physical Exam:     Vitals:    01/12/18 0808   BP: 113/79   Pulse: 83   Resp: 16   Temp: 98.2  F (36.8  C)   SpO2: 100%   Weight: 268 lb 3.2 oz (121.7 kg)     BMI= Body mass index is 42.85 kg/(m^2).   Wt Readings from Last 10 Encounters:   01/12/18 268 lb 3.2 oz (121.7 kg)   12/19/17 265 lb (120.2 kg)   12/18/17 270 lb 6.4 oz (122.7 kg)   12/14/17 (P) 273 lb 5.9 oz (124 kg)   12/05/17 273 lb 6.4 oz (124 kg)   10/04/17 261 lb 6.4 oz (118.6 kg)   09/08/17 257 lb (116.6 kg)   07/18/17 261 lb 3.2 oz (118.5 kg)   05/31/17 263 lb (119.3 kg)   12/07/16 271 lb 3.2 oz (123 kg)     Appearance: Male appearance and dress  GENERAL:: healthy, alert and no distress  RIGHT FIFTH DIGIT:  Full flexion of right fifth digit, small papule nodule over PIP consistent with ganglion cyst.   RIGHT FOOT:  Tender over right 2nd metatarsal. tender papule  Scars from ankle surgery, no rash, normal skin color. Gait evaluated with and without shoes. Has some collapse of right arch. Normal digits  LEFT ANKLE:   Left ankle with mild edema. Tender at anterior talofibular ligament. Other ligaments non tender to palpation       Affect: Bright  Fast talker, loud talker, same as previous.            Labs:    Results from the last 24 hoursNo results found for this or any previous visit (from the past 24 hour(s)).    Assessment and Plan       Trevin was seen  today for recheck, medication refill and pain.    Diagnoses and all orders for this visit:    Right foot pain  Right Foot Either mortons neuroma or sesamoiditis  Recommend super feet, spenco or birkenstock blue foot bed support. Be Taoist about wearing.      Left Ankle  Isometric dorsiflexion exercises and ankle brace      Attention deficit hyperactivity disorder (ADHD), combined type  -     amphetamine-dextroamphetamine (ADDERALL) 20 MG per tablet; Take 1 tablet (20 mg) by mouth 2 times daily  -     amphetamine-dextroamphetamine (ADDERALL XR) 20 MG per 24 hr capsule; Take 1 capsule (20 mg) by mouth 2 times daily  -     amphetamine-dextroamphetamine (ADDERALL XR) 20 MG per 24 hr capsule; Take 1 capsule (20 mg) by mouth 2 times daily  Cont same dose. Next refill by Anish. Visits q6months    Gender dysphoria    Follow up with Dr. Reyes for consideration of top surgery. Will recommend other names if interested. Continue on same doses of hormones.   Labs in 1 year    Hypertension goal BP (blood pressure) < 130/80    Continue 5 mg of lisinopril. OK to stop if you get dizzy. As fitness increases, will discontinue blood pressure med.     Encounter for other contraceptive management    Ganglion cyst  Follow up with Dr. Reyes for possibility of top surgery and possibility of ganglion cyst.     Contraception:   Nexplanon replace in 2019  Follow up:  Follow up in 6 months or earlier for pre-op   Results by anish  Questions were elicited and answered.     The information in this document, created by the medical scribe for me, accurately reflects the services I personally performed and the decisions made by me. I have reviewed and approved this document for accuracy prior to leaving the patient care area.  Liz Baker MD  8:04 AM, 01/12/18  I spent 50 min face to face with the patient and >50% was spent counselling the patient about the above medical conditions, educating patient  .  Liz Baker MD

## 2018-01-15 DIAGNOSIS — M94.0 COSTOCHONDRITIS: ICD-10-CM

## 2018-01-15 RX ORDER — NAPROXEN 500 MG/1
500 TABLET ORAL 2 TIMES DAILY PRN
Qty: 60 TABLET | Refills: 1 | Status: SHIPPED | OUTPATIENT
Start: 2018-01-15 | End: 2021-12-15

## 2018-01-15 NOTE — TELEPHONE ENCOUNTER
Date of last visit at clinic: 1/12/18    Please complete refill and CLOSE ENCOUNTER.  Closing the encounter signifies the refill is complete.

## 2018-02-01 ENCOUNTER — OFFICE VISIT (OUTPATIENT)
Dept: ORTHOPEDICS | Facility: CLINIC | Age: 43
End: 2018-02-01
Payer: COMMERCIAL

## 2018-02-01 DIAGNOSIS — G56.03 BILATERAL CARPAL TUNNEL SYNDROME: Primary | ICD-10-CM

## 2018-02-01 NOTE — LETTER
2/1/2018       RE: Irma Evans  [address] [id number]     Dear Colleague,    Thank you for referring your patient, Irma Evans, to the [facility] at [facility]. Please see a copy of my visit note below.    Patient returns for a postoperative follow-up after left carpal tunnel release.    INTERVAL HISTORY: Denies any pain at surgical site. Nighttime symptoms have resolved. Reports returning to work and starting gentle lifting. This did not result in any issues.    PHYSICAL EXAMINATION:  Gen.: No acute distress.  On exam of the left volar wrist, incision is well-healed. There is no pain at the surgical site. Patient is able to oppose the thumb to small finger. Able to make a fist and extend all digits. Sensation to light touch intact in the median nerve distribution.    ASSESSMENT: Six-week status post left carpal tunnel release. Doing well.    PLAN: Patient has no restrictions from my standpoint. He will return in 6 weeks for evaluation of final healing. At that time we will evaluate him for right carpal tunnel release. Nerve conduction studies from June 2017 revealed right median neuropathy.    Total time spent with patient was 15 min of which greater than 50% was in counseling.    Again, thank you for allowing me to participate in the care of your patient.      Sincerely,    Khoi Reyes MD

## 2018-02-01 NOTE — NURSING NOTE
"Reason For Visit:   Chief Complaint   Patient presents with     Surgical Followup     Follow up for left carpal tunnel release.  DOS: 12/19/2017       Primary MD: Liz Baker  Ref. MD: Liz Baker    Age: 42 year old    ?  No      There were no vitals taken for this visit.      Pain Assessment  Patient Currently in Pain: Denies               QuickDASH Assessment  No flowsheet data found.       Current Outpatient Prescriptions   Medication Sig Dispense Refill     naproxen (NAPROSYN) 500 MG tablet Take 1 tablet (500 mg) by mouth 2 times daily as needed for moderate pain 60 tablet 1     amphetamine-dextroamphetamine (ADDERALL) 20 MG per tablet Take 1 tablet (20 mg) by mouth 2 times daily 60 tablet 0     [START ON 2/28/2018] amphetamine-dextroamphetamine (ADDERALL XR) 20 MG per 24 hr capsule Take 1 capsule (20 mg) by mouth 2 times daily 60 capsule 0     [START ON 3/27/2018] amphetamine-dextroamphetamine (ADDERALL XR) 20 MG per 24 hr capsule Take 1 capsule (20 mg) by mouth 2 times daily 60 capsule 0     etonogestrel (IMPLANON/NEXPLANON) 68 MG IMPL 1 each (68 mg) by Subdermal route once for 1 dose 1 each 0     lisinopril (PRINIVIL/ZESTRIL) 5 MG tablet Take 1 tablet (5 mg) by mouth daily 30 tablet 1     minocycline (MINOCIN/DYNACIN) 100 MG capsule Take 1 capsule (100 mg) by mouth 2 times daily 180 capsule 3     needle, disp, 18G X 1\" MISC 1 each once a week 15 each 3     Needle, Disp, 23G X 1\" MISC Use to inject testosterone into muscle weekly 15 each 3     syringe, disposable, (BD TUBERCULIN SYRINGE) 1 ML MISC BD 1ml Tuberculing syringe SLIP TIP (no needle attached). Use to administer IM medications as instructed 15 each 3     testosterone cypionate (DEPOTESTOTERONE) 200 MG/ML injection Inject 0.4 mLs (80 mg) into the muscle once a week In cottonseed oil ok 10 mL 2     montelukast (SINGULAIR) 10 MG tablet Take 1 tablet (10 mg) by mouth nightly as needed 90 tablet 3     FLUoxetine (PROZAC) 20 MG capsule Take 1 " capsule (20 mg) by mouth daily 90 capsule 3     fluticasone (FLONASE) 50 MCG/ACT spray Spray 2 sprays into both nostrils daily 48 g 3     Sharps Container MISC Dispose sharps 1 each 3     triamcinolone (KENALOG) 0.1 % cream Apply sparingly to affected area two times daily as needed 80 g 2       No Known Allergies    Iona Haley, ATC

## 2018-02-01 NOTE — MR AVS SNAPSHOT
After Visit Summary   2/1/2018    Irma Evans    MRN: [id number]           Patient Information     Date Of Birth          1975        Visit Information        Provider Department      2/1/2018 4:15 PM Khoi Reyes MD [facility]        Today's Diagnoses     Bilateral carpal tunnel syndrome    -  1       Follow-ups after your visit        Follow-up notes from your care team     Return in about 6 weeks (around 3/15/2018).      Who to contact     Please call your clinic at [phone] to:    Ask questions about your health    Make or cancel appointments    Discuss your medicines    Learn about your test results    Speak to your doctor   If you have compliments or concerns about an experience at your clinic, or if you wish to file a complaint, please contact [facility] Patient Relations at [phone] or email us at [email or web address]         Additional Information About Your Visit        MyChart Information     [employer] gives you secure access to your electronic health record. If you see a primary care provider, you can also send messages to your care team and make appointments. If you have questions, please call your primary care clinic.  If you do not have a primary care provider, please call [phone] and they will assist you.      The [employer] Expert is an electronic gateway that provides easy, online access to your medical records. With [employer], you can request a clinic appointment, read your test results, renew a prescription or communicate with your care team.     To access your existing account, please contact your [facility] or call [phone] for assistance.        Care EveryWhere ID     This is your Care EveryWhere ID. This could be used by other organizations to access your [facility] medical records  [id number]         Blood Pressure from Last 3 Encounters:   01/12/18 113/79   12/19/17 (!) 129/97    12/18/17 (!) 138/101    Weight from Last 3 Encounters:   01/12/18 121.7 kg (268 lb 3.2 oz)   12/19/17 120.2 kg (265 lb)   12/18/17 122.7 kg (270 lb 6.4 oz)              Today, you had the following     No orders found for display       Primary Care Provider Office Phone # Fax #    Liz Baker [phone]       [address] [address]        Equal Access to Services     WILDA GREGORIO : Hadii aad ku hadasho Soomaali, waaxda luqadaha, qaybta kaalmada adeegyada, waxay idiin hayaan adeeg solomon gary . So [facility] [phone].    ATENCIÓN: Si habla español, tiene a schuler disposición servicios gratuitos de asistencia lingüística. [facility] [phone].    We comply with applicable federal civil rights laws and [place] laws. We do not discriminate on the basis of race, color, national origin, age, disability, sex, sexual orientation, or gender identity.            Thank you!     Thank you for choosing [facility]  for your care. Our goal is always to provide you with excellent care. Hearing back from our patients is one way we can continue to improve our services. Please take a few minutes to complete the written survey that you may receive in the mail after your visit with us. Thank you!             Your Updated Medication List - Protect others around you: Learn how to safely use, store and throw away your medicines at www.disposemymeds.org.          This list is accurate as of 2/1/18  4:30 PM.  Always use your most recent med list.                   Brand Name Dispense Instructions for use Diagnosis    * amphetamine-dextroamphetamine 20 MG per tablet    ADDERALL    60 tablet    Take 1 tablet (20 mg) by mouth 2 times daily    Attention deficit hyperactivity disorder (ADHD), combined type       * amphetamine-dextroamphetamine 20 MG per 24 hr capsule   Start taking on:  2/28/2018    ADDERALL XR    60 capsule    Take 1 capsule (20 mg) by mouth 2 times daily    Attention  "deficit hyperactivity disorder (ADHD), combined type       * amphetamine-dextroamphetamine 20 MG per 24 hr capsule   Start taking on:  3/27/2018    ADDERALL XR    60 capsule    Take 1 capsule (20 mg) by mouth 2 times daily    Attention deficit hyperactivity disorder (ADHD), combined type       etonogestrel 68 MG Impl    IMPLANON/NEXPLANON    1 each    1 each (68 mg) by Subdermal route once for 1 dose    Encounter for other contraceptive management       FLUoxetine 20 MG capsule    PROzac    90 capsule    Take 1 capsule (20 mg) by mouth daily    SHANA (generalized anxiety disorder)       fluticasone 50 MCG/ACT spray    FLONASE    48 g    Spray 2 sprays into both nostrils daily    Chronic rhinitis       lisinopril 5 MG tablet    PRINIVIL/ZESTRIL    30 tablet    Take 1 tablet (5 mg) by mouth daily    Elevated blood pressure reading without diagnosis of hypertension       minocycline 100 MG capsule    MINOCIN/DYNACIN    180 capsule    Take 1 capsule (100 mg) by mouth 2 times daily    Acne vulgaris       montelukast 10 MG tablet    SINGULAIR    90 tablet    Take 1 tablet (10 mg) by mouth nightly as needed    Seasonal allergic rhinitis due to pollen       naproxen 500 MG tablet    NAPROSYN    60 tablet    Take 1 tablet (500 mg) by mouth 2 times daily as needed for moderate pain    Costochondritis       needle (disp) 18G X 1\" Misc     15 each    1 each once a week        Needle (Disp) 23G X 1\" Misc     15 each    Use to inject testosterone into muscle weekly        Sharps Container Misc     1 each    Dispose sharps    Gender dysphoria       syringe (disposable) 1 ML Misc    BD TUBERCULIN SYRINGE    15 each    BD 1ml Tuberculing syringe SLIP TIP (no needle attached). Use to administer IM medications as instructed        testosterone cypionate 200 MG/ML injection    DEPOTESTOTERONE    10 mL    Inject 0.4 mLs (80 mg) into the muscle once a week In cottonseed oil ok    Gender identity disorder       triamcinolone 0.1 % cream    " KENALOG    80 g    Apply sparingly to affected area two times daily as needed    Dermatitis       * Notice:  This list has 3 medication(s) that are the same as other medications prescribed for you. Read the directions carefully, and ask your doctor or other care provider to review them with you.

## 2018-02-01 NOTE — PROGRESS NOTES
Patient returns for a postoperative follow-up after left carpal tunnel release.    INTERVAL HISTORY: Denies any pain at surgical site. Nighttime symptoms have resolved. Reports returning to work and starting gentle lifting. This did not result in any issues.    PHYSICAL EXAMINATION:  Gen.: No acute distress.  On exam of the left volar wrist, incision is well-healed. There is no pain at the surgical site. Patient is able to oppose the thumb to small finger. Able to make a fist and extend all digits. Sensation to light touch intact in the median nerve distribution.    ASSESSMENT: Six-week status post left carpal tunnel release. Doing well.    PLAN: Patient has no restrictions from my standpoint. He will return in 6 weeks for evaluation of final healing. At that time we will evaluate him for right carpal tunnel release. Nerve conduction studies from June 2017 revealed right median neuropathy.    Total time spent with patient was 15 min of which greater than 50% was in counseling.

## 2018-02-27 DIAGNOSIS — R03.0 ELEVATED BLOOD PRESSURE READING WITHOUT DIAGNOSIS OF HYPERTENSION: ICD-10-CM

## 2018-02-27 NOTE — TELEPHONE ENCOUNTER
Request for medication refill:    Date of last visit at clinic: 01/12/2018    Please complete refill if appropriate and CLOSE ENCOUNTER.    Closing the encounter signifies the refill is complete.    If refill has been denied, please complete the smart phrase .smirefuse and route it to the [facility] RN TRIAGE pool to inform the patient and the pharmacy.    Mike Bai, CMA

## 2018-02-28 RX ORDER — LISINOPRIL 5 MG/1
5 TABLET ORAL DAILY
Qty: 30 TABLET | Refills: 1 | Status: SHIPPED | OUTPATIENT
Start: 2018-02-28 | End: 2018-05-07

## 2018-03-07 ENCOUNTER — TELEPHONE (OUTPATIENT)
Dept: FAMILY MEDICINE | Facility: CLINIC | Age: 43
End: 2018-03-07

## 2018-03-07 DIAGNOSIS — F90.2 ATTENTION DEFICIT HYPERACTIVITY DISORDER (ADHD), COMBINED TYPE: Primary | ICD-10-CM

## 2018-03-07 NOTE — TELEPHONE ENCOUNTER
[facility] phone call message- medication clarification/question:    Full Medication Name: adderrall   Dose: 20mg XR    Question: the patient has a current Rx but is written for XR and should be short acting instead.  Both Rx in the chart are wrong and they are asking if this can be done today or by tomorrow 8am so they can fill the medication.  They will also bring the paper Rx they have to us.    Pharmacy confirmed as   [id number] - [place] - [address]  [address] [place] [address]  Phone: [phone] Fax: [phone]  : Yes    OK to leave a message on voice mail? Yes    Call taken on March 7, 2018 at 4:18 PM by Fanta Estrada

## 2018-03-08 RX ORDER — DEXTROAMPHETAMINE SACCHARATE, AMPHETAMINE ASPARTATE, DEXTROAMPHETAMINE SULFATE AND AMPHETAMINE SULFATE 5; 5; 5; 5 MG/1; MG/1; MG/1; MG/1
20 TABLET ORAL 2 TIMES DAILY
Qty: 60 TABLET | Refills: 0 | Status: SHIPPED | OUTPATIENT
Start: 2018-04-07 | End: 2018-03-08

## 2018-03-08 RX ORDER — DEXTROAMPHETAMINE SACCHARATE, AMPHETAMINE ASPARTATE, DEXTROAMPHETAMINE SULFATE AND AMPHETAMINE SULFATE 5; 5; 5; 5 MG/1; MG/1; MG/1; MG/1
20 TABLET ORAL 2 TIMES DAILY
Qty: 60 TABLET | Refills: 0 | Status: SHIPPED | OUTPATIENT
Start: 2018-05-06 | End: 2018-06-07

## 2018-03-08 RX ORDER — DEXTROAMPHETAMINE SACCHARATE, AMPHETAMINE ASPARTATE, DEXTROAMPHETAMINE SULFATE AND AMPHETAMINE SULFATE 5; 5; 5; 5 MG/1; MG/1; MG/1; MG/1
20 TABLET ORAL 2 TIMES DAILY
Qty: 60 TABLET | Refills: 0 | Status: SHIPPED | OUTPATIENT
Start: 2018-03-08 | End: 2018-03-08

## 2018-03-08 NOTE — TELEPHONE ENCOUNTER
Call pt at 8:54 AM  Got voicemail   Clarifying meds 20 mg BID of long acting and then short acting    Requested call back by pt if this is wrong and direct triage so Dr. Baker can be paged to make any necessary changes.    Pt called back 0900  Clarified - using fast acting 2X DAILY because short acting is more expensive.     So last 3 mos of prescriptions are erroneous. We will refill fast acting and leave them at the  to  at pts lunch break.     Liz Baker MD

## 2018-03-26 ENCOUNTER — OFFICE VISIT (OUTPATIENT)
Dept: FAMILY MEDICINE | Facility: CLINIC | Age: 43
End: 2018-03-26
Payer: COMMERCIAL

## 2018-03-26 VITALS
BODY MASS INDEX: 40.9 KG/M2 | RESPIRATION RATE: 18 BRPM | TEMPERATURE: 99.1 F | OXYGEN SATURATION: 97 % | WEIGHT: 256 LBS | DIASTOLIC BLOOD PRESSURE: 83 MMHG | SYSTOLIC BLOOD PRESSURE: 123 MMHG | HEART RATE: 102 BPM

## 2018-03-26 DIAGNOSIS — F41.1 GAD (GENERALIZED ANXIETY DISORDER): ICD-10-CM

## 2018-03-26 DIAGNOSIS — F43.21 GRIEF: Primary | ICD-10-CM

## 2018-03-26 RX ORDER — HYDROXYZINE HYDROCHLORIDE 25 MG/1
25-50 TABLET, FILM COATED ORAL
Qty: 60 TABLET | Refills: 1 | Status: SHIPPED | OUTPATIENT
Start: 2018-03-26 | End: 2018-07-06

## 2018-03-26 RX ORDER — CLONAZEPAM 0.5 MG/1
TABLET, ORALLY DISINTEGRATING ORAL
Qty: 45 TABLET | Refills: 0 | Status: SHIPPED | OUTPATIENT
Start: 2018-03-26 | End: 2018-03-28

## 2018-03-26 RX ORDER — FLUOXETINE 40 MG/1
40 CAPSULE ORAL DAILY
Qty: 60 CAPSULE | Refills: 1 | Status: SHIPPED | OUTPATIENT
Start: 2018-03-26 | End: 2018-07-06

## 2018-03-26 ASSESSMENT — ANXIETY QUESTIONNAIRES
5. BEING SO RESTLESS THAT IT IS HARD TO SIT STILL: MORE THAN HALF THE DAYS
1. FEELING NERVOUS, ANXIOUS, OR ON EDGE: NEARLY EVERY DAY
2. NOT BEING ABLE TO STOP OR CONTROL WORRYING: MORE THAN HALF THE DAYS
6. BECOMING EASILY ANNOYED OR IRRITABLE: NEARLY EVERY DAY
GAD7 TOTAL SCORE: 16
3. WORRYING TOO MUCH ABOUT DIFFERENT THINGS: MORE THAN HALF THE DAYS
IF YOU CHECKED OFF ANY PROBLEMS ON THIS QUESTIONNAIRE, HOW DIFFICULT HAVE THESE PROBLEMS MADE IT FOR YOU TO DO YOUR WORK, TAKE CARE OF THINGS AT HOME, OR GET ALONG WITH OTHER PEOPLE: VERY DIFFICULT
7. FEELING AFRAID AS IF SOMETHING AWFUL MIGHT HAPPEN: MORE THAN HALF THE DAYS

## 2018-03-26 ASSESSMENT — PATIENT HEALTH QUESTIONNAIRE - PHQ9: 5. POOR APPETITE OR OVEREATING: MORE THAN HALF THE DAYS

## 2018-03-26 NOTE — PATIENT INSTRUCTIONS
Anxiety   1. Prozac - continue to use up supply by taking 2 pills/day. Will give 2 mos supply with refill at increased dose (40 mg).  2. Klonopin - 1/2 mg (1 tab) twice a day scheduled. Morning or night, for 5 days. Will prescribe one prn if you have a panic attack. Message me in 5 days. Do not combine with alcohol or opiate.  3. If needed, will prescribe hydroxyzine for sleep   4. Continue weekly therapy, use DBT skills   5. Do not have more than 1 drink.   6. Brainstorm what people can do will be helpful for you.  7. Consider a friend who will go to the gym with you.     Will see what you look like in 5 days to see if you need another office visit, but for sure will need another visit in the future.

## 2018-03-26 NOTE — PROGRESS NOTES
HPI:       Irma Evans is a 42 year old who presents for the following  Patient presents with:  Medication Request: Anxiety meds     Grief  Father recently passed away post surgery. Waiting on autopsy to confirm death, but partly blaming health care. Reached out to therapist for emotional support. Also had to put dog down last Mon. Has another dog at home but worried about this because this dog was his father's dog. Partner has been gone a lot due to traveling.     Anxiety   Feeling more anxiety than depression. Dealing with people has become increasingly difficult. Highly reactive like suddenly quitting job after a situation with his boss. Feeling nauseated. Appetite not good. Poor sleep. Hard to fall asleep and stay asleep. Has drank more alcohol since passing of his father because it helps him sleep. Positive for daily crying spells. Interacting with others is a challenge due to grief. Positive for tight chest, difficulty breathing, diarrhea, hot flashes. Avoidance has been a coping mechanism right now as well as in the past. Friends are good, keep bugging him. Some what difficult to accept help. Reading a book about grieving, but causes him to cry more.   Took 1 small dose of ativan from friend on day of father's , but didn't seem to help. Unsure of dose. Would like anxiety medications to be temporary.    Taking ADD meds regularly besides weekends and holidays. Does not think meds are making anxiety worse.   Has successfully used diazepam in the past. Hydroxyzine not familiar.     Noticed weight loss when he stepped on scale today. Trying to eat, but low interest.     Wt Readings from Last 10 Encounters:   18 256 lb (116.1 kg)   18 268 lb 3.2 oz (121.7 kg)   17 265 lb (120.2 kg)   17 270 lb 6.4 oz (122.7 kg)   17 (P) 273 lb 5.9 oz (124 kg)   17 273 lb 6.4 oz (124 kg)   10/04/17 261 lb 6.4 oz (118.6 kg)   17 257 lb (116.6 kg)   17 261 lb 3.2 oz  (118.5 kg)   05/31/17 263 lb (119.3 kg)       Problem, Medication and Allergy Lists were reviewed and are current.  Patient is an established patient of this clinic.         Review of Systems:   Review of Systems     Positive for racing thoughts.   No thoughts of self harm, but doesn't want to continue feeling the way he feels now.   Not using any other drugs besides pot. Not interested in using other drugs.   Fhx of coping through medications, does not want this.   Denies PTSD dx  Positive for compulsion to clean right now, thinks this is more for distraction purposes.   Carpal tunnel sx on L improved since can use R dominantly now     This document serves as a record of the services and decisions personally performed and made by Liz Baker MD. It was created on his/her behalf by Lorna Donald, a trained medical scribe. The creation of this document is based the provider's statements to the medical scribe.  Scribe Lorna Donald 11:44 AM, March 26, 2018         Physical Exam:   Patient Vitals for the past 24 hrs:   BP Temp Temp src Pulse Resp SpO2 Weight   03/26/18 1119 123/83 99.1  F (37.3  C) Oral 102 18 97 % 256 lb (116.1 kg)     Body mass index is 40.9 kg/(m^2).  Vitals were reviewed and were except pulse     Physical Exam      GENERAL: healthy, alert and in mild distress  PSYCH: Alert and oriented times 3; speech- coherent , more latency and much quieter than normal, very fidgety. Endorses grief and pain but not SI, no VH/AH, tearful spells daily and is tearful during exam.   No delusions or true compulsions. Affect tearful, sad, anxious.         Results:      Results from the last 24 hoursNo results found for this or any previous visit (from the past 24 hour(s)).  Assessment and Plan     Trevin was seen today for medication request.    Diagnoses and all orders for this visit:    Grief  Very early in processing. Will need follow up -may benefit from group. Normalized grief. Ask friends for help packing up  house/dad's things.  Dad  at [facility] after surgery - Trevin had planned to follow up with Dr. Reyes for opposite side carpal tunnel release - this appointment cancelled due to fear. Will follow up. Sx seem mild enough to manage for now. Top surgery on hold as well.     SHANA (generalized anxiety disorder)  -     FLUoxetine (PROZAC) 40 MG capsule; Take 1 capsule (40 mg) by mouth daily  -     clonazePAM (KLONOPIN) 0.5 MG ODT tab; Take 1 tab (0.5mg) two times daily scheduled and 1 additional tab As needed for panic attack  -     hydrOXYzine (ATARAX) 25 MG tablet; Take 1-2 tablets (25-50 mg) by mouth nightly as needed for itching, anxiety or other (sleep)  Experiencing neurologic, metabolic and GI sx of anxiety with acute anxiety r/t grief reaction. Has had panic attacks but few. Increase SSRI dosing now, discussed r/b/a to acute anxiety treatment. Doubt hydroxyzine would be enough at this time, nor gabapentin. He does use marijuana recreationally but not other drugs, no hx of IV drug use, no hx of recreational opiate use. Pt wishes to proceed with benzo which I agree w/ due ot severity of sx and impact on fxn.   Discussed scheduled use, will taper to nothing after effect of SSRI ramps up. Expect 4-6wks on benzos, possibly longer at a lower dose. Will need to assess for efficacy after 5 days.     AVS  Anxiety   1. Prozac - continue to use up supply by taking 2 pills/day. Will give 2 mos supply with refill at increased dose (40 mg).  2. Klonopin - 1/2 mg (1 tab) twice a day scheduled. Morning or night, for 5 days. Will prescribe one prn if you have a panic attack. Message me in 5 days. Do not combine with alcohol or opiate.  3. If needed, will prescribe hydroxyzine for sleep   4. Continue weekly therapy, use DBT skills   5. Do not have more than 1 drink.   6. Brainstorm what people can do will be helpful for you.  7. Consider a friend who will go to the gym with you.     Will see what you look like in 5 days to see if you  need another office visit, but for sure will need another visit in the future.     Medications Discontinued During This Encounter   Medication Reason     FLUoxetine (PROZAC) 20 MG capsule Reorder     Options for treatment and follow-up care were reviewed with the patient. Irma Evans  engaged in the decision making process and verbalized understanding of the options discussed and agreed with the final plan.    The information in this document, created by the medical scribe for me, accurately reflects the services I personally performed and the decisions made by me. I have reviewed and approved this document for accuracy prior to leaving the patient care area.  Liz Baker MD  11:44 AM, 03/26/18  I spent 45 min face to face with the patient and >50% was spent counselling the patient about the above medical conditions, educating patient on their medical conditions, behavioral interventions and supports.      Liz Baker MD

## 2018-03-26 NOTE — MR AVS SNAPSHOT
After Visit Summary   3/26/2018    Irma Evans    MRN: [id number]           Patient Information     Date Of Birth          1975        Visit Information        Provider Department      3/26/2018 11:20 AM Liz Bakre MD [facility]        Today's Diagnoses     Grief    -  1    SHANA (generalized anxiety disorder)          Care Instructions    Anxiety   1. Prozac - continue to use up supply by taking 2 pills/day. Will give 2 mos supply with refill at increased dose (40 mg).  2. Klonopin - 1/2 mg (1 tab) twice a day scheduled. Morning or night, for 5 days. Will prescribe one prn if you have a panic attack. Message me in 5 days. Do not combine with alcohol or opiate.  3. If needed, will prescribe hydroxyzine for sleep   4. Continue weekly therapy, use DBT skills   5. Do not have more than 1 drink.   6. Brainstorm what people can do will be helpful for you.  7. Consider a friend who will go to the gym with you.     Will see what you look like in 5 days to see if you need another office visit, but for sure will need another visit in the future.           Follow-ups after your visit        Who to contact     Please call your clinic at [phone] to:    Ask questions about your health    Make or cancel appointments    Discuss your medicines    Learn about your test results    Speak to your doctor            Additional Information About Your Visit        [employer] Information     [employer] gives you secure access to your electronic health record. If you see a primary care provider, you can also send messages to your care team and make appointments. If you have questions, please call your primary care clinic.  If you do not have a primary care provider, please call [phone] and they will assist you.      [employer] That [employer] is an electronic gateway that provides easy, online access to your medical records. With [employer], you can request a clinic appointment, read your test results, renew a  prescription or communicate with your care team.     To access your existing account, please contact your [facility] or call [phone] for assistance.        Care EveryWhere ID     This is your Care EveryWhere ID. This could be used by other organizations to access your [place] medical records  [id number]        Your Vitals Were     Pulse Temperature Respirations Pulse Oximetry Breastfeeding? BMI (Body Mass Index)    102 99.1  F (37.3  C) (Oral) 18 97% No 40.9 kg/m2       Blood Pressure from Last 3 Encounters:   03/26/18 123/83   01/12/18 113/79   12/19/17 (!) 129/97    Weight from Last 3 Encounters:   03/26/18 256 lb (116.1 kg)   01/12/18 268 lb 3.2 oz (121.7 kg)   12/19/17 265 lb (120.2 kg)              Today, you had the following     No orders found for display         Today's Medication Changes          These changes are accurate as of 3/26/18 12:09 PM.  If you have any questions, ask your nurse or doctor.               Start taking these medicines.        Dose/Directions    clonazePAM 0.5 MG ODT tab   Commonly known as:  klonoPIN   Used for:  SHANA (generalized anxiety disorder)   Started by:  Liz Baker MD        Take 1 tab (0.5mg) two times daily scheduled and 1 additional tab As needed for panic attack   Quantity:  45 tablet   Refills:  0       hydrOXYzine 25 MG tablet   Commonly known as:  ATARAX   Used for:  SHANA (generalized anxiety disorder)   Started by:  Liz Baker MD        Dose:  25-50 mg   Take 1-2 tablets (25-50 mg) by mouth nightly as needed for itching, anxiety or other (sleep)   Quantity:  60 tablet   Refills:  1         These medicines have changed or have updated prescriptions.        Dose/Directions    FLUoxetine 40 MG capsule   Commonly known as:  PROzac   This may have changed:    - medication strength  - how much to take   Used for:  SHANA (generalized anxiety disorder)   Changed by:  Liz Baker MD        Dose:  40 mg   Take 1 capsule (40 mg) by  mouth daily   Quantity:  60 capsule   Refills:  1            Where to get your medicines      These medications were sent to [id number] - [place] [address] [facility] [address], [facility] [address]     Phone:  [phone]     FLUoxetine 40 MG capsule    hydrOXYzine 25 MG tablet         Some of these will need a paper prescription and others can be bought over the counter.  Ask your nurse if you have questions.     Bring a paper prescription for each of these medications     clonazePAM 0.5 MG ODT tab                Primary Care Provider Office Phone # Fax #    Liz MD Samuel [phone]       [address] [address]        Equal Access to Services     WILDA GREGORIO : Hadii stephany nio Sochapito, waaxda luqadaha, qaybta kaalmada meenuyada, bogdan winters. So [facility] [phone].    ATENCIÓN: Si habla español, tiene a schuler disposición servicios gratuitos de asistencia lingüística. [facility] [phone].    We comply with applicable federal civil rights laws and [place] laws. We do not discriminate on the basis of race, color, national origin, age, disability, sex, sexual orientation, or gender identity.            Thank you!     Thank you for choosing [facility]  for your care. Our goal is always to provide you with excellent care. Hearing back from our patients is one way we can continue to improve our services. Please take a few minutes to complete the written survey that you may receive in the mail after your visit with us. Thank you!             Your Updated Medication List - Protect others around you: Learn how to safely use, store and throw away your medicines at www.disposemymeds.org.          This list is accurate as of 3/26/18 12:09 PM.  Always use your most recent med list.                   Brand Name Dispense Instructions for use Diagnosis    amphetamine-dextroamphetamine  "20 MG per tablet   Start taking on:  5/6/2018    ADDERALL    60 tablet    Take 1 tablet (20 mg) by mouth 2 times daily    Attention deficit hyperactivity disorder (ADHD), combined type       clonazePAM 0.5 MG ODT tab    klonoPIN    45 tablet    Take 1 tab (0.5mg) two times daily scheduled and 1 additional tab As needed for panic attack    SHANA (generalized anxiety disorder)       etonogestrel 68 MG Impl    IMPLANON/NEXPLANON    1 each    1 each (68 mg) by Subdermal route once for 1 dose    Encounter for other contraceptive management       FLUoxetine 40 MG capsule    PROzac    60 capsule    Take 1 capsule (40 mg) by mouth daily    SHANA (generalized anxiety disorder)       fluticasone 50 MCG/ACT spray    FLONASE    48 g    Spray 2 sprays into both nostrils daily    Chronic rhinitis       hydrOXYzine 25 MG tablet    ATARAX    60 tablet    Take 1-2 tablets (25-50 mg) by mouth nightly as needed for itching, anxiety or other (sleep)    SHANA (generalized anxiety disorder)       lisinopril 5 MG tablet    PRINIVIL/ZESTRIL    30 tablet    Take 1 tablet (5 mg) by mouth daily    Elevated blood pressure reading without diagnosis of hypertension       minocycline 100 MG capsule    MINOCIN/DYNACIN    180 capsule    Take 1 capsule (100 mg) by mouth 2 times daily    Acne vulgaris       montelukast 10 MG tablet    SINGULAIR    90 tablet    Take 1 tablet (10 mg) by mouth nightly as needed    Seasonal allergic rhinitis due to pollen       naproxen 500 MG tablet    NAPROSYN    60 tablet    Take 1 tablet (500 mg) by mouth 2 times daily as needed for moderate pain    Costochondritis       needle (disp) 18G X 1\" Misc     15 each    1 each once a week        Needle (Disp) 23G X 1\" Misc     15 each    Use to inject testosterone into muscle weekly        Sharps Container Misc     1 each    Dispose sharps    Gender dysphoria       syringe (disposable) 1 ML Mis    BD TUBERCULIN SYRINGE    15 each    BD 1ml Tuberculing syringe SLIP TIP (no needle " attached). Use to administer IM medications as instructed        testosterone cypionate 200 MG/ML injection    DEPOTESTOTERONE    10 mL    Inject 0.4 mLs (80 mg) into the muscle once a week In cottonseed oil ok    Gender identity disorder       triamcinolone 0.1 % cream    KENALOG    80 g    Apply sparingly to affected area two times daily as needed    Dermatitis

## 2018-03-27 ASSESSMENT — PATIENT HEALTH QUESTIONNAIRE - PHQ9: SUM OF ALL RESPONSES TO PHQ QUESTIONS 1-9: 18

## 2018-03-27 ASSESSMENT — ANXIETY QUESTIONNAIRES: GAD7 TOTAL SCORE: 16

## 2018-03-30 ENCOUNTER — TELEPHONE (OUTPATIENT)
Dept: FAMILY MEDICINE | Facility: CLINIC | Age: 43
End: 2018-03-30

## 2018-03-30 NOTE — TELEPHONE ENCOUNTER
Prior Authorization Retail Medication Request    Medication/Dose: Clonazepam ODT 0.5MG Tablets  ICD code (if different than what is on RX):  See chart  Previously Tried and Failed:  See chart  Rationale:  See chart    Insurance Name: see chart  Insurance ID: [id number]      Pharmacy Information (if different than what is on RX)  Name: [facility]  Phone: [phone]

## 2018-04-02 ENCOUNTER — MYC MEDICAL ADVICE (OUTPATIENT)
Dept: FAMILY MEDICINE | Facility: CLINIC | Age: 43
End: 2018-04-02

## 2018-04-02 NOTE — TELEPHONE ENCOUNTER
Central Prior Authorization Team   Phone: [phone]    PA Initiation    Medication: Clonazepam ODT 0.5MG Tablets-Initiated-  Insurance Company: [employer] - Phone [phone] Fax [phone]  Pharmacy Filling the Rx: [facility] [address] [place] [address] [address] [facility] [address]  Filling Pharmacy Phone: [phone]  Filling Pharmacy Fax: [phone]  Start Date: 4/2/2018

## 2018-04-02 NOTE — TELEPHONE ENCOUNTER
40mg prozac daily   Klonopin 0.5 mg two times daily   Hydroxyzine for sleep   = all symptoms better. Could use improvement.     New Plan:   1. klonopin 0.5mg two times daily  2. Up to one tablet additionally per day, as needed  for additional panic/anxiety, bobby for work  3. Get to the gym   4. Hydroxyzine 25-50 at night for sleep, scheduled   5. We can wean down on the klonopin as things improve and the prozac kicks in.   6. See me before you run out so we can resupply and check in again. Use what you have for now.

## 2018-04-03 NOTE — TELEPHONE ENCOUNTER
Prior Authorization Not Needed per Insurance    Medication: Clonazepam ODT 0.5MG Tablets-Initiated-PA Not Needed-  Insurance Company: [employer] - Phone [phone] Fax [phone]  Expected CoPay: $1.29    Pharmacy Filling the Rx: [facility] [address] [place] [address] [address]  Pharmacy Notified: Yes  Patient Notified: Yes    Prescriber changed the prescription from the ODT tablets to the regular tablets, the new medication does not require a prior authorization.

## 2018-04-12 ENCOUNTER — OFFICE VISIT (OUTPATIENT)
Dept: FAMILY MEDICINE | Facility: CLINIC | Age: 43
End: 2018-04-12
Payer: COMMERCIAL

## 2018-04-12 VITALS
OXYGEN SATURATION: 97 % | BODY MASS INDEX: 40.77 KG/M2 | WEIGHT: 255.2 LBS | DIASTOLIC BLOOD PRESSURE: 77 MMHG | TEMPERATURE: 98.2 F | HEART RATE: 93 BPM | SYSTOLIC BLOOD PRESSURE: 110 MMHG

## 2018-04-12 DIAGNOSIS — F41.1 GENERALIZED ANXIETY DISORDER: Primary | ICD-10-CM

## 2018-04-12 DIAGNOSIS — F41.1 GAD (GENERALIZED ANXIETY DISORDER): ICD-10-CM

## 2018-04-12 RX ORDER — CLONAZEPAM 0.5 MG/1
TABLET ORAL
Qty: 65 TABLET | Refills: 0 | Status: SHIPPED | OUTPATIENT
Start: 2018-04-12 | End: 2018-07-06

## 2018-04-12 ASSESSMENT — PATIENT HEALTH QUESTIONNAIRE - PHQ9: 5. POOR APPETITE OR OVEREATING: NEARLY EVERY DAY

## 2018-04-12 ASSESSMENT — PAIN SCALES - GENERAL: PAINLEVEL: NO PAIN (0)

## 2018-04-12 ASSESSMENT — ANXIETY QUESTIONNAIRES
2. NOT BEING ABLE TO STOP OR CONTROL WORRYING: NEARLY EVERY DAY
5. BEING SO RESTLESS THAT IT IS HARD TO SIT STILL: MORE THAN HALF THE DAYS
IF YOU CHECKED OFF ANY PROBLEMS ON THIS QUESTIONNAIRE, HOW DIFFICULT HAVE THESE PROBLEMS MADE IT FOR YOU TO DO YOUR WORK, TAKE CARE OF THINGS AT HOME, OR GET ALONG WITH OTHER PEOPLE: SOMEWHAT DIFFICULT
1. FEELING NERVOUS, ANXIOUS, OR ON EDGE: NEARLY EVERY DAY
3. WORRYING TOO MUCH ABOUT DIFFERENT THINGS: NEARLY EVERY DAY
7. FEELING AFRAID AS IF SOMETHING AWFUL MIGHT HAPPEN: NEARLY EVERY DAY

## 2018-04-12 NOTE — PATIENT INSTRUCTIONS
Anxiety  1. Continue hydroxyzine 25mg if less than 8 hours of sleep, if more than 8 hours take 50.   2. Continue prozac and klonopin. Will plan to wean as prozac becomes more effective   3. For crisis mode: extra pill, call clinic, misbah Baker, crisis number  4. Klonopin and alcohol do not mix      Return in 4 weeks

## 2018-04-12 NOTE — PROGRESS NOTES
HPI:       Irma Evans is a 42 year old who presents for the following  Patient presents with:  RECHECK: f/up for medications for anxiety      Anxiety  Started prozac on 3/26, slight delay in starting due to pharmacy/insurance issue. Doing well on the meds. When not on the meds, does not do as well. Getting triggers every where they go. Avoids looking at pictures of his dad and staying very busy. Seeing therapist after visit today. Reports they do not feel like they are dealing with the grief and just masking it. Does not want to be on anxiety meds forever. Medication makes him more tired and possibly more slow. Wonders how much anxiety fuels attention sx. The last rx was for 45 tablets given on 3/28 with scheduled 2X DAILY  Additional one during the day for panic attack, but has only had to use once. Has 14 tablets left.   Reports 2-3's become 0-1 with benzo, except relaxing which is a chronic issue.     SHANA-7 SCORE 8/17/2011 11/16/2012 3/26/2018   Total Score 0 - -   Total Score - - 16   Total Score BEH Adult - 4 -     Score today: 17    Sleep  First time he took Hydroxyzine he slept 7 hours straight. Now may wake up just once during the night but will go back to sleep rather quick. Will take 2 tabs if he has a longer time to sleep or just one for a shorter night.     Social  New roommates moving in, including two kids. Has known these people for a long time and looking forward to this transition - also keeping him busy. Asks which med he should not take when drinking.         Problem, Medication and Allergy Lists were reviewed and are current.  Patient is an established patient of this clinic.         Review of Systems:   Review of Systems     Positive for crying spells.  Depressive sx are better, less rumination.   Denies suicidality  Feeling more calm     This document serves as a record of the services and decisions personally performed and made by Liz Baker MD. It was created on his/her behalf  by Lorna Donald, a trained medical scribe. The creation of this document is based the provider's statements to the medical scribe.  Scribe Lorna Donald 3:41 PM, April 12, 2018         Physical Exam:   Patient Vitals for the past 24 hrs:   BP Temp Temp src Pulse SpO2 Weight   04/12/18 1533 110/77 98.2  F (36.8  C) Oral 93 97 % 255 lb 3.2 oz (115.8 kg)     Body mass index is 40.77 kg/(m^2).  Vitals were reviewed and were normal     Physical Exam   GENERAL: healthy, alert and no distress  PSYCH: Alert and oriented times 3; speech- coherent , fast rate and normal volume; able to articulate logical thoughts, able to abstract reason, affect- anxious but much improved      Results:      Results from the last 24 hoursNo results found for this or any previous visit (from the past 24 hour(s)).  Assessment and Plan     Irma was seen today for recheck.    Diagnoses and all orders for this visit:    Generalized anxiety disorder    SHANA (generalized anxiety disorder)  -     clonazePAM (KLONOPIN) 0.5 MG tablet; One tablet two times a day.  May take one additional tablet during day prn panic attack.  Greatly improved, seeing therapist today. Recommend to continue. Discussed risks and benefits and alternative to benzos because of SE and addiction profile. Will stay away from alcohol as he has been doing.     There are no discontinued medications.  Options for treatment and follow-up care were reviewed with the patient. Irma JOSE ALEJANDRO Nathan  engaged in the decision making process and verbalized understanding of the options discussed and agreed with the final plan.    AVS  Anxiety  1. Continue hydroxyzine 25mg if less than 8 hours of sleep, if more than 8 hours take 50.   2. Continue prozac and klonopin. Will plan to wean as prozac becomes more effective   3. For crisis mode: extra pill, call clinic, misbah Baker, crisis number  4. Klonopin and alcohol do not mix      Return in 4 weeks    The information in this document,  created by the medical scribe for me, accurately reflects the services I personally performed and the decisions made by me. I have reviewed and approved this document for accuracy prior to leaving the patient care area.  Liz Baker MD  3:41 PM, 04/12/18    Liz Baker MD

## 2018-04-12 NOTE — MR AVS SNAPSHOT
After Visit Summary   4/12/2018    Irma Evans    MRN: [id number]           Patient Information     Date Of Birth          1975        Visit Information        Provider Department      4/12/2018 3:40 PM Liz Baker MD [facility]        Today's Diagnoses     Generalized anxiety disorder    -  1    SHANA (generalized anxiety disorder)          Care Instructions    Anxiety  1. Continue hydroxyzine 25mg if less than 8 hours of sleep, if more than 8 hours take 50.   2. Continue prozac and klonopin. Will plan to wean as prozac becomes more effective   3. For crisis mode: extra pill, call clinic, misbah Baker, crisis number  4. Klonopin and alcohol do not mix     Return in 4 weeks           Follow-ups after your visit        Who to contact     Please call your clinic at [phone] to:    Ask questions about your health    Make or cancel appointments    Discuss your medicines    Learn about your test results    Speak to your doctor            Additional Information About Your Visit        [employer] Information     [employer] gives you secure access to your electronic health record. If you see a primary care provider, you can also send messages to your care team and make appointments. If you have questions, please call your primary care clinic.  If you do not have a primary care provider, please call [phone] and they will assist you.      [employer] is an electronic gateway that provides easy, online access to your medical records. With [employer], you can request a clinic appointment, read your test results, renew a prescription or communicate with your care team.     To access your existing account, please contact your [facility] or call [phone] for assistance.        Care EveryWhere ID     This is your Care EveryWhere ID. This could be used by other organizations to access your [place] medical records  [id number]        Your Vitals Were      Pulse Temperature Pulse Oximetry BMI (Body Mass Index)          93 98.2  F (36.8  C) (Oral) 97% 40.77 kg/m2         Blood Pressure from Last 3 Encounters:   04/12/18 110/77   03/26/18 123/83   01/12/18 113/79    Weight from Last 3 Encounters:   04/12/18 255 lb 3.2 oz (115.8 kg)   03/26/18 256 lb (116.1 kg)   01/12/18 268 lb 3.2 oz (121.7 kg)              Today, you had the following     No orders found for display         Where to get your medicines      Some of these will need a paper prescription and others can be bought over the counter.  Ask your nurse if you have questions.     Bring a paper prescription for each of these medications     clonazePAM 0.5 MG tablet          Primary Care Provider Office Phone # Fax #    Liz Baker [phone]       [address] [place] [address]        Equal Access to Services     WILDA GREGORIO : Hadii stephany Schofield, wadevda adrian, qaybta kaalmada shai, bogdan gary . So [facility] [phone].    ATENCIÓN: Si habla español, tiene a schuler disposición servicios gratuitos de asistencia lingüística. Katia al [phone].    We comply with applicable federal civil rights laws and [place] laws. We do not discriminate on the basis of race, color, national origin, age, disability, sex, sexual orientation, or gender identity.            Thank you!     Thank you for choosing [facility]  for your care. Our goal is always to provide you with excellent care. Hearing back from our patients is one way we can continue to improve our services. Please take a few minutes to complete the written survey that you may receive in the mail after your visit with us. Thank you!             Your Updated Medication List - Protect others around you: Learn how to safely use, store and throw away your medicines at www.disposemymeds.org.          This list is accurate as of 4/12/18  4:00 PM.  Always use your most recent med  "list.                   Brand Name Dispense Instructions for use Diagnosis    amphetamine-dextroamphetamine 20 MG per tablet   Start taking on:  5/6/2018    ADDERALL    60 tablet    Take 1 tablet (20 mg) by mouth 2 times daily    Attention deficit hyperactivity disorder (ADHD), combined type       clonazePAM 0.5 MG tablet    klonoPIN    65 tablet    One tablet two times a day.  May take one additional tablet during day prn panic attack.    SHANA (generalized anxiety disorder)       etonogestrel 68 MG Impl    IMPLANON/NEXPLANON    1 each    1 each (68 mg) by Subdermal route once for 1 dose    Encounter for other contraceptive management       FLUoxetine 40 MG capsule    PROzac    60 capsule    Take 1 capsule (40 mg) by mouth daily    SHANA (generalized anxiety disorder)       fluticasone 50 MCG/ACT spray    FLONASE    48 g    Spray 2 sprays into both nostrils daily    Chronic rhinitis       hydrOXYzine 25 MG tablet    ATARAX    60 tablet    Take 1-2 tablets (25-50 mg) by mouth nightly as needed for itching, anxiety or other (sleep)    SHANA (generalized anxiety disorder)       lisinopril 5 MG tablet    PRINIVIL/ZESTRIL    30 tablet    Take 1 tablet (5 mg) by mouth daily    Elevated blood pressure reading without diagnosis of hypertension       minocycline 100 MG capsule    MINOCIN/DYNACIN    180 capsule    Take 1 capsule (100 mg) by mouth 2 times daily    Acne vulgaris       montelukast 10 MG tablet    SINGULAIR    90 tablet    Take 1 tablet (10 mg) by mouth nightly as needed    Seasonal allergic rhinitis due to pollen       naproxen 500 MG tablet    NAPROSYN    60 tablet    Take 1 tablet (500 mg) by mouth 2 times daily as needed for moderate pain    Costochondritis       needle (disp) 18G X 1\" Misc     15 each    1 each once a week        Needle (Disp) 23G X 1\" Misc     15 each    Use to inject testosterone into muscle weekly        Sharps Container Misc     1 each    Dispose sharps    Gender dysphoria       syringe " (disposable) 1 ML Misc    BD TUBERCULIN SYRINGE    15 each    BD 1ml Tuberculing syringe SLIP TIP (no needle attached). Use to administer IM medications as instructed        testosterone cypionate 200 MG/ML injection    DEPOTESTOTERONE    10 mL    Inject 0.4 mLs (80 mg) into the muscle once a week In cottonseed oil ok    Gender identity disorder       triamcinolone 0.1 % cream    KENALOG    80 g    Apply sparingly to affected area two times daily as needed    Dermatitis

## 2018-05-07 DIAGNOSIS — R03.0 ELEVATED BLOOD PRESSURE READING WITHOUT DIAGNOSIS OF HYPERTENSION: ICD-10-CM

## 2018-05-07 NOTE — TELEPHONE ENCOUNTER
Request for medication refill:    Date of last visit at clinic: 4-12-18    Please complete refill if appropriate and CLOSE ENCOUNTER.    Closing the encounter signifies the refill is complete.    If refill has been denied, please complete the smart phrase .smirefuse and route it to the [facility] RN TRIAGE pool to inform the patient and the pharmacy.    Renetta Proctor, CMA

## 2018-05-08 RX ORDER — LISINOPRIL 5 MG/1
5 TABLET ORAL DAILY
Qty: 30 TABLET | Refills: 1 | Status: SHIPPED | OUTPATIENT
Start: 2018-05-08 | End: 2018-07-02

## 2018-06-04 ENCOUNTER — MYC MEDICAL ADVICE (OUTPATIENT)
Dept: FAMILY MEDICINE | Facility: CLINIC | Age: 43
End: 2018-06-04

## 2018-06-04 DIAGNOSIS — F90.2 ATTENTION DEFICIT HYPERACTIVITY DISORDER (ADHD), COMBINED TYPE: ICD-10-CM

## 2018-06-08 RX ORDER — DEXTROAMPHETAMINE SACCHARATE, AMPHETAMINE ASPARTATE, DEXTROAMPHETAMINE SULFATE AND AMPHETAMINE SULFATE 5; 5; 5; 5 MG/1; MG/1; MG/1; MG/1
20 TABLET ORAL 2 TIMES DAILY
Qty: 60 TABLET | Refills: 0 | Status: SHIPPED | OUTPATIENT
Start: 2018-06-08 | End: 2018-07-06

## 2018-06-08 NOTE — TELEPHONE ENCOUNTER
RN printed pended prescription, preceptor signed, MyChart message to patient, and script in lock box.     Lupe Mosley RN

## 2018-06-12 NOTE — TELEPHONE ENCOUNTER
Patient presented in clinic with ID and picked up adderall prescription.    Katarzyna Burkett RN

## 2018-07-02 DIAGNOSIS — R03.0 ELEVATED BLOOD PRESSURE READING WITHOUT DIAGNOSIS OF HYPERTENSION: ICD-10-CM

## 2018-07-02 RX ORDER — LISINOPRIL 5 MG/1
5 TABLET ORAL DAILY
Qty: 30 TABLET | Refills: 1 | Status: SHIPPED | OUTPATIENT
Start: 2018-07-02 | End: 2018-09-18

## 2018-07-02 NOTE — TELEPHONE ENCOUNTER

## 2018-07-06 ENCOUNTER — OFFICE VISIT (OUTPATIENT)
Dept: FAMILY MEDICINE | Facility: CLINIC | Age: 43
End: 2018-07-06
Payer: COMMERCIAL

## 2018-07-06 VITALS
HEART RATE: 75 BPM | RESPIRATION RATE: 16 BRPM | SYSTOLIC BLOOD PRESSURE: 129 MMHG | OXYGEN SATURATION: 99 % | DIASTOLIC BLOOD PRESSURE: 88 MMHG | TEMPERATURE: 98.4 F | WEIGHT: 235.2 LBS | BODY MASS INDEX: 37.58 KG/M2

## 2018-07-06 DIAGNOSIS — F41.1 GAD (GENERALIZED ANXIETY DISORDER): ICD-10-CM

## 2018-07-06 DIAGNOSIS — F90.2 ATTENTION DEFICIT HYPERACTIVITY DISORDER (ADHD), COMBINED TYPE: Primary | ICD-10-CM

## 2018-07-06 RX ORDER — HYDROXYZINE HYDROCHLORIDE 25 MG/1
25-50 TABLET, FILM COATED ORAL
Qty: 100 TABLET | Refills: 1 | Status: SHIPPED | OUTPATIENT
Start: 2018-07-06 | End: 2018-10-04

## 2018-07-06 RX ORDER — DEXTROAMPHETAMINE SACCHARATE, AMPHETAMINE ASPARTATE, DEXTROAMPHETAMINE SULFATE AND AMPHETAMINE SULFATE 5; 5; 5; 5 MG/1; MG/1; MG/1; MG/1
20 TABLET ORAL 2 TIMES DAILY
Qty: 60 TABLET | Refills: 0 | Status: SHIPPED | OUTPATIENT
Start: 2018-09-01 | End: 2018-10-17

## 2018-07-06 RX ORDER — DEXTROAMPHETAMINE SACCHARATE, AMPHETAMINE ASPARTATE, DEXTROAMPHETAMINE SULFATE AND AMPHETAMINE SULFATE 5; 5; 5; 5 MG/1; MG/1; MG/1; MG/1
20 TABLET ORAL 2 TIMES DAILY
Qty: 60 TABLET | Refills: 0 | Status: SHIPPED | OUTPATIENT
Start: 2018-07-06 | End: 2018-10-17

## 2018-07-06 RX ORDER — DEXTROAMPHETAMINE SACCHARATE, AMPHETAMINE ASPARTATE, DEXTROAMPHETAMINE SULFATE AND AMPHETAMINE SULFATE 5; 5; 5; 5 MG/1; MG/1; MG/1; MG/1
20 TABLET ORAL 2 TIMES DAILY
Qty: 60 TABLET | Refills: 0 | Status: SHIPPED | OUTPATIENT
Start: 2018-08-01 | End: 2018-10-17

## 2018-07-06 ASSESSMENT — ANXIETY QUESTIONNAIRES
6. BECOMING EASILY ANNOYED OR IRRITABLE: NEARLY EVERY DAY
3. WORRYING TOO MUCH ABOUT DIFFERENT THINGS: SEVERAL DAYS
1. FEELING NERVOUS, ANXIOUS, OR ON EDGE: NEARLY EVERY DAY
5. BEING SO RESTLESS THAT IT IS HARD TO SIT STILL: NEARLY EVERY DAY
2. NOT BEING ABLE TO STOP OR CONTROL WORRYING: NEARLY EVERY DAY
GAD7 TOTAL SCORE: 16
7. FEELING AFRAID AS IF SOMETHING AWFUL MIGHT HAPPEN: NOT AT ALL

## 2018-07-06 ASSESSMENT — PATIENT HEALTH QUESTIONNAIRE - PHQ9: 5. POOR APPETITE OR OVEREATING: NEARLY EVERY DAY

## 2018-07-06 NOTE — MR AVS SNAPSHOT
After Visit Summary   7/6/2018    Irma Evans    MRN: [id number]           Patient Information     Date Of Birth          1975        Visit Information        Provider Department      7/6/2018 8:00 AM Liz Baker MD [facility]        Today's Diagnoses     Attention deficit hyperactivity disorder (ADHD), combined type    -  1    SHANA (generalized anxiety disorder)          Care Instructions    Anxiety  1. Continue DBT skills    Medications  1. Increase Prozac to 60 mg (three 20 mg tabs). Can use up what you have now.  2. Adderall prescription today   3. Refilled hydroxyzine    Check in  1. Check in 4 weeks via [employer] with your mood   2. Will send your chart to social work. She will get in touch with you.             Follow-ups after your visit        Who to contact     Please call your clinic at [phone] to:    Ask questions about your health    Make or cancel appointments    Discuss your medicines    Learn about your test results    Speak to your doctor            Additional Information About Your Visit        [employer] Information     [employer] gives you secure access to your electronic health record. If you see a primary care provider, you can also send messages to your care team and make appointments. If you have questions, please call your primary care clinic.  If you do not have a primary care provider, please call [phone] and they will assist you.      [employer] is an electronic gateway that provides easy, online access to your medical records. With [employer], you can request a clinic appointment, read your test results, renew a prescription or communicate with your care team.     To access your existing account, please contact your [facility] or call [phone] for assistance.        Care EveryWhere ID     This is your Care EveryWhere ID. This could be used by other organizations to access your [facility]  records  [id number]        Your Vitals Were     Pulse Temperature Respirations Pulse Oximetry BMI (Body Mass Index)       75 98.4  F (36.9  C) (Oral) 16 99% 37.58 kg/m2        Blood Pressure from Last 3 Encounters:   07/06/18 129/88   04/12/18 110/77   03/26/18 123/83    Weight from Last 3 Encounters:   07/06/18 235 lb 3.2 oz (106.7 kg)   04/12/18 255 lb 3.2 oz (115.8 kg)   03/26/18 256 lb (116.1 kg)              Today, you had the following     No orders found for display         Today's Medication Changes          These changes are accurate as of 7/6/18  8:28 AM.  If you have any questions, ask your nurse or doctor.               These medicines have changed or have updated prescriptions.        Dose/Directions    * amphetamine-dextroamphetamine 20 MG per tablet   Commonly known as:  ADDERALL   This may have changed:  Another medication with the same name was added. Make sure you understand how and when to take each.   Used for:  Attention deficit hyperactivity disorder (ADHD), combined type   Changed by:  Liz Baker MD        Dose:  20 mg   Take 1 tablet (20 mg) by mouth 2 times daily   Quantity:  60 tablet   Refills:  0       * amphetamine-dextroamphetamine 20 MG per tablet   Commonly known as:  ADDERALL   This may have changed:  You were already taking a medication with the same name, and this prescription was added. Make sure you understand how and when to take each.   Used for:  Attention deficit hyperactivity disorder (ADHD), combined type   Changed by:  Liz Baker MD        Dose:  20 mg   Start taking on:  8/1/2018   Take 1 tablet (20 mg) by mouth 2 times daily   Quantity:  60 tablet   Refills:  0       * amphetamine-dextroamphetamine 20 MG per tablet   Commonly known as:  ADDERALL   This may have changed:  You were already taking a medication with the same name, and this prescription was added. Make sure you understand how and when to take each.   Used for:  Attention deficit hyperactivity disorder  (ADHD), combined type   Changed by:  Liz Baker MD        Dose:  20 mg   Start taking on:  9/1/2018   Take 1 tablet (20 mg) by mouth 2 times daily   Quantity:  60 tablet   Refills:  0       FLUoxetine 20 MG capsule   Commonly known as:  PROzac   This may have changed:    - medication strength  - how much to take   Used for:  SHANA (generalized anxiety disorder)   Changed by:  Liz Baker MD        Dose:  60 mg   Take 3 capsules (60 mg) by mouth daily   Quantity:  270 capsule   Refills:  1       hydrOXYzine 25 MG tablet   Commonly known as:  ATARAX   This may have changed:  reasons to take this   Used for:  SHANA (generalized anxiety disorder)   Changed by:  Liz Baker MD        Dose:  25-50 mg   Take 1-2 tablets (25-50 mg) by mouth nightly as needed for anxiety or other (sleep)   Quantity:  100 tablet   Refills:  1       * Notice:  This list has 3 medication(s) that are the same as other medications prescribed for you. Read the directions carefully, and ask your doctor or other care provider to review them with you.      Stop taking these medicines if you haven't already. Please contact your care team if you have questions.     clonazePAM 0.5 MG tablet   Commonly known as:  klonoPIN   Stopped by:  Liz Baker MD                Where to get your medicines      These medications were sent to [facility] [address] [place] [address] AT [place] [id number]  [address] [place] [id number]     Phone:  [phone]     FLUoxetine 20 MG capsule    hydrOXYzine 25 MG tablet         Some of these will need a paper prescription and others can be bought over the counter.  Ask your nurse if you have questions.     Bring a paper prescription for each of these medications     amphetamine-dextroamphetamine 20 MG per tablet    amphetamine-dextroamphetamine 20 MG per tablet    amphetamine-dextroamphetamine 20 MG per tablet                Primary Care Provider Office Phone # Fax #    Liz  MD Samuel [phone]       [address] [address]        Equal Access to Services     WILDA GREGORIO : Hadlawanda aad ku hadranjeetbrandon Bassamali, wadevda anilyandyha, chidicomfort dumontrenzoda shai, waxjackie bekahin hayaaolaf dowdlolita gerbernahid abdirahman winters. So [facility] [phone].    ATENCIÓN: Si habla español, tiene a schuler disposición servicios gratuitos de asistencia lingüística. Katia al [phone].    We comply with applicable federal civil rights laws and [place] laws. We do not discriminate on the basis of race, color, national origin, age, disability, sex, sexual orientation, or gender identity.            Thank you!     Thank you for choosing [facility]  for your care. Our goal is always to provide you with excellent care. Hearing back from our patients is one way we can continue to improve our services. Please take a few minutes to complete the written survey that you may receive in the mail after your visit with us. Thank you!             Your Updated Medication List - Protect others around you: Learn how to safely use, store and throw away your medicines at www.disposemymeds.org.          This list is accurate as of 7/6/18  8:28 AM.  Always use your most recent med list.                   Brand Name Dispense Instructions for use Diagnosis    * amphetamine-dextroamphetamine 20 MG per tablet    ADDERALL    60 tablet    Take 1 tablet (20 mg) by mouth 2 times daily    Attention deficit hyperactivity disorder (ADHD), combined type       * amphetamine-dextroamphetamine 20 MG per tablet   Start taking on:  8/1/2018    ADDERALL    60 tablet    Take 1 tablet (20 mg) by mouth 2 times daily    Attention deficit hyperactivity disorder (ADHD), combined type       * amphetamine-dextroamphetamine 20 MG per tablet   Start taking on:  9/1/2018    ADDERALL    60 tablet    Take 1 tablet (20 mg) by mouth 2 times daily    Attention deficit hyperactivity disorder (ADHD), combined type       etonogestrel 68 MG Impl  "   IMPLANON/NEXPLANON    1 each    1 each (68 mg) by Subdermal route once for 1 dose    Encounter for other contraceptive management       FLUoxetine 20 MG capsule    PROzac    270 capsule    Take 3 capsules (60 mg) by mouth daily    SHANA (generalized anxiety disorder)       fluticasone 50 MCG/ACT spray    FLONASE    48 g    Spray 2 sprays into both nostrils daily    Chronic rhinitis       hydrOXYzine 25 MG tablet    ATARAX    100 tablet    Take 1-2 tablets (25-50 mg) by mouth nightly as needed for anxiety or other (sleep)    SHANA (generalized anxiety disorder)       lisinopril 5 MG tablet    PRINIVIL/ZESTRIL    30 tablet    Take 1 tablet (5 mg) by mouth daily    Elevated blood pressure reading without diagnosis of hypertension       minocycline 100 MG capsule    MINOCIN/DYNACIN    180 capsule    Take 1 capsule (100 mg) by mouth 2 times daily    Acne vulgaris       montelukast 10 MG tablet    SINGULAIR    90 tablet    Take 1 tablet (10 mg) by mouth nightly as needed    Seasonal allergic rhinitis due to pollen       naproxen 500 MG tablet    NAPROSYN    60 tablet    Take 1 tablet (500 mg) by mouth 2 times daily as needed for moderate pain    Costochondritis       needle (disp) 18G X 1\" Misc     15 each    1 each once a week        Needle (Disp) 23G X 1\" Misc     15 each    Use to inject testosterone into muscle weekly        Sharps Container Misc     1 each    Dispose sharps    Gender dysphoria       syringe (disposable) 1 ML [facility]    BD TUBERCULIN SYRINGE    15 each    BD 1ml Tuberculing syringe SLIP TIP (no needle attached). Use to administer IM medications as instructed        testosterone cypionate 200 MG/ML injection    DEPOTESTOTERONE    10 mL    Inject 0.4 mLs (80 mg) into the muscle once a week In cottonseed oil ok    Gender identity disorder       triamcinolone 0.1 % cream    KENALOG    80 g    Apply sparingly to affected area two times daily as needed    Dermatitis       * Notice:  This list has 3 " medication(s) that are the same as other medications prescribed for you. Read the directions carefully, and ask your doctor or other care provider to review them with you.

## 2018-07-06 NOTE — PROGRESS NOTES
HPI       Irma Evans is a 42 year old  who presents for   Chief Complaint   Patient presents with     RECHECK     medications       Social  Patient reports social isolation is still on-going. Friend, Bb's dad recently passed away as well as Trevin's. Does not want to accept the death of his dad yet. Denies being in a sexual relationship with Bb, though they are partners and acts as an emotional support for Trevin. Bb has tried to help Trevin financially, but cannot accept. Accepting help is a difficult and uncomfortable place for pt. Positive for some drinking - though not too much that he is waking up with a hangover or blacking out. Drinking is mainly occurring in a social context and using to self medicate difficult emotions.     Anxiety  Klonopin makes pt tired. Requests re-fill of hydroxyzine, which significantly helps with sleep. Some days he doesn't take any but some days he takes 1-2 for sleep - depending on pt's schedule. Taking Prozac 40 mg, helping most with depression sx though still very prominent. Uses some skills they learned in DBT to help cope. Stopped seeing therapist due to financial constraints and insurance issues. Relies on [facility] to pay for clinic visits. Was seeing therapist every other week. Have not seen therapist in 1 mo. Though have not noticed a significant difference since discontinuing therapy sessions.       Problem, Medication and Allergy Lists were reviewed and updated if needed..    Patient is an established patient of this clinic..         Review of Systems:   Review of Systems     Difficulty falling asleep  Frequent awakening at night   Denies suicide ideation, but positive for thoughts of feeling better off dead   Positive for apathy  Racing thoughts lessened   Irritability at work is hard to measure. Sridhar with walking away from people who aggregate him       This document serves as a record of the services and decisions personally performed and made by Liz Baker MD.  It was created on his/her behalf by Lorna Donald, a trained medical scribe. The creation of this document is based the provider's statements to the medical scribe.  Freedom Donald 7:58 AM, July 6, 2018         Physical Exam:     Vitals:    07/06/18 0803   BP: 129/88   Pulse: 75   Resp: 16   Temp: 98.4  F (36.9  C)   TempSrc: Oral   SpO2: 99%   Weight: 235 lb 3.2 oz (106.7 kg)     Body mass index is 37.58 kg/(m^2).  Vitals were reviewed and were normal     Physical Exam   BMI= Body mass index is 37.58 kg/(m^2).  GENERAL: healthy, alert and no distress  LE: prominent vein   PSYCH: Alert and oriented times 3; speech- coherent , normal rate and volume; able to articulate logical thoughts, able to abstract reason, no tangential thoughts, no hallucinations or delusions, affect- normal        Results:      Results from this visit  Results for orders placed or performed during the hospital encounter of 12/19/17   hCG qualitative urine POCT   Result Value Ref Range    HCG Qual Urine Negative neg    Internal QC OK Yes        Assessment and Plan        Irma was seen today for recheck.    Diagnoses and all orders for this visit:    Attention deficit hyperactivity disorder (ADHD), combined type  -     amphetamine-dextroamphetamine (ADDERALL) 20 MG per tablet; Take 1 tablet (20 mg) by mouth 2 times daily  -     amphetamine-dextroamphetamine (ADDERALL) 20 MG per tablet; Take 1 tablet (20 mg) by mouth 2 times daily  -     amphetamine-dextroamphetamine (ADDERALL) 20 MG per tablet; Take 1 tablet (20 mg) by mouth 2 times daily    SHANA (generalized anxiety disorder)  -     FLUoxetine (PROZAC) 20 MG capsule; Take 3 capsules (60 mg) by mouth daily  -     hydrOXYzine (ATARAX) 25 MG tablet; Take 1-2 tablets (25-50 mg) by mouth nightly as needed for anxiety or other (sleep)    1. Continue DBT skills           Medications Discontinued During This Encounter   Medication Reason     clonazePAM (KLONOPIN) 0.5 MG tablet       FLUoxetine (PROZAC) 40 MG capsule Reorder     hydrOXYzine (ATARAX) 25 MG tablet Reorder     amphetamine-dextroamphetamine (ADDERALL) 20 MG per tablet        Options for treatment and follow-up care were reviewed with the patient. Irma Evans  engaged in the decision making process and verbalized understanding of the options discussed and agreed with the final plan.    AVS  Anxiety  1. Continue DBT skills    Medications  1. Increase Prozac to 60 mg (three 20 mg tabs). Can use up what you have now.  2. Adderall prescription today   3. Refilled hydroxyzine    Check in  1. Check in 4 weeks via [employer] with your mood   2. Will send your chart to social work. She will get in touch with you.    The information in this document, created by the medical scribe for me, accurately reflects the services I personally performed and the decisions made by me. I have reviewed and approved this document for accuracy prior to leaving the patient care area.  Liz Baker MD  7:57 AM, 07/06/18      Liz Baker MD

## 2018-07-06 NOTE — PATIENT INSTRUCTIONS
Anxiety  1. Continue DBT skills    Medications  1. Increase Prozac to 60 mg (three 20 mg tabs). Can use up what you have now.  2. Adderall prescription today   3. Refilled hydroxyzine    Check in  1. Check in 4 weeks via [employer] with your mood   2. Will send your chart to social work. She will get in touch with you.

## 2018-07-07 ASSESSMENT — PATIENT HEALTH QUESTIONNAIRE - PHQ9: SUM OF ALL RESPONSES TO PHQ QUESTIONS 1-9: 15

## 2018-07-07 ASSESSMENT — ANXIETY QUESTIONNAIRES: GAD7 TOTAL SCORE: 16

## 2018-08-07 ENCOUNTER — MYC MEDICAL ADVICE (OUTPATIENT)
Dept: FAMILY MEDICINE | Facility: CLINIC | Age: 43
End: 2018-08-07

## 2018-08-07 DIAGNOSIS — F64.0 GENDER DYSPHORIA IN ADOLESCENT AND ADULT: Primary | ICD-10-CM

## 2018-08-14 NOTE — TELEPHONE ENCOUNTER
Pt not active in [facility] any longer so I cannot send info Please let him know that the referral to Dr. Reyes for top surgery has been placed and he can contact them at his convenience.

## 2018-08-30 ENCOUNTER — MYC MEDICAL ADVICE (OUTPATIENT)
Dept: FAMILY MEDICINE | Facility: CLINIC | Age: 43
End: 2018-08-30

## 2018-09-06 NOTE — TELEPHONE ENCOUNTER
Called Trevin 2:44 PM   No answer. Left . AAsked him to follow up with me on Monday at my 1120 appointment and [facility]. He can also message me back via [employer]

## 2018-09-18 DIAGNOSIS — R03.0 ELEVATED BLOOD PRESSURE READING WITHOUT DIAGNOSIS OF HYPERTENSION: ICD-10-CM

## 2018-09-18 RX ORDER — LISINOPRIL 5 MG/1
5 TABLET ORAL DAILY
Qty: 30 TABLET | Refills: 1 | Status: SHIPPED | OUTPATIENT
Start: 2018-09-18 | End: 2018-11-15

## 2018-09-25 NOTE — TELEPHONE ENCOUNTER
Patient scheduled 10/4/18. Patient was going to schedule pn 10/01 per Dr. Baker request but said the 4th would work better.     Anya Diane, [facility]

## 2018-10-04 ENCOUNTER — OFFICE VISIT (OUTPATIENT)
Dept: FAMILY MEDICINE | Facility: CLINIC | Age: 43
End: 2018-10-04
Payer: COMMERCIAL

## 2018-10-04 VITALS
BODY MASS INDEX: 41.41 KG/M2 | SYSTOLIC BLOOD PRESSURE: 133 MMHG | DIASTOLIC BLOOD PRESSURE: 88 MMHG | OXYGEN SATURATION: 100 % | HEART RATE: 95 BPM | WEIGHT: 259.2 LBS | TEMPERATURE: 98.3 F

## 2018-10-04 DIAGNOSIS — F41.0 PANIC ATTACK: ICD-10-CM

## 2018-10-04 DIAGNOSIS — F43.21 GRIEF: ICD-10-CM

## 2018-10-04 DIAGNOSIS — F41.1 GAD (GENERALIZED ANXIETY DISORDER): ICD-10-CM

## 2018-10-04 DIAGNOSIS — F41.1 GENERALIZED ANXIETY DISORDER: Primary | ICD-10-CM

## 2018-10-04 RX ORDER — FLUOXETINE 40 MG/1
80 CAPSULE ORAL DAILY
Qty: 60 CAPSULE | Refills: 1 | Status: SHIPPED | OUTPATIENT
Start: 2018-10-04 | End: 2018-12-18

## 2018-10-04 RX ORDER — CLONAZEPAM 0.25 MG/1
0.25 TABLET, ORALLY DISINTEGRATING ORAL DAILY PRN
Qty: 30 TABLET | Refills: 0 | Status: SHIPPED | OUTPATIENT
Start: 2018-10-04 | End: 2018-10-08

## 2018-10-04 RX ORDER — HYDROXYZINE HYDROCHLORIDE 25 MG/1
25-50 TABLET, FILM COATED ORAL
Qty: 100 TABLET | Refills: 1 | Status: SHIPPED | OUTPATIENT
Start: 2018-10-04 | End: 2019-03-14

## 2018-10-04 ASSESSMENT — ANXIETY QUESTIONNAIRES
1. FEELING NERVOUS, ANXIOUS, OR ON EDGE: NEARLY EVERY DAY
IF YOU CHECKED OFF ANY PROBLEMS ON THIS QUESTIONNAIRE, HOW DIFFICULT HAVE THESE PROBLEMS MADE IT FOR YOU TO DO YOUR WORK, TAKE CARE OF THINGS AT HOME, OR GET ALONG WITH OTHER PEOPLE: VERY DIFFICULT
2. NOT BEING ABLE TO STOP OR CONTROL WORRYING: NEARLY EVERY DAY
5. BEING SO RESTLESS THAT IT IS HARD TO SIT STILL: MORE THAN HALF THE DAYS
6. BECOMING EASILY ANNOYED OR IRRITABLE: NEARLY EVERY DAY
GAD7 TOTAL SCORE: 19
3. WORRYING TOO MUCH ABOUT DIFFERENT THINGS: NEARLY EVERY DAY
7. FEELING AFRAID AS IF SOMETHING AWFUL MIGHT HAPPEN: MORE THAN HALF THE DAYS

## 2018-10-04 ASSESSMENT — PATIENT HEALTH QUESTIONNAIRE - PHQ9: 5. POOR APPETITE OR OVEREATING: NEARLY EVERY DAY

## 2018-10-04 NOTE — MR AVS SNAPSHOT
After Visit Summary   10/4/2018    Irma Evans    MRN: [id number]           Patient Information     Date Of Birth          1975        Visit Information        Provider Department      10/4/2018 9:00 AM Liz Baker MD [facility]        Today's Diagnoses     Generalized anxiety disorder    -  1    Grief        SHANA (generalized anxiety disorder)        Panic attack          Care Instructions      1. Consider reaching out to [facility]  #: [phone].    Mood  1. I will prescribed Prozac 40mg today (higher dose). Take two tablets daily. If you want to split the dosing, that's fine.  2. I will refill hydroxyzine today. Continue taking it for anxiety and sleep as needed.  3. Consider other affordable options for therapy. I will refer you to a Behavior Health Specialist at [facility].  4. I will prescribe 0.25mg Klonopin today for panic. Take daily for two weeks, then switch to taking it every other day for one week, and then switch to taking it as needed.    5. Follow-up with me in one month.      Mental Health Clinics with Sliding Fee Scale    [facility]  [phone]  [facility] [phone]  [facility] [phone]  [facility] ([address] [place] [address]) [phone]          Follow-ups after your visit        Who to contact     Please call your clinic at [phone] to:    Ask questions about your health    Make or cancel appointments    Discuss your medicines    Learn about your test results    Speak to your doctor            Additional Information About Your Visit        MyChart Information     [employer] gives you secure access to your electronic health record. If you see a primary care provider, you can also send messages to your care team and make appointments. If you have questions, please call your primary care clinic.  If you do not have a primary care provider, please call  [phone] and they will assist you.      [employer] is an electronic gateway that provides easy, online access to your medical records. With [employer], you can request a clinic appointment, read your test results, renew a prescription or communicate with your care team.     To access your existing account, please contact your [facility] or call [phone] for assistance.        Care EveryWhere ID     This is your Care EveryWhere ID. This could be used by other organizations to access your [facility] medical records  [id number]        Your Vitals Were     Pulse Temperature Pulse Oximetry Breastfeeding? BMI (Body Mass Index)       95 98.3  F (36.8  C) (Oral) 100% No 41.41 kg/m2        Blood Pressure from Last 3 Encounters:   10/04/18 133/88   07/06/18 129/88   04/12/18 110/77    Weight from Last 3 Encounters:   10/04/18 259 lb 3.2 oz (117.6 kg)   07/06/18 235 lb 3.2 oz (106.7 kg)   04/12/18 255 lb 3.2 oz (115.8 kg)              Today, you had the following     No orders found for display         Today's Medication Changes          These changes are accurate as of 10/4/18  9:50 AM.  If you have any questions, ask your nurse or doctor.               Start taking these medicines.        Dose/Directions    clonazePAM 0.25 MG Tbdp ODT tab   Commonly known as:  klonoPIN   Used for:  Panic attack   Started by:  Liz Baker MD        Dose:  0.25 mg   Take 1 tablet (0.25 mg) by mouth daily as needed for anxiety   Quantity:  30 tablet   Refills:  0         These medicines have changed or have updated prescriptions.        Dose/Directions    * FLUoxetine 20 MG capsule   Commonly known as:  PROzac   This may have changed:  Another medication with the same name was added. Make sure you understand how and when to take each.   Used for:  SHANA (generalized anxiety disorder)   Changed by:  Liz Baker MD        Dose:  60 mg   Take 3 capsules (60 mg) by mouth daily   Quantity:  270 capsule   Refills:   1       * FLUoxetine 40 MG capsule   Commonly known as:  PROzac   This may have changed:  You were already taking a medication with the same name, and this prescription was added. Make sure you understand how and when to take each.   Used for:  Generalized anxiety disorder   Changed by:  Liz Baker MD        Dose:  80 mg   Take 2 capsules (80 mg) by mouth daily   Quantity:  60 capsule   Refills:  1       * Notice:  This list has 2 medication(s) that are the same as other medications prescribed for you. Read the directions carefully, and ask your doctor or other care provider to review them with you.         Where to get your medicines      These medications were sent to [facility] [address] AT [place] [id number], [facility] [address]     Phone:  [phone]     FLUoxetine 40 MG capsule    hydrOXYzine 25 MG tablet         Some of these will need a paper prescription and others can be bought over the counter.  Ask your nurse if you have questions.     Bring a paper prescription for each of these medications     clonazePAM 0.25 MG Tbdp ODT tab                Primary Care Provider Office Phone # Fax #    Liz Baker [phone]       [address] [address]        Equal Access to Services     WILDA GREGORIO AH: Arabella montero Sochapito, waaxda adrian, qaybta kaalmada adelolitayada, bogdan winters. So [facility] [phone].    ATENCIÓN: Si habla español, tiene a schuler disposición servicios gratuitos de asistencia lingüística. Llame al [phone].    We comply with applicable federal civil rights laws and [place] laws. We do not discriminate on the basis of race, color, national origin, age, disability, sex, sexual orientation, or gender identity.            Thank you!     Thank you for choosing [facility]  for your care. Our goal is always to provide you with excellent care.  Hearing back from our patients is one way we can continue to improve our services. Please take a few minutes to complete the written survey that you may receive in the mail after your visit with us. Thank you!             Your Updated Medication List - Protect others around you: Learn how to safely use, store and throw away your medicines at www.disposemymeds.org.          This list is accurate as of 10/4/18  9:50 AM.  Always use your most recent med list.                   Brand Name Dispense Instructions for use Diagnosis    * amphetamine-dextroamphetamine 20 MG per tablet    ADDERALL    60 tablet    Take 1 tablet (20 mg) by mouth 2 times daily    Attention deficit hyperactivity disorder (ADHD), combined type       * amphetamine-dextroamphetamine 20 MG per tablet    ADDERALL    60 tablet    Take 1 tablet (20 mg) by mouth 2 times daily    Attention deficit hyperactivity disorder (ADHD), combined type       * amphetamine-dextroamphetamine 20 MG per tablet    ADDERALL    60 tablet    Take 1 tablet (20 mg) by mouth 2 times daily    Attention deficit hyperactivity disorder (ADHD), combined type       clonazePAM 0.25 MG Tbdp ODT tab    klonoPIN    30 tablet    Take 1 tablet (0.25 mg) by mouth daily as needed for anxiety    Panic attack       etonogestrel 68 MG Impl    IMPLANON/NEXPLANON    1 each    1 each (68 mg) by Subdermal route once for 1 dose    Encounter for other contraceptive management       * FLUoxetine 20 MG capsule    PROzac    270 capsule    Take 3 capsules (60 mg) by mouth daily    SHANA (generalized anxiety disorder)       * FLUoxetine 40 MG capsule    PROzac    60 capsule    Take 2 capsules (80 mg) by mouth daily    Generalized anxiety disorder       fluticasone 50 MCG/ACT spray    FLONASE    48 g    Spray 2 sprays into both nostrils daily    Chronic rhinitis       hydrOXYzine 25 MG tablet    ATARAX    100 tablet    Take 1-2 tablets (25-50 mg) by mouth nightly as needed for anxiety or other (sleep)    SHANA  "(generalized anxiety disorder)       lisinopril 5 MG tablet    PRINIVIL/ZESTRIL    30 tablet    Take 1 tablet (5 mg) by mouth daily    Elevated blood pressure reading without diagnosis of hypertension       minocycline 100 MG capsule    MINOCIN/DYNACIN    180 capsule    Take 1 capsule (100 mg) by mouth 2 times daily    Acne vulgaris       montelukast 10 MG tablet    SINGULAIR    90 tablet    Take 1 tablet (10 mg) by mouth nightly as needed    Seasonal allergic rhinitis due to pollen       naproxen 500 MG tablet    NAPROSYN    60 tablet    Take 1 tablet (500 mg) by mouth 2 times daily as needed for moderate pain    Costochondritis       needle (disp) 18G X 1\" Misc     15 each    1 each once a week        Needle (Disp) 23G X 1\" Misc     15 each    Use to inject testosterone into muscle weekly        Sharps Container Misc     1 each    Dispose sharps    Gender dysphoria       syringe (disposable) 1 ML Misc    BD TUBERCULIN SYRINGE    15 each    BD 1ml Tuberculing syringe SLIP TIP (no needle attached). Use to administer IM medications as instructed        testosterone cypionate 200 MG/ML injection    DEPOTESTOSTERONE    10 mL    Inject 0.4 mLs (80 mg) into the muscle once a week In cottonseed oil ok    Gender identity disorder       triamcinolone 0.1 % cream    KENALOG    80 g    Apply sparingly to affected area two times daily as needed    Dermatitis       * Notice:  This list has 5 medication(s) that are the same as other medications prescribed for you. Read the directions carefully, and ask your doctor or other care provider to review them with you.      "

## 2018-10-04 NOTE — PROGRESS NOTES
"       HPI       Irma Evans is a 42 year old  who presents for   Chief Complaint   Patient presents with     RECHECK     f/u anxiety and depression     Mood  Reports feeling stressed. Stress is slightly better, but still present. Work is a lot, and has strange relationships at work. Reports a work related injury, shows image of a hematoma on the left shin. Car broke down, lost wallet twice within a week, and has financial issues. Lost HSA card so could not get refills. Missed close to a week of taking meds, feels tired now that he's back on them. 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 Has been fighting with sister over things related to fathers estate. Laughs when asked about positive coping mechanisms. Tries to think before he speaks, so that the present issue doesn't escalate. Uses anger as a coping mechanism. Feels easily angered recently. Sleep is okay, and is better with hydroxyzine. Work schedule changes, which then affects sleep schedule. Will drink daily to get buzzed, but will not get drunk. Will drink watered down vodka, and drink a lot of water afterwards. Does not engage in self harm or have thoughts, but has suicidal thoughts. Often thinks \"what's the point\" since all he does is work. Has been hanging out with one of his friends, Pat, who he's had sexual relations with, thinks he may have developed deeper feelings for his friend who doesn't feel the same. Has not been to therapy, because he hasn't been able to afford it. Friend lent him $200, but finds it difficult to accept help from others. Begins panicking quicker in enclosed spaces now.    Medication Reconciliation  Reports having some indigestion after taking Prozac. Would like a refill on hydroxyzine today. Wants something that will help with agitation and temperament. Has questions about anxiety medications and alcohol use.    Social Hx  Employed. Alcohol use.       +++++++      Problem, Medication and Allergy " Lists were reviewed and updated if needed..    Patient is an established patient of this clinic..         Review of Systems:   Review of Systems      Positive for anxious mood,   Easy to anger   Irritable   Decreased sleep   Increased passive suicidal thoughts, no plan or intent   See HPI for additional sx.    This document serves as a record of the services and decisions personally performed and made by Liz Baker MD. It was created on his/her behalf by Marjorie Nelson, a trained medical scribe. The creation of this document is based the provider's statements to the medical scribe.  Scribe Marjorie Nelson 9:20 AM, October 4, 2018       Physical Exam:     Vitals:    10/04/18 0901   BP: 133/88   Pulse: 95   Temp: 98.3  F (36.8  C)   TempSrc: Oral   SpO2: 100%   Weight: 259 lb 3.2 oz (117.6 kg)     Body mass index is 41.41 kg/(m^2).  Vitals were reviewed and were normal, except elevated BP.     Wt Readings from Last 10 Encounters:   10/04/18 259 lb 3.2 oz (117.6 kg)   07/06/18 235 lb 3.2 oz (106.7 kg)   04/12/18 255 lb 3.2 oz (115.8 kg)   03/26/18 256 lb (116.1 kg)   01/12/18 268 lb 3.2 oz (121.7 kg)   12/19/17 265 lb (120.2 kg)   12/18/17 270 lb 6.4 oz (122.7 kg)   12/14/17 (P) 273 lb 5.9 oz (124 kg)   12/05/17 273 lb 6.4 oz (124 kg)   10/04/17 261 lb 6.4 oz (118.6 kg)       Physical Exam    BMI= Body mass index is 41.41 kg/(m^2).     GENERAL: healthy, alert and no distress, gained weight.  PSYCH: Alert and oriented times 3; speech- coherent , normal rate and volume; affect- anxious, sad, talking very loud and fast, agitated.       Results:   No testing ordered today    Assessment and Plan   jorge luis was seen today for recheck.    Diagnoses and all orders for this visit:    Generalized anxiety disorder  -     FLUoxetine (PROZAC) 40 MG capsule; Take 2 capsules (80 mg) by mouth daily  Will incr and do short term klonopin since increasingly anxious, having SI thoughts and will be weeks before benefit seen from ssri   -      "hydrOXYzine (ATARAX) 25 MG tablet; Take 1-2 tablets (25-50 mg) by mouth nightly as needed for anxiety or other (sleep)  Discussed accepting help, being kind to self, knowing how to reach out - he is \"bad\" at this but working on it     Grief  - Referral to Behavioral health today. Can't see own therapist due to financial constraints so will set up for drop in centers/sliding fee    Panic attack  -     clonazePAM (KLONOPIN) 0.25 MG TBDP ODT tab; Take 1 tablet (0.25 mg) by mouth daily as needed for anxiety      Medications Discontinued During This Encounter   Medication Reason     hydrOXYzine (ATARAX) 25 MG tablet Reorder        AVS      1. Consider reaching out to [facility]  #: [phone] regarding estate issues if you need help    Mood  1. I will prescribed Prozac 40mg today (higher dose). Take two tablets daily. If you want to split the dosing, that's fine.  2. I will refill hydroxyzine today. Continue taking it for anxiety and sleep as needed.  3. Consider other affordable options for therapy. I will refer you to a Behavior Health Specialist at [facility].  4. I will prescribe 0.25mg Klonopin today for panic. Take daily for two weeks, then switch to taking it every other day for one week, and then switch to taking it as needed.    5. Follow-up with me in one month.    Options for treatment and follow-up care were reviewed with the patient. Irma Evans  engaged in the decision making process and verbalized understanding of the options discussed and agreed with the final plan.    The information in this document, created by the medical scribe for me, accurately reflects the services I personally performed and the decisions made by me. I have reviewed and approved this document for accuracy prior to leaving the patient care area.    Liz Baker MD  9:31 AM, 10/04/18    "

## 2018-10-04 NOTE — PROGRESS NOTES
Primary Care Behavioral Health Consult Note    Meeting lasted: 5 minutes  Others present: patient only    Identifying Information and Presenting Problem:    Dr. Baker requested behavioral health consultation for this patient regarding need for sliding scale therapy.  The patient is a 42 year old individual that agreed to be seen by behavioral health today.    Topics Discussed/Interventions Provided:       Provided referrals for Mental Health Clinics with a sliding fee to patient.    Briefly discussed coping skills with managing anxiety    Provided diaphragmic breathing handout and practiced in room today.    Assessment:     Mental Status: Trevin appeared generally alert and oriented. Dress was casual and appropriate to the weather and occasion. Grooming and hygiene were fair. Eye contact was good. Speech was of normal volume and rate and was clear, coherent, and relevant. Mood was nervous with congruent affect. Thought processes were relevant, logical and goal-directed. Thought content was WNL with no evidence of psychotic or paranoid features. No evidence of SI/HI or self-harm, intent, or plans. Memory appeared grossly intact. Insight and judgment appeared good and patient exhibited good impulse control during the appointment.     PHQ-9 SCORE 11/16/2012 3/26/2018 7/6/2018   Total Score - - -   Total Score 2 - -   Total Score - 18 15       SHANA-7 SCORE 11/16/2012 3/26/2018 7/6/2018   Total Score - - -   Total Score - 16 16   Total Score BEH Adult 4 - -       Diagnostic Considerations:      A complete diagnostic assessment was not performed at today's visit.  History of depression and anxiety per provider, patient and EMR    Plan:      Patient will pursue sliding scale counseling options    Patient will work on anxiety reducing strategies, particularly breathing exercise provided today.

## 2018-10-04 NOTE — PATIENT INSTRUCTIONS
1. Consider reaching out to [facility]  #: [phone].    Mood  1. I will prescribed Prozac 40mg today (higher dose). Take two tablets daily. If you want to split the dosing, that's fine.  2. I will refill hydroxyzine today. Continue taking it for anxiety and sleep as needed.  3. Consider other affordable options for therapy. I will refer you to a Behavior Health Specialist at [facility].  4. I will prescribe 0.25mg Klonopin today for panic. Take daily for two weeks, then switch to taking it every other day for one week, and then switch to taking it as needed.    5. Follow-up with me in one month.      Mental Health Clinics with Sliding Fee Scale    [facility]  [phone]  [employer] [phone]  [facility] [phone]  [facility] ([address] [place] [address]) [phone]

## 2018-10-05 ASSESSMENT — ANXIETY QUESTIONNAIRES: GAD7 TOTAL SCORE: 19

## 2018-10-05 ASSESSMENT — PATIENT HEALTH QUESTIONNAIRE - PHQ9: SUM OF ALL RESPONSES TO PHQ QUESTIONS 1-9: 15

## 2018-10-08 ENCOUNTER — TELEPHONE (OUTPATIENT)
Dept: FAMILY MEDICINE | Facility: CLINIC | Age: 43
End: 2018-10-08

## 2018-10-08 DIAGNOSIS — F41.0 PANIC ATTACK: Primary | ICD-10-CM

## 2018-10-08 RX ORDER — CLONAZEPAM 0.25 MG/1
0.5 TABLET, ORALLY DISINTEGRATING ORAL DAILY PRN
Qty: 30 TABLET | Refills: 0
Start: 2018-10-08 | End: 2018-11-13

## 2018-10-08 NOTE — TELEPHONE ENCOUNTER
[facility] phone call message- medication clarification/question:    Full Medication Name: clonazePAM (KLONOPIN) 0.25 MG TBDP ODT tab   Dose: Take 1 tablet (0.25 mg) by mouth daily as needed for anxiety - Oral    Question/Clarification needed: Seyoon [facility] to advise that ODT tablet not covered by insurance, but pharmacy spoke with patient and he is okay taking regular tablets. Patsyon inquiring if it's okay to dispense regular tablets, which would be covered by patient's insurance.     Pharmacy confirmed as   [email or web address] [address] [place] [address] [address] AT [facility] [address]  : Yes      OK to leave a message on voice mail? Yes    Advised patient that RN would call back within 3 hours, unless emergent.    Primary language: English      needed? No    Call taken on October 8, 2018 at 3:42 PM by Karolina Bella    Route to [facility]

## 2018-10-08 NOTE — TELEPHONE ENCOUNTER
I changed order to oral tablet instead of ODT (that was just a clerical error)   Please let pharmacy know to dispense oral tab but it is now correct in epic

## 2018-10-08 NOTE — TELEPHONE ENCOUNTER
Message routed to PCP to authorize regular tablets. Please update rx if appropriate    Annabelle Dawn RN

## 2018-10-09 NOTE — TELEPHONE ENCOUNTER
RN called and left a VM for pharmacy to inform okay to dispense regular tablets.    Lupe Mosley RN

## 2018-10-17 ENCOUNTER — TELEPHONE (OUTPATIENT)
Dept: FAMILY MEDICINE | Facility: CLINIC | Age: 43
End: 2018-10-17

## 2018-10-17 DIAGNOSIS — F90.2 ATTENTION DEFICIT HYPERACTIVITY DISORDER (ADHD), COMBINED TYPE: Primary | ICD-10-CM

## 2018-10-17 RX ORDER — DEXTROAMPHETAMINE SACCHARATE, AMPHETAMINE ASPARTATE MONOHYDRATE, DEXTROAMPHETAMINE SULFATE AND AMPHETAMINE SULFATE 5; 5; 5; 5 MG/1; MG/1; MG/1; MG/1
20 CAPSULE, EXTENDED RELEASE ORAL DAILY
Qty: 30 CAPSULE | Refills: 0 | Status: SHIPPED | OUTPATIENT
Start: 2018-12-15 | End: 2019-01-03

## 2018-10-17 RX ORDER — DEXTROAMPHETAMINE SACCHARATE, AMPHETAMINE ASPARTATE MONOHYDRATE, DEXTROAMPHETAMINE SULFATE AND AMPHETAMINE SULFATE 5; 5; 5; 5 MG/1; MG/1; MG/1; MG/1
20 CAPSULE, EXTENDED RELEASE ORAL 2 TIMES DAILY
Qty: 60 CAPSULE | Refills: 0 | Status: SHIPPED | OUTPATIENT
Start: 2018-10-17 | End: 2018-11-16

## 2018-10-17 RX ORDER — DEXTROAMPHETAMINE SACCHARATE, AMPHETAMINE ASPARTATE MONOHYDRATE, DEXTROAMPHETAMINE SULFATE AND AMPHETAMINE SULFATE 5; 5; 5; 5 MG/1; MG/1; MG/1; MG/1
20 CAPSULE, EXTENDED RELEASE ORAL 2 TIMES DAILY
Qty: 60 CAPSULE | Refills: 0 | Status: SHIPPED | OUTPATIENT
Start: 2018-11-16 | End: 2018-12-16

## 2018-10-17 NOTE — TELEPHONE ENCOUNTER
Verify that the refill encounter hasn't been started Yes    [facility] phone call message- patient requesting a refill:    Full Medication Name: Adderoll    Dose: ?     Pharmacy confirmed as   : No: clinic     Medication tab checked to see if medication has been sent  Yes    Additional Comments: Patient sent a [employer] message on Friday of last week and has not heard anything back yet.  They want a phone call to notify them when they can  the paper copy in clinic.    OK to leave a message on voice mail? Yes    Advised patient refill may take up to 2 business days? Yes    Primary language: English      needed? No    Call taken on October 17, 2018 at 10:06 AM by Yulissa Irvin    Route to [facility]

## 2018-11-13 ENCOUNTER — OFFICE VISIT (OUTPATIENT)
Dept: FAMILY MEDICINE | Facility: CLINIC | Age: 43
End: 2018-11-13
Payer: COMMERCIAL

## 2018-11-13 VITALS
BODY MASS INDEX: 42.66 KG/M2 | TEMPERATURE: 98.6 F | WEIGHT: 267 LBS | HEART RATE: 88 BPM | DIASTOLIC BLOOD PRESSURE: 91 MMHG | OXYGEN SATURATION: 98 % | SYSTOLIC BLOOD PRESSURE: 137 MMHG

## 2018-11-13 DIAGNOSIS — Z23 ENCOUNTER FOR IMMUNIZATION: Primary | ICD-10-CM

## 2018-11-13 DIAGNOSIS — F41.1 GAD (GENERALIZED ANXIETY DISORDER): ICD-10-CM

## 2018-11-13 RX ORDER — CLONAZEPAM 0.5 MG/1
0.25 TABLET ORAL DAILY PRN
Qty: 9 TABLET | Refills: 0 | Status: SHIPPED | OUTPATIENT
Start: 2018-11-13 | End: 2019-01-03

## 2018-11-13 ASSESSMENT — ANXIETY QUESTIONNAIRES
1. FEELING NERVOUS, ANXIOUS, OR ON EDGE: MORE THAN HALF THE DAYS
3. WORRYING TOO MUCH ABOUT DIFFERENT THINGS: MORE THAN HALF THE DAYS
7. FEELING AFRAID AS IF SOMETHING AWFUL MIGHT HAPPEN: MORE THAN HALF THE DAYS
IF YOU CHECKED OFF ANY PROBLEMS ON THIS QUESTIONNAIRE, HOW DIFFICULT HAVE THESE PROBLEMS MADE IT FOR YOU TO DO YOUR WORK, TAKE CARE OF THINGS AT HOME, OR GET ALONG WITH OTHER PEOPLE: SOMEWHAT DIFFICULT
5. BEING SO RESTLESS THAT IT IS HARD TO SIT STILL: SEVERAL DAYS
6. BECOMING EASILY ANNOYED OR IRRITABLE: SEVERAL DAYS
2. NOT BEING ABLE TO STOP OR CONTROL WORRYING: MORE THAN HALF THE DAYS
GAD7 TOTAL SCORE: 12

## 2018-11-13 ASSESSMENT — PATIENT HEALTH QUESTIONNAIRE - PHQ9: 5. POOR APPETITE OR OVEREATING: MORE THAN HALF THE DAYS

## 2018-11-13 NOTE — PATIENT INSTRUCTIONS
Mood  1. Continue hydroxyzine at bedtime as needed.  2. Continue klonopin as needed for panic.  3. Follow up with me in one month, we will do labs.

## 2018-11-13 NOTE — NURSING NOTE
Injectable influenza vaccine documentation    1. Has the patient received the information for the influenza vaccine? YES    2. Does the patient have a severe allergy to eggs (Patients with a severe egg allergy should be assessed by a medical provider, RN, or clinical pharmacist. If they receive the influenza vaccine, please have them observed for 15 minutes.)? No    3. Has the patient had an allergic reaction to previous influenza vaccines? No    4. Has the patient had any severe allergic reactions to past influenza vaccines ? No       5. Does patient have a history of Guillain-[place] syndrome? No      Based on responses above, I administered the influenza vaccine.  Rose Doll, CMA

## 2018-11-13 NOTE — MR AVS SNAPSHOT
After Visit Summary   11/13/2018    Irma Evans    MRN: [id number]           Patient Information     Date Of Birth          1975        Visit Information        Provider Department      11/13/2018 3:40 PM Liz Baker MD [facility]        Today's Diagnoses     Encounter for immunization    -  1    SHANA (generalized anxiety disorder)          Care Instructions    Mood  1. Continue hydroxyzine at bedtime as needed.  2. Continue klonopin as needed for panic.  3. Follow up with me in one month, we will do labs.          Follow-ups after your visit        Who to contact     Please call your clinic at [phone] to:    Ask questions about your health    Make or cancel appointments    Discuss your medicines    Learn about your test results    Speak to your doctor            Additional Information About Your Visit        MyChart Information     [employer] gives you secure access to your electronic health record. If you see a primary care provider, you can also send messages to your care team and make appointments. If you have questions, please call your primary care clinic.  If you do not have a primary care provider, please call [phone] and they will assist you.      [employer] is an electronic gateway that provides easy, online access to your medical records. With [employer], you can request a clinic appointment, read your test results, renew a prescription or communicate with your care team.     To access your existing account, please contact your [facility] or call [phone] for assistance.        Care EveryWhere ID     This is your Care EveryWhere ID. This could be used by other organizations to access your [facility] medical records  [id number]        Your Vitals Were     Pulse Temperature Pulse Oximetry BMI (Body Mass Index)          88 98.6  F (37  C) (Oral) 98% 42.66 kg/m2         Blood Pressure from Last 3 Encounters:   11/13/18 (!)  137/91   10/04/18 133/88   07/06/18 129/88    Weight from Last 3 Encounters:   11/13/18 267 lb (121.1 kg)   10/04/18 259 lb 3.2 oz (117.6 kg)   07/06/18 235 lb 3.2 oz (106.7 kg)              We Performed the Following     ADMIN VACCINE, INITIAL     FLU VAC PRESRV FREE QUAD SPLIT VIR IM, 0.5 mL dosage          Today's Medication Changes          These changes are accurate as of 11/13/18  4:45 PM.  If you have any questions, ask your nurse or doctor.               Start taking these medicines.        Dose/Directions    clonazePAM 0.5 MG tablet   Commonly known as:  klonoPIN   Used for:  SHANA (generalized anxiety disorder)   Replaces:  clonazePAM 0.25 MG Tbdp ODT tab   Started by:  Liz Baker MD        Dose:  0.25 mg   Take 0.5 tablets (0.25 mg) by mouth daily as needed for anxiety (panic)   Quantity:  9 tablet   Refills:  0         These medicines have changed or have updated prescriptions.        Dose/Directions    FLUoxetine 40 MG capsule   Commonly known as:  PROzac   This may have changed:  Another medication with the same name was removed. Continue taking this medication, and follow the directions you see here.   Used for:  Generalized anxiety disorder   Changed by:  Liz Baker MD        Dose:  80 mg   Take 2 capsules (80 mg) by mouth daily   Quantity:  60 capsule   Refills:  1         Stop taking these medicines if you haven't already. Please contact your care team if you have questions.     clonazePAM 0.25 MG Tbdp ODT tab   Commonly known as:  klonoPIN   Replaced by:  clonazePAM 0.5 MG tablet   Stopped by:  Liz Baker MD                Where to get your medicines      Some of these will need a paper prescription and others can be bought over the counter.  Ask your nurse if you have questions.     Bring a paper prescription for each of these medications     clonazePAM 0.5 MG tablet                Primary Care Provider Office Phone # Fax #    Liz Baker [phone]       [address]  [facility] [address]        Equal Access to Services     WILDA GREGORIO : Hadii aad ku hadranjeetbrandon Lissettechapito, waaxda luqadaha, qaybta kaalmaangelito gibbons, bogdan winters. So [facility] [phone].    ATENCIÓN: Si habla español, tiene a schuler disposición servicios gratuitos de asistencia lingüística. Conyame al [phone].    We comply with applicable federal civil rights laws and [place] laws. We do not discriminate on the basis of race, color, national origin, age, disability, sex, sexual orientation, or gender identity.            Thank you!     Thank you for choosing [facility]  for your care. Our goal is always to provide you with excellent care. Hearing back from our patients is one way we can continue to improve our services. Please take a few minutes to complete the written survey that you may receive in the mail after your visit with us. Thank you!             Your Updated Medication List - Protect others around you: Learn how to safely use, store and throw away your medicines at www.disposemymeds.org.          This list is accurate as of 11/13/18  4:45 PM.  Always use your most recent med list.                   Brand Name Dispense Instructions for use Diagnosis    * amphetamine-dextroamphetamine 20 MG per 24 hr capsule    ADDERALL XR    60 capsule    Take 1 capsule (20 mg) by mouth 2 times daily    Attention deficit hyperactivity disorder (ADHD), combined type       * amphetamine-dextroamphetamine 20 MG per tablet    ADDERALL    60 tablet    Take 1 tablet (20 mg) by mouth 2 times daily    Attention deficit hyperactivity disorder (ADHD), combined type       * amphetamine-dextroamphetamine 20 MG per 24 hr capsule   Start taking on:  11/16/2018    ADDERALL XR    60 capsule    Take 1 capsule (20 mg) by mouth 2 times daily    Attention deficit hyperactivity disorder (ADHD), combined type       * amphetamine-dextroamphetamine 20 MG per tablet   Start taking on:  11/16/2018     ADDERALL    60 tablet    Take 1 tablet (20 mg) by mouth 2 times daily    Attention deficit hyperactivity disorder (ADHD), combined type       * amphetamine-dextroamphetamine 20 MG per 24 hr capsule   Start taking on:  12/15/2018    ADDERALL XR    30 capsule    Take 1 capsule (20 mg) by mouth daily    Attention deficit hyperactivity disorder (ADHD), combined type       * amphetamine-dextroamphetamine 20 MG per tablet   Start taking on:  12/15/2018    ADDERALL    60 tablet    Take 1 tablet (20 mg) by mouth 2 times daily    Attention deficit hyperactivity disorder (ADHD), combined type       clonazePAM 0.5 MG tablet    klonoPIN    9 tablet    Take 0.5 tablets (0.25 mg) by mouth daily as needed for anxiety (panic)    SHANA (generalized anxiety disorder)       etonogestrel 68 MG Impl    IMPLANON/NEXPLANON    1 each    1 each (68 mg) by Subdermal route once for 1 dose    Encounter for other contraceptive management       FLUoxetine 40 MG capsule    PROzac    60 capsule    Take 2 capsules (80 mg) by mouth daily    Generalized anxiety disorder       fluticasone 50 MCG/ACT spray    FLONASE    48 g    Spray 2 sprays into both nostrils daily    Chronic rhinitis       hydrOXYzine 25 MG tablet    ATARAX    100 tablet    Take 1-2 tablets (25-50 mg) by mouth nightly as needed for anxiety or other (sleep)    SHANA (generalized anxiety disorder)       lisinopril 5 MG tablet    PRINIVIL/ZESTRIL    30 tablet    Take 1 tablet (5 mg) by mouth daily    Elevated blood pressure reading without diagnosis of hypertension       minocycline 100 MG capsule    MINOCIN/DYNACIN    180 capsule    Take 1 capsule (100 mg) by mouth 2 times daily    Acne vulgaris       montelukast 10 MG tablet    SINGULAIR    90 tablet    Take 1 tablet (10 mg) by mouth nightly as needed    Seasonal allergic rhinitis due to pollen       naproxen 500 MG tablet    NAPROSYN    60 tablet    Take 1 tablet (500 mg) by mouth 2 times daily as needed for moderate pain     "Costochondritis       needle (disp) 18G X 1\" Misc     15 each    1 each once a week        Needle (Disp) 23G X 1\" Misc     15 each    Use to inject testosterone into muscle weekly        Sharps Container Misc     1 each    Dispose sharps    Gender dysphoria       syringe (disposable) 1 ML Misc    BD TUBERCULIN SYRINGE    15 each    BD 1ml Tuberculing syringe SLIP TIP (no needle attached). Use to administer IM medications as instructed        testosterone cypionate 200 MG/ML injection    DEPOTESTOSTERONE    10 mL    Inject 0.4 mLs (80 mg) into the muscle once a week In cottonseed oil ok    Gender identity disorder       triamcinolone 0.1 % cream    KENALOG    80 g    Apply sparingly to affected area two times daily as needed    Dermatitis       * Notice:  This list has 6 medication(s) that are the same as other medications prescribed for you. Read the directions carefully, and ask your doctor or other care provider to review them with you.      "

## 2018-11-13 NOTE — PROGRESS NOTES
"       HPI       Irma Evans is a 42 year old  who presents for   Chief Complaint   Patient presents with     Recheck Medication     Klonopin     Mood  Feels mood has been pretty good overall. Reports decreased motivation over the last month. Also depressed thoughts off and on but does feel the Prozac is working. Currently taking Klonopin 0.25mg every other day. Needs refill. Feels it is working well to calm his anxiety on days he takes it. Also has trouble remembering if taking it every day.  Curious about increasing Adderall dose. Currently is taking 20mg BID. Feels like attention span has decreased and is becoming more forgetful and \"scattered.\" Does not feel he is getting enough done in the work day. Cannot sleep without hydroxyzine, sleep is great with med. Amount of sleep fluctuates because of wide variations in schedule. Has been better with managing grief. Doesn't like the idea of therapy. No sexual concerns. Sexually active with multiple male partners but regulars, no changes. No concerning lesions or discharge. Does not feel the need to recheck STD testing today. Doesn't awnt to restart PrEP. Things with roommate and Evelyn are well. Evelyn has a lot of shows coming up.     PHQ-9 SCORE 3/26/2018 7/6/2018 10/4/2018   Total Score - - -   Total Score - - -   Total Score 18 15 15   Today's Score: 10    SHANA-7 SCORE 3/26/2018 7/6/2018 10/4/2018   Total Score - - -   Total Score 16 16 19   Total Score BEH Adult - - -   Today's Score: 12    Preventative  Up to date on flu shot this year. Shot day is Tuesday or Wednesday.    Social Hx  Sexually active. Employed. Social alcohol use. Not exercising, but would like to start this week.    Problem, Medication and Allergy Lists were reviewed and updated if needed..    Patient is an established patient of this clinic..         Review of Systems:   Review of Systems    Positive for:  - Scattered brain--effecting daily acitivity  - Abruptly switching tasks without " finishing them  - Lack of motivation  - Low mood (but has improved with meds)  - Mild HA's towards the end of a long day  - Panic attacks 2x weekly    See HPI for additional sx.    This document serves as a record of the services and decisions personally performed and made by Liz Baker MD. It was created on his/her behalf by Marjorie Nelson, a trained medical scribe. The creation of this document is based the provider's statements to the medical scribe.  Scribe Marjorie Nelson 4:25 PM, November 13, 2018       Physical Exam:     Vitals:    11/13/18 1559   BP: (!) 137/91   Pulse: 88   Temp: 98.6  F (37  C)   TempSrc: Oral   SpO2: 98%   Weight: 267 lb (121.1 kg)     Body mass index is 42.66 kg/(m^2).  Vitals were reviewed and were normal, except elevated BP.      Physical Exam    BMI= Body mass index is 42.66 kg/(m^2).   GENERAL: healthy, alert and no distress  PSYCH: Alert and oriented times 3; speech- coherent , normal rate and volume; affect- anxious.      Results:   No testing ordered today Labs in one month.    Assessment and Plan   Trevin was seen today for recheck medication.    Diagnoses and all orders for this visit:    Encounter for immunization  -     ADMIN VACCINE, INITIAL  -     FLU VAC PRESRV FREE QUAD SPLIT VIR IM, 0.5 mL dosage    SHANA (generalized anxiety disorder)  -     clonazePAM (KLONOPIN) 0.5 MG tablet; Take 0.5 tablets (0.25 mg) by mouth daily as needed for anxiety (panic)  Seems like out of control panic symptoms  Will restart benzo   Should restart therapy - not an option right now for him. Will focus on exercise instead for now.   Cont Prozac - anticipate even further therapeutic effect over next efw weeks    Medications Discontinued During This Encounter   Medication Reason     FLUoxetine (PROZAC) 20 MG capsule      clonazePAM (KLONOPIN) 0.25 MG TBDP ODT tab        Patient Instructions   Mood  1. Continue hydroxyzine at bedtime as needed.  2. Continue klonopin as needed for panic.  3. Follow  up with me in one month, we will do labs.    Options for treatment and follow-up care were reviewed with the patient. Irma Evans  engaged in the decision making process and verbalized understanding of the options discussed and agreed with the final plan.    The information in this document, created by the medical scribe for me, accurately reflects the services I personally performed and the decisions made by me. I have reviewed and approved this document for accuracy prior to leaving the patient care area.    Anmol Roldan, MS 3

## 2018-11-15 DIAGNOSIS — R03.0 ELEVATED BLOOD PRESSURE READING WITHOUT DIAGNOSIS OF HYPERTENSION: ICD-10-CM

## 2018-11-15 DIAGNOSIS — L70.0 ACNE VULGARIS: ICD-10-CM

## 2018-11-15 NOTE — TELEPHONE ENCOUNTER

## 2018-11-19 RX ORDER — LISINOPRIL 5 MG/1
5 TABLET ORAL DAILY
Qty: 30 TABLET | Refills: 1 | Status: SHIPPED | OUTPATIENT
Start: 2018-11-19 | End: 2019-01-09

## 2018-11-19 RX ORDER — MINOCYCLINE HYDROCHLORIDE 100 MG/1
100 CAPSULE ORAL 2 TIMES DAILY
Qty: 180 CAPSULE | Refills: 3 | Status: SHIPPED | OUTPATIENT
Start: 2018-11-19 | End: 2019-11-26

## 2018-11-19 ASSESSMENT — PATIENT HEALTH QUESTIONNAIRE - PHQ9: SUM OF ALL RESPONSES TO PHQ QUESTIONS 1-9: 10

## 2018-11-20 ASSESSMENT — ANXIETY QUESTIONNAIRES: GAD7 TOTAL SCORE: 12

## 2018-11-21 NOTE — PROGRESS NOTES
Preceptor Attestation:  I was present with the medical student who participated in the service and in the documentation of this note. I have verified the history and personally performed the physical exam and medical decision making. I have verified the content of the note, which accurately reflects my assessment of the patient and the plan of care.   Supervising Physician:  Liz Baker MD

## 2018-11-29 DIAGNOSIS — F64.9 GENDER DYSPHORIA: Primary | ICD-10-CM

## 2018-11-29 DIAGNOSIS — F64.9 GENDER IDENTITY DISORDER: ICD-10-CM

## 2018-11-29 NOTE — TELEPHONE ENCOUNTER

## 2018-11-29 NOTE — TELEPHONE ENCOUNTER

## 2018-11-29 NOTE — TELEPHONE ENCOUNTER

## 2018-11-30 RX ORDER — TESTOSTERONE CYPIONATE 200 MG/ML
80 INJECTION, SOLUTION INTRAMUSCULAR WEEKLY
Qty: 10 ML | Refills: 2 | Status: SHIPPED | OUTPATIENT
Start: 2018-11-30 | End: 2019-01-03

## 2018-12-03 NOTE — TELEPHONE ENCOUNTER
RN faxed in prescription to [facility] pharmacy successfully.  Denisa Macario RN

## 2018-12-18 DIAGNOSIS — F41.1 GENERALIZED ANXIETY DISORDER: ICD-10-CM

## 2018-12-18 RX ORDER — FLUOXETINE 40 MG/1
80 CAPSULE ORAL DAILY
Qty: 60 CAPSULE | Refills: 1 | Status: SHIPPED | OUTPATIENT
Start: 2018-12-18 | End: 2019-01-03

## 2018-12-18 NOTE — PROGRESS NOTES

## 2018-12-18 NOTE — TELEPHONE ENCOUNTER

## 2019-01-01 RX ORDER — LISINOPRIL 20 MG/1
10 TABLET ORAL DAILY
COMMUNITY
End: 2019-06-21

## 2019-01-03 ENCOUNTER — OFFICE VISIT (OUTPATIENT)
Dept: FAMILY MEDICINE | Facility: CLINIC | Age: 44
End: 2019-01-03
Payer: COMMERCIAL

## 2019-01-03 VITALS
HEART RATE: 70 BPM | WEIGHT: 272.6 LBS | DIASTOLIC BLOOD PRESSURE: 92 MMHG | TEMPERATURE: 98.3 F | OXYGEN SATURATION: 100 % | SYSTOLIC BLOOD PRESSURE: 127 MMHG | BODY MASS INDEX: 43.55 KG/M2

## 2019-01-03 DIAGNOSIS — F64.9 GENDER DYSPHORIA: ICD-10-CM

## 2019-01-03 DIAGNOSIS — F90.2 ATTENTION DEFICIT HYPERACTIVITY DISORDER (ADHD), COMBINED TYPE: ICD-10-CM

## 2019-01-03 DIAGNOSIS — F41.1 GENERALIZED ANXIETY DISORDER: ICD-10-CM

## 2019-01-03 DIAGNOSIS — Z13.9 SCREENING FOR CONDITION: Primary | ICD-10-CM

## 2019-01-03 DIAGNOSIS — R10.2 PELVIC PAIN: ICD-10-CM

## 2019-01-03 DIAGNOSIS — F41.1 GAD (GENERALIZED ANXIETY DISORDER): ICD-10-CM

## 2019-01-03 DIAGNOSIS — F64.9 GENDER IDENTITY DISORDER: ICD-10-CM

## 2019-01-03 LAB
AMPHETAMINES QUAL: POSITIVE
BACTERIA: NORMAL
BARBITURATES QUAL URINE: NEGATIVE
BENZODIAZEPINE QUAL URINE: POSITIVE
BUPRENORPHINE QUAL URINE: NEGATIVE
CANNABINOIDS UR QL SCN: POSITIVE
CLUE CELLS: NORMAL
COCAINE QUAL URINE: NEGATIVE
METHAMPHETAMINE: NEGATIVE
METHODONE QUAL: NEGATIVE
MORPHINE QUAL: NEGATIVE
MOTILE TRICHOMONAS: NEGATIVE
ODOR: NORMAL
OXYCODONE QUAL: NEGATIVE
PH WET PREP: >4.5
PHENCYCLIDINE: NEGATIVE
PROPOXYPHENE: NEGATIVE
TEMPERATURE OF URINE WAS BETWEEN 90-100 DEGREES F: YES
TRICYCLIC ANTIDEPRESSANTS: NEGATIVE
WBC WET PREP: NORMAL
YEAST: NORMAL

## 2019-01-03 RX ORDER — DEXTROAMPHETAMINE SACCHARATE, AMPHETAMINE ASPARTATE MONOHYDRATE, DEXTROAMPHETAMINE SULFATE AND AMPHETAMINE SULFATE 5; 5; 5; 5 MG/1; MG/1; MG/1; MG/1
20 CAPSULE, EXTENDED RELEASE ORAL DAILY
Qty: 30 CAPSULE | Refills: 0 | Status: SHIPPED | OUTPATIENT
Start: 2019-01-03 | End: 2019-01-03

## 2019-01-03 RX ORDER — DEXTROAMPHETAMINE SACCHARATE, AMPHETAMINE ASPARTATE, DEXTROAMPHETAMINE SULFATE AND AMPHETAMINE SULFATE 5; 5; 5; 5 MG/1; MG/1; MG/1; MG/1
20 TABLET ORAL 2 TIMES DAILY
Qty: 60 TABLET | Refills: 0 | Status: ON HOLD | OUTPATIENT
Start: 2019-02-02 | End: 2019-06-22

## 2019-01-03 RX ORDER — DEXTROAMPHETAMINE SACCHARATE, AMPHETAMINE ASPARTATE, DEXTROAMPHETAMINE SULFATE AND AMPHETAMINE SULFATE 5; 5; 5; 5 MG/1; MG/1; MG/1; MG/1
20 TABLET ORAL 2 TIMES DAILY
Qty: 60 TABLET | Refills: 0 | Status: SHIPPED | OUTPATIENT
Start: 2019-03-01 | End: 2019-03-28

## 2019-01-03 RX ORDER — TESTOSTERONE CYPIONATE 200 MG/ML
80 INJECTION, SOLUTION INTRAMUSCULAR WEEKLY
Qty: 10 ML | Refills: 3 | Status: SHIPPED | OUTPATIENT
Start: 2019-01-03 | End: 2019-04-22

## 2019-01-03 RX ORDER — DEXTROAMPHETAMINE SACCHARATE, AMPHETAMINE ASPARTATE, DEXTROAMPHETAMINE SULFATE AND AMPHETAMINE SULFATE 5; 5; 5; 5 MG/1; MG/1; MG/1; MG/1
20 TABLET ORAL 2 TIMES DAILY
Qty: 60 TABLET | Refills: 0 | Status: ON HOLD | OUTPATIENT
Start: 2019-01-03 | End: 2019-06-22

## 2019-01-03 RX ORDER — DEXTROAMPHETAMINE SACCHARATE, AMPHETAMINE ASPARTATE MONOHYDRATE, DEXTROAMPHETAMINE SULFATE AND AMPHETAMINE SULFATE 5; 5; 5; 5 MG/1; MG/1; MG/1; MG/1
20 CAPSULE, EXTENDED RELEASE ORAL DAILY
Qty: 30 CAPSULE | Refills: 0 | Status: SHIPPED | OUTPATIENT
Start: 2019-02-03 | End: 2019-01-03

## 2019-01-03 RX ORDER — FLUOXETINE 40 MG/1
80 CAPSULE ORAL DAILY
Qty: 180 CAPSULE | Refills: 1 | Status: SHIPPED | OUTPATIENT
Start: 2019-01-03 | End: 2019-03-14

## 2019-01-03 RX ORDER — DEXTROAMPHETAMINE SACCHARATE, AMPHETAMINE ASPARTATE MONOHYDRATE, DEXTROAMPHETAMINE SULFATE AND AMPHETAMINE SULFATE 5; 5; 5; 5 MG/1; MG/1; MG/1; MG/1
20 CAPSULE, EXTENDED RELEASE ORAL DAILY
Qty: 30 CAPSULE | Refills: 0 | Status: SHIPPED | OUTPATIENT
Start: 2019-03-06 | End: 2019-01-03

## 2019-01-03 NOTE — PATIENT INSTRUCTIONS
Preventative  1. Labs today at [facility]. Results via [employer].  2. Start the gym again.     Pelvic Pain  1. I will refer you to Gynecology for a work up.    ADHD  1. Refilled Adderall. Take one tablet, twice a day.    Hormones  1. Refilled testosterone today. Continue at 0.40mL dose.   2. Push for your right needles  3. Use the [facility] litzy.  4. Consider getting needles through [facility].    Mood  1. Stop klonopin today.  2. Continue hydroxyzine as needed for sleep.  3. Continue Prozac at 80mg daily. I will provide a 3 month refill.  4. Follow up with me in 3 months.    OB-Gyn referral  [phone]  Alma Nunez sent to Veda Carlos,    Abran left a voice message for the patient and sent a reminder letter.     Thanks!   Alma    Previous Messages      ----- Message -----   From: Veda Carlos   Sent: 2/5/2019  11:21 AM   To: Referral Specialist Team   Subject: OB-Gyn                                           Good Morning,     Please take a look at 1/3 OB_Gyn referral and let me know, please.   Thank you so much.     Veda Gracia

## 2019-01-03 NOTE — PROGRESS NOTES
"       HPI   Trevin Evans is a 43 year old  who presents for   Chief Complaint   Patient presents with     RECHECK     HRT     Refill Request     Adderrall     Hormones  Needs T refill. Has questions about where to buy syringes for less since insurance doesn't cover them. Uses [facility], for discounts on prescriptions. Wonders if ovaries and uterus shrink on T.  No new sexula partners. Pain persists. No masculinizing changes since on long time.    Pelvic Pain  Reports that he still has deep internal, lower pelvic pain a couple times a week. Has had the pain for years. Has read articles that state that trans individuals have phantom pain, wonders if it's that. Also wonders if it's r/t testosterone. Does not think it's r/t to sex and lack of lubrication. But notices that he will have the pain a few hours after sex. Reports that when his sister was teenager she had pelvic pain, isn't sure if there's family hx of endometriosis.     Mood  Reports that the prozac dose increase before the holidays helped. Anxiety has been better. Has not experienced panic attacks. Has work related stress. Is working towards surgery, and getting mortgage in fathers name. Sleep is \"pretty good.\" Explains that one of his friends tried to wake him up and he didn't wake up as easily as he would've without hydroxyzine.     ADHD  Focus is better. Has been drinking a little every 3-4 nights, buzzed no hangover. Needs refill on Adderall.     Preventative  Would like to get STI testing today since it's been about a year since his last labs. Declines pap today.   Oral/vaginal sex receptive and no rectal receptive  Same partners   They also get tested     Social Hx  Employed. Started going to the gym with a friend, Gwendolyn.     +++++++    Problem, Medication and Allergy Lists were reviewed and updated if needed..    Patient is an established patient of this clinic..         Review of Systems:   Review of Systems    Negative for: anxiety, panic attacks, " CP, SOB, changes in bowels, changes in diet, changes in appetite, crampy pain with orgasms, pain with penetration.    Positive for: improved sleep, lower pelvic pain.    See HPI for additional sx.    This document serves as a record of the services and decisions personally performed and made by Liz Baker MD. It was created on his/her behalf by Marjorie Nelson, a trained medical scribe. The creation of this document is based the provider's statements to the medical scribe.  Scribe Marjorie Nelson 8:07 AM, January 3, 2019       Physical Exam:     Vitals:    01/03/19 0804   BP: (!) 127/92   Pulse: 70   Temp: 98.3  F (36.8  C)   TempSrc: Oral   SpO2: 100%   Weight: 123.7 kg (272 lb 9.6 oz)     Body mass index is 43.55 kg/m .  Vitals were reviewed and were normal, except BP.     Wt Readings from Last 10 Encounters:   01/03/19 123.7 kg (272 lb 9.6 oz)   11/13/18 121.1 kg (267 lb)   10/04/18 117.6 kg (259 lb 3.2 oz)   07/06/18 106.7 kg (235 lb 3.2 oz)   04/12/18 115.8 kg (255 lb 3.2 oz)   03/26/18 116.1 kg (256 lb)   01/12/18 121.7 kg (268 lb 3.2 oz)   12/19/17 120.2 kg (265 lb)   12/18/17 122.7 kg (270 lb 6.4 oz)   12/14/17 (P) 124 kg (273 lb 5.9 oz)       Physical Exam    BMI= Body mass index is 43.55 kg/m .   GENERAL: healthy, alert and no distress  HEENT: oropharynx clear  PSYCH: Alert and oriented times 3; speech- coherent , normal rate and volume; able to articulate logical thoughts, able to abstract reason, affect- anxious      Results:   Results are ordered and pending    Assessment and Plan   Trevin was seen today for recheck and refill request.    Diagnoses and all orders for this visit:    Screening for condition  -     Neisseria gonorrhoeae PCR urine  -     Chlamydia trachomatis PCR  -     Wet Prep ([employer]) self collect  -     Treponema Abs w Reflex to RPR and Titer  -     HIV Antigen Antibody Combo  -     NEISSERIA GONORRHOEA PCR throat  -     CHLAMYDIA TRACHOMATIS PCR    Gender dysphoria  -     testosterone  "cypionate (DEPOTESTOSTERONE) 200 MG/ML injection; Inject 0.4 mLs (80 mg) into the muscle once a week In cottonseed oil ok  No change in dose  Cont annual visits   Lubrication seems adequate and not causing pain  return to clinic for pap - due. He will.     Attention deficit hyperactivity disorder (ADHD), combined type  Needs rapid acting no insurance coverage for long acting. Effective. No side effects.   -     amphetamine-dextroamphetamine (ADDERALL) 20 MG tablet; Take 1 tablet (20 mg) by mouth 2 times daily  -     amphetamine-dextroamphetamine (ADDERALL) 20 MG tablet; Take 1 tablet (20 mg) by mouth 2 times daily  -     amphetamine-dextroamphetamine (ADDERALL) 20 MG tablet; Take 1 tablet (20 mg) by mouth 2 times daily  -     Rapid Urine Drug Screen ([facility])    SHANA (generalized anxiety disorder)  - Continue Prozac at 80mg daily  - Stop klonopin today.  - Continue hydroxyzine as needed for sleep and as needed for panic attacks   Cont working out   Minimum 6 months on this dose of prozac    Gender dysphoria  -     needle, disp, 18G X 1\" MISC; 1 each once a week  -     Needle, Disp, 23G X 1\" MISC; Use to inject testosterone into muscle weekly    Generalized anxiety disorder  -     FLUoxetine (PROZAC) 40 MG capsule; Take 2 capsules (80 mg) by mouth daily    Pelvic pain  -     OB/GYN REFERRAL - INTERNAL    - No cycles, but also has Nexplanon. Chronic, couple times weekly crampy pelvic pain that limits activities.No GI sx whatsoever.  Pain will limit sexual activity/interest  but not other activities. With hx of normal pelvic US. CT showed that there is some gallstones only. Refer to GYN for work up, question endometriosis. Educated Trevin about name of institution ([facility] He prefers [facility]. Gave some names to consider.     Medications Discontinued During This Encounter   Medication Reason     triamcinolone (KENALOG) 0.1 % cream      clonazePAM (KLONOPIN) 0.5 MG tablet      testosterone cypionate " "(DEPOTESTOSTERONE) 200 MG/ML injection Reorder     needle, disp, 18G X 1\" MISC Reorder     Needle, Disp, 23G X 1\" MISC      FLUoxetine (PROZAC) 40 MG capsule      amphetamine-dextroamphetamine (ADDERALL) 20 MG per tablet      amphetamine-dextroamphetamine (ADDERALL XR) 20 MG 24 hr capsule      amphetamine-dextroamphetamine (ADDERALL XR) 20 MG 24 hr capsule      amphetamine-dextroamphetamine (ADDERALL XR) 20 MG 24 hr capsule      amphetamine-dextroamphetamine (ADDERALL XR) 20 MG per 24 hr capsule      Patient Instructions   Preventative  1. Labs today at [facility]. Results via [employer].  2. Start the gym again.     Pelvic Pain  1. I will refer you to Gynecology for a work up.    ADHD  1. Refilled Adderall. Take one tablet, twice a day.    Hormones  1. Refilled testosterone today. Continue at 0.40mL dose.   2. Push for your right needles  3. Use the [facility] litzy.  4. Consider getting needles through [facility].    Mood  1. Stop klonopin today.  2. Continue hydroxyzine as needed for sleep.  3. Continue Prozac at 80mg daily. I will provide a 3 month refill.  4. Follow up with me in 3 months.    Options for treatment and follow-up care were reviewed with the patient. Irma Evans  engaged in the decision making process and verbalized understanding of the options discussed and agreed with the final plan.    The information in this document, created by the medical scribe for me, accurately reflects the services I personally performed and the decisions made by me. I have reviewed and approved this document for accuracy prior to leaving the patient care area.  I spent 42 min face to face with the patient and >50% was spent counselling the patient about the above medical conditions, educating patient on their medical conditions, behavioral interventions and supports.      Liz Baker MD  8:07 AM, 01/03/19    "

## 2019-01-04 LAB
C TRACH DNA SPEC QL NAA+PROBE: NEGATIVE
C TRACH DNA SPEC QL NAA+PROBE: NEGATIVE
HIV 1+2 AB+HIV1 P24 AG SERPL QL IA: NONREACTIVE
N GONORRHOEA DNA SPEC QL NAA+PROBE: NEGATIVE
N GONORRHOEA DNA SPEC QL NAA+PROBE: NEGATIVE
SPECIMEN SOURCE: NORMAL
T PALLIDUM AB SER QL: NONREACTIVE

## 2019-01-09 DIAGNOSIS — R03.0 ELEVATED BLOOD PRESSURE READING WITHOUT DIAGNOSIS OF HYPERTENSION: ICD-10-CM

## 2019-01-09 RX ORDER — LISINOPRIL 5 MG/1
5 TABLET ORAL DAILY
Qty: 30 TABLET | Refills: 1 | Status: SHIPPED | OUTPATIENT
Start: 2019-01-09 | End: 2019-03-14

## 2019-01-09 NOTE — TELEPHONE ENCOUNTER

## 2019-03-13 DIAGNOSIS — R03.0 ELEVATED BLOOD PRESSURE READING WITHOUT DIAGNOSIS OF HYPERTENSION: ICD-10-CM

## 2019-03-13 DIAGNOSIS — F41.1 GENERALIZED ANXIETY DISORDER: ICD-10-CM

## 2019-03-13 NOTE — TELEPHONE ENCOUNTER

## 2019-03-14 DIAGNOSIS — F41.1 GAD (GENERALIZED ANXIETY DISORDER): ICD-10-CM

## 2019-03-14 RX ORDER — FLUOXETINE 40 MG/1
80 CAPSULE ORAL DAILY
Qty: 180 CAPSULE | Refills: 1 | Status: SHIPPED | OUTPATIENT
Start: 2019-03-14 | End: 2019-06-21

## 2019-03-14 RX ORDER — HYDROXYZINE HYDROCHLORIDE 25 MG/1
25-50 TABLET, FILM COATED ORAL
Qty: 100 TABLET | Refills: 1 | Status: SHIPPED | OUTPATIENT
Start: 2019-03-14 | End: 2020-03-23

## 2019-03-14 RX ORDER — LISINOPRIL 5 MG/1
5 TABLET ORAL DAILY
Qty: 30 TABLET | Refills: 1 | Status: SHIPPED | OUTPATIENT
Start: 2019-03-14 | End: 2019-05-13

## 2019-03-14 NOTE — TELEPHONE ENCOUNTER

## 2019-03-27 ENCOUNTER — MYC MEDICAL ADVICE (OUTPATIENT)
Dept: FAMILY MEDICINE | Facility: CLINIC | Age: 44
End: 2019-03-27

## 2019-03-27 DIAGNOSIS — F90.2 ATTENTION DEFICIT HYPERACTIVITY DISORDER (ADHD), COMBINED TYPE: ICD-10-CM

## 2019-03-28 RX ORDER — DEXTROAMPHETAMINE SACCHARATE, AMPHETAMINE ASPARTATE, DEXTROAMPHETAMINE SULFATE AND AMPHETAMINE SULFATE 5; 5; 5; 5 MG/1; MG/1; MG/1; MG/1
20 TABLET ORAL 2 TIMES DAILY
Qty: 60 TABLET | Refills: 0 | Status: SHIPPED | OUTPATIENT
Start: 2019-03-28 | End: 2019-04-22

## 2019-03-28 NOTE — TELEPHONE ENCOUNTER
RN sent patient message that 1 month is ready, but we need them to come to office for further refills.  Denisa Macario RN

## 2019-04-15 DIAGNOSIS — F64.9 GENDER IDENTITY DISORDER: Primary | ICD-10-CM

## 2019-04-15 NOTE — TELEPHONE ENCOUNTER

## 2019-04-22 ENCOUNTER — OFFICE VISIT (OUTPATIENT)
Dept: FAMILY MEDICINE | Facility: CLINIC | Age: 44
End: 2019-04-22
Payer: COMMERCIAL

## 2019-04-22 VITALS
SYSTOLIC BLOOD PRESSURE: 119 MMHG | WEIGHT: 259.4 LBS | DIASTOLIC BLOOD PRESSURE: 87 MMHG | BODY MASS INDEX: 41.44 KG/M2 | HEART RATE: 97 BPM | TEMPERATURE: 98.4 F | OXYGEN SATURATION: 98 %

## 2019-04-22 DIAGNOSIS — Z20.6 CONTACT WITH AND (SUSPECTED) EXPOSURE TO HUMAN IMMUNODEFICIENCY VIRUS (HIV): Primary | ICD-10-CM

## 2019-04-22 DIAGNOSIS — F64.9 GENDER IDENTITY DISORDER: ICD-10-CM

## 2019-04-22 DIAGNOSIS — F90.2 ATTENTION DEFICIT HYPERACTIVITY DISORDER (ADHD), COMBINED TYPE: ICD-10-CM

## 2019-04-22 RX ORDER — DEXTROAMPHETAMINE SACCHARATE, AMPHETAMINE ASPARTATE, DEXTROAMPHETAMINE SULFATE AND AMPHETAMINE SULFATE 5; 5; 5; 5 MG/1; MG/1; MG/1; MG/1
20 TABLET ORAL 2 TIMES DAILY
Qty: 60 TABLET | Refills: 0 | Status: ON HOLD | OUTPATIENT
Start: 2019-04-22 | End: 2019-06-22

## 2019-04-22 RX ORDER — DEXTROAMPHETAMINE SACCHARATE, AMPHETAMINE ASPARTATE, DEXTROAMPHETAMINE SULFATE AND AMPHETAMINE SULFATE 5; 5; 5; 5 MG/1; MG/1; MG/1; MG/1
20 TABLET ORAL DAILY
Qty: 30 TABLET | Refills: 0 | Status: SHIPPED | OUTPATIENT
Start: 2019-06-23 | End: 2019-04-22

## 2019-04-22 RX ORDER — DEXTROAMPHETAMINE SACCHARATE, AMPHETAMINE ASPARTATE, DEXTROAMPHETAMINE SULFATE AND AMPHETAMINE SULFATE 5; 5; 5; 5 MG/1; MG/1; MG/1; MG/1
20 TABLET ORAL 2 TIMES DAILY
Qty: 60 TABLET | Refills: 0 | Status: SHIPPED | OUTPATIENT
Start: 2019-04-22 | End: 2019-08-02

## 2019-04-22 RX ORDER — TESTOSTERONE CYPIONATE 200 MG/ML
80 INJECTION, SOLUTION INTRAMUSCULAR WEEKLY
Qty: 10 ML | Refills: 3 | Status: SHIPPED | OUTPATIENT
Start: 2019-04-22 | End: 2019-10-14

## 2019-04-22 RX ORDER — DEXTROAMPHETAMINE SACCHARATE, AMPHETAMINE ASPARTATE, DEXTROAMPHETAMINE SULFATE AND AMPHETAMINE SULFATE 5; 5; 5; 5 MG/1; MG/1; MG/1; MG/1
20 TABLET ORAL DAILY
Qty: 30 TABLET | Refills: 0 | Status: SHIPPED | OUTPATIENT
Start: 2019-04-22 | End: 2019-04-29

## 2019-04-22 RX ORDER — DEXTROAMPHETAMINE SACCHARATE, AMPHETAMINE ASPARTATE, DEXTROAMPHETAMINE SULFATE AND AMPHETAMINE SULFATE 5; 5; 5; 5 MG/1; MG/1; MG/1; MG/1
20 TABLET ORAL DAILY
Qty: 30 TABLET | Refills: 0 | Status: SHIPPED | OUTPATIENT
Start: 2019-05-23 | End: 2019-04-29

## 2019-04-22 ASSESSMENT — PATIENT HEALTH QUESTIONNAIRE - PHQ9
5. POOR APPETITE OR OVEREATING: MORE THAN HALF THE DAYS
SUM OF ALL RESPONSES TO PHQ QUESTIONS 1-9: 11

## 2019-04-22 ASSESSMENT — ANXIETY QUESTIONNAIRES
3. WORRYING TOO MUCH ABOUT DIFFERENT THINGS: MORE THAN HALF THE DAYS
5. BEING SO RESTLESS THAT IT IS HARD TO SIT STILL: SEVERAL DAYS
2. NOT BEING ABLE TO STOP OR CONTROL WORRYING: MORE THAN HALF THE DAYS
GAD7 TOTAL SCORE: 13
6. BECOMING EASILY ANNOYED OR IRRITABLE: MORE THAN HALF THE DAYS
7. FEELING AFRAID AS IF SOMETHING AWFUL MIGHT HAPPEN: MORE THAN HALF THE DAYS
IF YOU CHECKED OFF ANY PROBLEMS ON THIS QUESTIONNAIRE, HOW DIFFICULT HAVE THESE PROBLEMS MADE IT FOR YOU TO DO YOUR WORK, TAKE CARE OF THINGS AT HOME, OR GET ALONG WITH OTHER PEOPLE: SOMEWHAT DIFFICULT
1. FEELING NERVOUS, ANXIOUS, OR ON EDGE: MORE THAN HALF THE DAYS
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
## 2019-04-22 NOTE — PATIENT INSTRUCTIONS
1. Go back to gym  2. Continue Adderall  3. Use repetition method to reduce errors  4. Continue Prozac   5. Pursue therapist  6. Testosterone refill - visits yearly for this.   7. [facility] Phone: [phone]

## 2019-04-23 ASSESSMENT — ANXIETY QUESTIONNAIRES: GAD7 TOTAL SCORE: 13

## 2019-04-24 ASSESSMENT — ENCOUNTER SYMPTOMS
COUGH: 0
HYPERACTIVE: 1
ABDOMINAL PAIN: 0
NERVOUS/ANXIOUS: 1
TREMORS: 0
SLEEP DISTURBANCE: 1
PALPITATIONS: 0
FEVER: 0
CHILLS: 0
AGITATION: 1
DIARRHEA: 0
DIAPHORESIS: 1
DECREASED CONCENTRATION: 1
SHORTNESS OF BREATH: 0
HEADACHES: 0
NAUSEA: 0
VOMITING: 0
UNEXPECTED WEIGHT CHANGE: 0

## 2019-05-13 DIAGNOSIS — R03.0 ELEVATED BLOOD PRESSURE READING WITHOUT DIAGNOSIS OF HYPERTENSION: ICD-10-CM

## 2019-05-13 RX ORDER — LISINOPRIL 5 MG/1
5 TABLET ORAL DAILY
Qty: 30 TABLET | Refills: 1 | Status: SHIPPED | OUTPATIENT
Start: 2019-05-13 | End: 2019-07-08

## 2019-05-15 ENCOUNTER — TELEPHONE (OUTPATIENT)
Dept: FAMILY MEDICINE | Facility: CLINIC | Age: 44
End: 2019-05-15

## 2019-05-15 NOTE — TELEPHONE ENCOUNTER
Prior Authorization Retail Medication Request    Medication/Dose: testosterone cypionate (DEPOTESTOSTERONE) 200 MG/ML injection  ICD code (if different than what is on RX):  Gender dysphoria [F64.9]   Previously Tried and Failed:  See Chart  Rationale:  See chart    Insurance Name:  [facility]  Insurance ID:  [id number]       Pharmacy Information (if different than what is on RX)  Name:  [facility]  Phone:  [phone]

## 2019-05-16 NOTE — TELEPHONE ENCOUNTER
Central Prior Authorization Team   [phone]    PA Initiation    Medication: testosterone cypionate (DEPOTESTOSTERONE) 200 MG/ML injection  Insurance Company: [employer] ([facility]) - Phone [phone] Fax [phone]  Pharmacy Filling the Rx: [facility] [address] [address] [address] [facility] [address]  Filling Pharmacy Phone: [phone]  Filling Pharmacy Fax: [phone]  Start Date: 5/16/2019

## 2019-05-16 NOTE — TELEPHONE ENCOUNTER
PRIOR AUTHORIZATION DENIED    Medication: testosterone cypionate (DEPOTESTOSTERONE) 200 MG/ML injection-PA DENIED     Denial Date: 5/16/2019    Denial Rational:          Appeal Information: If provider would like to appeal please provide a letter of medical necessity stating why formulary alternatives would not be clinically appropriate for patient and route back to the PA team.

## 2019-05-17 NOTE — TELEPHONE ENCOUNTER
Prior Authorization:     Denied Reason: see chart    Alternatives: None available    Pharmacy notified.  Routing to MD    Please advise if you would like to move forward with the appeal process or plan to  prescribe an alternative medication. If Appeal is desired a letter of medical necessity with denial rationale is needed to start the appeal process.   Route to PA Pool when completed.    Sybil Madden CMA on 5/17/2019 at 8:54 AM

## 2019-05-22 NOTE — TELEPHONE ENCOUNTER
All of the below - listed criteria for the PA are already met. (see pharmacy advocate note).   Please submit PA w/ these items listed so it gets approved. thanks

## 2019-06-05 NOTE — TELEPHONE ENCOUNTER
Called insurance for clarification of Denial Rational and insurance stated that there were two cases and on medication and both are Denied. Requested for second Denial Letter be sent via faxed and Denial Rational states that for coverage requires that the drug has been recognized for the treatment of medical condition(s) by one of the following: two articles from major peer-reviews medical journal that present data supporting the proposed off-label use or uses as generally safe and effective (unleass there is clear and convincing contradictory evidence presented in a major peer-reviewed medical journal) or [facility] Form[facility] ([employer]) Drug Information or DRUGDEX system by [employer]. Insurance stated that since there is two cases on medication and both are denied, the next option is provider would have to Appeal with a letter medical necessity.

## 2019-06-12 NOTE — TELEPHONE ENCOUNTER
Central Prior Authorization Team   [phone]    PA Re-Initiation    Medication: testosterone cypionate (DEPOTESTOSTERONE) 200 MG/ML injection  Insurance Company: [employer] ([facility]) - Phone [phone] Fax [phone]  Pharmacy Filling the Rx: [facility] [address] [place] [address] [address] AT [facility] OF [address]  Filling Pharmacy Phone: [phone]  Filling Pharmacy Fax: [phone]  Start Date: 6/12/2019    Re-initiated PA by phone at [phone]. Case # [id number].

## 2019-06-13 NOTE — TELEPHONE ENCOUNTER
Prior Authorization Approval    Authorization Effective Date: 6/12/2019  Authorization Expiration Date: 6/12/2020  Medication: testosterone cypionate (DEPOTESTOSTERONE) 200 MG/ML injection-PA APPROVED   Approved Dose/Quantity:   Reference #: REFERENCE # [id number]   Insurance Company: [employer] ([facility]) - Phone [phone] Fax [phone]  Expected CoPay:       CoPay Card Available:      Foundation Assistance Needed:    Which Pharmacy is filling the prescription (Not needed for infusion/clinic administered): [facility] [address] [place] [address] [address] AT [facility] OF [address] [facility]  Pharmacy Notified: Yes- **Instructed pharmacy to notify patient when script is ready to /ship.**  Patient Notified: Yes

## 2019-06-21 ENCOUNTER — OFFICE VISIT (OUTPATIENT)
Dept: FAMILY MEDICINE | Facility: CLINIC | Age: 44
End: 2019-06-21
Payer: COMMERCIAL

## 2019-06-21 ENCOUNTER — HOSPITAL ENCOUNTER (INPATIENT)
Facility: CLINIC | Age: 44
LOS: 1 days | Discharge: HOME | DRG: 603 | End: 2019-06-22
Attending: EMERGENCY MEDICINE | Admitting: FAMILY MEDICINE
Payer: COMMERCIAL

## 2019-06-21 ENCOUNTER — MYC MEDICAL ADVICE (OUTPATIENT)
Dept: FAMILY MEDICINE | Facility: CLINIC | Age: 44
End: 2019-06-21

## 2019-06-21 VITALS
RESPIRATION RATE: 16 BRPM | OXYGEN SATURATION: 100 % | WEIGHT: 247.8 LBS | HEIGHT: 65 IN | BODY MASS INDEX: 41.29 KG/M2 | TEMPERATURE: 99.4 F | HEART RATE: 95 BPM | DIASTOLIC BLOOD PRESSURE: 81 MMHG | SYSTOLIC BLOOD PRESSURE: 126 MMHG

## 2019-06-21 DIAGNOSIS — L03.91 BACTERIAL LYMPHANGITIS: ICD-10-CM

## 2019-06-21 DIAGNOSIS — L03.012 PARONYCHIA OF LEFT INDEX FINGER: Primary | ICD-10-CM

## 2019-06-21 DIAGNOSIS — B96.89 BACTERIAL LYMPHANGITIS: ICD-10-CM

## 2019-06-21 DIAGNOSIS — I89.1 LYMPHANGITIS: ICD-10-CM

## 2019-06-21 LAB
ANION GAP SERPL CALCULATED.3IONS-SCNC: 6 MMOL/L (ref 3–14)
BASOPHILS # BLD AUTO: 0.1 10E9/L (ref 0–0.2)
BASOPHILS NFR BLD AUTO: 0.4 %
BUN SERPL-MCNC: 12 MG/DL (ref 7–30)
CALCIUM SERPL-MCNC: 9 MG/DL (ref 8.5–10.1)
CHLORIDE SERPL-SCNC: 101 MMOL/L (ref 94–109)
CO2 SERPL-SCNC: 28 MMOL/L (ref 20–32)
CREAT SERPL-MCNC: 0.9 MG/DL (ref 0.52–1.04)
DIFFERENTIAL METHOD BLD: ABNORMAL
EOSINOPHIL # BLD AUTO: 0.2 10E9/L (ref 0–0.7)
EOSINOPHIL NFR BLD AUTO: 1 %
ERYTHROCYTE [DISTWIDTH] IN BLOOD BY AUTOMATED COUNT: 13.1 % (ref 10–15)
GFR SERPL CREATININE-BSD FRML MDRD: 79 ML/MIN/{1.73_M2}
GLUCOSE SERPL-MCNC: 106 MG/DL (ref 70–99)
HCT VFR BLD AUTO: 50.7 % (ref 35–47)
HGB BLD-MCNC: 16.3 G/DL (ref 11.7–15.7)
IMM GRANULOCYTES # BLD: 0.1 10E9/L (ref 0–0.4)
IMM GRANULOCYTES NFR BLD: 0.8 %
LACTATE BLD-SCNC: 0.7 MMOL/L (ref 0.7–2)
LYMPHOCYTES # BLD AUTO: 1.5 10E9/L (ref 0.8–5.3)
LYMPHOCYTES NFR BLD AUTO: 9.9 %
MCH RBC QN AUTO: 29.5 PG (ref 26.5–33)
MCHC RBC AUTO-ENTMCNC: 32.1 G/DL (ref 31.5–36.5)
MCV RBC AUTO: 92 FL (ref 78–100)
MONOCYTES # BLD AUTO: 1.1 10E9/L (ref 0–1.3)
MONOCYTES NFR BLD AUTO: 6.9 %
NEUTROPHILS # BLD AUTO: 12.5 10E9/L (ref 1.6–8.3)
NEUTROPHILS NFR BLD AUTO: 81 %
NRBC # BLD AUTO: 0 10*3/UL
NRBC BLD AUTO-RTO: 0 /100
PLATELET # BLD AUTO: 211 10E9/L (ref 150–450)
POTASSIUM SERPL-SCNC: 4 MMOL/L (ref 3.4–5.3)
RBC # BLD AUTO: 5.52 10E12/L (ref 3.8–5.2)
SODIUM SERPL-SCNC: 135 MMOL/L (ref 133–144)
WBC # BLD AUTO: 15.4 10E9/L (ref 4–11)

## 2019-06-21 PROCEDURE — 80048 BASIC METABOLIC PNL TOTAL CA: CPT | Performed by: EMERGENCY MEDICINE

## 2019-06-21 PROCEDURE — 25000132 ZZH RX MED GY IP 250 OP 250 PS 637: Performed by: STUDENT IN AN ORGANIZED HEALTH CARE EDUCATION/TRAINING PROGRAM

## 2019-06-21 PROCEDURE — 25000128 H RX IP 250 OP 636: Performed by: EMERGENCY MEDICINE

## 2019-06-21 PROCEDURE — 25800030 ZZH RX IP 258 OP 636: Performed by: EMERGENCY MEDICINE

## 2019-06-21 PROCEDURE — 96368 THER/DIAG CONCURRENT INF: CPT | Performed by: EMERGENCY MEDICINE

## 2019-06-21 PROCEDURE — 85025 COMPLETE CBC W/AUTO DIFF WBC: CPT | Performed by: EMERGENCY MEDICINE

## 2019-06-21 PROCEDURE — 25000128 H RX IP 250 OP 636: Performed by: STUDENT IN AN ORGANIZED HEALTH CARE EDUCATION/TRAINING PROGRAM

## 2019-06-21 PROCEDURE — 96366 THER/PROPH/DIAG IV INF ADDON: CPT | Performed by: EMERGENCY MEDICINE

## 2019-06-21 PROCEDURE — 87641 MR-STAPH DNA AMP PROBE: CPT | Performed by: STUDENT IN AN ORGANIZED HEALTH CARE EDUCATION/TRAINING PROGRAM

## 2019-06-21 PROCEDURE — 25800030 ZZH RX IP 258 OP 636: Performed by: STUDENT IN AN ORGANIZED HEALTH CARE EDUCATION/TRAINING PROGRAM

## 2019-06-21 PROCEDURE — 96361 HYDRATE IV INFUSION ADD-ON: CPT | Performed by: EMERGENCY MEDICINE

## 2019-06-21 PROCEDURE — 87040 BLOOD CULTURE FOR BACTERIA: CPT | Performed by: EMERGENCY MEDICINE

## 2019-06-21 PROCEDURE — 12000001 ZZH R&B MED SURG/OB UMMC

## 2019-06-21 PROCEDURE — 96365 THER/PROPH/DIAG IV INF INIT: CPT | Performed by: EMERGENCY MEDICINE

## 2019-06-21 PROCEDURE — 99285 EMERGENCY DEPT VISIT HI MDM: CPT | Mod: 25 | Performed by: EMERGENCY MEDICINE

## 2019-06-21 PROCEDURE — 99284 EMERGENCY DEPT VISIT MOD MDM: CPT | Mod: Z6 | Performed by: EMERGENCY MEDICINE

## 2019-06-21 PROCEDURE — 83605 ASSAY OF LACTIC ACID: CPT | Performed by: EMERGENCY MEDICINE

## 2019-06-21 RX ORDER — FLUOXETINE 40 MG/1
40 CAPSULE ORAL 2 TIMES DAILY
COMMUNITY
End: 2020-01-29

## 2019-06-21 RX ORDER — LIDOCAINE 40 MG/G
CREAM TOPICAL
Status: DISCONTINUED | OUTPATIENT
Start: 2019-06-21 | End: 2019-06-22 | Stop reason: HOSPADM

## 2019-06-21 RX ORDER — MINOCYCLINE HYDROCHLORIDE 100 MG/1
100 CAPSULE ORAL 2 TIMES DAILY
Status: DISCONTINUED | OUTPATIENT
Start: 2019-06-21 | End: 2019-06-22 | Stop reason: HOSPADM

## 2019-06-21 RX ORDER — NAPROXEN 500 MG/1
500 TABLET ORAL 2 TIMES DAILY PRN
Status: DISCONTINUED | OUTPATIENT
Start: 2019-06-21 | End: 2019-06-22 | Stop reason: HOSPADM

## 2019-06-21 RX ORDER — MONTELUKAST SODIUM 10 MG/1
10 TABLET ORAL AT BEDTIME
Status: DISCONTINUED | OUTPATIENT
Start: 2019-06-21 | End: 2019-06-22 | Stop reason: HOSPADM

## 2019-06-21 RX ORDER — SODIUM CHLORIDE 9 MG/ML
INJECTION, SOLUTION INTRAVENOUS CONTINUOUS
Status: DISCONTINUED | OUTPATIENT
Start: 2019-06-21 | End: 2019-06-22

## 2019-06-21 RX ORDER — HYDROXYZINE HYDROCHLORIDE 25 MG/1
25-50 TABLET, FILM COATED ORAL
Status: DISCONTINUED | OUTPATIENT
Start: 2019-06-21 | End: 2019-06-22 | Stop reason: HOSPADM

## 2019-06-21 RX ORDER — CEFTRIAXONE 1 G/1
1 INJECTION, POWDER, FOR SOLUTION INTRAMUSCULAR; INTRAVENOUS EVERY 24 HOURS
Status: DISCONTINUED | OUTPATIENT
Start: 2019-06-21 | End: 2019-06-22 | Stop reason: HOSPADM

## 2019-06-21 RX ORDER — NALOXONE HYDROCHLORIDE 0.4 MG/ML
.1-.4 INJECTION, SOLUTION INTRAMUSCULAR; INTRAVENOUS; SUBCUTANEOUS
Status: DISCONTINUED | OUTPATIENT
Start: 2019-06-21 | End: 2019-06-22 | Stop reason: HOSPADM

## 2019-06-21 RX ORDER — ACETAMINOPHEN 325 MG/1
650 TABLET ORAL EVERY 4 HOURS PRN
Status: DISCONTINUED | OUTPATIENT
Start: 2019-06-21 | End: 2019-06-22 | Stop reason: HOSPADM

## 2019-06-21 RX ORDER — LISINOPRIL 2.5 MG/1
5 TABLET ORAL DAILY
Status: DISCONTINUED | OUTPATIENT
Start: 2019-06-22 | End: 2019-06-22 | Stop reason: HOSPADM

## 2019-06-21 RX ORDER — MONTELUKAST SODIUM 10 MG/1
10 TABLET ORAL
COMMUNITY
End: 2022-04-06

## 2019-06-21 RX ORDER — SULFAMETHOXAZOLE/TRIMETHOPRIM 800-160 MG
2 TABLET ORAL 2 TIMES DAILY
Status: ON HOLD | COMMUNITY
Start: 2019-06-19 | End: 2019-06-22

## 2019-06-21 RX ADMIN — MINOCYCLINE HYDROCHLORIDE 100 MG: 100 CAPSULE ORAL at 21:27

## 2019-06-21 RX ADMIN — VANCOMYCIN HYDROCHLORIDE 1500 MG: 10 INJECTION, POWDER, LYOPHILIZED, FOR SOLUTION INTRAVENOUS at 15:00

## 2019-06-21 RX ADMIN — SODIUM CHLORIDE: 9 INJECTION, SOLUTION INTRAVENOUS at 14:26

## 2019-06-21 RX ADMIN — SODIUM CHLORIDE 1000 ML: 9 INJECTION, SOLUTION INTRAVENOUS at 16:51

## 2019-06-21 RX ADMIN — FLUOXETINE 40 MG: 20 CAPSULE ORAL at 19:41

## 2019-06-21 RX ADMIN — VANCOMYCIN HYDROCHLORIDE 1500 MG: 10 INJECTION, POWDER, LYOPHILIZED, FOR SOLUTION INTRAVENOUS at 22:39

## 2019-06-21 RX ADMIN — CEFTRIAXONE 1 G: 1 INJECTION, POWDER, FOR SOLUTION INTRAMUSCULAR; INTRAVENOUS at 16:47

## 2019-06-21 ASSESSMENT — ENCOUNTER SYMPTOMS
VOMITING: 0
APPETITE CHANGE: 0
DECREASED CONCENTRATION: 0
LIGHT-HEADEDNESS: 0
SHORTNESS OF BREATH: 0
VOMITING: 0
FEVER: 1
CONSTIPATION: 0
HEADACHES: 0
SHORTNESS OF BREATH: 0
NECK PAIN: 0
JOINT SWELLING: 0
CHILLS: 1
DIFFICULTY URINATING: 0
WEAKNESS: 0
CHEST TIGHTNESS: 0
COLOR CHANGE: 1
MYALGIAS: 1
COUGH: 0
SORE THROAT: 0
TREMORS: 0
MYALGIAS: 1
DYSURIA: 0
ARTHRALGIAS: 0
NECK STIFFNESS: 0
ADENOPATHY: 1
NERVOUS/ANXIOUS: 0
PALPITATIONS: 0
WEAKNESS: 0
FATIGUE: 0
CHILLS: 0
ACTIVITY CHANGE: 0
COLOR CHANGE: 1
TROUBLE SWALLOWING: 0
NAUSEA: 0
ABDOMINAL PAIN: 0
UNEXPECTED WEIGHT CHANGE: 0
FEVER: 1
JOINT SWELLING: 1
DIARRHEA: 0
CHILLS: 1
NAUSEA: 0
HYPERACTIVE: 0
NUMBNESS: 0
SLEEP DISTURBANCE: 0
ARTHRALGIAS: 0
FEVER: 0

## 2019-06-21 ASSESSMENT — MIFFLIN-ST. JEOR
SCORE: 1789.89
SCORE: 1795.34

## 2019-06-21 ASSESSMENT — ACTIVITIES OF DAILY LIVING (ADL)
AMBULATION: 0-->INDEPENDENT
TRANSFERRING: 0-->INDEPENDENT
SWALLOWING: 0-->SWALLOWS FOODS/LIQUIDS WITHOUT DIFFICULTY
FALL_HISTORY_WITHIN_LAST_SIX_MONTHS: NO
ADLS_ACUITY_SCORE: 10
RETIRED_EATING: 0-->INDEPENDENT
RETIRED_COMMUNICATION: 0-->UNDERSTANDS/COMMUNICATES WITHOUT DIFFICULTY
BATHING: 0-->INDEPENDENT
DRESS: 0-->INDEPENDENT
TOILETING: 0-->INDEPENDENT
COGNITION: 0 - NO COGNITION ISSUES REPORTED

## 2019-06-21 ASSESSMENT — PAIN DESCRIPTION - DESCRIPTORS: DESCRIPTORS: SORE

## 2019-06-21 NOTE — ED PROVIDER NOTES
[place] EMERGENCY DEPARTMENT ([facility])  6/21/19 [facility]  History     Chief Complaint   Patient presents with     Wound Check     The history is provided by the patient.     Trevin Evans is a 43 year old adult who presents with lymphangitis of the right upper extremity from a third fingertip wound.  He reports that he was seen by physician 2 days ago, was diagnosed with a paronychia and was placed on oral antibiotics, I believe Bactrim.  There was no drainage done.  The patient reports that he had a lesion on the tip of the finger that he use a fingernail clipper to open and then there was green purulent drainage.  There is no tenderness or swelling there to suggest abscess but he does have streaky red lymphangitis into the axilla with axillary adenopathy.  No systemic symptoms of illness.  He was seen back in the primary clinic today and advised to come here.  My recommendation will be overnight admission for IV antibiotics.    Last Tdap was in 2016.     This part of the document was transcribed by Santy Romero Scribe.      I have reviewed the Medications, Allergies, Past Medical and Surgical History, and Social History in the DOZ system.  PAST MEDICAL HISTORY:   Past Medical History:   Diagnosis Date     Broken foot 1-2012     Depressive disorder      Hypertension      Migraines since [employer]     Patient overweight     BMI 56     Seasonal allergies     spring       PAST SURGICAL HISTORY:   Past Surgical History:   Procedure Laterality Date     C OPEN RX ANKLE DISLOCATN+FIXATN  01/09    right     RELEASE CARPAL TUNNEL Left 12/19/2017    Procedure: RELEASE CARPAL TUNNEL;  Left Open Carpal Tunnel Release  ;  Surgeon: Khoi Reyes MD;  Location: [facility] OR       FAMILY HISTORY:   Family History   Problem Relation Age of Onset     Arthritis Mother      Alcohol/Drug Mother      Asthma Mother      Depression Mother         and many on mom's side of family     Hypertension Mother      Thyroid  "Disease Mother         s/p removal     Alcohol/Drug Father      Hypertension Father      Obesity Father      Stomach Cancer Father      Gastrointestinal Disease Sister         CROHN disease     Hypertension Maternal Uncle      Hypertension Maternal Grandfather      Arthritis Maternal Grandfather      Cancer No family hx of        SOCIAL HISTORY:   Social History     Tobacco Use     Smoking status: Former Smoker     Packs/day: 1.00     Years: 12.00     Pack years: 12.00     Types: Cigarettes     Smokeless tobacco: Never Used     Tobacco comment: quit 6/29/13   Substance Use Topics     Alcohol use: Yes     Comment: Occ       Patient's Medications   New Prescriptions    No medications on file   Previous Medications    AMPHETAMINE-DEXTROAMPHETAMINE (ADDERALL) 20 MG TABLET    Take 1 tablet (20 mg) by mouth 2 times daily    ETONOGESTREL (IMPLANON/NEXPLANON) 68 MG IMPL    1 each (68 mg) by Subdermal route once for 1 dose    FLUOXETINE (PROZAC) 40 MG CAPSULE    Take 40 mg by mouth 2 times daily    FLUTICASONE (FLONASE) 50 MCG/ACT SPRAY    Spray 2 sprays into both nostrils daily    HYDROXYZINE (ATARAX) 25 MG TABLET    Take 1-2 tablets (25-50 mg) by mouth nightly as needed for anxiety or other (sleep)    LISINOPRIL (PRINIVIL/ZESTRIL) 5 MG TABLET    Take 1 tablet (5 mg) by mouth daily    MINOCYCLINE (MINOCIN/DYNACIN) 100 MG CAPSULE    Take 1 capsule (100 mg) by mouth 2 times daily    MONTELUKAST (SINGULAIR) 10 MG TABLET    Take 10 mg by mouth nightly as needed    NAPROXEN (NAPROSYN) 500 MG TABLET    Take 1 tablet (500 mg) by mouth 2 times daily as needed for moderate pain    NEEDLE, DISP, 18G X 1\" MISC    1 each once a week    NEEDLE, DISP, 23G X 1\" MISC    Use to inject testosterone into muscle weekly    SHARPS CONTAINER MISC    Dispose sharps    SULFAMETHOXAZOLE-TRIMETHOPRIM (BACTRIM DS/SEPTRA DS) 800-160 MG TABLET    Take 2 tablets by mouth 2 times daily     SYRINGE, DISPOSABLE, (BD TUBERCULIN SYRINGE) 1 ML MISC    BD 1ml " "Tuberculing syringe SLIP TIP (no needle attached). Use to administer IM medications as instructed    TESTOSTERONE CYPIONATE (DEPOTESTOSTERONE) 200 MG/ML INJECTION    Inject 0.4 mLs (80 mg) into the muscle once a week In cottonseed oil ok   Modified Medications    No medications on file   Discontinued Medications    FLUOXETINE (PROZAC) 20 MG CAPSULE    60 mg by Oral or Feeding Tube route daily    FLUOXETINE (PROZAC) 40 MG CAPSULE    Take 2 capsules (80 mg) by mouth daily    LISINOPRIL (PRINIVIL/ZESTRIL) 20 MG TABLET    Take 10 mg by mouth daily    MONTELUKAST (SINGULAIR) 10 MG TABLET    Take 1 tablet (10 mg) by mouth nightly as needed        No Known Allergies   Review of Systems   Constitutional: Negative for chills and fever.   Musculoskeletal: Negative for joint swelling.        Redness streaking up arm from an infected right third finger   Skin: Positive for color change and rash.   Hematological: Positive for adenopathy.   All other systems reviewed and are negative.      Physical Exam   BP: 146/84  Pulse: 104  Temp: 99  F (37.2  C)  Resp: 16  Height: 165.1 cm (5' 5\")  Weight: 112.9 kg (249 lb)  SpO2: 100 %      Physical Exam   Constitutional: He appears well-developed and well-nourished. No distress.   Musculoskeletal:        Arms:       Hands:  Lymphangitis from the tip of the right long finger where the original small lesion is into the axilla.  No tenderness of the fingertip to suggest underlying abscess   Lymphadenopathy:     He has axillary adenopathy.        Right axillary: Pectoral adenopathy present.   Nursing note and vitals reviewed.      ED Course        Procedures        Medications   sodium chloride 0.9% infusion ( Intravenous New Bag 6/21/19 1426)   vancomycin 1500 mg in 0.9% NaCl 250 ml intermittent infusion 1,500 mg (1,500 mg Intravenous Given 6/21/19 1500)   vancomycin 1500 mg in 0.9% NaCl 250 ml intermittent infusion 1,500 mg (has no administration in time range)            Labs Ordered and " Resulted from Time of ED Arrival Up to the Time of Departure from the ED   CBC WITH PLATELETS DIFFERENTIAL - Abnormal; Notable for the following components:       Result Value    WBC 15.4 (*)     RBC Count 5.52 (*)     Hemoglobin 16.3 (*)     Hematocrit 50.7 (*)     Absolute Neutrophil 12.5 (*)     All other components within normal limits   BASIC METABOLIC PANEL - Abnormal; Notable for the following components:    Glucose 106 (*)     All other components within normal limits            Assessments & Plan (with Medical Decision Making)   43-year-old right-hand-dominant patient with acute lymphangitis originating from a fingertip wound on the right long finger.  No systemic symptoms no drainable abscess.  His white count is elevated but afebrile and blood pressure is maintained.  A dose of vancomycin was given and he will be admitted to the family medicine service.    I have reviewed the nursing notes.    I have reviewed the findings, diagnosis, plan and need for follow up with the patient.       Medication List      There are no discharge medications for this visit.         Final diagnoses:   Lymphangitis       6/21/2019   [facility], [place], EMERGENCY DEPARTMENT     Meng Lynn MD  06/21/19 5584

## 2019-06-21 NOTE — TELEPHONE ENCOUNTER
RN attempted reaching patient to assist in scheduling a visit. Patient did not answer, RN left voicemail stating that I was calling to assist in scheduling an office visit and gave callback number and that any staff can assist him with that.     Also sent a [employer] message with details for my reasoning for recommending a visit.  Denisa Mcaario RN

## 2019-06-21 NOTE — PHARMACY-VANCOMYCIN DOSING SERVICE
Pharmacy Vancomycin Initial Note  Date of Service 2019  Patient's  1975  43 year old, adult    Indication: Lymphangitis    Current estimated CrCl = Estimated Creatinine Clearance (based on SCr of 0.9 mg/dL)  Female: 103.1 mL/min  Male: 125.4 mL/min    Creatinine for last 3 days  2019:  2:26 PM Creatinine 0.90 mg/dL    Recent Vancomycin Level(s) for last 3 days  No results found for requested labs within last 72 hours.      Vancomycin IV Administrations (past 72 hours)                   vancomycin 1500 mg in 0.9% NaCl 250 ml intermittent infusion 1,500 mg (mg) 1,500 mg Given 19 1500                Nephrotoxins and other renal medications (From now, onward)    Start     Dose/Rate Route Frequency Ordered Stop    19 2300  vancomycin 1500 mg in 0.9% NaCl 250 ml intermittent infusion 1,500 mg      15 mg/kg × 112.9 kg  over 90 Minutes Intravenous EVERY 8 HOURS 19 1509      19 1411  vancomycin 1500 mg in 0.9% NaCl 250 ml intermittent infusion 1,500 mg      15 mg/kg × 112.9 kg  over 90 Minutes Intravenous ONCE 19 1410            Contrast Orders - past 72 hours (72h ago, onward)    None                Plan:  1.  Start vancomycin  1500 mg IV q8h.   2.  Goal Trough Level: 10-15 mg/L   3.  Pharmacy will check trough levels as appropriate in 1-3 Days.    4. Serum creatinine levels will be ordered daily for the first week of therapy and at least twice weekly for subsequent weeks.    5. Barker method utilized to dose vancomycin therapy: Method 2    Maureen Horner, PharmD  Pager [phone]

## 2019-06-21 NOTE — ED TRIAGE NOTES
Patient presents ambulatory from home with infection to right middle finger. Patient was seen at the ED last week and given antibiotics which he has finished but no improvement.

## 2019-06-21 NOTE — H&P
"[facility], [facility] History and Physical        Date of Admission:  6/21/2019  Date of Service: 6/21/19         HPI      Chief Complaint   Infection going up my arm      History of Present Illness   Trevin is a 43 year old transmale with past medical history of hypertension, anxiety, ADHD, metabolic syndrome, and on testoserone HRT, who presents emergency room today with right hand wound.  He is right-hand dominant.    He reports that he was seen by physician 2 days ago at outside institution and diagnosed with paronychia on the right third finger.  Was given Bactrim and did not have drainage.  He notes that he used fingernail clipper to open the lesion and there was green purulent drainage.  He denies tenderness or swelling to suggest abscess, but notes he does have red streaks running up his arm to the axilla with axillary lymphadenopathy.  Denies fever or chills.   Notes last tetanus shot was in 2016.    Today, he reached out to primary care physician via my chart.  Clinic RN attempted to call patient to schedule a visit as \"his infected finger had spread up to the armpit \".  In the message he notes he took for doses of antibiotics, but now has pain in axilla.    In the emergency department, he was seen and evaluated.  Found to be afebrile but tachycardic.  BMP unremarkable.   CBC was noteworthy for leukocytosis with left shift, and hemoglobin of 16.3. He was given vancomycin and put on 75 mL 0.9 NS. Family medicine consulted for concern of failing outpatient antibiotics.       Denies drug use, no new sexual partners, no hx of STI, doesn't use finger for penatrative sex. No cats, has a dog but hasn't been around it since prior to infection starting. No gardening work or exposures to soil.          History:   Review of Systems     Review of Systems   Constitutional: Positive for chills and fever. Negative for activity change, appetite change, fatigue and " unexpected weight change.   HENT: Negative for congestion, sore throat and trouble swallowing.    Respiratory: Negative for cough, chest tightness and shortness of breath.    Cardiovascular: Negative for chest pain, palpitations and leg swelling.   Gastrointestinal: Negative for constipation, diarrhea, nausea and vomiting.   Endocrine: Negative for cold intolerance and heat intolerance.   Genitourinary: Negative for difficulty urinating and dysuria.   Musculoskeletal: Positive for myalgias. Negative for arthralgias, joint swelling, neck pain and neck stiffness.   Skin: Negative for rash.   Neurological: Negative for tremors, weakness, light-headedness and headaches.   Psychiatric/Behavioral: Negative for decreased concentration and sleep disturbance. The patient is not nervous/anxious and is not hyperactive.        Past Medical History    I have reviewed this patient's medical history and updated it with pertinent information if needed.   Past Medical History:   Diagnosis Date     Broken foot 1-2012     Depressive disorder      Hypertension      Migraines since [place] [facility]     Patient overweight     BMI 56     Seasonal allergies     spring        Past Surgical History   I have reviewed this patient's surgical history and updated it with pertinent information if needed.  Past Surgical History:   Procedure Laterality Date     C OPEN RX ANKLE DISLOCATN+FIXATN  01/09    right     RELEASE CARPAL TUNNEL Left 12/19/2017    Procedure: RELEASE CARPAL TUNNEL;  Left Open Carpal Tunnel Release  ;  Surgeon: Khoi Reyes MD;  Location:  OR        Social History   Social History     Tobacco Use     Smoking status: Former Smoker     Packs/day: 1.00     Years: 12.00     Pack years: 12.00     Types: Cigarettes     Smokeless tobacco: Never Used     Tobacco comment: quit 6/29/13   Substance Use Topics     Alcohol use: Yes     Comment: Occ     Drug use: Yes     Types: Marijuana     Comment: THC only       Family History   I have  "reviewed this patient's family history and updated it with pertinent information if needed.   Family History   Problem Relation Age of Onset     Arthritis Mother      Alcohol/Drug Mother      Asthma Mother      Depression Mother         and many on mom's side of family     Hypertension Mother      Thyroid Disease Mother         s/p removal     Alcohol/Drug Father      Hypertension Father      Obesity Father      Stomach Cancer Father      Gastrointestinal Disease Sister         CROHN disease     Hypertension Maternal Uncle      Hypertension Maternal Grandfather      Arthritis Maternal Grandfather      Cancer No family hx of        Prior to Admission Medications   Prior to Admission Medications   Prescriptions Last Dose Informant Patient Reported? Taking?   FLUoxetine (PROZAC) 40 MG capsule 2019 at AM Self Yes Yes   Sig: Take 40 mg by mouth 2 times daily   Needle, Disp, 23G X 1\" MISC  Self No No   Sig: Use to inject testosterone into muscle weekly   Sharps Container MISC  Self No No   Sig: Dispose sharps   amphetamine-dextroamphetamine (ADDERALL) 20 MG tablet   No No   Sig: Take 1 tablet (20 mg) by mouth 2 times daily   amphetamine-dextroamphetamine (ADDERALL) 20 MG tablet   No No   Sig: Take 1 tablet (20 mg) by mouth 2 times daily   amphetamine-dextroamphetamine (ADDERALL) 20 MG tablet 2019 at AM Self No Yes   Sig: Take 1 tablet (20 mg) by mouth 2 times daily   amphetamine-dextroamphetamine (ADDERALL) 20 MG tablet   No No   Sig: Take 1 tablet (20 mg) by mouth 2 times daily   amphetamine-dextroamphetamine (ADDERALL) 20 MG tablet   No No   Sig: Take 1 tablet (20 mg) by mouth 2 times daily   amphetamine-dextroamphetamine (ADDERALL) 20 MG tablet   No No   Sig: Take 1 tablet (20 mg) by mouth 2 times daily   etonogestrel (IMPLANON/NEXPLANON) 68 MG IMPL  Self Yes No   Si each (68 mg) by Subdermal route once for 1 dose   fluticasone (FLONASE) 50 MCG/ACT spray Past Month at Unknown time Self No Yes   Sig: Spray " "2 sprays into both nostrils daily   hydrOXYzine (ATARAX) 25 MG tablet 2019 at PM Self No Yes   Sig: Take 1-2 tablets (25-50 mg) by mouth nightly as needed for anxiety or other (sleep)   lisinopril (PRINIVIL/ZESTRIL) 5 MG tablet 2019 at AM Self No Yes   Sig: Take 1 tablet (5 mg) by mouth daily   minocycline (MINOCIN/DYNACIN) 100 MG capsule 2019 at AM Self No Yes   Sig: Take 1 capsule (100 mg) by mouth 2 times daily   montelukast (SINGULAIR) 10 MG tablet Past Week at Unknown time Self Yes Yes   Sig: Take 10 mg by mouth nightly as needed   naproxen (NAPROSYN) 500 MG tablet Past Week at Unknown time Self No Yes   Sig: Take 1 tablet (500 mg) by mouth 2 times daily as needed for moderate pain   needle, disp, 18G X 1\" MISC  Self No No   Si each once a week   sulfamethoxazole-trimethoprim (BACTRIM DS/SEPTRA DS) 800-160 MG tablet 2019 at AM Self Yes Yes   Sig: Take 2 tablets by mouth 2 times daily    syringe, disposable, (BD TUBERCULIN SYRINGE) 1 ML MISC  Self No No   Sig: BD 1ml Tuberculing syringe SLIP TIP (no needle attached). Use to administer IM medications as instructed   testosterone cypionate (DEPOTESTOSTERONE) 200 MG/ML injection 2019 Self No Yes   Sig: Inject 0.4 mLs (80 mg) into the muscle once a week In cottonseed oil ok      Facility-Administered Medications: None     Allergies   No Known Allergies        Physical Exam      Vital Signs: Temp: 99.3  F (37.4  C) Temp src: Oral BP: 133/78 Pulse: 92 Heart Rate: 96 Resp: 16 SpO2: 99 % O2 Device: None (Room air)    Weight: 249 lbs 0 oz    Physical Exam   Constitutional: He is oriented to person, place, and time. He appears well-developed and well-nourished. No distress.   HENT:   Head: Normocephalic and atraumatic.   Eyes: Conjunctivae are normal. Right eye exhibits no discharge. Left eye exhibits no discharge.   Neck: No JVD present.   Cardiovascular: Regular rhythm and normal heart sounds.   No murmur heard.  Tachycardic  "   Pulmonary/Chest: Effort normal and breath sounds normal. No stridor. No respiratory distress. He has no rales.   Abdominal: He exhibits no mass. There is no tenderness.   obese   Musculoskeletal: Normal range of motion.   Lymphadenopathy:     He has no cervical adenopathy.   Neurological: He is alert and oriented to person, place, and time. No cranial nerve deficit.   Skin: Skin is warm. Capillary refill takes less than 2 seconds.        Psychiatric: He has a normal mood and affect.       Assessment & Plan      Trevin is a 43 year old adult admitted on 6/21/2019. He presents with lymphangitis after originally having purulent paronychia that did not respond to PO bactrim.       Right Paronychia Complicated by Lymphangitis- Failed Outpatient Abx  - Leukocytosis but afebrile on admission. S/p 2 days bactrim. No animal bites or exposure to animals, nor any exposure to soil, gardening or other rarer causes of lymphadnenitis. Non nodularity form of lymphangitis based on exam. Will need to cover Staph, Strep, and GN. UTD on sexual health screening, doesn't use finger for penetrative sex, has had recent HIV.   Plan  IV ceftriaxone and vancomycin for now. Narrow as clinically indicated. Consider doxycycline for atypical causes of lymphangitis if not responding.   MRSA Nares Follow-up  Follow-up lactic acid  Tylenol and ibuprofen PRN for pain   0.9 NS bolus 1 L now. Further fluids by lactate and heart rate.   Pharm to dose vancomycin   CBC BMP daily    Chronic Conditions   Hypertension cont PTA lasix to start in AM. Will hold if soft pressures develop but stable for now  Anxiety PTA Prozac   ADHD holding adderal while inpatient. Do not want to mask tachycardia from disease  metabolic syndrome carb controlled diet. Last HbA1c 5. No need for repeat testing.   testoserone HRT shots due on Wednesdays       # Pain Assessment:  Current Pain Score 12/19/2017   Patient currently in pain? denies   Pain score (0-10) -   Pain  descriptors -   - Irma is experiencing pain due to lymphadenitis. Pain management was discussed and the plan was created in a collaborative fashion.         Diet: Combination Diet Regular Diet Adult  Fluids: 125 ml 0.9 NS overnight. End 0800.   DVT Prophylaxis: Pneumatic Compression Devices  Code Status: Full Code    Disposition Plan   Expected discharge: 1-3 days recommended to prior living arrangement once antibiotic plan established and SIRS/Sepsis treated. Dispo: Expected Discharge Date: 06/23/19        Entered: Joel Shah 06/21/2019, 7:16 PM   Information in the above section will display in the discharge planner report.    Discussed with and examined by faculty.    Joel Shah MD    [facility] Medicine Inpatient Service  [facility]   Pager: [phone]  Please see sticky note for cross cover information      Results:     Data   Recent Labs   Lab 06/21/19  1426   WBC 15.4*   HGB 16.3*   MCV 92         POTASSIUM 4.0   CHLORIDE 101   CO2 28   BUN 12   CR 0.90   ANIONGAP 6   CHARLIE 9.0   *

## 2019-06-21 NOTE — PHARMACY-ADMISSION MEDICATION HISTORY
Admission medication history interview status for the 6/21/2019 admission is complete. See Epic admission navigator for allergy information, pharmacy, prior to admission medications and immunization status.     Medication history interview sources:  Patient    Changes made to PTA medication list (reason)  Added: None  Deleted: None  Changed: None    Additional medication history information (including reliability of information, actions taken by pharmacist):  -Patient was put on 10 day course of Bactrim 2 tablets BID started 6/19 PM for Cellulitis. Last dose 6/21 AM      Prior to Admission medications    Medication Sig Last Dose Taking? Auth Provider   amphetamine-dextroamphetamine (ADDERALL) 20 MG tablet Take 1 tablet (20 mg) by mouth 2 times daily 6/21/2019 at AM Yes Liz Baker MD   FLUoxetine (PROZAC) 40 MG capsule Take 40 mg by mouth 2 times daily 6/21/2019 at AM Yes Unknown, Entered By History   fluticasone (FLONASE) 50 MCG/ACT spray Spray 2 sprays into both nostrils daily Past Month at Unknown time Yes Liz Baker MD   hydrOXYzine (ATARAX) 25 MG tablet Take 1-2 tablets (25-50 mg) by mouth nightly as needed for anxiety or other (sleep) 6/20/2019 at PM Yes Liz Baker MD   lisinopril (PRINIVIL/ZESTRIL) 5 MG tablet Take 1 tablet (5 mg) by mouth daily 6/21/2019 at AM Yes Liz Baker MD   minocycline (MINOCIN/DYNACIN) 100 MG capsule Take 1 capsule (100 mg) by mouth 2 times daily 6/21/2019 at AM Yes Liz Baker MD   montelukast (SINGULAIR) 10 MG tablet Take 10 mg by mouth nightly as needed Past Week at Unknown time Yes Unknown, Entered By History   naproxen (NAPROSYN) 500 MG tablet Take 1 tablet (500 mg) by mouth 2 times daily as needed for moderate pain Past Week at Unknown time Yes Liz Baker MD   sulfamethoxazole-trimethoprim (BACTRIM DS/SEPTRA DS) 800-160 MG tablet Take 2 tablets by mouth 2 times daily  6/21/2019 at AM Yes Reported, Patient   testosterone cypionate (DEPOTESTOSTERONE) 200 MG/ML  "injection Inject 0.4 mLs (80 mg) into the muscle once a week In cottonseed oil ok 6/19/2019 Yes Liz Baker MD   etonogestrel (IMPLANON/NEXPLANON) 68 MG IMPL 1 each (68 mg) by Subdermal route once for 1 dose   Liz Baker MD   needle, disp, 18G X 1\" MISC 1 each once a week   Liz Baker MD   Needle, Disp, 23G X 1\" MISC Use to inject testosterone into muscle weekly   Lzi Baker MD   Sharps Container MISC Dispose sharps   Liz Baker MD   syringe, disposable, (BD TUBERCULIN SYRINGE) 1 ML MISC BD 1ml Tuberculing syringe SLIP TIP (no needle attached). Use to administer IM medications as instructed   Liz Baker MD         Medication history completed by:   Annabelle Grove  PharmD IV Student      "

## 2019-06-22 VITALS
TEMPERATURE: 98.5 F | BODY MASS INDEX: 41.48 KG/M2 | RESPIRATION RATE: 16 BRPM | OXYGEN SATURATION: 97 % | WEIGHT: 249 LBS | DIASTOLIC BLOOD PRESSURE: 76 MMHG | HEIGHT: 65 IN | HEART RATE: 74 BPM | SYSTOLIC BLOOD PRESSURE: 124 MMHG

## 2019-06-22 LAB
ANION GAP SERPL CALCULATED.3IONS-SCNC: 7 MMOL/L (ref 3–14)
BUN SERPL-MCNC: 8 MG/DL (ref 7–30)
CALCIUM SERPL-MCNC: 8.6 MG/DL (ref 8.5–10.1)
CHLORIDE SERPL-SCNC: 107 MMOL/L (ref 94–109)
CO2 SERPL-SCNC: 23 MMOL/L (ref 20–32)
CREAT SERPL-MCNC: 0.65 MG/DL (ref 0.52–1.04)
ERYTHROCYTE [DISTWIDTH] IN BLOOD BY AUTOMATED COUNT: 13.1 % (ref 10–15)
GFR SERPL CREATININE-BSD FRML MDRD: >90 ML/MIN/{1.73_M2}
GLUCOSE SERPL-MCNC: 98 MG/DL (ref 70–99)
HCT VFR BLD AUTO: 47.4 % (ref 35–47)
HGB BLD-MCNC: 15.4 G/DL (ref 11.7–15.7)
MCH RBC QN AUTO: 29.6 PG (ref 26.5–33)
MCHC RBC AUTO-ENTMCNC: 32.5 G/DL (ref 31.5–36.5)
MCV RBC AUTO: 91 FL (ref 78–100)
PLATELET # BLD AUTO: 182 10E9/L (ref 150–450)
POTASSIUM SERPL-SCNC: 4 MMOL/L (ref 3.4–5.3)
RBC # BLD AUTO: 5.2 10E12/L (ref 3.8–5.2)
SODIUM SERPL-SCNC: 136 MMOL/L (ref 133–144)
WBC # BLD AUTO: 11.6 10E9/L (ref 4–11)

## 2019-06-22 PROCEDURE — 25800030 ZZH RX IP 258 OP 636: Performed by: STUDENT IN AN ORGANIZED HEALTH CARE EDUCATION/TRAINING PROGRAM

## 2019-06-22 PROCEDURE — 80202 ASSAY OF VANCOMYCIN: CPT | Performed by: FAMILY MEDICINE

## 2019-06-22 PROCEDURE — 85027 COMPLETE CBC AUTOMATED: CPT | Performed by: STUDENT IN AN ORGANIZED HEALTH CARE EDUCATION/TRAINING PROGRAM

## 2019-06-22 PROCEDURE — 25000132 ZZH RX MED GY IP 250 OP 250 PS 637: Performed by: STUDENT IN AN ORGANIZED HEALTH CARE EDUCATION/TRAINING PROGRAM

## 2019-06-22 PROCEDURE — 36415 COLL VENOUS BLD VENIPUNCTURE: CPT | Performed by: STUDENT IN AN ORGANIZED HEALTH CARE EDUCATION/TRAINING PROGRAM

## 2019-06-22 PROCEDURE — 80048 BASIC METABOLIC PNL TOTAL CA: CPT | Performed by: STUDENT IN AN ORGANIZED HEALTH CARE EDUCATION/TRAINING PROGRAM

## 2019-06-22 PROCEDURE — 25000128 H RX IP 250 OP 636: Performed by: STUDENT IN AN ORGANIZED HEALTH CARE EDUCATION/TRAINING PROGRAM

## 2019-06-22 RX ORDER — AMOXICILLIN 500 MG/1
1000 CAPSULE ORAL 2 TIMES DAILY
Qty: 24 CAPSULE | Refills: 0 | Status: SHIPPED | OUTPATIENT
Start: 2019-06-22 | End: 2019-09-12

## 2019-06-22 RX ORDER — DOXYCYCLINE 100 MG/1
100 CAPSULE ORAL 2 TIMES DAILY
Qty: 12 CAPSULE | Refills: 0 | Status: SHIPPED | OUTPATIENT
Start: 2019-06-22 | End: 2019-09-12

## 2019-06-22 RX ADMIN — FLUOXETINE 40 MG: 20 CAPSULE ORAL at 08:12

## 2019-06-22 RX ADMIN — LISINOPRIL 5 MG: 2.5 TABLET ORAL at 08:12

## 2019-06-22 RX ADMIN — SODIUM CHLORIDE: 9 INJECTION, SOLUTION INTRAVENOUS at 02:46

## 2019-06-22 RX ADMIN — VANCOMYCIN HYDROCHLORIDE 1500 MG: 10 INJECTION, POWDER, LYOPHILIZED, FOR SOLUTION INTRAVENOUS at 08:11

## 2019-06-22 RX ADMIN — MINOCYCLINE HYDROCHLORIDE 100 MG: 100 CAPSULE ORAL at 08:12

## 2019-06-22 ASSESSMENT — ACTIVITIES OF DAILY LIVING (ADL)
ADLS_ACUITY_SCORE: 10

## 2019-06-22 NOTE — DISCHARGE SUMMARY
[facility], [facility] Discharge Summary- Basil  Service    Date of Admission:  6/21/2019  Date of Service: 6/22/2019  Date of Discharge:  6/22/2019  Discharging Attending Provider: Dr. Monique   Discharge Team: [facility] Adult Hospitalist    Discharge Diagnoses   # Paronychia  # Lymphangitis     Follow-ups Needed After Discharge   - Consider CBC     Hospital Course   Trevin Evans 43 is a year old adult admitted on 6/21/2019. He presents with lymphangitis after originally having purulent paronychia that did not respond to PO bactrim.     The following problems were addressed during his hospitalization:    Right Paronychia Complicated by Lymphangitis- Failed Outpatient Abx  Patient seen 2 days prior to admission by outside ER physician.  Followed up on day of admission at primary care clinic and noted that he had worsening erythema, and lymphangitis.  Had taken 4 doses of Bactrim.  Was sent to ER/hospital for IV antibiotics.  Initially afebrile, but with leukocytosis.  Lactic acid normal, and less concern for sepsis.  Pain was controlled on oral Tylenol and ibuprofen.  Was dosed with vancomycin and ceftriaxone for 24 hours with marked improvement.  On day of discharge, leukocytosis had improved, patient remained afebrile and vitally stable.  IV antibiotics were changed to amoxicillin 1 g twice daily and doxycycline 100 mg twice daily for 6 additional days to complete 7-day course.  Patient is up-to-date on Tdap.    Chronic Conditions   Hypertension- continue home Lasix   Anxiety- Continued Prozac  ADHD-held while inpatient, and continued at home  metabolic syndrome -carb controlled diet. Last HbA1c 5.   testoserone HRT shots due on Wednesdays     # Discharge Pain Plan:   - Patient currently has NO PAIN and is not being prescribed pain medications on discharge.    Consultations This Hospital Stay   PHARMACY TO DOSE VANCO  MEDICATION HISTORY IP PHARMACY CONSULT    Code  Status   Full Code       The patient was discussed with and seen by Dr. Monique.    DO [facility]  [facility]   Pager:[phone]  ___________________________________________________________________  Review of Systems:  CONSTITUTIONAL: NEGATIVE for fever, chills, change in weight  INTEGUMENTARY/SKIN: Improving erythema   EYES: NEGATIVE for vision changes or irritation  ENT/MOUTH: NEGATIVE for ear, mouth and throat problems  RESP: NEGATIVE for significant cough or SOB  BREAST: NEGATIVE for masses, tenderness or discharge  CV: NEGATIVE for chest pain, palpitations or peripheral edema  GI: NEGATIVE for nausea, abdominal pain, heartburn, or change in bowel habits  : NEGATIVE for frequency, dysuria, or hematuria  MUSCULOSKELETAL: NEGATIVE for significant arthralgias or myalgia  NEURO: NEGATIVE for weakness, dizziness or paresthesias  ENDOCRINE: NEGATIVE for temperature intolerance, skin/hair changes  HEME/ALLERGY: NEGATIVE for bleeding problems  PSYCHIATRIC: NEGATIVE for changes in mood or affect    Physical Exam   Vital Signs: Temp: 98.4  F (36.9  C) Temp src: Oral BP: 117/82 Pulse: 66 Heart Rate: 79 Resp: 16 SpO2: 100 % O2 Device: None (Room air)    Weight: 249 lbs 0 oz    Constitutional: He is oriented to person, place, and time. He appears well-developed and well-nourished. No distress.   HENT:   Head: Normocephalic and atraumatic.   Eyes: Conjunctivae are normal. Right eye exhibits no discharge. Left eye exhibits no discharge.   Neck: No JVD present.   Cardiovascular: Regular rhythm and normal heart sounds.   No murmur heard.   Pulmonary/Chest: Effort normal and breath sounds normal. No stridor. No respiratory distress. He has no rales.   Abdominal: He exhibits no mass. There is no tenderness.   obese   Musculoskeletal: Normal range of motion.   Lymphadenopathy:     He has no cervical adenopathy.   Neurological: He is alert and oriented to person, place,  and time. No cranial nerve deficit.   Skin: Skin is warm. Capillary refill takes less than 2 seconds. Improving right middle finger lesion. Improved erythema. Axilla tenderness improved.     Significant Results and Procedures   Most Recent 3 CBC's:  Recent Labs   Lab Test 06/22/19  0608 06/21/19  1426 12/19/17  1136   WBC 11.6* 15.4* 6.6   HGB 15.4 16.3* 17.4*   MCV 91 92 91    211 172     Most Recent 3 BMP's:  Recent Labs   Lab Test 06/22/19  0608 06/21/19  1426 12/18/17  1645  10/01/14  0909    135 137.8   < > 140   POTASSIUM 4.0 4.0 3.7   < > 4.3   CHLORIDE 107 101 102.6   < > 111*   CO2 23 28 27.7   < > 23   BUN 8 12 15.1   < > 15   CR 0.65 0.90 0.7   < > 0.90   ANIONGAP 7 6  --   --  6   CHARLIE 8.6 9.0 9.4   < > 9.3   GLC 98 106* 96.3   < > 83    < > = values in this interval not displayed.   ,   Results for orders placed or performed in visit on 05/27/16   CT Abdomen Pelvis w Contrast    Narrative    Exam: CT abdomen pelvis with contrast 5/27/2016 12:45 PM.    History: Generalized abdominal pain. Patient complains of intermittent  abdominal pain for several years. Ultrasound demonstrated gallstones  without evidence of cholecystitis. Liver enzymes are within normal  limits.    Comparison: Ultrasound, 5/27/2016.    Technique: CT images from the lung bases through the symphysis pubis  after the uneventful administration of IV and oral contrast.    Findings:     Punctate likely calcified granuloma of left lower lobe. Lung bases are  otherwise clear. Heart is nonenlarged.    4 mm hypodense focus of segment 4 a (series 3, image 108), too small  to characterize. Heterogeneous region of hypoenhancement along the  inferomedial margin of hepatic segment 5 (series 3, 187). The  gallbladder, spleen, adrenal glands, and pancreas are unremarkable.  8mm hypodensity of the right kidney midpole (series 3, image 183).  Urinary bladder, uterus and adnexa are unremarkable. The bowel and  intra-abdominal vasculature are  unremarkable. Prominent portacaval  lymph nodes measuring up to 12 mm (series 3, image 132). Otherwise, no  intra-abdominal or inguinal adenopathy.    No acute or suspicious osseous lesions.      Impression    Impression:   1. Previously identified cholelithiasis identified on same day  ultrasound is not appreciated on this examination. No evidence for  cholecystitis.  2. Heterogeneous region of hypoenhancement within the inferomedial  hepatic segment 5 correlates with heterogeneous hyperechoic signal on  same day ultrasound and is suggestive of focal fatty deposition or  hemangioma.    I have personally reviewed the examination and initial interpretation  and I agree with the findings.    MARY CAMACHO MD       Pending Results   These results will be followed up by PCP  Unresulted Labs Ordered in the Past 30 Days of this Admission     Date and Time Order Name Status Description    6/21/2019 1632 Blood culture Preliminary              Primary Care Physician   Liz Baker    Discharge Disposition   Discharged to home  Condition at discharge: Stable    Discharge Orders      Reason for your hospital stay    Lymphatic infection     Adult [facility] Follow-up and recommended labs and tests    Follow up with primary care provider, Liz Baker or other [facility] doc within 7 days for hospital follow- up.        Appointments on [place] and/or [facility] (with [facility] or [facility] provider or service). Call [phone] if you haven't heard regarding these appointments within 7 days of discharge.     Activity    Your activity upon discharge: activity as tolerated     Full Code     Diet    Follow this diet upon discharge: Orders Placed This Encounter      Combination Diet Regular Diet Adult     Discharge Medications   Current Discharge Medication List      START taking these medications    Details   amoxicillin (AMOXIL) 500 MG capsule Take 2 capsules (1,000 mg) by mouth 2 times daily for 6 days  Qty: 24 capsule, Refills: 0     Associated Diagnoses: Lymphangitis      doxycycline hyclate (VIBRAMYCIN) 100 MG capsule Take 1 capsule (100 mg) by mouth 2 times daily for 6 days  Qty: 12 capsule, Refills: 0    Associated Diagnoses: Lymphangitis         CONTINUE these medications which have NOT CHANGED    Details   amphetamine-dextroamphetamine (ADDERALL) 20 MG tablet Take 1 tablet (20 mg) by mouth 2 times daily  Qty: 60 tablet, Refills: 0    Associated Diagnoses: Attention deficit hyperactivity disorder (ADHD), combined type      FLUoxetine (PROZAC) 40 MG capsule Take 40 mg by mouth 2 times daily      fluticasone (FLONASE) 50 MCG/ACT spray Spray 2 sprays into both nostrils daily  Qty: 48 g, Refills: 3    Associated Diagnoses: Chronic rhinitis      hydrOXYzine (ATARAX) 25 MG tablet Take 1-2 tablets (25-50 mg) by mouth nightly as needed for anxiety or other (sleep)  Qty: 100 tablet, Refills: 1    Associated Diagnoses: SHANA (generalized anxiety disorder)      lisinopril (PRINIVIL/ZESTRIL) 5 MG tablet Take 1 tablet (5 mg) by mouth daily  Qty: 30 tablet, Refills: 1    Associated Diagnoses: Elevated blood pressure reading without diagnosis of hypertension      minocycline (MINOCIN/DYNACIN) 100 MG capsule Take 1 capsule (100 mg) by mouth 2 times daily  Qty: 180 capsule, Refills: 3    Associated Diagnoses: Acne vulgaris      montelukast (SINGULAIR) 10 MG tablet Take 10 mg by mouth nightly as needed      naproxen (NAPROSYN) 500 MG tablet Take 1 tablet (500 mg) by mouth 2 times daily as needed for moderate pain  Qty: 60 tablet, Refills: 1    Associated Diagnoses: Costochondritis      testosterone cypionate (DEPOTESTOSTERONE) 200 MG/ML injection Inject 0.4 mLs (80 mg) into the muscle once a week In cottonseed oil ok  Qty: 10 mL, Refills: 3    Associated Diagnoses: Gender identity disorder      etonogestrel (IMPLANON/NEXPLANON) 68 MG IMPL 1 each (68 mg) by Subdermal route once for 1 dose  Qty: 1 each, Refills: 0    Associated Diagnoses: Encounter for other  "contraceptive management      needle, disp, 18G X 1\" MISC 1 each once a week  Qty: 15 each, Refills: 3    Associated Diagnoses: Gender dysphoria      Needle, Disp, 23G X 1\" MISC Use to inject testosterone into muscle weekly  Qty: 15 each, Refills: 3    Comments: Please apply substitutes for achieve lower price. 3 month supply okay.  Associated Diagnoses: Gender dysphoria      Sharps Container MISC Dispose sharps  Qty: 1 each, Refills: 3    Associated Diagnoses: Gender dysphoria      syringe, disposable, (BD TUBERCULIN SYRINGE) 1 ML MISC BD 1ml Tuberculing syringe SLIP TIP (no needle attached). Use to administer IM medications as instructed  Qty: 15 each, Refills: 3    Associated Diagnoses: Gender identity disorder         STOP taking these medications       sulfamethoxazole-trimethoprim (BACTRIM DS/SEPTRA DS) 800-160 MG tablet Comments:   Reason for Stopping:             Allergies   No Known Allergies    "

## 2019-06-22 NOTE — PLAN OF CARE
"NEURO: A&O x4  RESPIRATORY: LS clear and equal bilateral  CARDIO: Apical pulse normal, VSS  GI/: Pt voiding without difficulties and bowel sounds active and audible.   SKIN: Redness (blanchable) to right hand and arm. No other concerns.   ACTIVITY: Patient independent with ADLs. Steady gait.   PAIN: C/O of pain in right arm only with irritation such as touch or movement. Otherwise no pain when resting.   LINES: IV right upper forearm infusing NS 125mL/hr  PLAN: IV abx tonight into tomorrow and possible discharge tomorrow. Will continue to monitor.   /84   Pulse 92   Temp 99  F (37.2  C) (Oral)   Resp 14   Ht 1.651 m (5' 5\")   Wt 112.9 kg (249 lb)   SpO2 99%   Breastfeeding? No   BMI 41.44 kg/m      "

## 2019-06-22 NOTE — PLAN OF CARE
Discharge instructions given with verbalization of understanding including new antibiotics. PIV removed. Patient safe for discharge home- friend providing transport.

## 2019-06-22 NOTE — PLAN OF CARE
"Patient alert and oriented. Right arm with redness, reports soreness. Receiving iv antibiotics. Declines pain meds at this time. /82 (BP Location: Right arm)   Pulse 66   Temp 98.4  F (36.9  C) (Oral)   Resp 16   Ht 1.651 m (5' 5\")   Wt 112.9 kg (249 lb)   SpO2 100%   Breastfeeding? No   BMI 41.44 kg/m      "

## 2019-06-22 NOTE — PROGRESS NOTES
ALY Evans is a 43 year old  who presents for   Chief Complaint   Patient presents with     RECHECK     from ER 6/19/19, started antibiotics then but infection seems to be going up arm from middle finger into armpit, noticed a lump in armpit this am      Trevin reports that he first noticed pain swelling in the middle finger of his right hand on Sunday (06/16/19) evening. On Tuesday (06/18/2019), he was able to express purulent green discharge from the tip of his finger. He presented to the ED on Wednesday (06/19/2019) where, per patient report, they were struggling to choose between oral and IV antibiotics. He was discharged on oral bactrim. Over the next two days, he took bactrim as perscribed. The pain and swelling in his right arm continued worsen, tracking up towards his shoulder. On Thursday (06/20/2019) evening, he noticed a painful mass in his armpit. He presented to clinic today due to the fact that this has not been responding to antibiotics.    Patient speaks English and so an  was not used  +++++++    Problem, Medication and Allergy Lists were reviewed and updated if needed..    Patient is an established patient of this clinic..         Review of Systems:   Review of Systems   Constitutional: Positive for chills and fever.   HENT: Negative for ear pain.    Eyes: Negative for visual disturbance.   Respiratory: Negative for shortness of breath.    Cardiovascular: Negative for chest pain.   Gastrointestinal: Negative for abdominal pain, nausea and vomiting.   Musculoskeletal: Positive for joint swelling and myalgias. Negative for arthralgias.   Skin: Positive for color change and rash.   Neurological: Negative for weakness and numbness.            Physical Exam:     Vitals:    06/21/19 1123   BP: 126/81   BP Location: Left arm   Patient Position: Sitting   Cuff Size: Adult Regular   Pulse: 95   Resp: 16   Temp: 99.4  F (37.4  C)   TempSrc: Oral   SpO2: 100%   Weight: 112.4  "kg (247 lb 12.8 oz)   Height: 1.651 m (5' 5\")     Body mass index is 41.24 kg/m .  Vitals were reviewed and were normal     Physical Exam   Constitutional: He appears well-developed and well-nourished. No distress.   HENT:   Head: Normocephalic and atraumatic.   MSK: Swollen right hand, mostly in 2nd and third digits.  + erythema.   Skin: Warm, non-diaphoretic. Streaks of erythema tracking up his right forearm and arm into his axilla. Exquisitely painful nodule in right axilla, though is mildly painful throughout right arm  Psychiatric: He has a normal mood and affect.       Results:   No testing ordered today    Assessment and Plan        Right Paronychia Complicated by Lymphangitis- Failed Outpatient Abx  Continuing to track up arm despite oral antibiotics. 2nd and 3rd digits demonstrating most significant swelling. Denies history of gardening. No recent animal bites, Currently afebrile but demonstrating systemic symptoms.   - Transfer to hospital for IV antibiotics, inpatient management       There are no discontinued medications.    Options for treatment and follow-up care were reviewed with the patient. Irma Evans  engaged in the decision making process and verbalized understanding of the options discussed and agreed with the final plan.    Bryant Ramirez, MS4    Preceptor Attestation:  I was present with the medical student who participated in the service and in the documentation of this note. I have verified the history and personally performed the physical exam and medical decision making. I have verified the content of the note, which accurately reflects my assessment of the patient and the plan of care.   Supervising Physician:  Grace Bennett MD  "

## 2019-06-25 ENCOUNTER — PATIENT OUTREACH (OUTPATIENT)
Dept: FAMILY MEDICINE | Facility: CLINIC | Age: 44
End: 2019-06-25

## 2019-06-25 NOTE — PROGRESS NOTES
[facility]: Post-Discharge Note  SITUATION                                                      Admission:    Admission Date: 06/21/19   Reason for Admission: Lymphangitis   Discharge:   Discharge Date: 06/22/19  Discharge Diagnosis: Lymphangitis   Discharge Service: [facility]  Discharge Plan: home     BACKGROUND                                                      Trevin Vazquez is a year old adult admitted on 6/21/2019. He presents with lymphangitis after originally having purulent paronychia that did not respond to PO bactrim.      The following problems were addressed during his hospitalization:     Right Paronychia Complicated by Lymphangitis- Failed Outpatient Abx  Patient seen 2 days prior to admission by outside ER physician.  Followed up on day of admission at primary care clinic and noted that he had worsening erythema, and lymphangitis.  Had taken 4 doses of Bactrim.  Was sent to ER/hospital for IV antibiotics.  Initially afebrile, but with leukocytosis.  Lactic acid normal, and less concern for sepsis.  Pain was controlled on oral Tylenol and ibuprofen.  Was dosed with vancomycin and ceftriaxone for 24 hours with marked improvement.  On day of discharge, leukocytosis had improved, patient remained afebrile and vitally stable.  IV antibiotics were changed to amoxicillin 1 g twice daily and doxycycline 100 mg twice daily for 6 additional days to complete 7-day course.  Patient is up-to-date on Tdap.     Chronic Conditions   Hypertension- continue home Lasix   Anxiety- Continued Prozac  ADHD-held while inpatient, and continued at home  metabolic syndrome -carb controlled diet. Last HbA1c 5.   testoserone HRT shots due on Wednesdays     ASSESSMENT      Discharge Assessment  Patient reports symptoms are: Improved  Does the patient have all of their medications?: Yes  Does patient know what their new medications are for?: Yes  Does patient have a follow-up appointment scheduled?: Yes  Does  patient have any other questions or concerns?: No       Pt reports he is feeling much better. Finger is feeling good and armpit is feeling much better. No concerns     PLAN                                                      Outpatient Plan:  Follow up with primary care provider, Liz Baker or other [facility] priscilla within 7 days for hospital follow- up    Future Appointments   Date Time Provider Department Center   6/27/2019  3:40 PM [facility]       Annabelle Dawn RN

## 2019-06-27 ENCOUNTER — OFFICE VISIT (OUTPATIENT)
Dept: FAMILY MEDICINE | Facility: CLINIC | Age: 44
End: 2019-06-27
Payer: COMMERCIAL

## 2019-06-27 VITALS
SYSTOLIC BLOOD PRESSURE: 123 MMHG | DIASTOLIC BLOOD PRESSURE: 87 MMHG | RESPIRATION RATE: 16 BRPM | BODY MASS INDEX: 41.48 KG/M2 | TEMPERATURE: 98.4 F | HEIGHT: 65 IN | HEART RATE: 102 BPM | WEIGHT: 249 LBS | OXYGEN SATURATION: 98 %

## 2019-06-27 DIAGNOSIS — Z09 HOSPITAL DISCHARGE FOLLOW-UP: ICD-10-CM

## 2019-06-27 DIAGNOSIS — I89.1 LYMPHANGITIS: Primary | ICD-10-CM

## 2019-06-27 DIAGNOSIS — L03.011 PARONYCHIA OF RIGHT MIDDLE FINGER: ICD-10-CM

## 2019-06-27 LAB
% GRANULOCYTES: 65.5 %G (ref 40–75)
BACTERIA SPEC CULT: NO GROWTH
GRANULOCYTES #: 8.1 K/UL (ref 1.6–8.3)
HCT VFR BLD AUTO: 51.2 % (ref 35–47)
HEMOGLOBIN: 16.1 G/DL (ref 11.7–15.7)
LYMPHOCYTES # BLD AUTO: 3.2 K/UL (ref 0.8–5.3)
LYMPHOCYTES NFR BLD AUTO: 25.7 %L (ref 20–48)
Lab: NORMAL
MCH RBC QN AUTO: 29.9 PG (ref 26.5–35)
MCHC RBC AUTO-ENTMCNC: 31.4 G/DL (ref 32–36)
MCV RBC AUTO: 95.1 FL (ref 78–100)
MID #: 1.1 K/UL (ref 0–2.2)
MID %: 8.8 %M (ref 0–20)
PLATELET # BLD AUTO: 277 K/UL (ref 150–450)
RBC # BLD AUTO: 5.38 M/UL (ref 3.8–5.2)
SPECIMEN SOURCE: NORMAL
WBC # BLD AUTO: 12.3 K/UL (ref 4–11)

## 2019-06-27 ASSESSMENT — MIFFLIN-ST. JEOR: SCORE: 1795.34

## 2019-06-27 NOTE — PROGRESS NOTES
"  Hospitalization Follow-up Visit         [facility]       Hospital Follow-up Visit:    Hospital:  [facility]   Date of Admission: 6/21/19  Date of Discharge: 6/22/19  Reason(s) for Admission: Paronychia and Lymphangitis            Problems taking medications regularly:  None       Post Discharge Medication Reconciliation: discharge medications reconciled, continue medications without change.       Problems adhering to non-medication therapy:  None       Medications reviewed by: by myself. Findings include correct med list.    Summary of hospitalization:  [facility] discharge summary reviewed  Diagnostic Tests/Treatments reviewed.  Follow up needed: none  Other Healthcare Providers Involved in Patient s Care:         None  Update since discharge: improved.   Plan of care communicated with patient       On Amoxicillin and doxy to complete course.   Some loose BMs but not overt diarrhea.     Drives for [employer],                   Review of Systems:   CONSTITUTIONAL: no fatigue, no unexpected change in weight  SKIN: no worrisome rashes, no worrisome moles, no worrisome lesions  EYES: no acute vision problems or changes  ENT: no ear problems, no mouth problems, no throat problems  RESP: no significant cough, no shortness of breath  CV: no chest pain, no palpitations, no new or worsening peripheral edema  GI: no nausea, no vomiting, no constipation. minimal diarrhea  : no frequency, no dysuria, no hematuria  MS: no claudication, no myalgias, no joint aches  NEURO: no weakness, no dizziness, no syncope, no headaches  ENDOCRINE: no temperature intolerance, no skin/hair changes  HEME: no easy bruising, no bleeding problems  PSYCHIATRIC: NEGATIVE for changes in mood or affect            Physical Exam:     Vitals:    06/27/19 1538   BP: 123/87   Pulse: 102   Resp: 16   Temp: 98.4  F (36.9  C)   TempSrc: Oral   SpO2: 98%   Weight: 112.9 kg (249 lb)   Height: 1.651 m (5' 5\")     Body mass index is 41.44 " kg/m .    GENERAL: healthy, alert, well nourished, well hydrated, no distress  EYES: Eyes grossly normal to inspection, extraocular movements - intact, and PERRL  HENT: ear canals- normal; TMs- normal; Nose- normal; Mouth- no ulcers, no lesions  NECK: no tenderness, no adenopathy, no asymmetry, no masses, no stiffness; thyroid- normal to palpation  RESP: lungs clear to auscultation - no rales, no rhonchi, no wheezes  CV: regular rates and rhythm, normal S1 S2, no S3 or S4 and no murmur, no click or rub -  ABDOMEN: soft, no tenderness, no  hepatosplenomegaly, no masses, normal bowel sounds  MS: extremities- no gross deformities noted, no edema  SKIN: no suspicious lesions, no rashes. Much improved right hand and finger erythema.   NEURO: strength and tone- normal, sensory exam- grossly normal, mentation- intact, speech- normal, reflexes- symmetric  PSYCH: Alert and oriented times 3; speech- coherent , normal rate and volume; able to articulate logical thoughts, able to abstract reason, no tangential thoughts, no hallucinations or delusions, affect- normal  LYMPHATICS: ant. cervical- normal, post. cervical- normal, axillary- normal, supraclavicular- normal         Results:   CBC ordered and pending     Assessment and Plan     Trevin was seen today for follow up.    Diagnoses and all orders for this visit:    Hospital discharge follow-up  Lymphangitis  Paronychia of right middle finger  -     CBC with Diff Plt ([facility])    Trevin is a patient that is well-known to me, whom I saw in the hospital for lymphangitis of paronychia, right middle finger extending up right hand to right axilla with axillary lymphadenopathy.  Was treated with dose of vancomycin and Rocephin IV, transition to doxycycline and amoxicillin to complete 7-day course of antibiotics.  On physical exam today, feeling much better, no fevers or chills, erythema markedly improved.  Minimal side effects from antibiotics including loose stools, but keeping  well-hydrated.  Does work in food delivery, to [employer], believes that this is where infections stop.  Discussed prevention of further infections including wiping dental electronic devices, or items that are handled by multiple people.  Discussed good hand hygiene as well as prevention of cracking skin.  Follow-up with primary care provider for hormone therapy as previously scheduled.  If recurrence of paronychia or lymphatitis, will come in for urgent appointment with potential drainage.  Given that patient still had leukocytosis on day of discharge, will recheck CBC today.      E&M code to be billed if TCM cannot be: 68008    Type of decision making: Moderate complexity (33170)    Options for treatment and follow-up care were reviewed with the patient  Irma Evans   engaged in the decision making process and verbalized understanding of the options discussed and agreed with the final plan.      Chip Willoughby DO, MA  Family Medicine PGY-3  [facility], [facility]   Pager: [phone]

## 2019-06-27 NOTE — PROGRESS NOTES
Preceptor Attestation:   Patient seen and discussed with the resident. Assessment and plan reviewed with resident and agreed upon.   Supervising Physician:  Tashi Mckay MD  [facility]

## 2019-07-08 DIAGNOSIS — R03.0 ELEVATED BLOOD PRESSURE READING WITHOUT DIAGNOSIS OF HYPERTENSION: ICD-10-CM

## 2019-07-08 RX ORDER — LISINOPRIL 5 MG/1
5 TABLET ORAL DAILY
Qty: 30 TABLET | Refills: 1 | Status: SHIPPED | OUTPATIENT
Start: 2019-07-08 | End: 2019-08-30

## 2019-07-08 NOTE — TELEPHONE ENCOUNTER
"Request for medication refill:  Lisinopril    Providers if patient needs an appointment and you are willing to give a one month supply please refill for one month and  send a letter/MyChart using \".SMILLIMITEDREFILL\" .smillimited and route chart to \"P [facility] \" (Giving one month refill in non controlled medications is strongly recommended before denial)    If refill has been denied, meaning absolutely no refills without visit, please complete the smart phrase \".smirxrefuse\" and route it to the \"P [facility] MED REFILLS\"  pool to inform the patient and the pharmacy.    Carmen [facility]        "

## 2019-08-02 ENCOUNTER — MYC MEDICAL ADVICE (OUTPATIENT)
Dept: FAMILY MEDICINE | Facility: CLINIC | Age: 44
End: 2019-08-02

## 2019-08-02 DIAGNOSIS — F90.2 ATTENTION DEFICIT HYPERACTIVITY DISORDER (ADHD), COMBINED TYPE: ICD-10-CM

## 2019-08-02 RX ORDER — DEXTROAMPHETAMINE SACCHARATE, AMPHETAMINE ASPARTATE, DEXTROAMPHETAMINE SULFATE AND AMPHETAMINE SULFATE 5; 5; 5; 5 MG/1; MG/1; MG/1; MG/1
20 TABLET ORAL 2 TIMES DAILY
Qty: 60 TABLET | Refills: 0 | Status: SHIPPED | OUTPATIENT
Start: 2019-08-02 | End: 2019-09-12

## 2019-08-02 NOTE — TELEPHONE ENCOUNTER
"Request for medication refill: Adderall    Providers if patient needs an appointment and you are willing to give a one month supply please refill for one month and  send a letter/MyChart using \".SMILLIMITEDREFILL\" .smillimited and route chart to \"P [facility] \" (Giving one month refill in non controlled medications is strongly recommended before denial)    If refill has been denied, meaning absolutely no refills without visit, please complete the smart phrase \".smirxrefuse\" and route it to the \"P [facility] MED REFILLS\"  pool to inform the patient and the pharmacy.    Denisa Macario RN       "

## 2019-08-29 DIAGNOSIS — R03.0 ELEVATED BLOOD PRESSURE READING WITHOUT DIAGNOSIS OF HYPERTENSION: ICD-10-CM

## 2019-08-29 NOTE — TELEPHONE ENCOUNTER

## 2019-08-30 RX ORDER — LISINOPRIL 5 MG/1
5 TABLET ORAL DAILY
Qty: 30 TABLET | Refills: 1 | Status: SHIPPED | OUTPATIENT
Start: 2019-08-30 | End: 2019-11-04

## 2019-09-10 ENCOUNTER — MYC MEDICAL ADVICE (OUTPATIENT)
Dept: FAMILY MEDICINE | Facility: CLINIC | Age: 44
End: 2019-09-10

## 2019-09-10 DIAGNOSIS — F90.2 ATTENTION DEFICIT HYPERACTIVITY DISORDER (ADHD), COMBINED TYPE: ICD-10-CM

## 2019-09-12 RX ORDER — DEXTROAMPHETAMINE SACCHARATE, AMPHETAMINE ASPARTATE, DEXTROAMPHETAMINE SULFATE AND AMPHETAMINE SULFATE 5; 5; 5; 5 MG/1; MG/1; MG/1; MG/1
20 TABLET ORAL 2 TIMES DAILY
Qty: 60 TABLET | Refills: 0 | Status: SHIPPED | OUTPATIENT
Start: 2019-09-12 | End: 2020-03-12

## 2019-09-12 RX ORDER — DEXTROAMPHETAMINE SACCHARATE, AMPHETAMINE ASPARTATE, DEXTROAMPHETAMINE SULFATE AND AMPHETAMINE SULFATE 5; 5; 5; 5 MG/1; MG/1; MG/1; MG/1
20 TABLET ORAL 2 TIMES DAILY
Qty: 60 TABLET | Refills: 0 | Status: SHIPPED | OUTPATIENT
Start: 2019-11-13 | End: 2019-10-14

## 2019-09-12 RX ORDER — DEXTROAMPHETAMINE SACCHARATE, AMPHETAMINE ASPARTATE, DEXTROAMPHETAMINE SULFATE AND AMPHETAMINE SULFATE 5; 5; 5; 5 MG/1; MG/1; MG/1; MG/1
20 TABLET ORAL 2 TIMES DAILY
Qty: 60 TABLET | Refills: 0 | Status: SHIPPED | OUTPATIENT
Start: 2019-10-13 | End: 2019-10-14

## 2019-10-14 ENCOUNTER — OFFICE VISIT (OUTPATIENT)
Dept: FAMILY MEDICINE | Facility: CLINIC | Age: 44
End: 2019-10-14
Payer: COMMERCIAL

## 2019-10-14 VITALS
DIASTOLIC BLOOD PRESSURE: 80 MMHG | SYSTOLIC BLOOD PRESSURE: 124 MMHG | RESPIRATION RATE: 16 BRPM | HEIGHT: 66 IN | TEMPERATURE: 98.2 F | BODY MASS INDEX: 37.03 KG/M2 | WEIGHT: 230.4 LBS | OXYGEN SATURATION: 100 % | HEART RATE: 90 BPM

## 2019-10-14 DIAGNOSIS — Z30.017 NEXPLANON INSERTION: ICD-10-CM

## 2019-10-14 DIAGNOSIS — F90.2 ATTENTION DEFICIT HYPERACTIVITY DISORDER (ADHD), COMBINED TYPE: ICD-10-CM

## 2019-10-14 DIAGNOSIS — E66.812 CLASS 2 SEVERE OBESITY DUE TO EXCESS CALORIES WITH SERIOUS COMORBIDITY AND BODY MASS INDEX (BMI) OF 37.0 TO 37.9 IN ADULT (H): ICD-10-CM

## 2019-10-14 DIAGNOSIS — E88.810 DYSMETABOLIC SYNDROME X: Primary | ICD-10-CM

## 2019-10-14 DIAGNOSIS — F64.9 GENDER IDENTITY DISORDER: ICD-10-CM

## 2019-10-14 DIAGNOSIS — E66.01 CLASS 2 SEVERE OBESITY DUE TO EXCESS CALORIES WITH SERIOUS COMORBIDITY AND BODY MASS INDEX (BMI) OF 37.0 TO 37.9 IN ADULT (H): ICD-10-CM

## 2019-10-14 DIAGNOSIS — R80.9 MICROALBUMINURIA: ICD-10-CM

## 2019-10-14 DIAGNOSIS — L30.9 ECZEMA, UNSPECIFIED TYPE: ICD-10-CM

## 2019-10-14 DIAGNOSIS — F32.4 MAJOR DEPRESSIVE DISORDER, SINGLE EPISODE, IN PARTIAL REMISSION (H): ICD-10-CM

## 2019-10-14 LAB
ALBUMIN SERPL-MCNC: 4.3 MG/DL (ref 3.8–5)
ALP SERPL-CCNC: 53.2 U/L (ref 31.7–110.5)
ALT SERPL-CCNC: 28.2 U/L (ref 0–45)
AMPHETAMINES QUAL: POSITIVE
AST SERPL-CCNC: 22.1 U/L (ref 0–45)
BARBITURATES QUAL URINE: NEGATIVE
BENZODIAZEPINE QUAL URINE: POSITIVE
BILIRUB SERPL-MCNC: 0.6 MG/DL (ref 0.2–1.3)
BILIRUBIN DIRECT: 0.3 MG/DL (ref 0.1–0.3)
BUPRENORPHINE QUAL URINE: NEGATIVE
CANNABINOIDS UR QL SCN: POSITIVE
CHOLEST SERPL-MCNC: 207.3 MG/DL (ref 0–200)
CHOLEST/HDLC SERPL: 3.8 {RATIO} (ref 0–5)
COCAINE QUAL URINE: NEGATIVE
CREAT UR-MCNC: 173 MG/DL
GLUCOSE SERPL-MCNC: 115 MG'DL (ref 70–99)
HBA1C MFR BLD: 5.3 % (ref 4.1–5.7)
HDLC SERPL-MCNC: 54 MG/DL
HEMOGLOBIN: 16.2 G/DL (ref 11.7–15.7)
LDLC SERPL CALC-MCNC: 138 MG/DL (ref 0–129)
METHAMPHETAMINE: NEGATIVE
METHODONE QUAL: NEGATIVE
MICROALBUMIN UR-MCNC: 25 MG/L
MICROALBUMIN/CREAT UR: 14.68 MG/G CR (ref 0–25)
MORPHINE QUAL: NEGATIVE
OXYCODONE QUAL: NEGATIVE
PHENCYCLIDINE: NEGATIVE
PROPOXYPHENE: NEGATIVE
PROT SERPL-MCNC: 6.9 G/DL (ref 6.8–8.8)
TEMPERATURE OF URINE WAS BETWEEN 90-100 DEGREES F: YES
TRICYCLIC ANTIDEPRESSANTS: NEGATIVE
TRIGL SERPL-MCNC: 78 MG/DL (ref 0–150)
VLDL CHOLESTEROL: 15.6 MG/DL (ref 7–32)

## 2019-10-14 RX ORDER — DEXTROAMPHETAMINE SACCHARATE, AMPHETAMINE ASPARTATE, DEXTROAMPHETAMINE SULFATE AND AMPHETAMINE SULFATE 5; 5; 5; 5 MG/1; MG/1; MG/1; MG/1
20 TABLET ORAL 2 TIMES DAILY
Qty: 60 TABLET | Refills: 0 | Status: SHIPPED | OUTPATIENT
Start: 2019-11-13 | End: 2020-03-12

## 2019-10-14 RX ORDER — TESTOSTERONE CYPIONATE 200 MG/ML
80 INJECTION, SOLUTION INTRAMUSCULAR WEEKLY
Qty: 13 ML | Refills: 1 | Status: SHIPPED | OUTPATIENT
Start: 2019-10-14 | End: 2020-05-01

## 2019-10-14 RX ORDER — DEXTROAMPHETAMINE SACCHARATE, AMPHETAMINE ASPARTATE, DEXTROAMPHETAMINE SULFATE AND AMPHETAMINE SULFATE 5; 5; 5; 5 MG/1; MG/1; MG/1; MG/1
20 TABLET ORAL 2 TIMES DAILY
Qty: 60 TABLET | Refills: 0 | Status: SHIPPED | OUTPATIENT
Start: 2019-10-14 | End: 2020-03-12

## 2019-10-14 RX ORDER — HYDROCORTISONE VALERATE CREAM 2 MG/G
CREAM TOPICAL 2 TIMES DAILY
Qty: 60 G | Refills: 1 | Status: SHIPPED | OUTPATIENT
Start: 2019-10-14 | End: 2022-08-17

## 2019-10-14 ASSESSMENT — ANXIETY QUESTIONNAIRES
GAD7 TOTAL SCORE: 14
2. NOT BEING ABLE TO STOP OR CONTROL WORRYING: MORE THAN HALF THE DAYS
3. WORRYING TOO MUCH ABOUT DIFFERENT THINGS: MORE THAN HALF THE DAYS
5. BEING SO RESTLESS THAT IT IS HARD TO SIT STILL: MORE THAN HALF THE DAYS
6. BECOMING EASILY ANNOYED OR IRRITABLE: MORE THAN HALF THE DAYS
IF YOU CHECKED OFF ANY PROBLEMS ON THIS QUESTIONNAIRE, HOW DIFFICULT HAVE THESE PROBLEMS MADE IT FOR YOU TO DO YOUR WORK, TAKE CARE OF THINGS AT HOME, OR GET ALONG WITH OTHER PEOPLE: SOMEWHAT DIFFICULT
7. FEELING AFRAID AS IF SOMETHING AWFUL MIGHT HAPPEN: MORE THAN HALF THE DAYS
1. FEELING NERVOUS, ANXIOUS, OR ON EDGE: MORE THAN HALF THE DAYS

## 2019-10-14 ASSESSMENT — PATIENT HEALTH QUESTIONNAIRE - PHQ9
5. POOR APPETITE OR OVEREATING: MORE THAN HALF THE DAYS
SUM OF ALL RESPONSES TO PHQ QUESTIONS 1-9: 9

## 2019-10-14 ASSESSMENT — MIFFLIN-ST. JEOR: SCORE: 1718.9

## 2019-10-14 NOTE — TELEPHONE ENCOUNTER
Verify that the refill encounter hasn't been started Yes    [facility] phone call message- patient requesting a refill:    Full Medication Name: amphetamine-dextroamphetamine (ADDERALL) 20 MG tablet    Dose: Take 1 tablet (20 mg) by mouth 2 times daily - Oral     Pharmacy confirmed as     [id number] - [place], [place] - [address] AT [facility] [address]  [address]  [facility] [address]  Phone: [phone] Fax: [phone]  : Yes    Medication tab checked to see if medication has been sent  Yes    Additional Comments: Patient calling to advise that pharmacy has not received prescription for med noted above. Patient is out of medication, as he was supposed to receive it yesterday.    OK to leave a message on voice mail? Yes    Advised patient refill may take up to 2 business days? Yes    Primary language: English      needed? No    Call taken on October 14, 2019 at 12:52 PM by Karolina Bella    Route to [facility] MED REFILL

## 2019-10-14 NOTE — TELEPHONE ENCOUNTER
Routing to PCP to please e-prescribe this, it was local printed, therefore has not been received.  .mnem

## 2019-10-14 NOTE — PROGRESS NOTES
"       HPI   Trevin Evans is a 43 year old  who presents for   Chief Complaint   Patient presents with     Follow Up     HRT f/u     Preventative  Reports eating some vegetables but will also eat nachos and tacos. Snacks on pop tarts late at night. Would like to set weight loss goals. Has questions about ADHD meds. Has been painting bathroom little by little as a coping strategy due to ADHD.    Ate this morning before labs.    Sexual Health  Has a new sexual partner that is HIV positive, with whom he's initiated sexual activity. Has previously been on PrEP, but is currently worried about impact on kidneys. Does not want to start PrEP today. Reports nexplanon is expiring next month. Partner viral load is undetectable. Sex is unprotected. Partner is a nurse - feels he is following up well and adherent.     HRT  Is having insurance issues with testosterone through [employer], is paying two co-pays a month. Is on 0.4 testosterone, injections every Thursday.Doing well on them doesn't have side effects. Happy with changes. No bleeding.     Mood  Work is going well, enjoys his job. 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 Work is form of exercise. Has been using one tablet hydroxyzine instead of two, has been needing it less. Sleep improves with hydroxyzine. Prozac is managing sx well, not experiencing panic attacks. Is drinking to cope, he admits, but not drinking heavily, not til drunk always, has no concerns about this \"yet.\" Does not have hangovers the next day and is not blacking out. Drinks a lot of water while drinking alcohol. Has not experienced any legal trouble and is not driving while intoxicated.    Snacking is way down - roommate left. Doesn't buy snack food (pop tarts for ex) and this has reduced his wonton eating. Losing weight.       Derm Issue  Reports a skin rash on the lower left extremity.Same as prior which we treated with0.1% TMC cream but was worse then, in terms of size, " "thikcness and itch.     Attention issues   Well controlled. Needs prescription today. Using cannabis and EtOH recreationally. Drinking water to get his urine sample submitted, couldn't go before visit.   PHQ-9 SCORE 11/13/2018 4/22/2019 10/14/2019   PHQ-9 Total Score - - -   PHQ-9 Total Score - - -   PHQ-9 Total Score 10 11 9     SHANA-7 SCORE 11/13/2018 4/22/2019 10/14/2019   Total Score - - -   Total Score 12 13 14   Total Score BEH Adult - - -         Social Hx  Employed. Alcohol use.     +++++++    Problem, Medication and Allergy Lists were reviewed and updated if needed.    Patient is an established patient of this clinic.         Review of Systems:   Review of Systems    Positive for: skin rash on the left lower extremity    Negative for:  changes in appetite, CP, SOB, dyspnea, panic attacks, poor sleep.    See HPI for additional sx.    This document serves as a record of the services and decisions personally performed and made by Liz Baker MD. It was created on his/her behalf by Marjorie Nelson, a trained medical scribe. The creation of this document is based the provider's statements to the medical scribe.  Freedom Nelson 11:24 AM, October 14, 2019       Physical Exam:     Wt Readings from Last 10 Encounters:   10/14/19 104.5 kg (230 lb 6.4 oz)   06/27/19 112.9 kg (249 lb)   06/21/19 112.9 kg (249 lb)   06/21/19 112.4 kg (247 lb 12.8 oz)   04/22/19 117.7 kg (259 lb 6.4 oz)   01/03/19 123.7 kg (272 lb 9.6 oz)   11/13/18 121.1 kg (267 lb)   10/04/18 117.6 kg (259 lb 3.2 oz)   07/06/18 106.7 kg (235 lb 3.2 oz)   04/12/18 115.8 kg (255 lb 3.2 oz)       Vitals:    10/14/19 1114   BP: 124/80   Pulse: 90   Resp: 16   Temp: 98.2  F (36.8  C)   TempSrc: Oral   SpO2: 100%   Weight: 104.5 kg (230 lb 6.4 oz)   Height: 1.664 m (5' 5.5\")     Body mass index is 37.76 kg/m .  Vitals were reviewed and were normal     Physical Exam    BMI= Body mass index is 37.76 kg/m .   GENERAL: healthy, alert and no distress  SKIN: " Mildly lichenified, thickened, excoriated, and erythematous plaques on the lateral left lower extremity   PSYCH: Alert and oriented times 3; speech- coherent , normal rate and volume; able to articulate logical thoughts, able to abstract reason, affect- normal, Less loud, less verbose, and less anxious.    Results:   Results from this visit  Results for orders placed or performed in visit on 10/14/19   Hemoglobin A1c ([facility])   Result Value Ref Range    Hemoglobin A1C 5.3 4.1 - 5.7 %   Lipid Panel ([facility])   Result Value Ref Range    Cholesterol 207.3 (H) 0.0 - 200.0 mg/dL    Cholesterol/HDL Ratio 3.8 0.0 - 5.0    HDL Cholesterol 54.0 >40.0 mg/dL    Triglycerides 78.0 0.0 - 150.0 mg/dL    VLDL Cholesterol 15.6 7.0 - 32.0 mg/dL    LDL Cholesterol Calculated 138 (H) 0 - 129 mg/dL   HEPATIC PANEL ([facility])   Result Value Ref Range    Protein Total 6.9 6.8 - 8.8 g/dL    Albumin 4.3 3.8 - 5.0 mg/dL    Alkaline Phosphatase 53.2 31.7 - 110.5 U/L    ALT 28.2 0.0 - 45.0 U/L    AST 22.1 0.0 - 45.0 U/L    Bilirubin Direct 0.3 0.1 - 0.3 mg/dL    Bilirubin Total 0.6 0.2 - 1.3 mg/dL   Glucose ([facility])   Result Value Ref Range    Glucose 115.0 (H) 70.0 - 99.0 mg'dL   Hemoglobin (HGB) ([facility])   Result Value Ref Range    Hemoglobin 16.2 (H) 11.7 - 15.7 g/dL   Rapid Urine Drug Screen ([facility])   Result Value Ref Range    Cannabinoids Qual Urine POSITIVE (A) NEGATIVE    Phencyclidine NEGATIVE NEGATIVE    Cocaine Qual Urine NEGATIVE NEGATIVE    Methamphetamine Qual NEGATIVE NEGATIVE    Morphine Qual NEGATIVE NEGATIVE    Amphetamines Qual POSITIVE (A) NEGATIVE    Benzodiazepine Qual Urine POSITIVE (A) NEGATIVE    Tricyclic Antidepressants NEGATIVE NEGATIVE    Methadone Qual NEGATIVE NEGATIVE    Barbiturates Qual Urine NEGATIVE NEGATIVE    Oxycodone Qual NEGATIVE NEGATIVE    Propoxyphene NEGATIVE NEGATIVE    Buprenorphine Qual Urine NEGATIVE NEGATIVE    Temperature of Urine was Between  Degrees F YES YES     Assessment and Plan    Trevin was seen today for follow up.    Diagnoses and all orders for this visit:    Microalbuminuria  -     Albumin Random Urine Quantitative with Creat Ratio  Hx of elevation in 2011 - annual check now     Attention deficit hyperactivity disorder (ADHD), combined type  -     Rapid Urine Drug Screen ([facility])  -     Benzodiazepine confirm urine - no longer prescribed, but + on today's exam. Was + in past but was getting prescribed then. Could be due to serotonin specific reuptake inhibitor bobby w/ incr dose.   -     amphetamine-dextroamphetamine (ADDERALL) 20 MG tablet; Take 1 tablet (20 mg) by mouth 2 times daily  -     amphetamine-dextroamphetamine (ADDERALL) 20 MG tablet; Take 1 tablet (20 mg) by mouth 2 times daily    Gender dysphoria  -     Testosterone total  -     HEPATIC PANEL ([facility])  -     Glucose ([facility])  -     Hemoglobin (HGB) ([facility])  -     testosterone cypionate (DEPOTESTOSTERONE) 200 MG/ML injection; Inject 0.4 mLs (80 mg) into the muscle once a week In cottonseed oil ok. Pt needs 3 mo supply =13ml given on single use vials  - Detailed lab result review today: Hgb, hepatic panel, glucose, and lipid panel. Remaining results via [facility].    Class 2 severe obesity due to excess calories with serious comorbidity and body mass index (BMI) of 37.0 to 37.9 in adult (H)  Dysmetabolic syndrome X  -     Hemoglobin A1c ([facility])  -     Lipid Panel ([facility])  Congrats on wt loss down to BMI 37!!  Enc to decrease nachos - frequent fave food   Enc to continue with exercise and healthier choices (less snack food). Discussed liver impact of fatty liver and excess wt. Goal wt btw 210-215# because that puts BMI ~35. Has LOST 25# from less snacking!!    Eczema, unspecified type  -     hydrocortisone (WESTCORT) 0.2 % external cream; Apply topically 2 times daily    Nexplanon insertion  - Discussed evidence and efficacy, and he will keep it for the five years. Is aware it is an off label prescription. He will continue  instead of planning to remove next month    Major depressive disorder, single episode, in partial remission (H)  Cont current prozac 80 and as needed use of hydroxyzine - appears very improved on exam today.   Follow up would prefer sooner than 6 mos but he requests 6 mos      Medications Discontinued During This Encounter   Medication Reason     testosterone cypionate (DEPOTESTOSTERONE) 200 MG/ML injection          Patient Instructions   Preventative  1. Ordered labs today. Results were reviewed during visit. Remaining labs via [facility].  2. Increase healthy eating behaviors, continue eating vegetables.  3. Consider a goal weight: 210-215 lbs.  - Let me know if you would like to work on weight at future visits.  4. Follow up with me in 6 months, or otherwise    Hormone Therapy  1. Provided refill on testosterone today, and put in a note to the pharmacy.    Sexual Health  1. Discussed PrEP today.    Mood  1. Continue prozac as prescribed.  2. Continue hydroxyzine as prescribed, as needed for panic and sleep.  3. Continue staying active.    Derm Issue  1. Prescribed hydrocortisone cream today. Apply over the affected area twice a day as needed.  - If insurance does not cover this, please let me know we can try alternatives.    I spent 30 min face to face with the patient and >50% was spent counselling the patient about the above medical conditions, educating patient on their medical conditions, behavioral interventions and supports.    Options for treatment and follow-up care were reviewed with the patient. Trevin Evans  engaged in the decision making process and verbalized understanding of the options discussed and agreed with the final plan.    The information in this document, created by the medical scribe for me, accurately reflects the services I personally performed and the decisions made by me. I have reviewed and approved this document for accuracy prior to leaving the patient care area.    Liz Baker,  MD

## 2019-10-14 NOTE — PATIENT INSTRUCTIONS
Preventative  1. Ordered labs today. Results were reviewed during visit. Remaining labs via [facility].  2. Increase healthy eating behaviors, continue eating vegetables.  3. Consider a goal weight: 210-215 lbs.  - Let me know if you would like to work on weight at future visits.  4. Follow up with me in 6 months, or otherwise    Hormone Therapy  1. Provided refill on testosterone today, and put in a note to the pharmacy.    Sexual Health  1. Discussed PrEP today.    Mood  1. Continue prozac as prescribed.  2. Continue hydroxyzine as prescribed, as needed for panic and sleep.  3. Continue staying active.    Derm Issue  1. Prescribed hydrocortisone cream today. Apply over the affected area twice a day as needed.  - If insurance does not cover this, please let me know we can try alternatives.

## 2019-10-15 ASSESSMENT — ANXIETY QUESTIONNAIRES: GAD7 TOTAL SCORE: 14

## 2019-10-16 LAB — TESTOST SERPL-MCNC: 507 NG/DL (ref 8–60)

## 2019-10-18 LAB
A-OH ALPRAZ UR QL CFM: NEGATIVE
ALPRAZ UR QL CFM: NEGATIVE
LORAZEPAM UR QL CFM: NEGATIVE
NORDIAZEPAM UR QL CFM: NEGATIVE
OXAZEPAM UR QL CFM: NEGATIVE
TEMAZEPAM UR QL CFM: NEGATIVE

## 2019-11-04 DIAGNOSIS — R03.0 ELEVATED BLOOD PRESSURE READING WITHOUT DIAGNOSIS OF HYPERTENSION: ICD-10-CM

## 2019-11-04 RX ORDER — LISINOPRIL 5 MG/1
5 TABLET ORAL DAILY
Qty: 30 TABLET | Refills: 1 | Status: SHIPPED | OUTPATIENT
Start: 2019-11-04 | End: 2020-01-01

## 2019-11-04 NOTE — TELEPHONE ENCOUNTER

## 2019-11-26 DIAGNOSIS — L70.0 ACNE VULGARIS: ICD-10-CM

## 2019-11-26 RX ORDER — MINOCYCLINE HYDROCHLORIDE 100 MG/1
100 CAPSULE ORAL 2 TIMES DAILY
Qty: 180 CAPSULE | Refills: 3 | Status: SHIPPED | OUTPATIENT
Start: 2019-11-26 | End: 2020-12-02

## 2019-11-26 NOTE — TELEPHONE ENCOUNTER
"Request for medication refill: minocycline (MINOCIN/DYNACIN) 100 MG capsule    Providers if patient needs an appointment and you are willing to give a one month supply please refill for one month and  send a letter/MyChart using \".SMILLIMITEDREFILL\" .smillimited and route chart to \"P [facility] \" (Giving one month refill in non controlled medications is strongly recommended before denial)    If refill has been denied, meaning absolutely no refills without visit, please complete the smart phrase \".smirxrefuse\" and route it to the \"P [facility] MED REFILLS\"  pool to inform the patient and the pharmacy.    Lashae Cordero CMA        "

## 2019-12-06 ENCOUNTER — TELEPHONE (OUTPATIENT)
Dept: FAMILY MEDICINE | Facility: CLINIC | Age: 44
End: 2019-12-06

## 2019-12-06 DIAGNOSIS — F90.2 ATTENTION DEFICIT HYPERACTIVITY DISORDER (ADHD), COMBINED TYPE: Primary | ICD-10-CM

## 2019-12-06 RX ORDER — DEXTROAMPHETAMINE SACCHARATE, AMPHETAMINE ASPARTATE, DEXTROAMPHETAMINE SULFATE AND AMPHETAMINE SULFATE 5; 5; 5; 5 MG/1; MG/1; MG/1; MG/1
20 TABLET ORAL 2 TIMES DAILY
Qty: 60 TABLET | Refills: 0 | Status: SHIPPED | OUTPATIENT
Start: 2020-01-06 | End: 2020-05-05

## 2019-12-06 RX ORDER — DEXTROAMPHETAMINE SACCHARATE, AMPHETAMINE ASPARTATE, DEXTROAMPHETAMINE SULFATE AND AMPHETAMINE SULFATE 5; 5; 5; 5 MG/1; MG/1; MG/1; MG/1
20 TABLET ORAL 2 TIMES DAILY
Qty: 60 TABLET | Refills: 0 | Status: SHIPPED | OUTPATIENT
Start: 2020-02-06 | End: 2020-05-05

## 2019-12-06 RX ORDER — DEXTROAMPHETAMINE SACCHARATE, AMPHETAMINE ASPARTATE, DEXTROAMPHETAMINE SULFATE AND AMPHETAMINE SULFATE 5; 5; 5; 5 MG/1; MG/1; MG/1; MG/1
20 TABLET ORAL 2 TIMES DAILY
Qty: 60 TABLET | Refills: 0 | Status: SHIPPED | OUTPATIENT
Start: 2019-12-06 | End: 2020-05-05

## 2019-12-09 ENCOUNTER — MYC MEDICAL ADVICE (OUTPATIENT)
Dept: FAMILY MEDICINE | Facility: CLINIC | Age: 44
End: 2019-12-09

## 2019-12-31 DIAGNOSIS — R03.0 ELEVATED BLOOD PRESSURE READING WITHOUT DIAGNOSIS OF HYPERTENSION: ICD-10-CM

## 2019-12-31 NOTE — TELEPHONE ENCOUNTER
"Request for medication refill: Lisinopril 5mg    Providers if patient needs an appointment and you are willing to give a one month supply please refill for one month and  send a letter/MyChart using \".SMILLIMITEDREFILL\" .smillimited and route chart to \"P [facility]MI \" (Giving one month refill in non controlled medications is strongly recommended before denial)    If refill has been denied, meaning absolutely no refills without visit, please complete the smart phrase \".smirxrefuse\" and route it to the \"P [facility] MED REFILLS\"  pool to inform the patient and the pharmacy.    Maggie Higginbotham, [facility]        "

## 2020-01-01 RX ORDER — LISINOPRIL 5 MG/1
5 TABLET ORAL DAILY
Qty: 30 TABLET | Refills: 1 | Status: SHIPPED | OUTPATIENT
Start: 2020-01-01 | End: 2020-03-02

## 2020-01-14 DIAGNOSIS — F64.9 GENDER DYSPHORIA: ICD-10-CM

## 2020-01-14 NOTE — TELEPHONE ENCOUNTER

## 2020-01-29 DIAGNOSIS — F32.4 MAJOR DEPRESSIVE DISORDER, SINGLE EPISODE, IN PARTIAL REMISSION (H): Primary | ICD-10-CM

## 2020-01-29 NOTE — TELEPHONE ENCOUNTER

## 2020-01-30 RX ORDER — FLUOXETINE 40 MG/1
40 CAPSULE ORAL 2 TIMES DAILY
Qty: 180 CAPSULE | Refills: 1 | Status: SHIPPED | OUTPATIENT
Start: 2020-01-30 | End: 2020-12-30

## 2020-02-23 ENCOUNTER — HEALTH MAINTENANCE LETTER (OUTPATIENT)
Age: 45
End: 2020-02-23

## 2020-03-02 DIAGNOSIS — R03.0 ELEVATED BLOOD PRESSURE READING WITHOUT DIAGNOSIS OF HYPERTENSION: ICD-10-CM

## 2020-03-02 RX ORDER — LISINOPRIL 5 MG/1
5 TABLET ORAL DAILY
Qty: 30 TABLET | Refills: 1 | Status: SHIPPED | OUTPATIENT
Start: 2020-03-02 | End: 2020-05-08

## 2020-03-02 NOTE — TELEPHONE ENCOUNTER

## 2020-03-09 ENCOUNTER — MYC MEDICAL ADVICE (OUTPATIENT)
Dept: FAMILY MEDICINE | Facility: CLINIC | Age: 45
End: 2020-03-09

## 2020-03-09 DIAGNOSIS — F90.2 ATTENTION DEFICIT HYPERACTIVITY DISORDER (ADHD), COMBINED TYPE: ICD-10-CM

## 2020-03-10 NOTE — TELEPHONE ENCOUNTER

## 2020-03-12 RX ORDER — DEXTROAMPHETAMINE SACCHARATE, AMPHETAMINE ASPARTATE, DEXTROAMPHETAMINE SULFATE AND AMPHETAMINE SULFATE 5; 5; 5; 5 MG/1; MG/1; MG/1; MG/1
20 TABLET ORAL 2 TIMES DAILY
Qty: 60 TABLET | Refills: 0 | Status: SHIPPED | OUTPATIENT
Start: 2020-03-12 | End: 2020-05-05

## 2020-03-12 RX ORDER — DEXTROAMPHETAMINE SACCHARATE, AMPHETAMINE ASPARTATE, DEXTROAMPHETAMINE SULFATE AND AMPHETAMINE SULFATE 5; 5; 5; 5 MG/1; MG/1; MG/1; MG/1
20 TABLET ORAL 2 TIMES DAILY
Qty: 60 TABLET | Refills: 0 | Status: SHIPPED | OUTPATIENT
Start: 2020-03-12 | End: 2020-08-05

## 2020-03-12 RX ORDER — DEXTROAMPHETAMINE SACCHARATE, AMPHETAMINE ASPARTATE, DEXTROAMPHETAMINE SULFATE AND AMPHETAMINE SULFATE 5; 5; 5; 5 MG/1; MG/1; MG/1; MG/1
20 TABLET ORAL 2 TIMES DAILY
Qty: 60 TABLET | Refills: 0 | Status: SHIPPED | OUTPATIENT
Start: 2020-03-12 | End: 2020-04-04

## 2020-03-23 ENCOUNTER — VIRTUAL VISIT (OUTPATIENT)
Dept: FAMILY MEDICINE | Facility: CLINIC | Age: 45
End: 2020-03-23
Payer: COMMERCIAL

## 2020-03-23 DIAGNOSIS — F41.0 PANIC ATTACK: ICD-10-CM

## 2020-03-23 DIAGNOSIS — F41.1 GAD (GENERALIZED ANXIETY DISORDER): ICD-10-CM

## 2020-03-23 RX ORDER — CLONAZEPAM 0.25 MG/1
0.25 TABLET, ORALLY DISINTEGRATING ORAL DAILY PRN
Qty: 30 TABLET | Refills: 0 | Status: SHIPPED | OUTPATIENT
Start: 2020-03-23 | End: 2020-05-05

## 2020-03-23 RX ORDER — HYDROXYZINE HYDROCHLORIDE 25 MG/1
25-50 TABLET, FILM COATED ORAL
Qty: 100 TABLET | Refills: 1 | Status: SHIPPED | OUTPATIENT
Start: 2020-03-23 | End: 2021-12-15

## 2020-03-23 NOTE — PROGRESS NOTES
"Family Medicine Telephone Visit Note         Telephone Visit Consent   Patient was verbally read the following and verbal consent was obtained.  \"I understand that I may revoke this request for a phone visit at any time.  This consent will automatically  3 months from the signed date and time.\"    Name person giving consent:  Patient   Date verbal consent given:  3/23/2020  Time verbal consent given:  2:21 PM      Chief Complaint   Patient presents with     Anxiety     Current Outpatient Medications   Medication Sig Dispense Refill     clonazePAM (KLONOPIN) 0.25 MG TBDP ODT tab Take 1 tablet (0.25 mg) by mouth daily as needed for anxiety 30 tablet 0     hydrOXYzine (ATARAX) 25 MG tablet Take 1-2 tablets (25-50 mg) by mouth nightly as needed for anxiety or other (sleep) 100 tablet 1     amphetamine-dextroamphetamine (ADDERALL) 20 MG tablet Take 1 tablet (20 mg) by mouth 2 times daily 60 tablet 0     amphetamine-dextroamphetamine (ADDERALL) 20 MG tablet Take 1 tablet (20 mg) by mouth 2 times daily 60 tablet 0     amphetamine-dextroamphetamine (ADDERALL) 20 MG tablet Take 1 tablet (20 mg) by mouth 2 times daily 60 tablet 0     etonogestrel (IMPLANON/NEXPLANON) 68 MG IMPL 1 each (68 mg) by Subdermal route once for 1 dose 1 each 0     FLUoxetine (PROZAC) 40 MG capsule Take 1 capsule (40 mg) by mouth 2 times daily 180 capsule 1     fluticasone (FLONASE) 50 MCG/ACT spray Spray 2 sprays into both nostrils daily 48 g 3     hydrocortisone (WESTCORT) 0.2 % external cream Apply topically 2 times daily 60 g 1     lisinopril (ZESTRIL) 5 MG tablet Take 1 tablet (5 mg) by mouth daily 30 tablet 1     minocycline (MINOCIN/DYNACIN) 100 MG capsule Take 1 capsule (100 mg) by mouth 2 times daily 180 capsule 3     montelukast (SINGULAIR) 10 MG tablet Take 10 mg by mouth nightly as needed       naproxen (NAPROSYN) 500 MG tablet Take 1 tablet (500 mg) by mouth 2 times daily as needed for moderate pain 60 tablet 1     needle, disp, 18G " "X 1\" MISC 1 each once a week 15 each 3     Needle, Disp, 23G X 1\" MISC Use to inject testosterone into muscle weekly 15 each 3     Sharps Container MISC Dispose sharps 1 each 3     syringe, disposable, (BD TUBERCULIN SYRINGE) 1 ML MISC BD 1ml Tuberculing syringe SLIP TIP (no needle attached). Use to administer IM medications as instructed 15 each 3     testosterone cypionate (DEPOTESTOSTERONE) 200 MG/ML injection Inject 0.4 mLs (80 mg) into the muscle once a week In cottonseed oil ok. Pt needs 3 mo supply =13ml given on single use vials 13 mL 1     No Known Allergies         HPI   Patients name: Trevin  Appointment start time:  2:21 PM      Worsening Anxiety    Patient with known history of anxiety  Reports similar episodes of panic attacks that occurred about 2 years ago after his father passed away.   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 Desires to go back on this medication though reports, \"It's not that I like this medication, but it helps me feel numb to my anxiety.\"  Denies any SI or HI           Assessment and Plan   1. SHANA (generalized anxiety disorder)  2. Panic attack  Patient with known history of anxiety. Does desire to start therapy with our clinical psychologists. Referral placed. Additionally will send for a 1 month fill of Klonopin with at least follow up in 2-4 weeks. Additionally reordered atarax which has aided with sleep in the past. Discussed this can also be utilized for anxiety.   - hydrOXYzine (ATARAX) 25 MG tablet; Take 1-2 tablets (25-50 mg) by mouth nightly as needed for anxiety or other (sleep)  Dispense: 100 tablet; Refill: " 1  - BEHAVIORAL HEALTH REFERRAL ([facility] interal and external)    After Visit Information:  Patient chose to view AVS via [facility]    Appointment end time: 2:33 PM  This is a telephone visit that took 12 minutes.      Clinician location:  Home, precepted with Dr. YEISON Avendaño MD  [facility] Resident PGY-3  [phone]

## 2020-03-23 NOTE — PROGRESS NOTES
Preceptor Attestation:  I discussed the patient with the resident. I have verified the content of the note, which accurately reflects my assessment of the patient and the plan of care.   Supervising Physician:  Hira Dumont MD.

## 2020-03-24 ENCOUNTER — TELEPHONE (OUTPATIENT)
Dept: PSYCHOLOGY | Facility: CLINIC | Age: 45
End: 2020-03-24

## 2020-03-24 NOTE — LETTER
March 30, 2020    Irma Evans  [address]  [place] [address]      Dear: Irma Evans    You were recently referred for a Behavioral Health appointment by your primary care provider at [facility].  We have called twice to schedule this appointment, but have been unable to reach you.  If you are interested in receiving this service, please call [facility] at [phone].  We would be happy to schedule this appointment for you.      Thank you.      Sincerely,    Patient Representative    [facility]  Hours:  Monday-Friday 8:00 am - 5:00 pm   Phone Number: [phone]

## 2020-03-24 NOTE — TELEPHONE ENCOUNTER
Mental Health Referral:  Please schedule with Dr. Chairez or Deisy.  Patient has [employer] insurance - he should call insurance to confirm that we are within [facility]      Please let patient know this is for primary care behavioral health services.  Therapists at our clinic are able to see patients for 8-12 sessions. If further assistance is needed, they will help the patient connect with ongoing services in the community.    If you are unable to reach the patient after two phone attempts, please send a letter and close the encounter.      Thank you!

## 2020-03-30 NOTE — TELEPHONE ENCOUNTER
Left voicemail to call direct line for assistance with scheduling.  2nd attempt letter mailed      Aleksandra Higgins CMA  [facility] Care Coordinator

## 2020-04-02 ENCOUNTER — MYC MEDICAL ADVICE (OUTPATIENT)
Dept: FAMILY MEDICINE | Facility: CLINIC | Age: 45
End: 2020-04-02

## 2020-04-02 DIAGNOSIS — F90.2 ATTENTION DEFICIT HYPERACTIVITY DISORDER (ADHD), COMBINED TYPE: ICD-10-CM

## 2020-04-04 RX ORDER — DEXTROAMPHETAMINE SACCHARATE, AMPHETAMINE ASPARTATE, DEXTROAMPHETAMINE SULFATE AND AMPHETAMINE SULFATE 5; 5; 5; 5 MG/1; MG/1; MG/1; MG/1
20 TABLET ORAL 2 TIMES DAILY
Qty: 60 TABLET | Refills: 0 | Status: SHIPPED | OUTPATIENT
Start: 2020-04-04 | End: 2020-05-05

## 2020-04-08 ENCOUNTER — MYC MEDICAL ADVICE (OUTPATIENT)
Dept: FAMILY MEDICINE | Facility: CLINIC | Age: 45
End: 2020-04-08

## 2020-04-30 DIAGNOSIS — F64.9 GENDER IDENTITY DISORDER: ICD-10-CM

## 2020-04-30 NOTE — TELEPHONE ENCOUNTER
"Request for medication refill: testosterone cypionate (DEPOTESTOSTERONE) 200 MG/ML injection    Providers if patient needs an appointment and you are willing to give a one month supply please refill for one month and  send a letter/MyChart using \".SMILLIMITEDREFILL\" .smillimited and route chart to \"P [facility] \" (Giving one month refill in non controlled medications is strongly recommended before denial)    If refill has been denied, meaning absolutely no refills without visit, please complete the smart phrase \".smirxrefuse\" and route it to the \"P [facility] MED REFILLS\"  pool to inform the patient and the pharmacy.    Nikki Yanez, [facility]        "

## 2020-05-01 RX ORDER — TESTOSTERONE CYPIONATE 200 MG/ML
80 INJECTION, SOLUTION INTRAMUSCULAR WEEKLY
Qty: 13 ML | Refills: 1 | Status: SHIPPED | OUTPATIENT
Start: 2020-05-01 | End: 2020-12-02

## 2020-05-06 ENCOUNTER — TELEPHONE (OUTPATIENT)
Dept: PSYCHOLOGY | Facility: CLINIC | Age: 45
End: 2020-05-06

## 2020-05-06 NOTE — TELEPHONE ENCOUNTER
Will need to confirm insurance coverage    [facility]  [address]  [address]  [place] [address]   [phone]    [facility]  Patients: Children, adolescents, adults  Locations:  [address]  [place], [place] [id number] -[address]  [place], [place] [id number] -[address]  [place] [address]  [place] [address]  Appointment Scheduling   [phone]  [email or web address]    [facility]  [address]  [address]  [place], [place] [address] [phone]     Individual Mental Health Practitioners   Therapist Children Adolescents Adults Family Other                   Briseyda Andino, [facility]  [address]  [place] [address]  [employer]  [phone]   YES YES On Bus[employer]  Couples too           Jeet Garay, PhD, LP  [address]  [place] [address]  PH: [phone]  [email or web address]  Older Adolescents YES  On busline   Mayur Slade, [facility]  [address]  [address]  [place] [address]  PH: [phone]  [employer]  YES YES YES Busline                   ELIZABETH Mar MFA, PsJarvis, LP   [address]  [address]  [place] [address]  [phone]  [email or web address]   YES YES Sexuality groups  busline

## 2020-05-07 DIAGNOSIS — R03.0 ELEVATED BLOOD PRESSURE READING WITHOUT DIAGNOSIS OF HYPERTENSION: ICD-10-CM

## 2020-05-07 NOTE — TELEPHONE ENCOUNTER
5/7/20 Attempted to contact patient regarding referral information. Had to leave message on patient voicemail, along with direct number.    Veda Carlos  Care Coordinator

## 2020-05-07 NOTE — TELEPHONE ENCOUNTER

## 2020-05-08 RX ORDER — LISINOPRIL 5 MG/1
5 TABLET ORAL DAILY
Qty: 30 TABLET | Refills: 1 | Status: SHIPPED | OUTPATIENT
Start: 2020-05-08 | End: 2020-07-08

## 2020-05-11 NOTE — TELEPHONE ENCOUNTER
5/10/20 Patient unavailable by telephone. Sending letter to patient home.    Veda Carlos  Care Coordinator

## 2020-06-09 DIAGNOSIS — F64.9 GENDER DYSPHORIA: ICD-10-CM

## 2020-06-09 NOTE — TELEPHONE ENCOUNTER
"Request for medication refill: Needle, Disp, 23G X 1\" MISC     Providers if patient needs an appointment and you are willing to give a one month supply please refill for one month and  send a letter/MyChart using \".SMILLIMITEDREFILL\" .smillimited and route chart to \"P [facility] \" (Giving one month refill in non controlled medications is strongly recommended before denial)    If refill has been denied, meaning absolutely no refills without visit, please complete the smart phrase \".smirxrefuse\" and route it to the \"P [facility] MED REFILLS\"  pool to inform the patient and the pharmacy.    Nikki Yanez, [facility]        "

## 2020-07-01 DIAGNOSIS — F64.9 GENDER IDENTITY DISORDER: ICD-10-CM

## 2020-07-01 NOTE — TELEPHONE ENCOUNTER

## 2020-07-06 DIAGNOSIS — R03.0 ELEVATED BLOOD PRESSURE READING WITHOUT DIAGNOSIS OF HYPERTENSION: ICD-10-CM

## 2020-07-06 NOTE — TELEPHONE ENCOUNTER

## 2020-07-08 RX ORDER — LISINOPRIL 5 MG/1
5 TABLET ORAL DAILY
Qty: 30 TABLET | Refills: 1 | Status: SHIPPED | OUTPATIENT
Start: 2020-07-08 | End: 2020-10-01

## 2020-07-15 ENCOUNTER — TELEPHONE (OUTPATIENT)
Dept: FAMILY MEDICINE | Facility: CLINIC | Age: 45
End: 2020-07-15

## 2020-07-15 NOTE — TELEPHONE ENCOUNTER
Central Prior Authorization Team   Phone: [phone]    PA Initiation    Medication: testosterone cypionate (DEPOTESTOSTERONE) 200 MG/ML injection   Insurance Company: [employer] ([facility]) - Phone [phone] Fax [phone]  Pharmacy Filling the Rx: [id number] - [facility] [address] AT [facility] [address]  Filling Pharmacy Phone: [phone]  Filling Pharmacy Fax: [phone]  Start Date: 7/15/2020

## 2020-07-17 NOTE — TELEPHONE ENCOUNTER
Prior Authorization Approval        Authorization Effective Date: 7/15/2020  Authorization Expiration Date: 7/15/2021  Medication: testosterone cypionate (DEPOTESTOSTERONE) 200 MG/ML injection   Approved Dose/Quantity:   Reference #: [id number]   Insurance Company: [employer] ([facility]) - Phone [phone] Fax [phone]   Which Pharmacy is filling the prescription (Not needed for infusion/clinic administered): [id number] [place] [address] [place] [facility] [address] [facility]  Pharmacy Notified: Yes - via voicemail  Patient Notified:

## 2020-07-30 ENCOUNTER — MYC MEDICAL ADVICE (OUTPATIENT)
Dept: FAMILY MEDICINE | Facility: CLINIC | Age: 45
End: 2020-07-30

## 2020-07-30 DIAGNOSIS — F90.2 ATTENTION DEFICIT HYPERACTIVITY DISORDER (ADHD), COMBINED TYPE: ICD-10-CM

## 2020-08-03 RX ORDER — DEXTROAMPHETAMINE SACCHARATE, AMPHETAMINE ASPARTATE, DEXTROAMPHETAMINE SULFATE AND AMPHETAMINE SULFATE 5; 5; 5; 5 MG/1; MG/1; MG/1; MG/1
20 TABLET ORAL 2 TIMES DAILY
Qty: 60 TABLET | Refills: 0 | OUTPATIENT
Start: 2020-08-03

## 2020-08-03 NOTE — TELEPHONE ENCOUNTER
Medication Refill Denied  Reason: Patient needs: provider visit in-person. Per Dr. Baker's 5/5/20 note. In person because needs UDS  Provider: I have not called the patient about the Rx denial, please call.  PCS: Please notify the pharmacy, Please contact the patient to explain reasoning provided above and to schedule the patient for a provider visit with any provider..    Natasha Burks MD

## 2020-08-04 NOTE — TELEPHONE ENCOUNTER
Left voicemail for pt to call us back to schedule per note below.    Maddy Avila, PRITESH 10:09 AM August 4, 2020

## 2020-08-05 ENCOUNTER — OFFICE VISIT (OUTPATIENT)
Dept: FAMILY MEDICINE | Facility: CLINIC | Age: 45
End: 2020-08-05
Payer: COMMERCIAL

## 2020-08-05 VITALS
WEIGHT: 256 LBS | OXYGEN SATURATION: 98 % | DIASTOLIC BLOOD PRESSURE: 88 MMHG | SYSTOLIC BLOOD PRESSURE: 121 MMHG | HEART RATE: 98 BPM | BODY MASS INDEX: 41.95 KG/M2

## 2020-08-05 DIAGNOSIS — F90.2 ATTENTION DEFICIT HYPERACTIVITY DISORDER (ADHD), COMBINED TYPE: ICD-10-CM

## 2020-08-05 DIAGNOSIS — Z13.9 SCREENING FOR CONDITION: Primary | ICD-10-CM

## 2020-08-05 LAB
AMPHETAMINES QUAL: POSITIVE
BARBITURATES QUAL URINE: NEGATIVE
BENZODIAZEPINE QUAL URINE: POSITIVE
BUPRENORPHINE QUAL URINE: NEGATIVE
CANNABINOIDS UR QL SCN: POSITIVE
COCAINE QUAL URINE: NEGATIVE
METHAMPHETAMINE: NEGATIVE
METHODONE QUAL: NEGATIVE
MORPHINE QUAL: NEGATIVE
OXYCODONE QUAL: NEGATIVE
PHENCYCLIDINE: NEGATIVE
PROPOXYPHENE: NEGATIVE
TEMPERATURE OF URINE WAS BETWEEN 90-100 DEGREES F: YES
TRICYCLIC ANTIDEPRESSANTS: NEGATIVE

## 2020-08-05 RX ORDER — DEXTROAMPHETAMINE SACCHARATE, AMPHETAMINE ASPARTATE, DEXTROAMPHETAMINE SULFATE AND AMPHETAMINE SULFATE 5; 5; 5; 5 MG/1; MG/1; MG/1; MG/1
20 TABLET ORAL 2 TIMES DAILY
Qty: 60 TABLET | Refills: 0 | Status: SHIPPED | OUTPATIENT
Start: 2020-09-04 | End: 2020-11-01

## 2020-08-05 RX ORDER — DEXTROAMPHETAMINE SACCHARATE, AMPHETAMINE ASPARTATE, DEXTROAMPHETAMINE SULFATE AND AMPHETAMINE SULFATE 5; 5; 5; 5 MG/1; MG/1; MG/1; MG/1
20 TABLET ORAL 2 TIMES DAILY
Qty: 60 TABLET | Refills: 0 | Status: SHIPPED | OUTPATIENT
Start: 2020-10-04 | End: 2020-09-02

## 2020-08-05 RX ORDER — DEXTROAMPHETAMINE SACCHARATE, AMPHETAMINE ASPARTATE, DEXTROAMPHETAMINE SULFATE AND AMPHETAMINE SULFATE 5; 5; 5; 5 MG/1; MG/1; MG/1; MG/1
20 TABLET ORAL 2 TIMES DAILY
Qty: 60 TABLET | Refills: 0 | Status: SHIPPED | OUTPATIENT
Start: 2020-08-05 | End: 2020-11-01

## 2020-08-05 ASSESSMENT — ENCOUNTER SYMPTOMS
UNEXPECTED WEIGHT CHANGE: 0
CHEST TIGHTNESS: 0

## 2020-08-05 NOTE — PROGRESS NOTES
HPI:       44 year old patient who presents with:    Follow-up of ADHD and renewal of medications.  He has not been in for quite some time and is due for a UDS.    He has had ADHD for a long time.  Has been stable on current dose of Adderall.  Still has difficulty with distraction and short attention span, but this is chronic and unchanged.  He is working regular hours and feels that he is doing okay in the midst of the pandemic.  Uses Klonopin or hydroxyzine for severe anxiety flares but states that his anxiety has been fairly good recently.  He uses marijuana and informs me that his drug screen will likely show that.       Problem, Medication and Allergy Lists were reviewed and are current.  Patient is an established patient of this clinic.         Review of Systems:     Review of Systems   Constitutional: Negative for unexpected weight change.   Respiratory: Negative for chest tightness.    Cardiovascular: Negative for chest pain.          Physical Exam:     /88 (BP Location: Left arm, Patient Position: Sitting, Cuff Size: Adult Large)   Pulse 98   Wt 116.1 kg (256 lb)   SpO2 98%   Breastfeeding No   BMI 41.95 kg/m      Body mass index is 41.95 kg/m .  Vitals reviewed.    Physical Exam  Constitutional:       General: He is not in acute distress.     Appearance: He is obese.   Cardiovascular:      Rate and Rhythm: Normal rate and regular rhythm.   Psychiatric:         Thought Content: Thought content normal.      Comments: He is alert and attends reasonably well.  Speech is not pressured.             Results:     Results from last visit:  Office Visit on 10/14/2019   Component Date Value Ref Range Status     Hemoglobin A1C 10/14/2019 5.3  4.1 - 5.7 % Final     Cholesterol 10/14/2019 207.3* 0.0 - 200.0 mg/dL Final     Cholesterol/HDL Ratio 10/14/2019 3.8  0.0 - 5.0 Final     HDL Cholesterol 10/14/2019 54.0  >40.0 mg/dL Final     Triglycerides 10/14/2019 78.0  0.0 - 150.0 mg/dL Final     VLDL  Cholesterol 10/14/2019 15.6  7.0 - 32.0 mg/dL Final     LDL Cholesterol Calculated 10/14/2019 138* 0 - 129 mg/dL Final     Testosterone Total 10/14/2019 507* 8 - 60 ng/dL Final    Comment: This test was developed and its performance characteristics determined by the   [facility],  [facility]. It has   not been cleared or approved by the FDA. The laboratory is regulated under   CLIA as qualified to perform high-complexity testing. This test is used for   clinical purposes. It should not be regarded as investigational or for   research.       Protein Total 10/14/2019 6.9  6.8 - 8.8 g/dL Final     Albumin 10/14/2019 4.3  3.8 - 5.0 mg/dL Final     Alkaline Phosphatase 10/14/2019 53.2  31.7 - 110.5 U/L Final     ALT 10/14/2019 28.2  0.0 - 45.0 U/L Final     AST 10/14/2019 22.1  0.0 - 45.0 U/L Final     Bilirubin Direct 10/14/2019 0.3  0.1 - 0.3 mg/dL Final     Bilirubin Total 10/14/2019 0.6  0.2 - 1.3 mg/dL Final     Glucose 10/14/2019 115.0* 70.0 - 99.0 mg'dL Final     Hemoglobin 10/14/2019 16.2* 11.7 - 15.7 g/dL Final     Cannabinoids Qual Urine 10/14/2019 POSITIVE* NEGATIVE Final     Phencyclidine 10/14/2019 NEGATIVE  NEGATIVE Final     Cocaine Qual Urine 10/14/2019 NEGATIVE  NEGATIVE Final     Methamphetamine Qual 10/14/2019 NEGATIVE  NEGATIVE Final     Morphine Qual 10/14/2019 NEGATIVE  NEGATIVE Final     Amphetamines Qual 10/14/2019 POSITIVE* NEGATIVE Final     Benzodiazepine Qual Urine 10/14/2019 POSITIVE* NEGATIVE Final     Tricyclic Antidepressants 10/14/2019 NEGATIVE  NEGATIVE Final     Methadone Qual 10/14/2019 NEGATIVE  NEGATIVE Final     Barbiturates Qual Urine 10/14/2019 NEGATIVE  NEGATIVE Final     Oxycodone Qual 10/14/2019 NEGATIVE  NEGATIVE Final     Propoxyphene 10/14/2019 NEGATIVE  NEGATIVE Final     Buprenorphine Qual Urine 10/14/2019 NEGATIVE  NEGATIVE Final     Temperature of Urine was Between 9* 10/14/2019 YES  YES Final    Comment: This is a  preliminary screening test that detects drugs-of-abuse in urine at   specified detection levels.  To confirm preliminary results, a more specific   method such as Gas Chromatography/Mass Spectrometry (GC/MS) must be used.          Creatinine Urine 10/14/2019 173  mg/dL Final     Albumin Urine mg/L 10/14/2019 25  mg/L Final     Albumin Urine mg/g Cr 10/14/2019 14.68  0 - 25 mg/g Cr Final     Desmethyldiazepam 10/14/2019 Negative   Final     Oxazepam Qual 10/14/2019 Negative   Final     Temazepam 10/14/2019 Negative   Final     Alprazolam 10/14/2019 Negative   Final     Alpha-OH-alprazolam 10/14/2019 Negative   Final     Lorazepam Qual 10/14/2019 Negative   Final    Comment: (Note)  Qualitative confirmation of analytes performed by LC/MS/MS  (high performance liquid chromatography/tandem mass   spectrometry). Results greater than or equal to the   confirmation threshold are reported as positive.  Drugs                                 Confirmation Threshold  desmethyldiazepam, oxazepam  temazepam and lorazepam                            300 ng/mL  alprazolam and metabolite   (alpha-hydroxyalprazolam)                          100 ng/mL  This test was developed and its performance characteristics  determined by [facility]. It has not been cleared or approved  by the Food and Drug Administration.  Analysis performed by [facility] [place] [address]         Assessment and Willow Solis was seen today for follow up.    Diagnoses and all orders for this visit:    Screening for condition  -     Rapid Urine Drug Screen ([facility])    Attention deficit hyperactivity disorder (ADHD), combined type.  He appears to be stable and we will continue him on current dose.  -     amphetamine-dextroamphetamine (ADDERALL) 20 MG tablet; Take 1 tablet (20 mg) by mouth 2 times daily  -     amphetamine-dextroamphetamine (ADDERALL) 20 MG tablet; Take 1 tablet (20 mg) by mouth 2 times daily  -     amphetamine-dextroamphetamine  (ADDERALL) 20 MG tablet; Take 1 tablet (20 mg) by mouth 2 times daily        Options for treatment and follow-up care were reviewed with the patient. Irma Evans engaged in the decision making process and verbalized understanding of the options discussed and agreed with the final plan.    Hira Dumont MD

## 2020-09-01 ENCOUNTER — VIRTUAL VISIT (OUTPATIENT)
Dept: FAMILY MEDICINE | Facility: CLINIC | Age: 45
End: 2020-09-01
Payer: COMMERCIAL

## 2020-09-01 DIAGNOSIS — J02.0 ACUTE STREPTOCOCCAL PHARYNGITIS: Primary | ICD-10-CM

## 2020-09-01 RX ORDER — AMOXICILLIN 500 MG/1
500 CAPSULE ORAL 2 TIMES DAILY
Qty: 20 CAPSULE | Refills: 0 | Status: SHIPPED | OUTPATIENT
Start: 2020-09-01 | End: 2020-09-11

## 2020-09-01 ASSESSMENT — PATIENT HEALTH QUESTIONNAIRE - PHQ9: SUM OF ALL RESPONSES TO PHQ QUESTIONS 1-9: 5

## 2020-09-01 NOTE — PATIENT INSTRUCTIONS
Here is the plan from today's visit    1. Acute streptococcal pharyngitis  We'll assume it's strep and treat you with amoxicillin.  If you're not getting better after 48 h on antibiotics, call back and let us know to come in for a clinic visit. You'd likely buy yourself a COVID-19 swab too if you dont' get better on  Antibiotics.  - amoxicillin (AMOXIL) 500 MG capsule; Take 1 capsule (500 mg) by mouth 2 times daily for 10 days  Dispense: 20 capsule; Refill: 0      Please call or return to clinic if your symptoms don't go away.    Follow up plan  As needed    Thank you for coming to [facility] today.  Lab Testing:  **If you had lab testing today and your results are reassuring or normal they will be mailed to you or sent through [employer] within 7 days.   **If the lab tests need quick action we will call you with the results.  The phone number we will call with results is # [phone] (home) . If this is not the best number please call our clinic and change the number.  Medication Refills:  If you need any refills please call your pharmacy and they will contact us.   If you need to  your refill at a new pharmacy, please contact the new pharmacy directly. The new pharmacy will help you get your medications transferred faster.   Scheduling:  If you have any concerns about today's visit or wish to schedule another appointment please call our office during normal business hours [phone] (8-5:00 M-F)  If a referral was made to a [facility] and you don't get a call from central scheduling please call [phone].  If a Mammogram was ordered for you at [facility] call [phone] to schedule or change your appointment.  If you had an XRay/CT/Ultrasound/MRI ordered the number is [phone] to schedule or change your radiology appointment.   Medical Concerns:  If you have urgent medical concerns please call [phone] at any time of the day.    Jaylen Euceda, DO

## 2020-09-01 NOTE — PROGRESS NOTES
"Family Medicine Telephone Visit Note       Telephone Visit Consent   Patient was verbally read the following and verbal consent was obtained.    \"Telephone visits are billed at different rates depending on your insurance coverage. During this emergency period, for some insurers they may be billed the same as an in-person visit.  Please reach out to your insurance provider with any questions.  If during the course of the call the physician/provider feels a telephone visit is not appropriate, you will not be charged for this service.\"    Name person giving consent:  Patient   Date verbal consent given:  9/1/2020  Time verbal consent given:  9:23 AM         Chief Complaint   Patient presents with     Pharyngitis     Strep throat-Sore throat x3days with white spots in back of throat. 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  Last antibiotics q year ago  No allergies to medicines    Current Outpatient Medications   Medication Sig Dispense Refill     amphetamine-dextroamphetamine (ADDERALL) 20 MG tablet Take 1 tablet (20 mg) by mouth 2 times daily 60 tablet 0     [START ON 9/4/2020] amphetamine-dextroamphetamine (ADDERALL) 20 MG tablet Take 1 tablet (20 mg) by mouth 2 times daily 60 tablet 0     clonazePAM (KLONOPIN) 0.25 MG TBDP ODT tab Take 1 tablet (0.25 mg) by mouth daily as needed for anxiety 30 tablet 0     etonogestrel (IMPLANON/NEXPLANON) 68 MG IMPL 1 each (68 mg) by Subdermal route once " "for 1 dose 1 each 0     FLUoxetine (PROZAC) 40 MG capsule Take 1 capsule (40 mg) by mouth 2 times daily 180 capsule 1     fluticasone (FLONASE) 50 MCG/ACT spray Spray 2 sprays into both nostrils daily 48 g 3     hydrocortisone (WESTCORT) 0.2 % external cream Apply topically 2 times daily 60 g 1     hydrOXYzine (ATARAX) 25 MG tablet Take 1-2 tablets (25-50 mg) by mouth nightly as needed for anxiety or other (sleep) 100 tablet 1     lisinopril (ZESTRIL) 5 MG tablet Take 1 tablet (5 mg) by mouth daily 30 tablet 1     minocycline (MINOCIN/DYNACIN) 100 MG capsule Take 1 capsule (100 mg) by mouth 2 times daily 180 capsule 3     montelukast (SINGULAIR) 10 MG tablet Take 10 mg by mouth nightly as needed       naproxen (NAPROSYN) 500 MG tablet Take 1 tablet (500 mg) by mouth 2 times daily as needed for moderate pain 60 tablet 1     needle, disp, 18G X 1\" MISC 1 each once a week 15 each 3     Needle, Disp, 23G X 1\" MISC Use to inject testosterone into muscle weekly 15 each 3     Sharps Container MISC Dispose sharps 1 each 3     syringe, disposable, (BD TUBERCULIN SYRINGE) 1 ML MISC BD 1ml Tuberculing syringe SLIP TIP (no needle attached). Use to administer IM medications as instructed 15 each 3     testosterone cypionate (DEPOTESTOSTERONE) 200 MG/ML injection Inject 0.4 mLs (80 mg) into the muscle once a week In cottonseed oil ok. Pt needs 3 mo supply =13ml given on single use vials 13 mL 1     [START ON 10/4/2020] amphetamine-dextroamphetamine (ADDERALL) 20 MG tablet Take 1 tablet (20 mg) by mouth 2 times daily (Patient not taking: Reported on 9/1/2020) 60 tablet 0     No Known Allergies           Review of Systems:              Physical Exam:     There were no vitals taken for this visit.  Estimated body mass index is 41.95 kg/m  as calculated from the following:    Height as of 10/14/19: 1.664 m (5' 5.5\").    Weight as of 8/5/20: 116.1 kg (256 lb).    Exam:  Constitutional: healthy, alert and no distress  Psychiatric: " mentation appears normal and affect normal/bright          Assessment and Plan   Irma was seen today for pharyngitis.    Diagnoses and all orders for this visit:    Acute streptococcal pharyngitis  -     amoxicillin (AMOXIL) 500 MG capsule; Take 1 capsule (500 mg) by mouth 2 times daily for 10 days    Will treat empirically for acute strep pharyngitis. If symptoms not improving, will have patient come in for clinic visit and will more highly suspect COVID-19. Advised patient to isolate himself.    Refilled medications that would be required in the next 3 months.     After Visit Information:  Patient chose to view AVS via [employer]    No follow-ups on file.    Appointment end time: 9:36 AM  This is a telephone visit that took 11 minutes.      Clinician location:  [facility]     DO [facility] Resident PGY-3  [phone]    I precepted today with Dr Dumont.

## 2020-09-01 NOTE — PROGRESS NOTES
Preceptor Attestation:   I talked to the patient on the phone. I discussed the patient with the resident. I have verified the content of the note, which accurately reflects my assessment of the patient and the plan of care.   Supervising Physician:  Tyra Dumont MD.

## 2020-09-03 ENCOUNTER — TELEPHONE (OUTPATIENT)
Dept: FAMILY MEDICINE | Facility: CLINIC | Age: 45
End: 2020-09-03

## 2020-09-03 NOTE — TELEPHONE ENCOUNTER
[facility] phone call message-patient reporting a symptom:     Symptom: Sore throat still possible strep    When did symptoms begin?     Characteristics: (location on body, intensity, what makes it better or worse, associated symptoms):  Throat    Additional Details: Still has white spots      Same Day Visit Offered: None available    Additional comments:     OK to leave message on voice mail? Yes    Advised patient that RN would call back within 3 hours, unless emergent   Primary language: English      needed? No    Call taken on September 3, 2020 at 9:09 AM by Sybil Suh

## 2020-09-03 NOTE — TELEPHONE ENCOUNTER
"Spoke with patient who stated that he has had no change in sore throat symptoms and he still has swollen white spots at the back of his throat. Has been taking Amoxicillin for 2 days. Per Dr. Euceda's note: \"Will treat empirically for acute strep pharyngitis. If symptoms not improving, will have patient come in for clinic visit and will more highly suspect COVID-19.\" There is no clinic availability for the rest of the week. Advised patient that he could either go to Urgent Care or come here for a COVID test and further recommendations from Dr. Euceda. Patient stated that he would rather come to [facility] for the test and see if there is anything else in the meantime that he can do to help his symptoms. He has been placed on tomorrow's symptomatic curbside testing schedule.    Dr. Euceda please advise, thank you!    Chanda Clark RN    "

## 2020-09-04 ENCOUNTER — OFFICE VISIT (OUTPATIENT)
Dept: URGENT CARE | Facility: URGENT CARE | Age: 45
End: 2020-09-04
Payer: COMMERCIAL

## 2020-09-04 VITALS
OXYGEN SATURATION: 100 % | WEIGHT: 250.2 LBS | BODY MASS INDEX: 41 KG/M2 | HEART RATE: 97 BPM | TEMPERATURE: 98.1 F | SYSTOLIC BLOOD PRESSURE: 132 MMHG | DIASTOLIC BLOOD PRESSURE: 91 MMHG

## 2020-09-04 DIAGNOSIS — R07.0 THROAT PAIN: Primary | ICD-10-CM

## 2020-09-04 LAB
DEPRECATED S PYO AG THROAT QL EIA: NEGATIVE
SPECIMEN SOURCE: NORMAL
SPECIMEN SOURCE: NORMAL
STREP GROUP A PCR: NOT DETECTED

## 2020-09-04 PROCEDURE — U0003 INFECTIOUS AGENT DETECTION BY NUCLEIC ACID (DNA OR RNA); SEVERE ACUTE RESPIRATORY SYNDROME CORONAVIRUS 2 (SARS-COV-2) (CORONAVIRUS DISEASE [COVID-19]), AMPLIFIED PROBE TECHNIQUE, MAKING USE OF HIGH THROUGHPUT TECHNOLOGIES AS DESCRIBED BY CMS-2020-01-R: HCPCS | Performed by: NURSE PRACTITIONER

## 2020-09-04 PROCEDURE — 99203 OFFICE O/P NEW LOW 30 MIN: CPT | Performed by: NURSE PRACTITIONER

## 2020-09-04 PROCEDURE — 87651 STREP A DNA AMP PROBE: CPT | Performed by: NURSE PRACTITIONER

## 2020-09-04 ASSESSMENT — ENCOUNTER SYMPTOMS
FEVER: 0
SORE THROAT: 1
HEADACHES: 0
DIARRHEA: 0
CHILLS: 0
SHORTNESS OF BREATH: 0
RHINORRHEA: 0
NAUSEA: 0
COUGH: 0
VOMITING: 0

## 2020-09-04 NOTE — TELEPHONE ENCOUNTER
I have nothing else to offer virtually. Patient should be evaluated in person - really ought to go to urgent care.     - Jaylen Euceda

## 2020-09-04 NOTE — PROGRESS NOTES
SUBJECTIVE:   Irma Evans is a 44 year old adult presenting with a chief complaint of   Chief Complaint   Patient presents with     Throat Problem       He is an established patient of [facility]    Onset of symptoms was 1 week(s) ago.  Course of illness is worsening.    Severity moderate  Current and Associated symptoms: sore throat  Treatment measures tried include on amoxicillin given on virtual visist. Seen on virtual visist 9/1/2020 on the third day of antibiotics  Predisposing factors include None.      Review of Systems   Constitutional: Negative for chills and fever.   HENT: Positive for sore throat. Negative for congestion, ear pain and rhinorrhea.    Respiratory: Negative for cough and shortness of breath.    Gastrointestinal: Negative for diarrhea, nausea and vomiting.   Neurological: Negative for headaches.   All other systems reviewed and are negative.      Past Medical History:   Diagnosis Date     Broken foot 1-2012     Depressive disorder      Hypertension      Migraines since [place]     Patient overweight     BMI 56     Seasonal allergies     spring     Family History   Problem Relation Age of Onset     Arthritis Mother      Alcohol/Drug Mother      Asthma Mother      Depression Mother         and many on mom's side of family     Hypertension Mother      Thyroid Disease Mother         s/p removal     Alcohol/Drug Father      Hypertension Father      Obesity Father      Stomach Cancer Father      Gastrointestinal Disease Sister         CROHN disease     Hypertension Maternal Uncle      Hypertension Maternal Grandfather      Arthritis Maternal Grandfather      Attention Deficit Disorder Other      Current Outpatient Medications   Medication Sig Dispense Refill     benzocaine-menthol (CHLORASEPTIC) 6-10 MG lozenge Place 1 lozenge inside cheek every 2 hours as needed for moderate pain 35 lozenge 0     amoxicillin (AMOXIL) 500 MG capsule Take 1 capsule (500 mg) by mouth 2 times  "daily for 10 days 20 capsule 0     amphetamine-dextroamphetamine (ADDERALL) 20 MG tablet Take 1 tablet (20 mg) by mouth 2 times daily 60 tablet 0     amphetamine-dextroamphetamine (ADDERALL) 20 MG tablet Take 1 tablet (20 mg) by mouth 2 times daily 60 tablet 0     clonazePAM (KLONOPIN) 0.25 MG TBDP ODT tab Take 1 tablet (0.25 mg) by mouth daily as needed for anxiety 30 tablet 0     etonogestrel (IMPLANON/NEXPLANON) 68 MG IMPL 1 each (68 mg) by Subdermal route once for 1 dose 1 each 0     FLUoxetine (PROZAC) 40 MG capsule Take 1 capsule (40 mg) by mouth 2 times daily 180 capsule 1     fluticasone (FLONASE) 50 MCG/ACT spray Spray 2 sprays into both nostrils daily 48 g 3     hydrocortisone (WESTCORT) 0.2 % external cream Apply topically 2 times daily 60 g 1     hydrOXYzine (ATARAX) 25 MG tablet Take 1-2 tablets (25-50 mg) by mouth nightly as needed for anxiety or other (sleep) 100 tablet 1     lisinopril (ZESTRIL) 5 MG tablet Take 1 tablet (5 mg) by mouth daily 30 tablet 1     minocycline (MINOCIN/DYNACIN) 100 MG capsule Take 1 capsule (100 mg) by mouth 2 times daily 180 capsule 3     montelukast (SINGULAIR) 10 MG tablet Take 10 mg by mouth nightly as needed       naproxen (NAPROSYN) 500 MG tablet Take 1 tablet (500 mg) by mouth 2 times daily as needed for moderate pain 60 tablet 1     needle, disp, 18G X 1\" MISC 1 each once a week 15 each 3     Needle, Disp, 23G X 1\" MISC Use to inject testosterone into muscle weekly 15 each 3     Sharps Container MISC Dispose sharps 1 each 3     syringe, disposable, (BD TUBERCULIN SYRINGE) 1 ML MISC BD 1ml Tuberculing syringe SLIP TIP (no needle attached). Use to administer IM medications as instructed 15 each 3     testosterone cypionate (DEPOTESTOSTERONE) 200 MG/ML injection Inject 0.4 mLs (80 mg) into the muscle once a week In cottonseed oil ok. Pt needs 3 mo supply =13ml given on single use vials 13 mL 1     Social History     Tobacco Use     Smoking status: Former Smoker     " Packs/day: 1.00     Years: 12.00     Pack years: 12.00     Types: Cigarettes     Smokeless tobacco: Never Used     Tobacco comment: quit 6/29/13   Substance Use Topics     Alcohol use: Yes     Comment: Occ       OBJECTIVE  BP (!) 132/91   Pulse 97   Temp 98.1  F (36.7  C) (Oral)   Wt 113.5 kg (250 lb 3.2 oz)   SpO2 100%   BMI 41.00 kg/m      Physical Exam  Vitals signs and nursing note reviewed.   Constitutional:       General: He is not in acute distress.     Appearance: He is well-developed. He is not diaphoretic.   HENT:      Head: Normocephalic and atraumatic.      Right Ear: Tympanic membrane and external ear normal.      Left Ear: Tympanic membrane and external ear normal.      Mouth/Throat:      Pharynx: Posterior oropharyngeal erythema present.      Tonsils: Tonsillar exudate present. 1+ on the right. 1+ on the left.   Eyes:      Pupils: Pupils are equal, round, and reactive to light.   Neck:      Musculoskeletal: Normal range of motion and neck supple.   Pulmonary:      Effort: Pulmonary effort is normal. No respiratory distress.      Breath sounds: Normal breath sounds.   Lymphadenopathy:      Cervical: No cervical adenopathy.   Skin:     General: Skin is warm and dry.   Neurological:      Mental Status: He is alert.      Cranial Nerves: No cranial nerve deficit.         Labs:  Results for orders placed or performed in visit on 09/04/20 (from the past 24 hour(s))   Streptococcus A Rapid Scr w Reflx to PCR    Specimen: Throat   Result Value Ref Range    Strep Specimen Description Throat     Streptococcus Group A Rapid Screen Negative NEG^Negative     ASSESSMENT:      ICD-10-CM    1. Throat pain  R07.0 Streptococcus A Rapid Scr w Reflx to PCR     Group A Streptococcus PCR Throat Swab     Asymptomatic COVID-19 Virus (Coronavirus) by PCR     benzocaine-menthol (CHLORASEPTIC) 6-10 MG lozenge        PLAN:  I discussed lab results with the patient.  Advised to continue and finish the amoxicillin.  Discussed  the possibilities of COVID 19, patient has agreed to carry out the test.  Printed material is given regarding coronavirus.  Precautions to protect himself or others discussed.  All questions are answered and patient is in agreement with plan.  I spent 30 minutes with patient face to face, of which 50 % or greater was spent on counselling and cordination of care in regards to COVID 19  See plan above      Patient Instructions       Patient Education     When You Have a Sore Throat    A sore throat can be painful. There are many reasons why you may have a sore throat. Your healthcare provider will work with you to find the cause of your sore throat. He or she will also find the best treatment for you.  What causes a sore throat?  Sore throats can be caused or worsened by:    Cold or flu viruses    Bacteria    Irritants such as tobacco smoke or air pollution    Acid reflux  A healthy throat  The tonsils are on the sides of the throat near the base of the tongue. They collect viruses and bacteria and help fight infection. The throat (pharynx) is the passage for air. Mucus from the nasal cavity also moves down the passage.  An inflamed throat  The tonsils and pharynx can become inflamed due to a cold or flu virus. Postnasal drip (excess mucus draining from the nasal cavity) can irritate the throat. It can also make the throat or tonsils more likely to be infected by bacteria. Severe, untreated tonsillitis in children or adults can cause a pocket of pus (abscess) to form near the tonsil.  Your evaluation  A medical evaluation can help find the cause of your sore throat. It can also help your healthcare provider choose the best treatment for you. The evaluation may include a health history, physical exam, and diagnostic tests.  Health history  Your healthcare provider may ask you the following:    How long has the sore throat lasted and how have you been treating it?    Do you have any other symptoms, such as body aches,  fever, or cough?    Does your sore throat recur? If so, how often? How many days of school or work have you missed because of a sore throat?    Do you have trouble eating or swallowing?    Have you been told that you snore or have other sleep problems?    Do you have bad breath?    Do you cough up bad-tasting mucus?  Physical exam  During the exam, your healthcare provider checks your ears, nose, and throat for problems. He or she also checks for swelling in the neck, and may listen to your chest.  Possible tests  Other tests your healthcare provider may perform include:    A throat swab to check for bacteria such as streptococcus (the bacteria that causes strep throat)    A blood test to check for mononucleosis (a viral infection)    A chest X-ray to rule out pneumonia, especially if you have a cough  Treating a sore throat  Treatment depends on many factors. What is the likely cause? Is the problem recent? Does it keep coming back? In many cases, the best thing to do is to treat the symptoms, rest, and let the problem heal itself. Antibiotics may help clear up some bacterial infections. For cases of severe or recurring tonsillitis, the tonsils may need to be removed.  Relieving your symptoms    Don t smoke, and avoid secondhand smoke.    For children, try throat sprays or Popsicles. Adults and older children may try lozenges.    Drink warm liquids to soothe the throat and help thin mucus. Avoid alcohol, spicy foods, and acidic drinks such as orange juice. These can irritate the throat.    Gargle with warm saltwater (1 teaspoon of salt to 8 ounces of warm water).    Use a humidifier to keep air moist and relieve throat dryness.    Try over-the-counter pain relievers such as acetaminophen or ibuprofen. Use as directed, and don t exceed the recommended dose. Don t give aspirin to children.   Are antibiotics needed?  If your sore throat is due to a bacterial infection, antibiotics may speed healing and prevent  "complications. Although group A streptococcus (\"strep throat\" or GAS) is the major treatable infection for a sore throat, GAS causes only 5% to 15% of sore throats in adults who seek medical care. Most sore throats are caused by cold or flu viruses. And antibiotics don t treat viral illness. In fact, using antibiotics when they re not needed may produce bacteria that are harder to kill. Your healthcare provider will prescribe antibiotics only if he or she thinks they are likely to help.  If antibiotics are prescribed  Take the medicine exactly as directed. Be sure to finish your prescription even if you re feeling better. And be sure to ask your healthcare provider or pharmacist what side effects are common and what to do about them.  Is surgery needed?  In some cases, tonsils need to be removed. This is often done as outpatient (same-day) surgery. Your healthcare provider may advise removing the tonsils in cases of:    Several severe bouts of tonsillitis in a year.  Severe  episodes include those that lead to missed days of school or work, or that need to be treated with antibiotics.    Tonsillitis that causes breathing problems during sleep    Tonsillitis caused by food particles collecting in pouches in the tonsils (cryptic tonsillitis)  Call your healthcare provider if any of the following occur:    Symptoms worsen, or new symptoms develop.    Swollen tonsils make breathing difficult.    The pain is severe enough to keep you from drinking liquids.    A skin rash, hives, or wheezing develops. Any of these could signal an allergic reaction to antibiotics.    Symptoms don t improve within a week.    Symptoms don t improve within 2 to 3 days of starting antibiotics.   Date Last Reviewed: 10/1/2016    5158-9919 [employer]. [address] [place] [address], [place] [address]. All rights reserved. This information is not intended as a substitute for professional medical care. Always follow your healthcare " professional's instructions.           Patient Education   After Your COVID-19 (Coronavirus) Test  You have been tested for COVID-19 (coronavirus).   If you'll have surgery in the next few days, we'll let you know ahead of time if you have the virus. Please call your surgeon's office with any questions.  For all other patients: Results are usually available within 7 to 10 days. Our testing sites do not have access to your test results.     If your test result is positive, you'll get a phone call letting you know. (A positive test means that you have the virus.)    If your test result is negative, you'll get a letter in the mail. (A negative test suggests you do not have the virus.)    You may also receive your results in [employer]. If you have questions after getting your results, please visit our testing website at [email or web address].  After 7 to 10 days, if you have not gotten your results:     Call [phone] ([phone]) and ask to speak with our COVID-19 results team.    If you're being treated at an infusion center: Call your infusion center directly.  What are the symptoms of COVID-19?  Symptoms may include any of the following: Fever, cough, trouble breathing, headache, body aches, sore throat, runny or stuffy nose, fatigue (feeling very tired), diarrhea (loose poop), and nausea or vomiting (feeling sick to the stomach or throwing up).  You may already have symptoms of COVID-19, or they may show up later.  What should I do if I have symptoms?  If you're having surgery: Call your surgeon's office.  For all other patients: Stay home and away from others (self-isolate) until ...    You've had no fever--and no medicine that reduces fever--for 1 full day (24 hours), AND    Other symptoms have gotten better. For example, your cough or breathing has improved, AND    At least 10 days have passed since your symptoms first started.  During this time    Stay in your own room, even for  "meals. Use your own bathroom if you can.    Stay away from others in your home. No hugging, kissing or shaking hands. No visitors.    Don't go to work, school or anywhere else.    Clean \"high touch\" surfaces often (doorknobs, counters, handles). Use household cleaning spray or wipes. You'll find a full list of  on the EPA website: www.epa.gov/pesticide-registration/list-n-disinfectants-use-against-sars-cov-2.    Cover your mouth and nose with a mask or other face covering to avoid spreading germs.    Wash your hands and face often. Use soap and water.    Caregivers in these groups are at risk for severe illness due to COVID-19:  ? People 65 years and older  ? People who live in a nursing home or long-term care facility  ? People with chronic disease (lung, heart, cancer, diabetes, kidney, liver, immunologic)  ? People who have a weakened immune system, including those who:    Are in cancer treatment    Take medicine that weakens the immune system, such as corticosteroids    Had a bone marrow or organ transplant    Have an immune deficiency    Have poorly controlled HIV or AIDS    Are obese (body mass index of 40 or higher)    Smoke regularly    Caregivers should wear gloves while washing dishes, handling laundry and cleaning bedrooms and bathrooms.    Use caution when washing and drying laundry: Don't shake dirty laundry and use the warmest water setting that you can.    For more tips on managing your health at home, go to www.cdc.gov/coronavirus/2019-ncov/downloads/10Things.pdf.  How can I take care of myself at home?  1. Get lots of rest. Drink extra fluids (unless a doctor has told you not to).  2. Take Tylenol (acetaminophen) for fever or pain. If you have liver or kidney problems, ask your family doctor if it's okay to take Tylenol.     Adults can take either:  ? 650 mg (two 325 mg pills) every 4 to 6 hours, or   ? 1,000 mg (two 500 mg pills) every 8 hours as needed.  ? Note: Don't take more than 3,000 " mg in one day. Acetaminophen is found in many medicines (both prescribed and over-the-counter medicines). Read all labels to be sure you don't take too much.   For children, check the Tylenol bottle for the right dose. The dose is based on the child's age or weight.  3. If you have other health problems (like cancer, heart failure, an organ transplant or severe kidney disease): Call your specialty clinic if you don't feel better in the next 2 days.  4. Know when to call 911. Emergency warning signs include:  ? Trouble breathing or shortness of breath  ? Chest pain or pressure that doesn't go away  ? Feeling confused like you haven't felt before, or not being able to wake up  ? Bluish-colored lips or face  5. If your doctor prescribed a blood thinner medicine: Follow their instructions.  Where can I get more information?    [facility] - About COVID-19:   [email or web address]    CDC - If You're Sick: cdc.gov/coronavirus/2019-ncov/about/steps-when-sick.html    CDC - Ending Home Isolation: www.cdc.gov/coronavirus/2019-ncov/hcp/disposition-in-home-patients.html    CDC - Caring for Someone: www.cdc.gov/coronavirus/2019-ncov/if-you-are-sick/care-for-someone.html    [facility] - Interim Guidance for Hospital Discharge to Home: www.health.[facility].[place]/diseases/coronavirus/hcp/hospdischarge.pdf    [facility] clinical trials (COVID-19 research studies): [email or web address]    Below are the COVID-19 hotlines at the [facility] ([facility]). Interpreters are available.  ? For health questions: Call 930-998-7301 or [phone] (7 a.m. to 7 p.m.)  ? For questions about schools and childcare: Call 931-159-6372 or [phone] (7 a.m. to 7 p.m.)    For informational purposes only. Not to replace the advice of your health care provider. Clinically reviewed by Infection Prevention and the [facility] COVID-19 Clinical Team. Copyright   2020 [facility].  All rights reserved. [employer] 143131 - Rev 8/4/20.

## 2020-09-04 NOTE — PATIENT INSTRUCTIONS
Patient Education     When You Have a Sore Throat    A sore throat can be painful. There are many reasons why you may have a sore throat. Your healthcare provider will work with you to find the cause of your sore throat. He or she will also find the best treatment for you.  What causes a sore throat?  Sore throats can be caused or worsened by:    Cold or flu viruses    Bacteria    Irritants such as tobacco smoke or air pollution    Acid reflux  A healthy throat  The tonsils are on the sides of the throat near the base of the tongue. They collect viruses and bacteria and help fight infection. The throat (pharynx) is the passage for air. Mucus from the nasal cavity also moves down the passage.  An inflamed throat  The tonsils and pharynx can become inflamed due to a cold or flu virus. Postnasal drip (excess mucus draining from the nasal cavity) can irritate the throat. It can also make the throat or tonsils more likely to be infected by bacteria. Severe, untreated tonsillitis in children or adults can cause a pocket of pus (abscess) to form near the tonsil.  Your evaluation  A medical evaluation can help find the cause of your sore throat. It can also help your healthcare provider choose the best treatment for you. The evaluation may include a health history, physical exam, and diagnostic tests.  Health history  Your healthcare provider may ask you the following:    How long has the sore throat lasted and how have you been treating it?    Do you have any other symptoms, such as body aches, fever, or cough?    Does your sore throat recur? If so, how often? How many days of school or work have you missed because of a sore throat?    Do you have trouble eating or swallowing?    Have you been told that you snore or have other sleep problems?    Do you have bad breath?    Do you cough up bad-tasting mucus?  Physical exam  During the exam, your healthcare provider checks your ears, nose, and throat for problems. He or she  "also checks for swelling in the neck, and may listen to your chest.  Possible tests  Other tests your healthcare provider may perform include:    A throat swab to check for bacteria such as streptococcus (the bacteria that causes strep throat)    A blood test to check for mononucleosis (a viral infection)    A chest X-ray to rule out pneumonia, especially if you have a cough  Treating a sore throat  Treatment depends on many factors. What is the likely cause? Is the problem recent? Does it keep coming back? In many cases, the best thing to do is to treat the symptoms, rest, and let the problem heal itself. Antibiotics may help clear up some bacterial infections. For cases of severe or recurring tonsillitis, the tonsils may need to be removed.  Relieving your symptoms    Don t smoke, and avoid secondhand smoke.    For children, try throat sprays or Popsicles. Adults and older children may try lozenges.    Drink warm liquids to soothe the throat and help thin mucus. Avoid alcohol, spicy foods, and acidic drinks such as orange juice. These can irritate the throat.    Gargle with warm saltwater (1 teaspoon of salt to 8 ounces of warm water).    Use a humidifier to keep air moist and relieve throat dryness.    Try over-the-counter pain relievers such as acetaminophen or ibuprofen. Use as directed, and don t exceed the recommended dose. Don t give aspirin to children.   Are antibiotics needed?  If your sore throat is due to a bacterial infection, antibiotics may speed healing and prevent complications. Although group A streptococcus (\"strep throat\" or GAS) is the major treatable infection for a sore throat, GAS causes only 5% to 15% of sore throats in adults who seek medical care. Most sore throats are caused by cold or flu viruses. And antibiotics don t treat viral illness. In fact, using antibiotics when they re not needed may produce bacteria that are harder to kill. Your healthcare provider will prescribe antibiotics " only if he or she thinks they are likely to help.  If antibiotics are prescribed  Take the medicine exactly as directed. Be sure to finish your prescription even if you re feeling better. And be sure to ask your healthcare provider or pharmacist what side effects are common and what to do about them.  Is surgery needed?  In some cases, tonsils need to be removed. This is often done as outpatient (same-day) surgery. Your healthcare provider may advise removing the tonsils in cases of:    Several severe bouts of tonsillitis in a year.  Severe  episodes include those that lead to missed days of school or work, or that need to be treated with antibiotics.    Tonsillitis that causes breathing problems during sleep    Tonsillitis caused by food particles collecting in pouches in the tonsils (cryptic tonsillitis)  Call your healthcare provider if any of the following occur:    Symptoms worsen, or new symptoms develop.    Swollen tonsils make breathing difficult.    The pain is severe enough to keep you from drinking liquids.    A skin rash, hives, or wheezing develops. Any of these could signal an allergic reaction to antibiotics.    Symptoms don t improve within a week.    Symptoms don t improve within 2 to 3 days of starting antibiotics.   Date Last Reviewed: 10/1/2016    7364-9978 [employer]. [address] [place] [address]. All rights reserved. This information is not intended as a substitute for professional medical care. Always follow your healthcare professional's instructions.           Patient Education   After Your COVID-19 (Coronavirus) Test  You have been tested for COVID-19 (coronavirus).   If you'll have surgery in the next few days, we'll let you know ahead of time if you have the virus. Please call your surgeon's office with any questions.  For all other patients: Results are usually available within 7 to 10 days. Our testing sites do not have access to your test results.     If  "your test result is positive, you'll get a phone call letting you know. (A positive test means that you have the virus.)    If your test result is negative, you'll get a letter in the mail. (A negative test suggests you do not have the virus.)    You may also receive your results in [facility]. If you have questions after getting your results, please visit our testing website at [email or web address].  After 7 to 10 days, if you have not gotten your results:     Call [phone] ([phone]) and ask to speak with our COVID-19 results team.    If you're being treated at an infusion center: Call your infusion center directly.  What are the symptoms of COVID-19?  Symptoms may include any of the following: Fever, cough, trouble breathing, headache, body aches, sore throat, runny or stuffy nose, fatigue (feeling very tired), diarrhea (loose poop), and nausea or vomiting (feeling sick to the stomach or throwing up).  You may already have symptoms of COVID-19, or they may show up later.  What should I do if I have symptoms?  If you're having surgery: Call your surgeon's office.  For all other patients: Stay home and away from others (self-isolate) until ...    You've had no fever--and no medicine that reduces fever--for 1 full day (24 hours), AND    Other symptoms have gotten better. For example, your cough or breathing has improved, AND    At least 10 days have passed since your symptoms first started.  During this time    Stay in your own room, even for meals. Use your own bathroom if you can.    Stay away from others in your home. No hugging, kissing or shaking hands. No visitors.    Don't go to work, school or anywhere else.    Clean \"high touch\" surfaces often (doorknobs, counters, handles). Use household cleaning spray or wipes. You'll find a full list of  on the EPA website: www.epa.gov/pesticide-registration/list-n-disinfectants-use-against-sars-cov-2.    Cover your mouth and nose " with a mask or other face covering to avoid spreading germs.    Wash your hands and face often. Use soap and water.    Caregivers in these groups are at risk for severe illness due to COVID-19:  ? People 65 years and older  ? People who live in a nursing home or long-term care facility  ? People with chronic disease (lung, heart, cancer, diabetes, kidney, liver, immunologic)  ? People who have a weakened immune system, including those who:    Are in cancer treatment    Take medicine that weakens the immune system, such as corticosteroids    Had a bone marrow or organ transplant    Have an immune deficiency    Have poorly controlled HIV or AIDS    Are obese (body mass index of 40 or higher)    Smoke regularly    Caregivers should wear gloves while washing dishes, handling laundry and cleaning bedrooms and bathrooms.    Use caution when washing and drying laundry: Don't shake dirty laundry and use the warmest water setting that you can.    For more tips on managing your health at home, go to www.cdc.gov/coronavirus/2019-ncov/downloads/10Things.pdf.  How can I take care of myself at home?  1. Get lots of rest. Drink extra fluids (unless a doctor has told you not to).  2. Take Tylenol (acetaminophen) for fever or pain. If you have liver or kidney problems, ask your family doctor if it's okay to take Tylenol.     Adults can take either:  ? 650 mg (two 325 mg pills) every 4 to 6 hours, or   ? 1,000 mg (two 500 mg pills) every 8 hours as needed.  ? Note: Don't take more than 3,000 mg in one day. Acetaminophen is found in many medicines (both prescribed and over-the-counter medicines). Read all labels to be sure you don't take too much.   For children, check the Tylenol bottle for the right dose. The dose is based on the child's age or weight.  3. If you have other health problems (like cancer, heart failure, an organ transplant or severe kidney disease): Call your specialty clinic if you don't feel better in the next 2  days.  4. Know when to call 911. Emergency warning signs include:  ? Trouble breathing or shortness of breath  ? Chest pain or pressure that doesn't go away  ? Feeling confused like you haven't felt before, or not being able to wake up  ? Bluish-colored lips or face  5. If your doctor prescribed a blood thinner medicine: Follow their instructions.  Where can I get more information?    [facility] - About COVID-19:   [email or web address]    CDC - If You're Sick: cdc.gov/coronavirus/2019-ncov/about/steps-when-sick.html    CDC - Ending Home Isolation: www.cdc.gov/coronavirus/2019-ncov/hcp/disposition-in-home-patients.html    CDC - Caring for Someone: www.cdc.gov/coronavirus/2019-ncov/if-you-are-sick/care-for-someone.html    [facility] - Interim Guidance for Hospital Discharge to Home: www.health.[facility].[place]/diseases/coronavirus/hcp/hospdischarge.pdf    [facility] clinical trials (COVID-19 research studies): [email or web address]-clinical-trials    Below are the COVID-19 hotlines at the [place] [facility] ([facility]). Interpreters are available.  ? For health questions: Call 062-943-4496 or [phone] (7 a.m. to 7 p.m.)  ? For questions about schools and childcare: Call 416-993-8124 or 1-576.212.9693 (7 a.m. to 7 p.m.)    For informational purposes only. Not to replace the advice of your health care provider. Clinically reviewed by Infection Prevention and the [facility] COVID-19 Clinical Team. Copyright   2020 [employer]. All rights reserved. [employer] 789674 - Rev 8/4/20.

## 2020-09-04 NOTE — TELEPHONE ENCOUNTER
Spoke with patient and relayed information below. Patient stated that they will decide today and will let us know if we should cancel their COVID swab or not.    Chanda Clark RN

## 2020-09-06 LAB
SARS-COV-2 RNA SPEC QL NAA+PROBE: NOT DETECTED
SPECIMEN SOURCE: NORMAL

## 2020-10-01 ENCOUNTER — MYC MEDICAL ADVICE (OUTPATIENT)
Dept: FAMILY MEDICINE | Facility: CLINIC | Age: 45
End: 2020-10-01

## 2020-10-01 DIAGNOSIS — R03.0 ELEVATED BLOOD PRESSURE READING WITHOUT DIAGNOSIS OF HYPERTENSION: ICD-10-CM

## 2020-10-01 RX ORDER — LISINOPRIL 5 MG/1
5 TABLET ORAL DAILY
Qty: 30 TABLET | Refills: 0 | Status: SHIPPED | OUTPATIENT
Start: 2020-10-01 | End: 2020-10-05

## 2020-10-01 NOTE — TELEPHONE ENCOUNTER
RN sent emergency supply since pt out. Routing to PCP to send further refills if appropriate or advise if appt needed

## 2020-10-05 RX ORDER — LISINOPRIL 5 MG/1
5 TABLET ORAL DAILY
Qty: 30 TABLET | Refills: 0 | Status: SHIPPED | OUTPATIENT
Start: 2020-10-05 | End: 2020-12-02

## 2020-10-27 ENCOUNTER — MYC MEDICAL ADVICE (OUTPATIENT)
Dept: FAMILY MEDICINE | Facility: CLINIC | Age: 45
End: 2020-10-27

## 2020-10-27 DIAGNOSIS — F90.2 ATTENTION DEFICIT HYPERACTIVITY DISORDER (ADHD), COMBINED TYPE: ICD-10-CM

## 2020-11-01 DIAGNOSIS — F90.2 ATTENTION DEFICIT HYPERACTIVITY DISORDER (ADHD), COMBINED TYPE: Primary | ICD-10-CM

## 2020-11-01 RX ORDER — DEXTROAMPHETAMINE SACCHARATE, AMPHETAMINE ASPARTATE, DEXTROAMPHETAMINE SULFATE AND AMPHETAMINE SULFATE 5; 5; 5; 5 MG/1; MG/1; MG/1; MG/1
20 TABLET ORAL 2 TIMES DAILY
Qty: 60 TABLET | Refills: 0 | Status: SHIPPED | OUTPATIENT
Start: 2020-12-01 | End: 2020-12-31

## 2020-11-01 RX ORDER — DEXTROAMPHETAMINE SACCHARATE, AMPHETAMINE ASPARTATE, DEXTROAMPHETAMINE SULFATE AND AMPHETAMINE SULFATE 5; 5; 5; 5 MG/1; MG/1; MG/1; MG/1
20 TABLET ORAL 2 TIMES DAILY
Qty: 60 TABLET | Refills: 0 | Status: SHIPPED | OUTPATIENT
Start: 2020-11-01 | End: 2021-12-15

## 2020-11-01 RX ORDER — DEXTROAMPHETAMINE SACCHARATE, AMPHETAMINE ASPARTATE, DEXTROAMPHETAMINE SULFATE AND AMPHETAMINE SULFATE 5; 5; 5; 5 MG/1; MG/1; MG/1; MG/1
20 TABLET ORAL 2 TIMES DAILY
Qty: 60 TABLET | Refills: 0 | Status: SHIPPED | OUTPATIENT
Start: 2021-01-01 | End: 2021-02-07

## 2020-11-30 DIAGNOSIS — R03.0 ELEVATED BLOOD PRESSURE READING WITHOUT DIAGNOSIS OF HYPERTENSION: ICD-10-CM

## 2020-11-30 DIAGNOSIS — L70.0 ACNE VULGARIS: ICD-10-CM

## 2020-11-30 DIAGNOSIS — F64.9 GENDER IDENTITY DISORDER: ICD-10-CM

## 2020-11-30 NOTE — TELEPHONE ENCOUNTER

## 2020-11-30 NOTE — TELEPHONE ENCOUNTER
"Request for medication refill:Testosterone Minocycline     Providers if patient needs an appointment and you are willing to give a one month supply please refill for one month and  send a letter/MyChart using \".SMILLIMITEDREFILL\" .smillimited and route chart to \"P SMI \" (Giving one month refill in non controlled medications is strongly recommended before denial)    If refill has been denied, meaning absolutely no refills without visit, please complete the smart phrase \".smirxrefuse\" and route it to the \"P SMI MED REFILLS\"  pool to inform the patient and the pharmacy.    Maggie Higginbotham, CMA        "

## 2020-12-02 RX ORDER — TESTOSTERONE CYPIONATE 200 MG/ML
80 INJECTION, SOLUTION INTRAMUSCULAR WEEKLY
Qty: 13 ML | Refills: 1 | Status: SHIPPED | OUTPATIENT
Start: 2020-12-02 | End: 2021-07-21

## 2020-12-02 RX ORDER — MINOCYCLINE HYDROCHLORIDE 100 MG/1
100 CAPSULE ORAL 2 TIMES DAILY
Qty: 180 CAPSULE | Refills: 3 | Status: SHIPPED | OUTPATIENT
Start: 2020-12-02 | End: 2021-12-14

## 2020-12-02 RX ORDER — LISINOPRIL 5 MG/1
5 TABLET ORAL DAILY
Qty: 90 TABLET | Refills: 0 | Status: SHIPPED | OUTPATIENT
Start: 2020-12-02 | End: 2021-03-09

## 2020-12-06 ENCOUNTER — HEALTH MAINTENANCE LETTER (OUTPATIENT)
Age: 45
End: 2020-12-06

## 2020-12-29 DIAGNOSIS — F32.4 MAJOR DEPRESSIVE DISORDER, SINGLE EPISODE, IN PARTIAL REMISSION (H): ICD-10-CM

## 2020-12-30 RX ORDER — FLUOXETINE 40 MG/1
40 CAPSULE ORAL 2 TIMES DAILY
Qty: 180 CAPSULE | Refills: 1 | Status: SHIPPED | OUTPATIENT
Start: 2020-12-30 | End: 2021-04-26

## 2021-01-28 ENCOUNTER — MYC MEDICAL ADVICE (OUTPATIENT)
Dept: FAMILY MEDICINE | Facility: CLINIC | Age: 46
End: 2021-01-28

## 2021-01-28 DIAGNOSIS — F90.2 ATTENTION DEFICIT HYPERACTIVITY DISORDER (ADHD), COMBINED TYPE: ICD-10-CM

## 2021-02-07 RX ORDER — DEXTROAMPHETAMINE SACCHARATE, AMPHETAMINE ASPARTATE, DEXTROAMPHETAMINE SULFATE AND AMPHETAMINE SULFATE 5; 5; 5; 5 MG/1; MG/1; MG/1; MG/1
20 TABLET ORAL 2 TIMES DAILY
Qty: 60 TABLET | Refills: 0 | Status: SHIPPED | OUTPATIENT
Start: 2021-03-10 | End: 2021-04-09

## 2021-02-07 RX ORDER — DEXTROAMPHETAMINE SACCHARATE, AMPHETAMINE ASPARTATE, DEXTROAMPHETAMINE SULFATE AND AMPHETAMINE SULFATE 5; 5; 5; 5 MG/1; MG/1; MG/1; MG/1
20 TABLET ORAL 2 TIMES DAILY
Qty: 60 TABLET | Refills: 0 | Status: SHIPPED | OUTPATIENT
Start: 2021-02-07 | End: 2021-03-09

## 2021-02-07 RX ORDER — DEXTROAMPHETAMINE SACCHARATE, AMPHETAMINE ASPARTATE, DEXTROAMPHETAMINE SULFATE AND AMPHETAMINE SULFATE 5; 5; 5; 5 MG/1; MG/1; MG/1; MG/1
20 TABLET ORAL 2 TIMES DAILY
Qty: 60 TABLET | Refills: 0 | Status: SHIPPED | OUTPATIENT
Start: 2021-04-10 | End: 2021-04-26

## 2021-02-08 DIAGNOSIS — F64.9 GENDER DYSPHORIA: ICD-10-CM

## 2021-02-08 NOTE — TELEPHONE ENCOUNTER
"Request for medication refill: NEEDLES 18GX1    Providers if patient needs an appointment and you are willing to give a one month supply please refill for one month and  send a letter/MyChart using \".SMILLIMITEDREFILL\" .smillimited and route chart to \"P SMI \" (Giving one month refill in non controlled medications is strongly recommended before denial)    If refill has been denied, meaning absolutely no refills without visit, please complete the smart phrase \".smirxrefuse\" and route it to the \"P SMI MED REFILLS\"  pool to inform the patient and the pharmacy.    Maggie Higginbotham, CMA        "

## 2021-03-09 DIAGNOSIS — R03.0 ELEVATED BLOOD PRESSURE READING WITHOUT DIAGNOSIS OF HYPERTENSION: ICD-10-CM

## 2021-03-09 RX ORDER — LISINOPRIL 5 MG/1
5 TABLET ORAL DAILY
Qty: 90 TABLET | Refills: 0 | Status: SHIPPED | OUTPATIENT
Start: 2021-03-09 | End: 2021-04-26

## 2021-03-19 ENCOUNTER — IMMUNIZATION (OUTPATIENT)
Dept: NURSING | Facility: CLINIC | Age: 46
End: 2021-03-19
Payer: COMMERCIAL

## 2021-03-19 PROCEDURE — 91300 PR COVID VAC PFIZER DIL RECON 30 MCG/0.3 ML IM: CPT

## 2021-03-19 PROCEDURE — 0001A PR COVID VAC PFIZER DIL RECON 30 MCG/0.3 ML IM: CPT

## 2021-04-09 ENCOUNTER — IMMUNIZATION (OUTPATIENT)
Dept: NURSING | Facility: CLINIC | Age: 46
End: 2021-04-09
Attending: FAMILY MEDICINE
Payer: COMMERCIAL

## 2021-04-09 PROCEDURE — 91300 PR COVID VAC PFIZER DIL RECON 30 MCG/0.3 ML IM: CPT

## 2021-04-09 PROCEDURE — 0002A PR COVID VAC PFIZER DIL RECON 30 MCG/0.3 ML IM: CPT

## 2021-04-11 ENCOUNTER — HEALTH MAINTENANCE LETTER (OUTPATIENT)
Age: 46
End: 2021-04-11

## 2021-04-26 ENCOUNTER — MYC MEDICAL ADVICE (OUTPATIENT)
Dept: FAMILY MEDICINE | Facility: CLINIC | Age: 46
End: 2021-04-26

## 2021-04-26 ENCOUNTER — OFFICE VISIT (OUTPATIENT)
Dept: FAMILY MEDICINE | Facility: CLINIC | Age: 46
End: 2021-04-26
Payer: COMMERCIAL

## 2021-04-26 VITALS
BODY MASS INDEX: 40.82 KG/M2 | TEMPERATURE: 98.6 F | WEIGHT: 254 LBS | SYSTOLIC BLOOD PRESSURE: 117 MMHG | RESPIRATION RATE: 20 BRPM | OXYGEN SATURATION: 100 % | HEART RATE: 71 BPM | DIASTOLIC BLOOD PRESSURE: 82 MMHG | HEIGHT: 66 IN

## 2021-04-26 DIAGNOSIS — Z20.2 CONTACT WITH AND (SUSPECTED) EXPOSURE TO INFECTIONS WITH A PREDOMINANTLY SEXUAL MODE OF TRANSMISSION: ICD-10-CM

## 2021-04-26 DIAGNOSIS — F64.9 GENDER IDENTITY DISORDER: Primary | ICD-10-CM

## 2021-04-26 DIAGNOSIS — E88.810 DYSMETABOLIC SYNDROME X: ICD-10-CM

## 2021-04-26 DIAGNOSIS — Z79.899 ENCOUNTER FOR LONG-TERM (CURRENT) USE OF MEDICATIONS: ICD-10-CM

## 2021-04-26 DIAGNOSIS — I10 ESSENTIAL HYPERTENSION: ICD-10-CM

## 2021-04-26 DIAGNOSIS — F32.4 MAJOR DEPRESSIVE DISORDER, SINGLE EPISODE, IN PARTIAL REMISSION (H): ICD-10-CM

## 2021-04-26 DIAGNOSIS — F90.2 ATTENTION DEFICIT HYPERACTIVITY DISORDER (ADHD), COMBINED TYPE: ICD-10-CM

## 2021-04-26 DIAGNOSIS — N95.2 ATROPHIC VAGINITIS: ICD-10-CM

## 2021-04-26 PROBLEM — I89.1 LYMPHANGITIS: Status: RESOLVED | Noted: 2019-06-21 | Resolved: 2021-04-26

## 2021-04-26 LAB
AMPHETAMINES QUAL: POSITIVE
BACTERIA: NORMAL
BARBITURATES QUAL URINE: NEGATIVE
BENZODIAZEPINE QUAL URINE: NEGATIVE
BUN SERPL-MCNC: 15.4 MG/DL (ref 7–19)
BUPRENORPHINE QUAL URINE: NEGATIVE
CALCIUM SERPL-MCNC: 9 MG/DL (ref 8.5–10.1)
CANNABINOIDS UR QL SCN: POSITIVE
CHLORIDE SERPLBLD-SCNC: 100.9 MMOL/L (ref 98–110)
CHOLEST SERPL-MCNC: 183.1 MG/DL (ref 0–200)
CHOLEST/HDLC SERPL: 4.5 {RATIO} (ref 0–5)
CLUE CELLS: NORMAL
CO2 SERPL-SCNC: 30.1 MMOL/L (ref 20–32)
COCAINE QUAL URINE: NEGATIVE
CREAT SERPL-MCNC: 0.7 MG/DL (ref 0.5–1)
CREAT UR-MCNC: 149 MG/DL
GFR SERPL CREATININE-BSD FRML MDRD: >90 ML/MIN/1.7 M2
GLUCOSE SERPL-MCNC: 110.1 MG'DL (ref 70–99)
HBA1C MFR BLD: 5.9 % (ref 4.1–5.7)
HCV AB SERPL QL IA: NONREACTIVE
HDLC SERPL-MCNC: 40.9 MG/DL
HGB BLD-MCNC: 16.5 G/DL (ref 11.7–15.7)
HIV 1+2 AB+HIV1 P24 AG SERPL QL IA: NONREACTIVE
LDLC SERPL CALC-MCNC: 124 MG/DL (ref 0–129)
METHAMPHETAMINE: POSITIVE
METHODONE QUAL: NEGATIVE
MICROALBUMIN UR-MCNC: 78 MG/L
MICROALBUMIN/CREAT UR: 52.68 MG/G CR (ref 0–25)
MORPHINE QUAL: NEGATIVE
MOTILE TRICHOMONAS: NEGATIVE
ODOR: NORMAL
OXYCODONE QUAL: NEGATIVE
PH WET PREP: <4.5 (ref 3.8–4.5)
PHENCYCLIDINE: NEGATIVE
POTASSIUM SERPL-SCNC: 4.3 MMOL/L (ref 3.3–4.5)
PROPOXYPHENE: NEGATIVE
SODIUM SERPL-SCNC: 137.2 MMOL/L (ref 132.6–141.4)
T PALLIDUM AB SER QL: NONREACTIVE
TEMPERATURE OF URINE WAS BETWEEN 90-100 DEGREES F: YES
TRICYCLIC ANTIDEPRESSANTS: NEGATIVE
TRIGL SERPL-MCNC: 93.4 MG/DL (ref 0–150)
VLDL CHOLESTEROL: 18.7 MG/DL (ref 7–32)
WBC WET PREP: NORMAL (ref 2–5)
YEAST: NORMAL

## 2021-04-26 PROCEDURE — 80061 LIPID PANEL: CPT | Performed by: FAMILY MEDICINE

## 2021-04-26 PROCEDURE — 86803 HEPATITIS C AB TEST: CPT | Performed by: FAMILY MEDICINE

## 2021-04-26 PROCEDURE — 87210 SMEAR WET MOUNT SALINE/INK: CPT | Performed by: FAMILY MEDICINE

## 2021-04-26 PROCEDURE — 80306 DRUG TEST PRSMV INSTRMNT: CPT | Performed by: FAMILY MEDICINE

## 2021-04-26 PROCEDURE — 85018 HEMOGLOBIN: CPT | Performed by: FAMILY MEDICINE

## 2021-04-26 PROCEDURE — 87591 N.GONORRHOEAE DNA AMP PROB: CPT | Performed by: FAMILY MEDICINE

## 2021-04-26 PROCEDURE — 84403 ASSAY OF TOTAL TESTOSTERONE: CPT | Performed by: FAMILY MEDICINE

## 2021-04-26 PROCEDURE — 86780 TREPONEMA PALLIDUM: CPT | Performed by: FAMILY MEDICINE

## 2021-04-26 PROCEDURE — 82043 UR ALBUMIN QUANTITATIVE: CPT | Performed by: FAMILY MEDICINE

## 2021-04-26 PROCEDURE — 80048 BASIC METABOLIC PNL TOTAL CA: CPT | Performed by: FAMILY MEDICINE

## 2021-04-26 PROCEDURE — 87389 HIV-1 AG W/HIV-1&-2 AB AG IA: CPT | Performed by: FAMILY MEDICINE

## 2021-04-26 PROCEDURE — 99214 OFFICE O/P EST MOD 30 MIN: CPT | Performed by: FAMILY MEDICINE

## 2021-04-26 PROCEDURE — 83036 HEMOGLOBIN GLYCOSYLATED A1C: CPT | Performed by: FAMILY MEDICINE

## 2021-04-26 PROCEDURE — 87491 CHLMYD TRACH DNA AMP PROBE: CPT | Performed by: FAMILY MEDICINE

## 2021-04-26 PROCEDURE — 36415 COLL VENOUS BLD VENIPUNCTURE: CPT | Performed by: FAMILY MEDICINE

## 2021-04-26 RX ORDER — FLUOXETINE 40 MG/1
40 CAPSULE ORAL 2 TIMES DAILY
Qty: 180 CAPSULE | Refills: 1 | Status: SHIPPED | OUTPATIENT
Start: 2021-04-26 | End: 2022-04-06

## 2021-04-26 RX ORDER — DEXTROAMPHETAMINE SACCHARATE, AMPHETAMINE ASPARTATE, DEXTROAMPHETAMINE SULFATE AND AMPHETAMINE SULFATE 5; 5; 5; 5 MG/1; MG/1; MG/1; MG/1
20 TABLET ORAL DAILY
Qty: 60 TABLET | Refills: 0 | Status: SHIPPED | OUTPATIENT
Start: 2021-04-26 | End: 2021-05-26

## 2021-04-26 RX ORDER — ESTRADIOL 0.1 MG/G
2 CREAM VAGINAL
Qty: 42.5 G | Refills: 3 | Status: ON HOLD | OUTPATIENT
Start: 2021-04-26 | End: 2022-04-20

## 2021-04-26 RX ORDER — DEXTROAMPHETAMINE SACCHARATE, AMPHETAMINE ASPARTATE, DEXTROAMPHETAMINE SULFATE AND AMPHETAMINE SULFATE 5; 5; 5; 5 MG/1; MG/1; MG/1; MG/1
20 TABLET ORAL 2 TIMES DAILY
Qty: 60 TABLET | Refills: 0 | Status: SHIPPED | OUTPATIENT
Start: 2021-06-27 | End: 2021-07-27

## 2021-04-26 RX ORDER — LISINOPRIL 5 MG/1
5 TABLET ORAL DAILY
Qty: 90 TABLET | Refills: 0 | Status: SHIPPED | OUTPATIENT
Start: 2021-04-26 | End: 2021-09-27

## 2021-04-26 RX ORDER — DEXTROAMPHETAMINE SACCHARATE, AMPHETAMINE ASPARTATE, DEXTROAMPHETAMINE SULFATE AND AMPHETAMINE SULFATE 5; 5; 5; 5 MG/1; MG/1; MG/1; MG/1
20 TABLET ORAL 2 TIMES DAILY
Qty: 60 TABLET | Refills: 0 | Status: SHIPPED | OUTPATIENT
Start: 2021-05-27 | End: 2021-06-26

## 2021-04-26 ASSESSMENT — MIFFLIN-ST. JEOR: SCORE: 1823.89

## 2021-04-26 NOTE — PATIENT INSTRUCTIONS
Preventative  1. Ordered labs today. Results via [facility].    Sexual Health  1. STI Labs ordered today. Results via [facility].  2. Let me know if you would like to start PrEP again    Gender  1. Ordered labs today. Results via [facility].  2. Provided refill on 23 gauge needles.  3. Prescribed estrogen cream today.  - Rub it in for 20-30 seconds twice a week    ADHD  1. Provided 3-month refill on Adderall.  2. Follow up with me in 6 months, or otherwise sooner.  - Send me a MyChart at the 3-month manjula to make sure we're on track for your visits

## 2021-04-26 NOTE — PROGRESS NOTES
"  Assessment & Plan     Gender dysphoria  - Stable, except coital bleeding that will be treatable with estrogen - see atrophic vaginitis below.  - Needle, Disp, 23G X 1\" MISC  Dispense: 15 each; Refill: 3  - Testosterone total  - Lipid Cascade ([facility])  - Hemoglobin  - Hepatic Panel ([facility])    Dysmetabolic syndrome X  - Weight has been stable after 100 lbs weight loss, doesn't want to talk about weight at all today. Will update labs and continue to recommend exercise.   - Hemoglobin A1c ([facility])    Encounter for long-term (current) use of medications  - Rapid drug screen likely positive for other substances. Discussed avoidance of ecstasy and other illicit substances as it could compromise ability to get amphetamines and risks overdosing with contaminated medications, he verbalized understanding.  - Rapid Urine Drug Screen ([facility])    Essential hypertension  - Basic Metabolic Panel ([facility])  - Albumin Random Urine Quantitative with Creat Ratio  - lisinopril (ZESTRIL) 5 MG tablet  Dispense: 90 tablet; Refill: 0    Major depressive disorder, single episode, in partial remission (H)  - Continue with therapy  - FLUoxetine (PROZAC) 40 MG capsule  Dispense: 180 capsule; Refill: 1    Attention deficit hyperactivity disorder (ADHD), combined type  - To be seen every 6 months, can request a refill once between visits by [facility].    Contact with and (suspected) exposure to infections with a predominantly sexual mode of transmission  - Has not been having any receptive anal sex so deferred rectal gonorrhea/chlamydia. Recommended considering PrEP because he has HIV positive partners.  - Sent [facility] message to pt- due for Pap and HPV, can do this summer at his convenience.  - Neisseria gonorrhoeae PCR  - Chlamydia trachomatis PCR  - Wet Prep ([facility])  - Treponema Abs w Reflex to RPR and Titer  - HIV Antigen Antibody Combo  - NEISSERIA GONORRHOEA PCR  - CHLAMYDIA TRACHOMATIS PCR  - Hepatitis C antibody    Atrophic " vaginitis  - estradiol (ESTRACE) 0.1 MG/GM vaginal cream  Dispense: 42.5 g; Refill: 3    Follow up with me in 6 months, or otherwise sooner.    The information in this document, created by the medical scribe for me, accurately reflects the services I personally performed and the decisions made by me. I have reviewed and approved this document for accuracy prior to leaving the patient care area.  Liz Baker MD  10:32 AM, 04/26/21    [facility]    34 minutes spent on the date of the encounter doing chart review, patient visit and documentation     Diagnosis or treatment significantly limited by social determinants of health - drug use, physical activity, depression, lack of social connection which have a direct bearing on their ability to manage their medical conditions.      Abran Zhong is a 45 year old who presents for the following health issues    HPI     Preventative  Received COVID-19 vaccination.    Gender  Testosterone injections are going well.     Sexual Health  No romantic relationships only having sexual relationships. Engages in vaginal and oral sex, no anal receptive. Some partners are HIV positive, undetectable- no other partners with known infections. Some partners are IV drug users. Has no pain with penetration, but has occasional bleeding with rougher sex, which is really bothersome.Would like STI testing today. Is not interested in starting PrEP at the current time, will consider when life is normalizes after pandemic.     ADHD  Adderall BID is improving sx, has no side effects related to amphetamines. Reports initial insomnia but doesn't attribute this to adderall, becomes distracted by YouTube. Wakes at 4 AM to watch [employer], habit began around the elections; so he now has difficulty going back to sleep. Denies agitation.     Mood  Things have been going well since last visit. Has less anxiety now, it was heightened during pandemic. Also feels better now that  "he's vaccination. No difficulty living life with anxiety/depression; however, has some social anxiety, attributes this to being home all the time during pandemic. Okay to go to work, doesn't partake in any other social activities with friends outside of his home. Would like to work on going out more, but is waiting to until others are vaccinated. Has good supports in place with therapy and friends.    PHQ 10/14/2019 5/5/2020 9/1/2020   PHQ-9 Total Score 9 3 5   Q9: Thoughts of better off dead/self-harm past 2 weeks Several days Not at all Not at all   Today's score: 8    Medication Reconciliation  Requests refill on Prozac, lisinopril, adderall and needles. Uses 23 gauge needles but only receives 4 from the pharmacy. Receives 15 18 gauge needles from pharmacy. Sometimes forgets to take night dose of minocycline, but then acne worsens, which motivates him to take medication consistently.    Weight  Declines discussion around weight.    Social Hx  Started experimenting with ectasy out of boredom. Feels like it heightens his senses, but doesn't crave doing it, so is less concerned that it will become an addiction. Would probably tell me if he begins craving ecstasy. No IV drug use. Exercise through work.    Review of Systems     Positive for: improved mood sx, vaginal bleeding, poor sleep    Negative for: pain with penetration, agitation    See HPI for additional sx.    This document serves as a record of the services and decisions personally performed and made by Liz Baker MD. It was created on his/her behalf by Marjorie Nelson, a trained medical scribe. The creation of this document is based the provider's statements to the medical scribe.  Scribcelso Nelson 10:26 AM, April 26, 2021    Objective    Blood Pressure 117/82   Pulse 71   Temperature 98.6  F (37  C) (Oral)   Respiration 20   Height 1.676 m (5' 6\")   Weight 115.2 kg (254 lb)   Oxygen Saturation 100%   Breastfeeding No   Body Mass Index 41.00 " kg/m    Body mass index is 41 kg/m .   Vitals were reviewed and were normal.     Physical Exam   GENERAL: healthy, alert and no distress  SKIN: Mild non cystic acne over face  PSYCH: mentation appears normal, affect euthymic.

## 2021-04-26 NOTE — PROGRESS NOTES
Name: [facility]   Clinic Number: [id number]   Age: 70 y.o.   Diagnosis:   Encounter Diagnosis   Name Primary?    Primary osteoarthritis of right knee       Physician: Edmund Rosas MD   Original Orders : PT eval and treat  Initial visit: 3/8/18  Date of Last visit: 4/20/18  Date of Discharge Note:  4/23/18  Total Visits Received: 18  Missed Visits: 0    Subjective: Pt is pleased with progress. Feels confident that he can continue exercises on his own at home and at the gym. Continues to have some medial joint soreness, particularly after prolonged sitting or if he twists his knee.     Objective:  Treatment :    Included:Therapeutic exercise, Joint mobilizations, Soft tissue mobilizations, Proprioception exercises , Electrical stimulation, Stretching, Balance and Coordination ex and cryotherapy.      ROM:  R knee AROM 0*-123*       LE Strength: 5/5 R knee flex, ext and R hip flex, ext, abd     LEFS: 49/80   (39% disability, Gcodes current CJ, goal CJ)   CH 0 percent impaired, limited or restricted  CI At least 1 percent but less than 20 percent impaired, limited or restricted  CJ At least 20 percent but less than 40 percent impaired, limited or restricted  CK At least 40 percent but less than 60 percent impaired, limited or restricted  CL At least 60 percent but less than 80 percent impaired, limited or restricted  CM At least 80 percent but less than 100 percent impaired, limited or restricted   percent impaired, limited or restricted    Assessment:  Pt demonstrates functional AROM and normal strength of the right knee. Pt is now more limited by pain and dysfunction of the L knee, which he plans on having replacement in Feb 2019.   Goals Achieved: Yes  Goals Not achieved and why: NA    Discharge reason : Met goals    Discharge plan :Continue HEP and Pt to follow-up with MD as planned    Plan:  This patient is discharged from [facility].        PHQ 10/14/2019 5/5/2020 9/1/2020   PHQ-9 Total Score 9 3 5   Q9: Thoughts of better off dead/self-harm past 2 weeks Several days Not at all Not at all   today score is 8

## 2021-04-27 LAB
C TRACH DNA SPEC QL NAA+PROBE: NEGATIVE
C TRACH DNA SPEC QL NAA+PROBE: NEGATIVE
N GONORRHOEA DNA SPEC QL NAA+PROBE: NEGATIVE
N GONORRHOEA DNA SPEC QL NAA+PROBE: NEGATIVE
SPECIMEN SOURCE: NORMAL

## 2021-04-27 PROCEDURE — 80076 HEPATIC FUNCTION PANEL: CPT | Performed by: FAMILY MEDICINE

## 2021-04-27 PROCEDURE — 36415 COLL VENOUS BLD VENIPUNCTURE: CPT | Performed by: FAMILY MEDICINE

## 2021-04-28 LAB
ALBUMIN SERPL-MCNC: 3.8 G/DL (ref 3.4–5)
ALP SERPL-CCNC: 74 U/L (ref 40–150)
ALT SERPL W P-5'-P-CCNC: 49 U/L (ref 0–50)
AST SERPL W P-5'-P-CCNC: 31 U/L (ref 0–45)
BILIRUB DIRECT SERPL-MCNC: <0.1 MG/DL (ref 0–0.2)
BILIRUB SERPL-MCNC: 0.2 MG/DL (ref 0.2–1.3)
PROT SERPL-MCNC: 7.1 G/DL (ref 6.8–8.8)
TESTOST SERPL-MCNC: 192 NG/DL (ref 8–60)

## 2021-06-17 DIAGNOSIS — F64.9 GENDER IDENTITY DISORDER: ICD-10-CM

## 2021-06-17 NOTE — TELEPHONE ENCOUNTER
"Request for medication refill:  Needle, Disp, 23G X 1\" MISC    Providers if patient needs an appointment and you are willing to give a one month supply please refill for one month and  send a letter/MyChart using \".SMILLIMITEDREFILL\" .smillimited and route chart to \"P [facility] \" (Giving one month refill in non controlled medications is strongly recommended before denial)    If refill has been denied, meaning absolutely no refills without visit, please complete the smart phrase \".smirxrefuse\" and route it to the \"P SMI MED REFILLS\"  pool to inform the patient and the pharmacy.    Opal Diamond MA        "

## 2021-07-09 ENCOUNTER — MYC MEDICAL ADVICE (OUTPATIENT)
Dept: FAMILY MEDICINE | Facility: CLINIC | Age: 46
End: 2021-07-09

## 2021-07-09 DIAGNOSIS — F64.9 GENDER IDENTITY DISORDER: ICD-10-CM

## 2021-07-21 DIAGNOSIS — F64.9 GENDER IDENTITY DISORDER: ICD-10-CM

## 2021-07-21 RX ORDER — TESTOSTERONE CYPIONATE 200 MG/ML
80 INJECTION, SOLUTION INTRAMUSCULAR WEEKLY
Qty: 4 ML | Refills: 0 | Status: SHIPPED | OUTPATIENT
Start: 2021-07-21 | End: 2021-09-17

## 2021-07-21 NOTE — TELEPHONE ENCOUNTER
"Request for medication refill:  testosterone cypionate (DEPOTESTOSTERONE) 200 MG/ML injection  Providers if patient needs an appointment and you are willing to give a one month supply please refill for one month and  send a letter/MyChart using \".SMILLIMITEDREFILL\" .smillimited and route chart to \"P SMI \" (Giving one month refill in non controlled medications is strongly recommended before denial)    If refill has been denied, meaning absolutely no refills without visit, please complete the smart phrase \".smirxrefuse\" and route it to the \"P SMI MED REFILLS\"  pool to inform the patient and the pharmacy.    Radha Martínez        "

## 2021-07-21 NOTE — TELEPHONE ENCOUNTER
Refilled for one month supply. Pt with low T on last lab check. (Maybe missed doses or inadequate dose, but pt has not responded to mychart).   Should have visit for medication adjustment and follow up. PCS please call to schedule visit with PCP within 30 days.     Chip Willoughby, DO

## 2021-07-23 ENCOUNTER — TELEPHONE (OUTPATIENT)
Dept: FAMILY MEDICINE | Facility: CLINIC | Age: 46
End: 2021-07-23

## 2021-07-26 ENCOUNTER — MYC MEDICAL ADVICE (OUTPATIENT)
Dept: FAMILY MEDICINE | Facility: CLINIC | Age: 46
End: 2021-07-26

## 2021-07-26 DIAGNOSIS — F90.2 ATTENTION DEFICIT HYPERACTIVITY DISORDER (ADHD), COMBINED TYPE: Primary | ICD-10-CM

## 2021-07-27 RX ORDER — DEXTROAMPHETAMINE SACCHARATE, AMPHETAMINE ASPARTATE, DEXTROAMPHETAMINE SULFATE AND AMPHETAMINE SULFATE 5; 5; 5; 5 MG/1; MG/1; MG/1; MG/1
20 TABLET ORAL 2 TIMES DAILY
Qty: 180 TABLET | Refills: 0 | Status: SHIPPED | OUTPATIENT
Start: 2021-07-27 | End: 2021-10-26

## 2021-07-28 NOTE — TELEPHONE ENCOUNTER
"Request for medication refill:    Providers if patient needs an appointment and you are willing to give a one month supply please refill for one month and  send a letter/MyChart using \".SMILLIMITEDREFILL\" .smillimited and route chart to \"P SMI \" (Giving one month refill in non controlled medications is strongly recommended before denial)    If refill has been denied, meaning absolutely no refills without visit, please complete the smart phrase \".smirxrefuse\" and route it to the \"P SMI MED REFILLS\"  pool to inform the patient and the pharmacy.    Mariluz Johnson MA        " Cardiologist and PA at bedside, patient rescheduled for august 11th

## 2021-08-30 ENCOUNTER — OFFICE VISIT (OUTPATIENT)
Dept: FAMILY MEDICINE | Facility: CLINIC | Age: 46
End: 2021-08-30
Payer: COMMERCIAL

## 2021-08-30 VITALS
OXYGEN SATURATION: 100 % | DIASTOLIC BLOOD PRESSURE: 88 MMHG | BODY MASS INDEX: 44.22 KG/M2 | SYSTOLIC BLOOD PRESSURE: 121 MMHG | RESPIRATION RATE: 16 BRPM | TEMPERATURE: 98.9 F | WEIGHT: 274 LBS | HEART RATE: 80 BPM

## 2021-08-30 DIAGNOSIS — R87.810 CERVICAL HIGH RISK HPV (HUMAN PAPILLOMAVIRUS) TEST POSITIVE: ICD-10-CM

## 2021-08-30 DIAGNOSIS — F64.9 GENDER IDENTITY DISORDER: Primary | ICD-10-CM

## 2021-08-30 DIAGNOSIS — F90.2 ATTENTION DEFICIT HYPERACTIVITY DISORDER (ADHD), COMBINED TYPE: ICD-10-CM

## 2021-08-30 DIAGNOSIS — F41.1 GAD (GENERALIZED ANXIETY DISORDER): ICD-10-CM

## 2021-08-30 DIAGNOSIS — E88.810 DYSMETABOLIC SYNDROME X: ICD-10-CM

## 2021-08-30 DIAGNOSIS — F32.0 MILD MAJOR DEPRESSION (H): ICD-10-CM

## 2021-08-30 PROCEDURE — 99214 OFFICE O/P EST MOD 30 MIN: CPT | Performed by: FAMILY MEDICINE

## 2021-08-30 RX ORDER — CLONAZEPAM 0.5 MG/1
0.5 TABLET ORAL DAILY PRN
Qty: 2 TABLET | Refills: 0 | Status: SHIPPED | OUTPATIENT
Start: 2021-08-30 | End: 2022-04-06

## 2021-08-30 SDOH — HEALTH STABILITY: PHYSICAL HEALTH: ON AVERAGE, HOW MANY MINUTES DO YOU ENGAGE IN EXERCISE AT THIS LEVEL?: 60 MIN

## 2021-08-30 SDOH — HEALTH STABILITY: PHYSICAL HEALTH: ON AVERAGE, HOW MANY DAYS PER WEEK DO YOU ENGAGE IN MODERATE TO STRENUOUS EXERCISE (LIKE A BRISK WALK)?: 4 DAYS

## 2021-08-30 SDOH — ECONOMIC STABILITY: INCOME INSECURITY: IN THE LAST 12 MONTHS, WAS THERE A TIME WHEN YOU WERE NOT ABLE TO PAY THE MORTGAGE OR RENT ON TIME?: NO

## 2021-08-30 ASSESSMENT — ANXIETY QUESTIONNAIRES
7. FEELING AFRAID AS IF SOMETHING AWFUL MIGHT HAPPEN: SEVERAL DAYS
1. FEELING NERVOUS, ANXIOUS, OR ON EDGE: MORE THAN HALF THE DAYS
5. BEING SO RESTLESS THAT IT IS HARD TO SIT STILL: SEVERAL DAYS
GAD7 TOTAL SCORE: 10
IF YOU CHECKED OFF ANY PROBLEMS ON THIS QUESTIONNAIRE, HOW DIFFICULT HAVE THESE PROBLEMS MADE IT FOR YOU TO DO YOUR WORK, TAKE CARE OF THINGS AT HOME, OR GET ALONG WITH OTHER PEOPLE: SOMEWHAT DIFFICULT
3. WORRYING TOO MUCH ABOUT DIFFERENT THINGS: SEVERAL DAYS
6. BECOMING EASILY ANNOYED OR IRRITABLE: MORE THAN HALF THE DAYS
2. NOT BEING ABLE TO STOP OR CONTROL WORRYING: SEVERAL DAYS

## 2021-08-30 ASSESSMENT — SOCIAL DETERMINANTS OF HEALTH (SDOH)
HOW OFTEN DO YOU ATTENT MEETINGS OF THE CLUB OR ORGANIZATION YOU BELONG TO?: NEVER
HOW OFTEN DO YOU GET TOGETHER WITH FRIENDS OR RELATIVES?: NEVER
DO YOU BELONG TO ANY CLUBS OR ORGANIZATIONS SUCH AS CHURCH GROUPS UNIONS, FRATERNAL OR ATHLETIC GROUPS, OR SCHOOL GROUPS?: NO

## 2021-08-30 ASSESSMENT — PATIENT HEALTH QUESTIONNAIRE - PHQ9: 5. POOR APPETITE OR OVEREATING: MORE THAN HALF THE DAYS

## 2021-08-30 NOTE — PROGRESS NOTES
Assessment & Plan     Gender dysphoria  - Testosterone total  - Hemoglobin  Low T now with clinical symptoms of bleeding, has been slightly better about getting his medications in. Last shot thursday, labs today, maybe dose adjustment, happy with IM. Didn't draw labs - no one took him to lab and he eventually left. Reached out by [employer] - asked for lab only visit.     Mild major depression (H)  Worse since last visit, better since restarting medications, continue exercise and social engagement, follow-up 3 months    SHANA (generalized anxiety disorder)  - clonazePAM (KLONOPIN) 0.5 MG tablet  Dispense: 2 tablet; Refill: 0  2 tablets for acute stress related to court case    Attention deficit hyperactivity disorder (ADHD), combined type  rapid drug screen annually since last April 2021. Follow-up Feb 2022 can request refill once between visits via "[employer]t, no elicits medications effective    Dysmetabolic syndrome X  - Albumin Random Urine Quantitative with Creat Ratio  20 pound weight gain, stop pop, elevated microalbumin, recheck today    Cervical high risk HPV (human papillomavirus) test positive  Has delayed follow-up for 6 years, encouraged use of topical estrogen and follow-up in 2 weeks for sampling, discussed risks for delayed testing     Diagnosis or treatment significantly limited by social determinants of health - stress  Prescription drug management       No follow-ups on file.    The information in this document, created by the medical scribe for me, accurately reflects the services I personally performed and the decisions made by me. I have reviewed and approved this document for accuracy prior to leaving the patient care area.  Liz Baker MD  11:47 AM, 08/30/21  [facility] KYLAH Zhong is a 45 year old who presents for the following health issues    HPI   Social History     Tobacco Use     Smoking status: Former Smoker     Packs/day: 1.00     Years: 12.00     Pack years:  12.00     Types: Cigarettes     Smokeless tobacco: Never Used     Tobacco comment: quit 6/29/13   Substance Use Topics     Alcohol use: Yes     Comment: Occ   Reviewed and updated today    Mood  Patient stopped taking Prozac for a while, but decided he was better on it so he restarted and worked his way back up to full dose. Patient noticed reflux when taking the Prozac with Adderall and Minocin which stopped when he began taking them separately.     Stress  Patient reports increase in stress due to court dates with his neighbor. Patient took a clonazepam on the day of his court and states it helped him a lot.     Patient denies feeling lonesome and states he talks to customers all day.     Patient does not think of self harm.     Physical activity  Patient reports left knee pain and night ankle pain when going down steps. He has not been going to the gym since the pandemic but feels he gets physical activity in during work with at least 10,000 steps 4 days per week. Patient has gained weight which he believes is due to the soda he drinks. Patient does not drink alcohol much.    Gender  Patient reports low testosterone levels and states he got his menstruation cycle 2 months ago with extreme blood flow. He has not bleed since. His last T shot was Thursday. Patient reports slight low energy and low sex drive recently, unrelated to the pandemic. He stopped doing estrogen after getting his period.      This document serves as a record of the services and decisions personally performed and made by Liz Baker MD. It was created on his/her behalf by Oksana Zaragoza, a trained medical scribe. The creation of this document is based the provider's statements to the medical scribe.  Freedom Zaragoza 11:59 AM, August 30, 2021  PHQ 10/14/2019 5/5/2020 9/1/2020   PHQ-9 Total Score 9 3 5   Q9: Thoughts of better off dead/self-harm past 2 weeks Several days Not at all Not at all     SHANA-7 SCORE 11/13/2018 4/22/2019 10/14/2019    Total Score - - -   Total Score 12 13 14   Total Score BEH Adult - - -           Objective    /88   Pulse 80   Temp 98.9  F (37.2  C) (Oral)   Resp 16   Wt 124.3 kg (274 lb)   SpO2 100%   BMI 44.22 kg/m    Body mass index is 44.22 kg/m .   Wt Readings from Last 10 Encounters:   08/30/21 124.3 kg (274 lb)   04/26/21 115.2 kg (254 lb)   09/04/20 113.5 kg (250 lb 3.2 oz)   08/05/20 116.1 kg (256 lb)   10/14/19 104.5 kg (230 lb 6.4 oz)   06/27/19 112.9 kg (249 lb)   06/21/19 112.9 kg (249 lb)   06/21/19 112.4 kg (247 lb 12.8 oz)   04/22/19 117.7 kg (259 lb 6.4 oz)   01/03/19 123.7 kg (272 lb 9.6 oz)     Vitals were reviewed and were normal.   Physical Exam   GENERAL: healthy, alert and no distress  PSYCH: mentation appears normal, affect anxious, speech rapid not pressured

## 2021-08-30 NOTE — PATIENT INSTRUCTIONS
- Next follow-up for adhd in February   - Keep doing the estrogen cream until we do your pap  - Make an appointment for pap  - Continue regular testosterone injections, lab results will determine whether we change your dose.  - Keep moving your body  - Try stopping soda

## 2021-09-07 ASSESSMENT — PATIENT HEALTH QUESTIONNAIRE - PHQ9: SUM OF ALL RESPONSES TO PHQ QUESTIONS 1-9: 5

## 2021-09-08 ASSESSMENT — ANXIETY QUESTIONNAIRES: GAD7 TOTAL SCORE: 10

## 2021-09-16 ENCOUNTER — LAB (OUTPATIENT)
Dept: LAB | Facility: CLINIC | Age: 46
End: 2021-09-16
Payer: COMMERCIAL

## 2021-09-16 DIAGNOSIS — F64.9 GENDER IDENTITY DISORDER: ICD-10-CM

## 2021-09-16 DIAGNOSIS — E88.810 DYSMETABOLIC SYNDROME X: ICD-10-CM

## 2021-09-16 LAB
CREAT UR-MCNC: 240 MG/DL
HGB BLD-MCNC: 17.1 G/DL (ref 11.7–17.7)
MICROALBUMIN UR-MCNC: 58 MG/L
MICROALBUMIN/CREAT UR: 24.17 MG/G CR (ref 0–25)

## 2021-09-16 PROCEDURE — 85018 HEMOGLOBIN: CPT

## 2021-09-16 PROCEDURE — 36415 COLL VENOUS BLD VENIPUNCTURE: CPT

## 2021-09-16 PROCEDURE — 84403 ASSAY OF TOTAL TESTOSTERONE: CPT

## 2021-09-16 PROCEDURE — 82043 UR ALBUMIN QUANTITATIVE: CPT

## 2021-09-17 DIAGNOSIS — F64.9 GENDER IDENTITY DISORDER: ICD-10-CM

## 2021-09-17 RX ORDER — TESTOSTERONE CYPIONATE 200 MG/ML
80 INJECTION, SOLUTION INTRAMUSCULAR WEEKLY
Qty: 13 ML | Refills: 0 | Status: SHIPPED | OUTPATIENT
Start: 2021-09-17 | End: 2022-05-12

## 2021-09-17 NOTE — TELEPHONE ENCOUNTER
"Request for medication refill:  testosterone cypionate (DEPOTESTOSTERONE) 200 MG/ML injection    Providers if patient needs an appointment and you are willing to give a one month supply please refill for one month and  send a letter/MyChart using \".SMILLIMITEDREFILL\" .smillimited and route chart to \"P SMI \" (Giving one month refill in non controlled medications is strongly recommended before denial)    If refill has been denied, meaning absolutely no refills without visit, please complete the smart phrase \".smirxrefuse\" and route it to the \"P SMI MED REFILLS\"  pool to inform the patient and the pharmacy.    Breanna Carroll        "

## 2021-09-18 LAB — TESTOST SERPL-MCNC: 466 NG/DL (ref 8–950)

## 2021-09-26 ENCOUNTER — HEALTH MAINTENANCE LETTER (OUTPATIENT)
Age: 46
End: 2021-09-26

## 2021-09-27 DIAGNOSIS — I10 ESSENTIAL HYPERTENSION: ICD-10-CM

## 2021-09-27 RX ORDER — LISINOPRIL 5 MG/1
5 TABLET ORAL DAILY
Qty: 90 TABLET | Refills: 0 | Status: SHIPPED | OUTPATIENT
Start: 2021-09-27 | End: 2022-03-09

## 2021-10-19 PROBLEM — F32.9 MAJOR DEPRESSION: Status: ACTIVE | Noted: 2021-08-30

## 2021-10-21 ENCOUNTER — MYC MEDICAL ADVICE (OUTPATIENT)
Dept: FAMILY MEDICINE | Facility: CLINIC | Age: 46
End: 2021-10-21

## 2021-10-21 DIAGNOSIS — F90.2 ATTENTION DEFICIT HYPERACTIVITY DISORDER (ADHD), COMBINED TYPE: ICD-10-CM

## 2021-10-26 RX ORDER — DEXTROAMPHETAMINE SACCHARATE, AMPHETAMINE ASPARTATE, DEXTROAMPHETAMINE SULFATE AND AMPHETAMINE SULFATE 5; 5; 5; 5 MG/1; MG/1; MG/1; MG/1
20 TABLET ORAL 2 TIMES DAILY
Qty: 180 TABLET | Refills: 0 | Status: SHIPPED | OUTPATIENT
Start: 2021-10-26 | End: 2022-01-21

## 2021-11-07 ENCOUNTER — MYC MEDICAL ADVICE (OUTPATIENT)
Dept: FAMILY MEDICINE | Facility: CLINIC | Age: 46
End: 2021-11-07
Payer: COMMERCIAL

## 2021-11-07 DIAGNOSIS — F64.9 GENDER IDENTITY DISORDER: ICD-10-CM

## 2021-11-17 ENCOUNTER — MYC MEDICAL ADVICE (OUTPATIENT)
Dept: FAMILY MEDICINE | Facility: CLINIC | Age: 46
End: 2021-11-17
Payer: COMMERCIAL

## 2021-11-17 DIAGNOSIS — Z30.9 ENCOUNTER FOR CONTRACEPTIVE MANAGEMENT, UNSPECIFIED TYPE: Primary | ICD-10-CM

## 2021-12-06 ENCOUNTER — IMMUNIZATION (OUTPATIENT)
Dept: NURSING | Facility: CLINIC | Age: 46
End: 2021-12-06
Payer: COMMERCIAL

## 2021-12-06 PROCEDURE — 0004A PR COVID VAC PFIZER DIL RECON 30 MCG/0.3 ML IM: CPT

## 2021-12-06 PROCEDURE — 91300 PR COVID VAC PFIZER DIL RECON 30 MCG/0.3 ML IM: CPT

## 2021-12-13 NOTE — TELEPHONE ENCOUNTER
RN attempted to reach patient, LM with call back number. If patient calls back, please assist with scheduling nexplanon appointment. Okay to be seen Wednesday in regular procedure clinic per Dr. Monique. RN will also send [employer] message.   Saumya Fournier RN

## 2021-12-13 NOTE — TELEPHONE ENCOUNTER
Patient contacted  and scheduled for procedure 12/15/21. Will route to PCP as FYI.   Saumya Fournier RN

## 2021-12-14 ENCOUNTER — MYC MEDICAL ADVICE (OUTPATIENT)
Dept: FAMILY MEDICINE | Facility: CLINIC | Age: 46
End: 2021-12-14
Payer: COMMERCIAL

## 2021-12-14 DIAGNOSIS — L70.0 ACNE VULGARIS: ICD-10-CM

## 2021-12-14 RX ORDER — MINOCYCLINE HYDROCHLORIDE 100 MG/1
100 CAPSULE ORAL 2 TIMES DAILY
Qty: 180 CAPSULE | Refills: 3 | Status: SHIPPED | OUTPATIENT
Start: 2021-12-14

## 2021-12-14 NOTE — TELEPHONE ENCOUNTER
"See MyChart message. Patient requesting refill of minocycline. RN routing to PCP to refill if appropriate.     Request for medication refill:    Providers if patient needs an appointment and you are willing to give a one month supply please refill for one month and  send a letter/MyChart using \".SMILLIMITEDREFILL\" .smillimited and route chart to \"P SMI \" (Giving one month refill in non controlled medications is strongly recommended before denial)    If refill has been denied, meaning absolutely no refills without visit, please complete the smart phrase \".smirxrefuse\" and route it to the \"P SMI MED REFILLS\"  pool to inform the patient and the pharmacy.    Carmen Vaughn RN        "

## 2021-12-15 ENCOUNTER — OFFICE VISIT (OUTPATIENT)
Dept: FAMILY MEDICINE | Facility: CLINIC | Age: 46
End: 2021-12-15
Payer: COMMERCIAL

## 2021-12-15 VITALS
RESPIRATION RATE: 16 BRPM | BODY MASS INDEX: 43.87 KG/M2 | HEART RATE: 111 BPM | DIASTOLIC BLOOD PRESSURE: 87 MMHG | WEIGHT: 271.8 LBS | TEMPERATURE: 98.6 F | OXYGEN SATURATION: 100 % | SYSTOLIC BLOOD PRESSURE: 132 MMHG

## 2021-12-15 DIAGNOSIS — Z30.8 ENCOUNTER FOR OTHER CONTRACEPTIVE MANAGEMENT: Primary | ICD-10-CM

## 2021-12-15 DIAGNOSIS — Z30.017 NEXPLANON INSERTION: ICD-10-CM

## 2021-12-15 LAB — HCG UR QL: NEGATIVE

## 2021-12-15 PROCEDURE — 81025 URINE PREGNANCY TEST: CPT | Performed by: FAMILY MEDICINE

## 2021-12-15 PROCEDURE — 11981 INSERTION DRUG DLVR IMPLANT: CPT | Mod: GC | Performed by: FAMILY MEDICINE

## 2021-12-15 PROCEDURE — 99207 PR DROP WITH A PROCEDURE: CPT | Performed by: FAMILY MEDICINE

## 2021-12-15 NOTE — PATIENT INSTRUCTIONS
Nexplanon Removal Patient Instructions    You may become pregnant right away after the removal of NEXPLANON. If you do not want to get pregnant after your healthcare provider removes the NEXPLANON implant, you should start another birth control method right away.    Over the next 3 months, your periods may be irregular if you do not start other birth control. This is normal. They should become more regular and normal over time.    Care of the removal site:   Small bandages, called steri-strips, were put on your skin is to help prevent infection.  You can cut them back as needed if they start to peel off.  Remove them in 7 days if they have not fallen off.  A small bandage was put over your Nexplanon  site.  Your arm was wrapped with a pressure bandage.  Leave this on for at least 24 hours. It will help stop bruising.  Do not get this bandage wet.  You may have bruising, minor pain, and itching on your arm near where the Nexplanon  was removed. This is normal and will go away.  You can get pregnant right away after Nexplanon  is removed.  Start other birth control if you do not want to get pregnant.  It is safe to try if you want to get pregnant.  We recommend waiting 1 cycle so we know the dating of your pregnancy.  It may be hard to get pregnant until your periods are normal. This may take a few months.    Call [facility] or your health care provider right away if you have signs of infection, such as:  worsening pain, redness or swelling at the site.     NEXPLANON AFTERCARE INSTRUCTIONS     You may have some pain at the site of the Nexplanon insertion. You can help relieve the discomfort with Tylenol (acetaminophen), Aspirin or Advil (ibuprofen). If your discomfort worsens or you notice redness spreading on the skin around the insertion site, please call the clinic.       Irregular bleeding is common with Nexplanon, especially in the first 6-12 months of use. After one year, approximately 20% of women who use  Nexplanon will stop having periods completely. Some women have longer, heavier periods. Some women will have increased spotting between periods. You may find that your periods may be hard to predict.       The Nexplanon does not protect against sexually transmitted infections including the AIDS virus (HIV), warts (HPV), gonorrhea, Chlamydia, and herpes. Condoms should be used to decrease the risk sexually transmitted infections. If you think that you have been exposed to a sexually transmitted infection, please call the clinic.       If you had Nexplanon placed for birth control, it is effective immediately if it was inserted within five days after the start of your period. If you have Nexplanon inserted at any other time during your menstrual cycle, use another method of birth control, like condoms for at least 7 days.       The Nexplanon should be removed and/or replaced by a health care provider after five years.   Warning Signs   Call the clinic if any of the following occurs:     You have bleeding, pus, or increasing redness, or pain at insertion site.     You have fever or chills     The implant comes out or you have concerns about its location.     You have a positive pregnancy test or suspect you might be pregnant.     Scheduling:  If you have any concerns about today's visit or wish to schedule another appointment please call our office during normal business hours [phone] (8-5:00 M-F)  If a referral was made to a [facility] and you don't get a call from central scheduling please call [phone].  If a Mammogram was ordered for you at [facility] call [phone] to schedule or change your appointment.  If you had an XRay/CT/Ultrasound/MRI ordered the number is [phone] to schedule or change your radiology appointment.

## 2021-12-15 NOTE — PROGRESS NOTES
[facility]  Procedure Note-Nexplanon Removal and Re-Insertion  Lot # : Lot Number [id number]  Placed: 12/15/2021  Expiration Date: 01/12/2024  Remove: 12/15/2026   Left arm    Trevin Evans is a patient of Liz Younger here for removal of etonogestrel implant Nexplanon/Implanon (initially placed 11/8/2016).     Indication: Time for nexplanon removal (has been in over 5 years), wants contraception.     Consent: Affirmation of informed consent was signed and scanned into the medical record. Risks, benefits and alternatives were discussed. Patient's questions were elicited and answered.   Procedure safety checklist was completed:  Yes  Time Out (Pause for the Cause) completed: Yes    Has unprotected sex w/ sperm-containing partners. Last sex 2 weeks ago. Had bled for ~3 weeks straight, which stopped Fri 12/10 and has been spotting since.     Preoperative Diagnosis: etonogestrel implant  Postoperative Diagnosis: etonogestrel implant removed    Technique: On the left arm  Skin prep Betadine  Anesthesia 2% lidocaine, with epi  Procedure: Small incision (<5mm) was made at distal end of palpable implant, curved hemostat was used to isolate the implant and bring it to the incision, the fibrous capsule containing the implant  was incised and the Implant was removed intact.  EBL: minimal  Complications:  No  Tolerance:  Pt tolerated procedure well and was in stable condition.     Contraception was discussed and patient chose the following method Nexplanon    ------------------------------------------  Indication:Contraception    LMP   11/29/21  STI history:   None  Prev Contraception? Nexplanon  Smoking?  Former smoker    Counselling and Consent:  Affirmation of informed consent was signed and scanned into the medical record. Risks, benefits and alternatives were discussed. Discussed potential side effects of the etonogestrel implant including the risk of irregular bleeding that may persist across the 5 yrs of  use.  Instructed on use of condoms for STI prevention.  Patient's questions were elicited and answered.      Procedure safety checklist was completed:  Yes  Time Out (Pause for the Cause) completed: Yes    Labs: UPT:  Negative    Preoperative Diagnosis: Desires effective contraception  Postoperative Diagnosis: etonogestrel implant in place    Technique:  Skin prep Betadine  Anesthesia 2% lidocaine, with epi  EBL:  Minimal  Complications: No  Tolerance: Patient tolerated procedure well and was in stable condition    Technique:   Patient was placed supine with left arm exposed. Area was marked for insertion 8cm frm the medial epicondyle and 3cm below the sulcus between the biceps and triceps. Anesthesia provided at the insertion site and along the insertion track and then the area was prepped with betadine. Etonogestrel implant was then inserted subdermally in usual fashion. Provider and patient confirmed placement by palpating the device. Pressure dressing applied and procedure complete.      User card and patient chart label filled out. User card  given to patient for record. Nexplanon added to medication list       Follow up:  Pt was instructed to call if bleeding, severe pain or foul smell.  Instructed to remove pressure dressing after 24 hours, then may keep insertion site covered with a bandaid until it is healed.  Instructed that the patient requires removal or replacement of the device in 5 years.  Lot Number [id number]  Follow up: Pt was instructed to call if bleeding, severe pain or foul smell.     Follow up if any concerns.       Resident: Nicole Marie MD  Faculty: Vitaly Monique MD present for and supervised this entire procedure.

## 2021-12-17 NOTE — PROGRESS NOTES
Preceptor Attestation:   I was present for and supervised the entire procedure. I have verified the content of the note, which accurately reflects my assessment of the patient and the plan of care.   Supervising Physician:  Vitaly Monique MD.

## 2022-01-21 ENCOUNTER — MYC REFILL (OUTPATIENT)
Dept: FAMILY MEDICINE | Facility: CLINIC | Age: 47
End: 2022-01-21
Payer: COMMERCIAL

## 2022-01-21 DIAGNOSIS — F90.2 ATTENTION DEFICIT HYPERACTIVITY DISORDER (ADHD), COMBINED TYPE: ICD-10-CM

## 2022-01-21 RX ORDER — DEXTROAMPHETAMINE SACCHARATE, AMPHETAMINE ASPARTATE, DEXTROAMPHETAMINE SULFATE AND AMPHETAMINE SULFATE 5; 5; 5; 5 MG/1; MG/1; MG/1; MG/1
20 TABLET ORAL 2 TIMES DAILY
Qty: 180 TABLET | Refills: 0 | Status: SHIPPED | OUTPATIENT
Start: 2022-01-21 | End: 2022-04-12

## 2022-01-21 NOTE — TELEPHONE ENCOUNTER
"Patient requesting refill of adderall 20 mg tablets via mychart. Last office visit 12/15/21 with Dr. Monique. RN unable to fill as it is a controlled substance. Routing to PCP To send if appropriate.   Saumya Fournier RN      Request for medication refill:    Providers if patient needs an appointment and you are willing to give a one month supply please refill for one month and  send a letter/MyChart using \".SMILLIMITEDREFILL\" .smillimited and route chart to \"P SMI \" (Giving one month refill in non controlled medications is strongly recommended before denial)    If refill has been denied, meaning absolutely no refills without visit, please complete the smart phrase \".smirxrefuse\" and route it to the \"P SMI MED REFILLS\"  pool to inform the patient and the pharmacy.          "

## 2022-02-25 ENCOUNTER — OFFICE VISIT (OUTPATIENT)
Dept: FAMILY MEDICINE | Facility: CLINIC | Age: 47
End: 2022-02-25
Payer: COMMERCIAL

## 2022-02-25 VITALS
BODY MASS INDEX: 43.55 KG/M2 | SYSTOLIC BLOOD PRESSURE: 128 MMHG | WEIGHT: 269.8 LBS | DIASTOLIC BLOOD PRESSURE: 85 MMHG | OXYGEN SATURATION: 99 % | HEART RATE: 105 BPM | RESPIRATION RATE: 16 BRPM

## 2022-02-25 DIAGNOSIS — Z20.2 CONTACT WITH AND (SUSPECTED) EXPOSURE TO INFECTIONS WITH A PREDOMINANTLY SEXUAL MODE OF TRANSMISSION: ICD-10-CM

## 2022-02-25 DIAGNOSIS — N93.9 ABNORMAL UTERINE BLEEDING (AUB): Primary | ICD-10-CM

## 2022-02-25 LAB
ERYTHROCYTE [DISTWIDTH] IN BLOOD BY AUTOMATED COUNT: 12.9 % (ref 10–15)
FERRITIN SERPL-MCNC: 8 NG/ML (ref 12–388)
HCT VFR BLD AUTO: 39.3 %
HGB BLD-MCNC: 12.3 G/DL (ref 11.7–17.7)
MCH RBC QN AUTO: 26.4 PG (ref 26.5–33)
MCHC RBC AUTO-ENTMCNC: 31.3 G/DL (ref 31.5–36.5)
MCV RBC AUTO: 84 FL (ref 78–100)
PLATELET # BLD AUTO: 276 10E3/UL (ref 150–450)
RBC # BLD AUTO: 4.66 10E6/UL (ref 3.8–5.9)
WBC # BLD AUTO: 9.3 10E3/UL (ref 4–11)

## 2022-02-25 PROCEDURE — 87491 CHLMYD TRACH DNA AMP PROBE: CPT | Performed by: FAMILY MEDICINE

## 2022-02-25 PROCEDURE — 86780 TREPONEMA PALLIDUM: CPT | Performed by: FAMILY MEDICINE

## 2022-02-25 PROCEDURE — 90471 IMMUNIZATION ADMIN: CPT | Performed by: FAMILY MEDICINE

## 2022-02-25 PROCEDURE — 87591 N.GONORRHOEAE DNA AMP PROB: CPT | Performed by: FAMILY MEDICINE

## 2022-02-25 PROCEDURE — 85027 COMPLETE CBC AUTOMATED: CPT | Performed by: FAMILY MEDICINE

## 2022-02-25 PROCEDURE — 87389 HIV-1 AG W/HIV-1&-2 AB AG IA: CPT | Performed by: FAMILY MEDICINE

## 2022-02-25 PROCEDURE — 99215 OFFICE O/P EST HI 40 MIN: CPT | Mod: 25 | Performed by: FAMILY MEDICINE

## 2022-02-25 PROCEDURE — 86803 HEPATITIS C AB TEST: CPT | Performed by: FAMILY MEDICINE

## 2022-02-25 PROCEDURE — 90686 IIV4 VACC NO PRSV 0.5 ML IM: CPT | Performed by: FAMILY MEDICINE

## 2022-02-25 PROCEDURE — 36415 COLL VENOUS BLD VENIPUNCTURE: CPT | Performed by: FAMILY MEDICINE

## 2022-02-25 PROCEDURE — 82728 ASSAY OF FERRITIN: CPT | Performed by: FAMILY MEDICINE

## 2022-02-25 RX ORDER — ETHYNODIOL DIACETATE AND ETHINYL ESTRADIOL 1 MG-50MCG
1 KIT ORAL DAILY
Qty: 28 TABLET | Refills: 0 | Status: ON HOLD | OUTPATIENT
Start: 2022-02-25 | End: 2022-04-20

## 2022-02-25 NOTE — PROGRESS NOTES
"  Assessment & Plan     Abnormal uterine bleeding (AUB)  Needs pelvic but he did not want today -says \"all you will see is blood.\" Endorses up to racquetball sized clots.   Hgb technically normal but represents a 5 point drop for this patient which is obviously significant. Because hgb>10 will avoid high dose estrogen or TXA. Will not get benefit from nexplanon if etiology is simply heavy menstrual bleeding but timeframe is long for this, as well as for anovulatory cycle. Higher suspicion of endometrial polyp, fibroids, or hyperplasia given pt body mass and suspected hx PCOS due to irreg periods as a younger person (though does have progesterone which should help prevent hyperplasia). Discussed will need imaging to find etiology and may need EMB and GYN referral. He would favor permanent solution such as hyst or ablation to method such as IUD, depending on findings.   Given hgb, Will do BRIDGER's just do stabilize bleeding - realizes he will have withdrawal bleed again and will need follow up to determine next steps. return to clinic if sob/dizzy. Will double book him back in clinic if needed for exam.   - CBC with platelets  - Ferritin  - US Pelvic Complete with Transvaginal - In Clinic  - CBC with platelets  - Ferritin    Contact with and (suspected) exposure to infections with a predominantly sexual mode of transmission  Std screening except wet prep since bleeding  Has HIV + and hence serodiscordant partner who has zero viral load but he is no longer on PreP by his choice.   No anal sex, so screen front hole, urine and cancelled wet prep due to blood  - Neisseria gonorrhoeae PCR  - Chlamydia trachomatis PCR  - Treponema Abs w Reflex to RPR and Titer  - HIV Antigen Antibody Combo  - Chlamydia trachomatis/Neisseria gonorrhoeae by PCR - Clinic Collect  - Treponema Abs w Reflex to RPR and Titer  - HIV Antigen Antibody Combo  - Neisseria gonorrhoeae PCR  - Chlamydia trachomatis PCR      Ordering of each unique " "test  Prescription drug management  One new problem     No follow-ups on file.    The information in this document, created by the medical scribe for me, accurately reflects the services I personally performed and the decisions made by me. I have reviewed and approved this document for accuracy prior to leaving the patient care area.  Liz Baker MD  3:50 PM, 02/25/22  [facility] KYLAH Zhong is a 46 year old who presents for the following health issues     HPI     Patient is still working at [employer] which he is very satisfied with. He is currently living alone but he is planning on living with roommates later this year.     Bleeding  Patient has been having daily vaginal bleeds for months, with a two week break after the last meeting in December. He describes this as gushes with, at worst, racket ball sized clumps within the last week starting Monday. The flow has varied from \"normal\" to heavy flow. He has had sex 4 times since this bleeding has started, with bleeding getting worse following orgasm, and some embarrassment with bleeding during and after sex. He denies any new sexual partners or anal sex, but is having unprotected sex, including oral sex, with partners who engage in high risk behavior like IV drug use. He attests to cramping during heavy flow, but otherwise no pain. He does not know of any family history of fibroids. Denies history of blood clots. Denies dizziness, light headedness, or any cardiac symptoms.      Review of Systems   Positive for: Vaginal bleeding  Negative for: Dizziness, light headedness    This document serves as a record of the services and decisions personally performed and made by Liz Baker MD. It was created on his/her behalf by Tera Membreno, a trained medical scribe. The creation of this document is based the provider's statements to the medical scribe.  Freedom Membreno 3:50 PM, February 25, 2022        Objective    /85 (BP " Location: Left arm, Patient Position: Sitting, Cuff Size: Adult Large)   Pulse 105   Resp 16   Wt 122.4 kg (269 lb 12.8 oz)   SpO2 99%   Breastfeeding No   BMI 43.55 kg/m    Body mass index is 43.55 kg/m .  Physical Exam   GENERAL: healthy, alert and no distress  ABDOMEN: Left lower quadrant tender to palpation  PSYCH: Affect bright    Results for orders placed or performed in visit on 02/25/22   CBC with platelets     Status: Abnormal   Result Value Ref Range    WBC Count 9.3 4.0 - 11.0 10e3/uL    RBC Count 4.66 3.80 - 5.90 10e6/uL    Hemoglobin 12.3 11.7 - 17.7 g/dL    Hematocrit 39.3 %    MCV 84 78 - 100 fL    MCH 26.4 (L) 26.5 - 33.0 pg    MCHC 31.3 (L) 31.5 - 36.5 g/dL    RDW 12.9 10.0 - 15.0 %    Platelet Count 276 150 - 450 10e3/uL    Narrative    The sex of this patient cannot be reliably determined based on discrepancies in demographics (legal sex, sex assigned at birth, gender identity).  Both male and female reference ranges are provided where applicable.  Careful evaluation of the patient s results as compared to the gender specific reference intervals is required in this setting.

## 2022-02-25 NOTE — PATIENT INSTRUCTIONS
Bleeding    1. Ultrasound ordered to look for fibroids, schedule this at the .     2. I will contact you about additional medication to reduce bleeding    Preventative    1. You received your Flu shot today

## 2022-02-26 LAB
C TRACH DNA SPEC QL NAA+PROBE: NEGATIVE
C TRACH DNA SPEC QL PROBE+SIG AMP: NEGATIVE
HIV 1+2 AB+HIV1 P24 AG SERPL QL IA: NONREACTIVE
N GONORRHOEA DNA SPEC QL NAA+PROBE: NEGATIVE
N GONORRHOEA DNA SPEC QL NAA+PROBE: NEGATIVE
T PALLIDUM AB SER QL: NONREACTIVE

## 2022-03-01 LAB — HCV AB SERPL QL IA: NONREACTIVE

## 2022-03-02 ENCOUNTER — ANCILLARY PROCEDURE (OUTPATIENT)
Dept: ULTRASOUND IMAGING | Facility: CLINIC | Age: 47
End: 2022-03-02
Attending: FAMILY MEDICINE
Payer: COMMERCIAL

## 2022-03-02 DIAGNOSIS — N93.9 ABNORMAL UTERINE BLEEDING (AUB): ICD-10-CM

## 2022-03-02 PROCEDURE — 76830 TRANSVAGINAL US NON-OB: CPT | Mod: GC | Performed by: RADIOLOGY

## 2022-03-02 PROCEDURE — 76856 US EXAM PELVIC COMPLETE: CPT | Mod: GC | Performed by: RADIOLOGY

## 2022-03-03 ENCOUNTER — TELEPHONE (OUTPATIENT)
Dept: FAMILY MEDICINE | Facility: CLINIC | Age: 47
End: 2022-03-03
Payer: COMMERCIAL

## 2022-03-03 DIAGNOSIS — D25.9 UTERINE LEIOMYOMA, UNSPECIFIED LOCATION: ICD-10-CM

## 2022-03-03 DIAGNOSIS — D62 ABLA (ACUTE BLOOD LOSS ANEMIA): Primary | ICD-10-CM

## 2022-03-03 NOTE — TELEPHONE ENCOUNTER
Called pt re: US results.   Discussed fibroid vs adenomyosis and need for possible direct visualization via hysteroscopy or proceed with hyst (his preference).Bleeding better but pharm out of OCPs so hasnt started yet. Will refer to [facility] as they have worked with many of my trans patients. He needs ph# to call. Will ask CC to reach out. Referral placed.   VB precautions given if sob or dizzy--> ED. If changing tampons hourly again for 2days or greater, follow up with me. Start OCP's now.

## 2022-03-04 ENCOUNTER — TELEPHONE (OUTPATIENT)
Dept: OBGYN | Facility: CLINIC | Age: 47
End: 2022-03-04
Payer: COMMERCIAL

## 2022-03-04 NOTE — TELEPHONE ENCOUNTER
[facility] Call Center    Phone Message    May a detailed message be left on voicemail: yes     Reason for Call: Appointment Intake    Referring Provider Name: Liz Baker MD in [facility]  Diagnosis and/or Symptoms: bleeding /fibroid. 5gram hgb drop but stable at hgb 12. He/him pronouns. Would like hyst.    -Priority 1-2 weeks  -Scheduled for first available on 4/12, sending due to no availability within requested time frame. Thank you!     Action Taken: Message routed to:  Other: [facility]    Travel Screening: Not Applicable

## 2022-03-07 ENCOUNTER — TELEPHONE (OUTPATIENT)
Dept: OBGYN | Facility: CLINIC | Age: 47
End: 2022-03-07
Payer: COMMERCIAL

## 2022-03-07 NOTE — TELEPHONE ENCOUNTER
Appointment held 3/24 at 1315 with Dr. Ruggiero.    Called patient and left VM to call back to discuss reschedule.

## 2022-03-09 DIAGNOSIS — I10 ESSENTIAL HYPERTENSION: ICD-10-CM

## 2022-03-09 RX ORDER — LISINOPRIL 5 MG/1
5 TABLET ORAL DAILY
Qty: 90 TABLET | Refills: 0 | Status: SHIPPED | OUTPATIENT
Start: 2022-03-09 | End: 2022-06-10

## 2022-03-09 NOTE — TELEPHONE ENCOUNTER
"Request for medication refill:    lisinopril (ZESTRIL) 5 MG tablet    Providers if patient needs an appointment and you are willing to give a one month supply please refill for one month and  send a letter/MyChart using \".SMILLIMITEDREFILL\" .smillimited and route chart to \"[facility] \" (Giving one month refill in non controlled medications is strongly recommended before denial)    If refill has been denied, meaning absolutely no refills without visit, please complete the smart phrase \".smirxrefuse\" and route it to the \"P [facility] MED REFILLS\"  pool to inform the patient and the pharmacy.    Simi Espinoza, CMA        "

## 2022-03-15 ENCOUNTER — HOSPITAL ENCOUNTER (EMERGENCY)
Facility: CLINIC | Age: 47
Discharge: HOME | End: 2022-03-15
Attending: EMERGENCY MEDICINE | Admitting: EMERGENCY MEDICINE
Payer: COMMERCIAL

## 2022-03-15 VITALS
HEART RATE: 112 BPM | SYSTOLIC BLOOD PRESSURE: 122 MMHG | OXYGEN SATURATION: 99 % | BODY MASS INDEX: 44.39 KG/M2 | RESPIRATION RATE: 20 BRPM | TEMPERATURE: 98 F | WEIGHT: 275 LBS | DIASTOLIC BLOOD PRESSURE: 74 MMHG

## 2022-03-15 DIAGNOSIS — N93.9 ABNORMAL UTERINE BLEEDING: ICD-10-CM

## 2022-03-15 LAB
ABO/RH(D): NORMAL
ALBUMIN SERPL-MCNC: 3.3 G/DL (ref 3.4–5)
ALP SERPL-CCNC: 46 U/L (ref 40–150)
ALT SERPL W P-5'-P-CCNC: 140 U/L (ref 0–70)
ANION GAP SERPL CALCULATED.3IONS-SCNC: 4 MMOL/L (ref 3–14)
ANTIBODY SCREEN: NEGATIVE
AST SERPL W P-5'-P-CCNC: 50 U/L (ref 0–45)
BASOPHILS # BLD AUTO: 0.1 10E3/UL (ref 0–0.2)
BASOPHILS NFR BLD AUTO: 1 %
BILIRUB SERPL-MCNC: 0.2 MG/DL (ref 0.2–1.3)
BUN SERPL-MCNC: 13 MG/DL (ref 7–30)
CALCIUM SERPL-MCNC: 8.7 MG/DL (ref 8.5–10.1)
CHLORIDE BLD-SCNC: 108 MMOL/L (ref 94–109)
CO2 SERPL-SCNC: 25 MMOL/L (ref 20–32)
CREAT SERPL-MCNC: 0.85 MG/DL (ref 0.52–1.25)
EOSINOPHIL # BLD AUTO: 0.4 10E3/UL (ref 0–0.7)
EOSINOPHIL NFR BLD AUTO: 4 %
ERYTHROCYTE [DISTWIDTH] IN BLOOD BY AUTOMATED COUNT: 13.2 % (ref 10–15)
GFR SERPL CREATININE-BSD FRML MDRD: 86 ML/MIN/1.73M2
GLUCOSE BLD-MCNC: 153 MG/DL (ref 70–99)
HCG SERPL QL: NEGATIVE
HCT VFR BLD AUTO: 33 % (ref 35–53)
HGB BLD-MCNC: 10.5 G/DL (ref 11.7–17.7)
IMM GRANULOCYTES # BLD: 0 10E3/UL
IMM GRANULOCYTES NFR BLD: 1 %
LYMPHOCYTES # BLD AUTO: 2.5 10E3/UL (ref 0.8–5.3)
LYMPHOCYTES NFR BLD AUTO: 28 %
MCH RBC QN AUTO: 25.5 PG (ref 26.5–33)
MCHC RBC AUTO-ENTMCNC: 31.8 G/DL (ref 31.5–36.5)
MCV RBC AUTO: 80 FL (ref 78–100)
MONOCYTES # BLD AUTO: 0.8 10E3/UL (ref 0–1.3)
MONOCYTES NFR BLD AUTO: 9 %
NEUTROPHILS # BLD AUTO: 5.1 10E3/UL (ref 1.6–8.3)
NEUTROPHILS NFR BLD AUTO: 57 %
NRBC # BLD AUTO: 0 10E3/UL
NRBC BLD AUTO-RTO: 0 /100
PLATELET # BLD AUTO: 291 10E3/UL (ref 150–450)
POTASSIUM BLD-SCNC: 3.8 MMOL/L (ref 3.4–5.3)
PROT SERPL-MCNC: 6.8 G/DL (ref 6.8–8.8)
RBC # BLD AUTO: 4.11 10E6/UL (ref 3.8–5.9)
SODIUM SERPL-SCNC: 137 MMOL/L (ref 133–144)
SPECIMEN EXPIRATION DATE: NORMAL
WBC # BLD AUTO: 8.8 10E3/UL (ref 4–11)

## 2022-03-15 PROCEDURE — 84703 CHORIONIC GONADOTROPIN ASSAY: CPT | Performed by: EMERGENCY MEDICINE

## 2022-03-15 PROCEDURE — 96360 HYDRATION IV INFUSION INIT: CPT | Performed by: EMERGENCY MEDICINE

## 2022-03-15 PROCEDURE — 258N000003 HC RX IP 258 OP 636: Performed by: EMERGENCY MEDICINE

## 2022-03-15 PROCEDURE — 85025 COMPLETE CBC W/AUTO DIFF WBC: CPT | Performed by: EMERGENCY MEDICINE

## 2022-03-15 PROCEDURE — 36415 COLL VENOUS BLD VENIPUNCTURE: CPT | Performed by: EMERGENCY MEDICINE

## 2022-03-15 PROCEDURE — 96361 HYDRATE IV INFUSION ADD-ON: CPT | Performed by: EMERGENCY MEDICINE

## 2022-03-15 PROCEDURE — 99285 EMERGENCY DEPT VISIT HI MDM: CPT | Performed by: EMERGENCY MEDICINE

## 2022-03-15 PROCEDURE — 86850 RBC ANTIBODY SCREEN: CPT | Performed by: EMERGENCY MEDICINE

## 2022-03-15 PROCEDURE — 99283 EMERGENCY DEPT VISIT LOW MDM: CPT | Mod: 25 | Performed by: EMERGENCY MEDICINE

## 2022-03-15 PROCEDURE — 80053 COMPREHEN METABOLIC PANEL: CPT | Performed by: EMERGENCY MEDICINE

## 2022-03-15 RX ORDER — ONDANSETRON 4 MG/1
4 TABLET, ORALLY DISINTEGRATING ORAL EVERY 8 HOURS PRN
Qty: 12 TABLET | Refills: 0 | Status: SHIPPED | OUTPATIENT
Start: 2022-03-15 | End: 2022-08-17

## 2022-03-15 RX ORDER — ETHYNODIOL DIACETATE AND ETHINYL ESTRADIOL 1 MG-50MCG
KIT ORAL
Qty: 56 TABLET | Refills: 1 | Status: ON HOLD | OUTPATIENT
Start: 2022-03-15 | End: 2022-04-20

## 2022-03-15 RX ADMIN — SODIUM CHLORIDE 1000 ML: 9 INJECTION, SOLUTION INTRAVENOUS at 09:24

## 2022-03-15 NOTE — ED TRIAGE NOTES
Pt experiencing abnormal vaginal bleeding x 3 months; was recently put on birth control to regulate bleeding, now bleeding very heavily for 3 days and beginning to feel weak

## 2022-03-15 NOTE — DISCHARGE INSTRUCTIONS
Take Zovia as directed.  You have follow-up in the OB/GYN clinic on March 24.  Return sooner if your bleeding continues or for symptoms\  Use Zofran as needed for nausea and vomiting.

## 2022-03-15 NOTE — ED PROVIDER NOTES
ED Provider Note  [facility]      History     Chief Complaint   Patient presents with     Vaginal Bleeding     HPI  Trevin Evans is a 46 year old adult (female to male transgender) with history of uterine fibroids, who presented due to heavy vaginal bleeding. He has been experiencing bleeding in the past 3-4 months and has only had around 2 weeks overall break in between. Last break was on late December. He was prescribed estrogen 12 days ago to control the bleeding. He was supposed to take it for 10 days, after a few days it helped with reducing the bleeding and it turned into spotting but then around the 10th day bleeding got worse and he has been passing large (2 inches) clots everytime he goes to the bathroom. So he did not stop the estrogen treatment. He is getting testosterone treatment and does not go through regular menses anymore. He works in construction and everytime he lifts up something heavy the bleeding exacerbates. Today he visited the ER because of feeling shaky and lightheaded. He mentions that he had to change super plus tampons every 5 minuets. He reports pain in the lower abdomen which is not constant and is worse when his bleeding increases.      Past Medical History  Past Medical History:   Diagnosis Date     Broken foot 1-2012     Depressive disorder      Hypertension      Migraines since [place]     Patient overweight     BMI 56     Seasonal allergies     spring     Past Surgical History:   Procedure Laterality Date     RELEASE CARPAL TUNNEL Left 12/19/2017    Procedure: RELEASE CARPAL TUNNEL;  Left Open Carpal Tunnel Release  ;  Surgeon: Khoi Reyes MD;  Location:  OR     [facility] OPEN RX ANKLE DISLOCATN+FIXATN  01/09    right     ethynodiol-ethinyl estradiol (ZOVIA) 1-50 MG-MCG tablet  ondansetron (ZOFRAN ODT) 4 MG ODT tab  amphetamine-dextroamphetamine (ADDERALL) 20 MG tablet  clonazePAM (KLONOPIN) 0.5 MG tablet  estradiol (ESTRACE) 0.1 MG/GM vaginal  "cream  ethynodiol-ethinyl estradiol (ZOVIA) 1-50 MG-MCG tablet  etonogestrel (NEXPLANON) 68 MG IMPL  FLUoxetine (PROZAC) 40 MG capsule  fluticasone (FLONASE) 50 MCG/ACT spray  hydrocortisone (WESTCORT) 0.2 % external cream  lisinopril (ZESTRIL) 5 MG tablet  minocycline (MINOCIN) 100 MG capsule  montelukast (SINGULAIR) 10 MG tablet  needle, disp, 18G X 1\" MISC  Needle, Disp, 25G X 1\" MISC  Sharps Container MISC  syringe, disposable, (BD TUBERCULIN SYRINGE) 1 ML MISC  testosterone cypionate (DEPOTESTOSTERONE) 200 MG/ML injection      No Known Allergies  Family History  Family History   Problem Relation Age of Onset     Arthritis Mother      Alcohol/Drug Mother      Asthma Mother      Depression Mother         and many on mom's side of family     Hypertension Mother      Thyroid Disease Mother         s/p removal     Alcohol/Drug Father      Hypertension Father      Obesity Father      Stomach Cancer Father      Gastrointestinal Disease Sister         CROHN disease     Hypertension Maternal Uncle      Hypertension Maternal Grandfather      Arthritis Maternal Grandfather      Attention Deficit Disorder Other      Social History   Social History     Tobacco Use     Smoking status: Former Smoker     Packs/day: 1.00     Years: 12.00     Pack years: 12.00     Types: Cigarettes     Smokeless tobacco: Never Used     Tobacco comment: quit 6/29/13   Vaping Use     Vaping Use: Never used   Substance Use Topics     Alcohol use: Yes     Comment: Occ     Drug use: Yes     Types: Marijuana     Comment: THC only      Past medical history, past surgical history, medications, allergies, family history, and social history were reviewed with the patient. No additional pertinent items.       Review of Systems  A complete review of systems was performed with pertinent positives and negatives noted in the HPI, and all other systems negative.    Physical Exam   BP: 122/74  Pulse: 112  Temp: 98  F (36.7  C)  Resp: 20  Weight: 124.7 kg (275 " lb)  SpO2: 100 %  Lying Orthostatic BP: 133/93  Lying Orthostatic Pulse: 108 bpm  Sitting Orthostatic BP: 135/89  Sitting Orthostatic Pulse: 113 bpm  Standing Orthostatic BP: 130/87  Standing Orthostatic Pulse: 122 bpm  Physical Exam  Constitutional:       Appearance: Normal appearance. He is obese.   HENT:      Head: Normocephalic and atraumatic.      Nose: Nose normal.   Eyes:      Extraocular Movements: Extraocular movements intact.      Pupils: Pupils are equal, round, and reactive to light.      Comments: Pale conjunctivae   Cardiovascular:      Rate and Rhythm: Regular rhythm. Tachycardia present.      Pulses: Normal pulses.      Heart sounds: Normal heart sounds.   Pulmonary:      Effort: Pulmonary effort is normal.      Breath sounds: Normal breath sounds.   Abdominal:      Palpations: Abdomen is soft.      Tenderness: There is no guarding or rebound.      Comments: Generalized tenderness in the lower abdomen   Musculoskeletal:      Cervical back: Neck supple.   Neurological:      General: No focal deficit present.      Mental Status: He is alert.   Psychiatric:         Mood and Affect: Mood normal.       ED Course      Procedures       The medical record was reviewed and interpreted.  Current labs reviewed and interpreted.  Previous labs reviewed and interpreted.  Previous images reviewed and interpreted: see below.  Managed outpatient prescription medications.   Patient was seen by provider Valentín Guajardo MD in room05. On arrival patient was tachycardic and showed symptoms of orthostatic hypotension. He was given 1 L of NS and lab works including CBC, CMP, Type and screen were requested. Laboratory studies show anemia, with a hemoglobin of 10.5. Comprehensive metabolic panel shows mildly elevated LFTs, with an AST of 50 and an ALT of 140.  ALT human is low at 3. He was placed on Zovia taper regimen, zofran and discharged with the plan to follow-up in the OB/GYN clinic on March 24.           Results for  orders placed or performed during the hospital encounter of 03/15/22   Comprehensive metabolic panel     Status: Abnormal   Result Value Ref Range    Sodium 137 133 - 144 mmol/L    Potassium 3.8 3.4 - 5.3 mmol/L    Chloride 108 94 - 109 mmol/L    Carbon Dioxide (CO2) 25 20 - 32 mmol/L    Anion Gap 4 3 - 14 mmol/L    Urea Nitrogen 13 7 - 30 mg/dL    Creatinine 0.85 0.52 - 1.25 mg/dL    Calcium 8.7 8.5 - 10.1 mg/dL    Glucose 153 (H) 70 - 99 mg/dL    Alkaline Phosphatase 46 40 - 150 U/L    AST 50 (H) 0 - 45 U/L     (H) 0 - 70 U/L    Protein Total 6.8 6.8 - 8.8 g/dL    Albumin 3.3 (L) 3.4 - 5.0 g/dL    Bilirubin Total 0.2 0.2 - 1.3 mg/dL    GFR Estimate 86 >60 mL/min/1.73m2    Narrative    The sex of this patient cannot be reliably determined based on discrepancies in demographics (legal sex, sex assigned at birth, gender identity).  Both male and female reference ranges are provided where applicable.  Careful evaluation of the patient s results as compared to the gender specific reference intervals is required in this setting.    HCG qualitative pregnancy (blood)     Status: Normal   Result Value Ref Range    hCG Serum Qualitative Negative Negative   CBC with platelets and differential     Status: Abnormal   Result Value Ref Range    WBC Count 8.8 4.0 - 11.0 10e3/uL    RBC Count 4.11 3.80 - 5.90 10e6/uL    Hemoglobin 10.5 (L) 11.7 - 17.7 g/dL    Hematocrit 33.0 (L) 35.0 - 53.0 %    MCV 80 78 - 100 fL    MCH 25.5 (L) 26.5 - 33.0 pg    MCHC 31.8 31.5 - 36.5 g/dL    RDW 13.2 10.0 - 15.0 %    Platelet Count 291 150 - 450 10e3/uL    % Neutrophils 57 %    % Lymphocytes 28 %    % Monocytes 9 %    % Eosinophils 4 %    % Basophils 1 %    % Immature Granulocytes 1 %    NRBCs per 100 WBC 0 <1 /100    Absolute Neutrophils 5.1 1.6 - 8.3 10e3/uL    Absolute Lymphocytes 2.5 0.8 - 5.3 10e3/uL    Absolute Monocytes 0.8 0.0 - 1.3 10e3/uL    Absolute Eosinophils 0.4 0.0 - 0.7 10e3/uL    Absolute Basophils 0.1 0.0 - 0.2 10e3/uL     Absolute Immature Granulocytes 0.0 <=0.4 10e3/uL    Absolute NRBCs 0.0 10e3/uL    Narrative    The sex of this patient cannot be reliably determined based on discrepancies in demographics (legal sex, sex assigned at birth, gender identity).  Both male and female reference ranges are provided where applicable.  Careful evaluation of the patient s results as compared to the gender specific reference intervals is required in this setting.    Adult Type and Screen     Status: None   Result Value Ref Range    ABO/RH(D) O POS     Antibody Screen Negative Negative    SPECIMEN EXPIRATION DATE [id number]    CBC with platelets differential     Status: Abnormal    Narrative    The following orders were created for panel order CBC with platelets differential.  Procedure                               Abnormality         Status                     ---------                               -----------         ------                     CBC with platelets and d...[[id number]]  Abnormal            Final result                 Please view results for these tests on the individual orders.   ABO/Rh type and screen     Status: None    Narrative    The following orders were created for panel order ABO/Rh type and screen.  Procedure                               Abnormality         Status                     ---------                               -----------         ------                     Adult Type and Screen[[id number]]                            Final result                 Please view results for these tests on the individual orders.     Medications   0.9% sodium chloride BOLUS (0 mLs Intravenous Stopped 3/15/22 1249)        Assessments & Plan (with Medical Decision Making)   Patient is a 45 yo female to male transgender with history of uterine fibroids on testosterone treatment who has had vaginal bleeding since 3-4 months ago that has gotten worse recently. He has tried OCP for the past 12 days which was initially helpful after  a few days but then for the past 3 days the bleeding has become so heavy that he needs to have a bathroom break every hour.     He had a pelvic TVS 2 weeks ago which showed:   1. Enlarged uterus (15 cm) with suspicion for a large 6.1 cm fibroid,  challenging to sonographically delineate given overall heterogeneous  uterine echotexture. Question adenomyosis. MRI may be useful in  further evaluation.  2. 6 mm endometrial stripe, ill-defined.    OB was consulted and evaluated the patient in the emergency department.  See attending note below> They recommended  Zovia oral contraceptive pill taper.  Gyn also recommends treating the patient with Zofran as the oral contraceptives can make one nauseous.    I have reviewed the nursing notes. I have reviewed the findings, diagnosis, plan and need for follow up with the patient.    Discharge Medication List as of 3/15/2022 12:50 PM      START taking these medications    Details   ondansetron (ZOFRAN ODT) 4 MG ODT tab Take 1 tablet (4 mg) by mouth every 8 hours as needed for nausea or vomiting, Disp-12 tablet, R-0, Local Print             Final diagnoses:   Abnormal uterine bleeding     Valentín Guajardo MD  PGY-1 psychiatry resident  ----    ED Attending Physician Attestation    I Raquel Sheffield MD, cared for this patient with the Resident. I have performed a history and physical examination of the patient and discussed management with the resident. I reviewed the resident's documentation above and agree with the documented findings and plan of care.    Summary of HPI, PE, ED Course   Patient is a 46 year old adult (female to male transgender) and history of fibroids evaluated in the emergency department for vagina bleeding.  He states that he has something more than 4 super maxi tampons since this morning.  Exam notable for stable blood pressure and tachycardia, conjunctiva pale.   Pelvic: External genitalia within normal limits, moderate vaginal bleeding  Pelvic ultrasound  done on March 2 shows  IMPRESSION:  1. Enlarged uterus (15 cm) with suspicion for a large 6.1 cm fibroid,  challenging to sonographically delineate given overall heterogeneous  uterine echotexture. Question adenomyosis. MRI may be useful in  further evaluation.  2. 6 mm endometrial stripe, ill-defined.     ED course notable for anemia, with a hemoglobin of 10.5.  2 large-bore IVs were established and the patient received normal saline bolus IV.  Type and screen was ordered.  Laboratory studies show anemia, with a hemoglobin of 10.5.  Comprehensive metabolic panel shows mildly elevated LFTs, with an AST of 50 and an ALT of 140.  ALT human is low at 3.3  OB/GYN was consulted.  Please see their consult note for details.    Recommendations include placing the patient on a Zovia oral contraceptive pill taper.  Gyn also recommends treating the patient with Zofran as the oral contraceptives can make one nauseous.  He does have a follow-up in the GYN clinic in a week.  I discussed bleeding precautions with the patient prior to discharge  Critical Care & Procedures  Not applicable.    Medical Decision Making  The medical record was reviewed and interpreted.  Current labs reviewed and interpreted.  Previous labs reviewed and interpreted.  Managed outpatient prescription medications.  OB/gyn consultation    Raquel Sheffield MD  Emergency Medicine      Raquel Sheffield  [facility] EMERGENCY DEPARTMENT  3/15/2022     Raquel Sheffield MD  03/15/22 0458

## 2022-03-15 NOTE — CONSULTS
Gynecology Consult Note    Consulting Physician: Raquel Sheffield MD  Reason for Consult: Abnormal uterine bleeding    HPI:   Trevin Evans is a 46 year old transmale (he, him, his) with months long history of abnormal uterine bleeding and uterine fibroid vs question of adenomyosis who presented to the ED with 3 days of worsened heavy vaginal bleeding and weakness.    Trevin has a history of abnormal uterine bleeding since about November, following expiration and subsequent replacement of nexplanon in December. Has had nexplanon for many years, has had multiple. Previously amenorrheic with nexplanon. Per chart review, Nexplanon removal and re-insertion 12/15/21. Bleeding stopped for about 1 week after nexplanon re-insertion. He has had daily bleeding since at least late December. Bleeding always heavier than spotting. Some days heavier than others. Per review of chart, bleeding at worst is gushing with up to racquet ball sized clots. He was seen by his PCP on 2/25 for this. At this visit, Hgb 12.3, down from baseline around 16-17. He was started on a short course of OCP, Zovia 1mg-50mcg (plan 1 pill daily x 10 days). Other workup included pelvic US that demonstrated enlarged uterus (15cm) with suspicion for large 6.1cm fibroid vs question of adenomyosis, endometrial stripe 6mm.     Bleeding worsened over the past 3 days. This morning, used 3 tampons in 45 minutes. Bleeding seems to wax and wane throughout the day. Bleeding is affecting his work as a . He is not able to stop frequently enough to change tampons. Does not have access to location to change tampons. 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 Discussed increased bleeding with his PCP, who advised him to present to ED.     Trevin was delayed in starting the OCPs. Was only planning to take 10 days worth but took his 11th dose yesterday due to ongoing bleeding. Has not taken OCP today.     OBHx:   G0     GynHx:   -  "Menses: As above. Hx of irregular periods when younger.   - Pap smear history: Per pt, last pap 2014, NILM HPV neg. To his knowledge, no hx of abnormal pap.   - Sexually activity: Not currently sexual active due to bleeding, but recently sexually active. Penetrative sex with MSM.   - Contraception: Nexplanon  - History of STDS/UTIs: No Hx of STI. Gets tested frequently. Most recently 2/25- negative for chlamydia, gonorrheae, treponema, Hep C, HIV.     Past medical history:   Past Medical History:   Diagnosis Date    Broken foot 1-2012    Depressive disorder     Hypertension     Migraines since [employer]    Patient overweight     BMI 56    Seasonal allergies     spring       Past surgical history:   Past Surgical History:   Procedure Laterality Date    RELEASE CARPAL TUNNEL Left 12/19/2017    Procedure: RELEASE CARPAL TUNNEL;  Left Open Carpal Tunnel Release  ;  Surgeon: Khoi Reyes MD;  Location:  OR    [facility] OPEN RX ANKLE DISLOCATN+FIXATN  01/09    right       Medications:  No current facility-administered medications for this encounter.     Current Outpatient Medications   Medication    amphetamine-dextroamphetamine (ADDERALL) 20 MG tablet    clonazePAM (KLONOPIN) 0.5 MG tablet    estradiol (ESTRACE) 0.1 MG/GM vaginal cream    ethynodiol-ethinyl estradiol (ZOVIA) 1-50 MG-MCG tablet    etonogestrel (NEXPLANON) 68 MG IMPL    FLUoxetine (PROZAC) 40 MG capsule    fluticasone (FLONASE) 50 MCG/ACT spray    hydrocortisone (WESTCORT) 0.2 % external cream    lisinopril (ZESTRIL) 5 MG tablet    minocycline (MINOCIN) 100 MG capsule    montelukast (SINGULAIR) 10 MG tablet    needle, disp, 18G X 1\" MISC    Needle, Disp, 25G X 1\" MISC    Sharps Container MISC    syringe, disposable, (BD TUBERCULIN SYRINGE) 1 ML MISC    testosterone cypionate (DEPOTESTOSTERONE) 200 MG/ML injection       Allergies:  No Known Allergies    Social Hx:   Social History     Tobacco Use    Smoking status: Former Smoker     Packs/day: 1.00     " Years: 12.00     Pack years: 12.00     Types: Cigarettes    Smokeless tobacco: Never Used    Tobacco comment: quit 6/29/13   Vaping Use    Vaping Use: Never used   Substance Use Topics    Alcohol use: Yes     Comment: Occ    Drug use: Yes     Types: Marijuana     Comment: THC only        Family History:   family history includes Alcohol/Drug in his father and mother; Arthritis in his maternal grandfather and mother; Asthma in his mother; Attention Deficit Disorder in an other family member; Depression in his mother; Gastrointestinal Disease in his sister; Hypertension in his father, maternal grandfather, maternal uncle, and mother; Obesity in his father; Stomach Cancer in his father; Thyroid Disease in his mother.    ROS:   Negative except per HPI    Objective:   /74   Pulse 112   Temp 98  F (36.7  C) (Oral)   Resp 20   Wt 124.7 kg (275 lb)   SpO2 99%   BMI 44.39 kg/m    Constitutional: Healthy appearing man, no acute distress  HEENT: Normal appearance.    Respiratory: Breathing non-labored.  Pelvic Exam - : Normal appearing vulva. No obvious excoriations, lesions, or rashes. Bartholins, urethra, skeins normal. Normal pink vaginal mucosa. Moderate blood in vaginal, wiped away with 2 swabs. No active bleeding from os with valsalva.   Skin: No suspicious lesions or rashes  Psychiatric: Mentation appears normal and affect normal/bright    Labs/Imaging:  Results for orders placed or performed during the hospital encounter of 03/15/22   Comprehensive metabolic panel     Status: None (Preliminary result)   Result Value Ref Range    Sodium 137 133 - 144 mmol/L    Potassium 3.8 3.4 - 5.3 mmol/L    Chloride 108 94 - 109 mmol/L    Carbon Dioxide (CO2)      Anion Gap      Urea Nitrogen      Creatinine      Calcium      Glucose      Alkaline Phosphatase      AST      ALT      Protein Total      Albumin      Bilirubin Total      GFR Estimate      Narrative    The sex of this patient cannot be reliably determined  based on discrepancies in demographics (legal sex, sex assigned at birth, gender identity).  Both male and female reference ranges are provided where applicable.  Careful evaluation of the patient s results as compared to the gender specific reference intervals is required in this setting.    HCG qualitative pregnancy (blood)     Status: Normal   Result Value Ref Range    hCG Serum Qualitative Negative Negative   CBC with platelets and differential     Status: Abnormal   Result Value Ref Range    WBC Count 8.8 4.0 - 11.0 10e3/uL    RBC Count 4.11 3.80 - 5.90 10e6/uL    Hemoglobin 10.5 (L) 11.7 - 17.7 g/dL    Hematocrit 33.0 (L) 35.0 - 53.0 %    MCV 80 78 - 100 fL    MCH 25.5 (L) 26.5 - 33.0 pg    MCHC 31.8 31.5 - 36.5 g/dL    RDW 13.2 10.0 - 15.0 %    Platelet Count 291 150 - 450 10e3/uL    % Neutrophils 57 %    % Lymphocytes 28 %    % Monocytes 9 %    % Eosinophils 4 %    % Basophils 1 %    % Immature Granulocytes 1 %    NRBCs per 100 WBC 0 <1 /100    Absolute Neutrophils 5.1 1.6 - 8.3 10e3/uL    Absolute Lymphocytes 2.5 0.8 - 5.3 10e3/uL    Absolute Monocytes 0.8 0.0 - 1.3 10e3/uL    Absolute Eosinophils 0.4 0.0 - 0.7 10e3/uL    Absolute Basophils 0.1 0.0 - 0.2 10e3/uL    Absolute Immature Granulocytes 0.0 <=0.4 10e3/uL    Absolute NRBCs 0.0 10e3/uL    Narrative    The sex of this patient cannot be reliably determined based on discrepancies in demographics (legal sex, sex assigned at birth, gender identity).  Both male and female reference ranges are provided where applicable.  Careful evaluation of the patient s results as compared to the gender specific reference intervals is required in this setting.    CBC with platelets differential     Status: Abnormal    Narrative    The following orders were created for panel order CBC with platelets differential.  Procedure                               Abnormality         Status                     ---------                               -----------         ------         "             CBC with platelets and d...[[id number]]  Abnormal            Final result                 Please view results for these tests on the individual orders.   ABO/Rh type and screen     Status: None (In process)    Narrative    The following orders were created for panel order ABO/Rh type and screen.  Procedure                               Abnormality         Status                     ---------                               -----------         ------                     Adult Type and Screen[[id number]]                            In process                   Please view results for these tests on the individual orders.      Imaging:   US Pelvic Complete with Transvaginal, 3/2/22  Findings per radiology:   \" Limited evaluation of the ovaries, only seen transabdominally. The  right ovary measures 1.6 x 1.6 x 1.7 cm and the left ovary measures  2.3 x 1.7 x 1.5 cm.     The uterus is enlarged and heterogeneous measuring 15.3 x 8.0 x 10.8  cm.  The endometrium is ill-defined and measures 6 mm. There is a  heterogeneous cystic area at the lower uterine segment/endocervical  canal possibly representing a cluster of nabothian cysts. There is a  heterogeneous ill-defined mass at the uterine fundus and body  measuring 5.3 x 4.6 x 6.1 cm    IMPRESSION:  1. Enlarged uterus (15 cm) with suspicion for a large 6.1 cm fibroid,  challenging to sonographically delineate given overall heterogeneous  uterine echotexture. Question adenomyosis. MRI may be useful in  further evaluation.  2. 6 mm endometrial stripe, ill-defined.\"      Assessment/Plan:   Trevin Evans is a 46 year old transmale with months long history of abnormal uterine bleeding and uterine fibroid vs question of adenomyosis who presented to the ED with 3 days of worsened heavy vaginal and weakness.    Abnormal Uterine Bleeding  History of daily vaginal bleeding since December, acutely worsened over past three days with subjective weakness. Etiology of abnormal " uterine bleeding is most likely known fibroid with possible adenomyosis contributing. Pt is normotensive, but tachycardic with Hgb 10.5 (baseline 16-17, last 12.4 on 2/25). Speculum exam not concerning for emergent hemorrhage. Most recent pelvic US (3/02) demonstrating 6.1cm fibroid and raising question of possible adenomyosis. Given hgb >7 and no concern for ongoing rapid bleed, no indication to transfuse. Discussed plan to manage bleeding with OCP taper until upcoming clinic visit. Discussed possible nausea given high dose of hormone, plan to manage with zofran. Discussed definitive solution would be hysterectomy, which patient desires. Patient already has appointment with Gyn clinic scheduled for 3/24 to discuss this.     - OCP taper with previously prescribed OCP: 3 pills for 3 days, 2 pills for 2 days, 1 pill daily until clinic follow up. Given already late in the day, will have pt take 2 pills today, start with 3 pills tomorrow.    - Zofran PRN   - Discussed reasons to call/return for evaluation including: worsening/ongoing heavy bleeding despite OCP taper, symptoms of anemia, etc.    Discussed with Dr. Angely Jeronimo, MS3    Resident/Fellow Attestation   I, Zoya Robert, was present with the medical student who participated in the service and in the documentation of the note. I have verified the history and personally performed the physical exam and medical decision making. I agree with the assessment and plan of care as documented in the note.      Zoya Robert MD  OB/GYN Resident, PGY-4  3/15/2022 4:53 PM     Appreciate Dr. Robert and student doctor Kandace's note above, patient's history and exam findings reviewed me. I agree with the note above.   Vickie Au MD

## 2022-03-24 ENCOUNTER — OFFICE VISIT (OUTPATIENT)
Dept: OBGYN | Facility: CLINIC | Age: 47
End: 2022-03-24
Attending: STUDENT IN AN ORGANIZED HEALTH CARE EDUCATION/TRAINING PROGRAM
Payer: COMMERCIAL

## 2022-03-24 ENCOUNTER — PREP FOR PROCEDURE (OUTPATIENT)
Dept: OBGYN | Facility: CLINIC | Age: 47
End: 2022-03-24

## 2022-03-24 ENCOUNTER — LAB (OUTPATIENT)
Dept: LAB | Facility: CLINIC | Age: 47
End: 2022-03-24
Attending: STUDENT IN AN ORGANIZED HEALTH CARE EDUCATION/TRAINING PROGRAM
Payer: COMMERCIAL

## 2022-03-24 VITALS
BODY MASS INDEX: 42.59 KG/M2 | WEIGHT: 265 LBS | DIASTOLIC BLOOD PRESSURE: 85 MMHG | HEART RATE: 87 BPM | SYSTOLIC BLOOD PRESSURE: 133 MMHG | HEIGHT: 66 IN

## 2022-03-24 DIAGNOSIS — D64.9 ANEMIA, UNSPECIFIED TYPE: ICD-10-CM

## 2022-03-24 DIAGNOSIS — D25.9 UTERINE LEIOMYOMA, UNSPECIFIED LOCATION: ICD-10-CM

## 2022-03-24 DIAGNOSIS — N93.9 ABNORMAL UTERINE BLEEDING (AUB): Primary | ICD-10-CM

## 2022-03-24 LAB
ERYTHROCYTE [DISTWIDTH] IN BLOOD BY AUTOMATED COUNT: 14.9 % (ref 10–15)
HCT VFR BLD AUTO: 31.4 % (ref 35–53)
HGB BLD-MCNC: 9.7 G/DL (ref 11.7–17.7)
MCH RBC QN AUTO: 25.1 PG (ref 26.5–33)
MCHC RBC AUTO-ENTMCNC: 30.9 G/DL (ref 31.5–36.5)
MCV RBC AUTO: 81 FL (ref 78–100)
PLATELET # BLD AUTO: 393 10E3/UL (ref 150–450)
RBC # BLD AUTO: 3.86 10E6/UL (ref 3.8–5.9)
WBC # BLD AUTO: 9.5 10E3/UL (ref 4–11)

## 2022-03-24 PROCEDURE — 36415 COLL VENOUS BLD VENIPUNCTURE: CPT

## 2022-03-24 PROCEDURE — 99203 OFFICE O/P NEW LOW 30 MIN: CPT | Mod: GE | Performed by: OBSTETRICS & GYNECOLOGY

## 2022-03-24 PROCEDURE — G0463 HOSPITAL OUTPT CLINIC VISIT: HCPCS

## 2022-03-24 PROCEDURE — 85027 COMPLETE CBC AUTOMATED: CPT

## 2022-03-24 RX ORDER — ACETAMINOPHEN 325 MG/1
975 TABLET ORAL ONCE
Status: CANCELLED | OUTPATIENT
Start: 2022-03-24 | End: 2022-03-24

## 2022-03-24 RX ORDER — CEFAZOLIN SODIUM IN 0.9 % NACL 3 G/100 ML
3 INTRAVENOUS SOLUTION, PIGGYBACK (ML) INTRAVENOUS
Status: CANCELLED | OUTPATIENT
Start: 2022-03-24

## 2022-03-24 RX ORDER — CEFAZOLIN SODIUM IN 0.9 % NACL 3 G/100 ML
3 INTRAVENOUS SOLUTION, PIGGYBACK (ML) INTRAVENOUS SEE ADMIN INSTRUCTIONS
Status: CANCELLED | OUTPATIENT
Start: 2022-03-24

## 2022-03-24 RX ORDER — KETOROLAC TROMETHAMINE 30 MG/ML
30 INJECTION, SOLUTION INTRAMUSCULAR; INTRAVENOUS ONCE
Status: CANCELLED | OUTPATIENT
Start: 2022-03-24 | End: 2022-03-24

## 2022-03-24 RX ORDER — PHENAZOPYRIDINE HYDROCHLORIDE 100 MG/1
200 TABLET, FILM COATED ORAL ONCE
Status: CANCELLED | OUTPATIENT
Start: 2022-03-24 | End: 2022-03-24

## 2022-03-24 NOTE — LETTER
Please excuse Trevin Nathan from work and work-related duties thru 4/30/22    If you have any questions or concerns, please don't hesitate to call.      Kathy Ruggiero MD MPH  3/31/22

## 2022-03-24 NOTE — LETTER
3/24/2022       RE: Irma Evans  [address] [place] [address]     Dear Colleague,    Thank you for referring your patient, Irma Evans, to the [facility] [place] at [facility]. Please see a copy of my visit note below.    [facility] Clinic Note  DOS: 3/23/2022  CC: AUB    HPI:    Trevin Evans is a 46 year old transmale here for hysterectomy consult. Since ED visit 3/15, bleeding has been good. Stopped bleeding on Monday. He is still taking daily OCP. He is very interested in hysterectomy. He still gets lightheaded and dizzy at work due to high physical demands of his job. He endorses all the below PMH. He does not have migraines or uses tobacco.       Pap Smears:   Lab Results   Component Value Date    PAP NIL 10/22/2014         OBHx:   G0      GynHx:   - Menses: As above. Hx of irregular periods when younger.   - Pap smear history: Per pt, last pap 2014, NILM HPV neg. To his knowledge, no hx of abnormal pap.   - Sexually activity: Not currently sexual active due to bleeding, but recently sexually active. Penetrative sex with MSM.   - Contraception: Nexplanon  - History of STDS/UTIs: No Hx of STI. Gets tested frequently. Most recently 2/25- negative for chlamydia, gonorrheae, treponema, Hep C, HIV.       PMHx:   Past Medical History:   Diagnosis Date     Broken foot 1-2012     Depressive disorder      Hypertension      Migraines since [employer]     Patient overweight     BMI 56     Seasonal allergies     spring       PSHx:   Past Surgical History:   Procedure Laterality Date     RELEASE CARPAL TUNNEL Left 12/19/2017    Procedure: RELEASE CARPAL TUNNEL;  Left Open Carpal Tunnel Release  ;  Surgeon: Khoi Reyes MD;  Location:  OR     [facility] OPEN RX ANKLE DISLOCATN+FIXATN  01/09    right        Meds:   Current Outpatient Medications   Medication Instructions     amphetamine-dextroamphetamine (ADDERALL) 20 MG tablet 20 mg,  "Oral, 2 TIMES DAILY     clonazePAM (KLONOPIN) 0.5 mg, Oral, DAILY PRN     estradiol (ESTRACE) 2 g, Vaginal, TWICE WEEKLY     ethynodiol-ethinyl estradiol (ZOVIA) 1-50 MG-MCG tablet 1 tablet, Oral, DAILY     ethynodiol-ethinyl estradiol (ZOVIA) 1-50 MG-MCG tablet Take 3 pills a day for the next 3 days, then 2 pills a day for the next 2 days then 1 pill a day until follow-up with the OB/GYN clinic     etonogestrel (NEXPLANON) 68 MG IMPL 1 each, Subdermal, ONCE          fluticasone (FLONASE) 50 MCG/ACT spray 2 sprays, Both Nostrils, DAILY     hydrocortisone (WESTCORT) 0.2 % external cream Topical, 2 TIMES DAILY     lisinopril (ZESTRIL) 5 mg, Oral, DAILY     minocycline (MINOCIN) 100 mg, Oral, 2 TIMES DAILY     montelukast (SINGULAIR) 10 mg, Oral, AT BEDTIME PRN     needle, disp, 18G X 1\" MISC 1 each, Does not apply, WEEKLY     Needle, Disp, 25G X 1\" MISC Use to inject testosterone into muscle weekly as directed     ondansetron (ZOFRAN ODT) 4 mg, Oral, EVERY 8 HOURS PRN     Sharps Container MISC Dispose sharps     syringe, disposable, (BD TUBERCULIN SYRINGE) 1 ML MISC BD 1ml Tuberculing syringe SLIP TIP (no needle attached). Use to administer IM medications as instructed     testosterone cypionate (DEPOTESTOSTERONE) 80 mg, Intramuscular, WEEKLY, In cottonseed oil ok. Pt needs 3 mo supply =13ml given on single use vials       Allergies:  NKDA    FamHx:   Breast cancer: denies  Ovarian cancer: denies  Endometrial cancer: denies  Other cancer: denies    SocHx:   Tobacco/e-cig use: denies  Alcohol use: social drinker  Employment: delivery drive  Feels safe at home: yes, lives alone    ROS: 10-Point ROS negative except as noted in HPI      Physical Exam  /85   Pulse 87   Ht 1.676 m (5' 6\")   Wt 120.2 kg (265 lb)   BMI 42.77 kg/m      Gen: Well-appearing, NAD  HEENT: Normocephalic, atraumatic  CV:  Regular rate  Pulm: Nonlabored on RA, no increased work of breathing  Abd: Soft, non-tender, non-distended***  Ext: No " LE edema, extremities warm and well perfused***    Labs/Imaging:  3/2/22 US IMPRESSION:  1. Enlarged uterus (15 cm) with suspicion for a large 6.1 cm fibroid,  challenging to sonographically delineate given overall heterogeneous  uterine echotexture. Question adenomyosis. MRI may be useful in  further evaluation.  2. 6 mm endometrial stripe, ill-defined.     3/15/22 Hgb 10.5    Assessment and Plan:  Trevin Evans is a 46 year old transmale here for management of AUB d/t fibroid, and suspected adenomyosis. Had ED visit and initial gynecology consult on 3/15. Was symptomatic from his heavy bleeding at that time and started on OCP taper. PMH otherwise notable for HTN well controlled on lisinopril, depression/anxiety. He does take testosterone for gender affirmation.     Today we discussed management options for AUB, but he is very interested in definitive surgical management by hysterectomy. We discussed given the size of his uterus that the safest approach for him would be an open approach. He is amenable to this. We discussed likely pfannensteil incision over VML however he is okay with either. He understands his surgical team will make the final decision. We discussed typical postoperative recovery, discharge when meeting goals which is usually POD#2-3, and lifting restrictions after surgery. Additionally we discussed risks/benefits of ovarian removal and he is okay with leaving ovaries in place for possible cardiac/bone health benefits given his age. Finally, we discussed low risk of cervical cancer given overdue pap smear, and low risk of underlying endometrial cancer (ES on US of 6 mm) given AUB, and how in that case, outcomes are better when his procedure is performed by a gyn onc surgeon. When given the option of doing pap and endometrial biopsy first or going straight to hysterectomy, patient accepts the low risk of underlying malignancy that is undiagnosed and would like to proceed with hysterectomy as soon  as possible.     He understood getting a COVID swab and preop H&P with his PCP prior to surgery    We are checking a Hgb today due to his continued lightheadedness with exertion. Results via MyChart per patient. He is taking oral iron at home      Patient discussed with Dr. Persaud who is in agreement with plan.    Kathy Ruggiero MD  Obstetrics & Gynecology PGY-3  3/23/22

## 2022-03-24 NOTE — LETTER
Patient:  Trevin Evans  :   1975  MRN:     [id number]      2022    Patient Name:  Trevin Evans    Physician: Kathy Ruggiero MD    Please excuse Trevin from all work and work-related duties from 3/24/22 thru 4/3/22 due to medical necessity.    _____________________________________________  Kathy Ruggiero MD [facility]  2022

## 2022-03-29 ENCOUNTER — TELEPHONE (OUTPATIENT)
Dept: OBGYN | Facility: CLINIC | Age: 47
End: 2022-03-29
Payer: COMMERCIAL

## 2022-03-29 NOTE — TELEPHONE ENCOUNTER
Called patient, discussed writer's next guaranteed OR time is 6/21 when clinic has block. Patient would like to be seen sooner. Escalated to Natasha MAE OR manager to find earlier dates, preferably within April, as case is marked Tier 2.    Will call patient back when writer hears back from OR scheduling team regarding sooner date.      Damari Araiza  Clinical Services Assistant

## 2022-04-04 ENCOUNTER — TRANSCRIBE ORDERS (OUTPATIENT)
Dept: OBGYN | Facility: CLINIC | Age: 47
End: 2022-04-04
Payer: COMMERCIAL

## 2022-04-04 ENCOUNTER — TELEPHONE (OUTPATIENT)
Dept: OBGYN | Facility: CLINIC | Age: 47
End: 2022-04-04
Payer: COMMERCIAL

## 2022-04-04 DIAGNOSIS — Z11.59 ENCOUNTER FOR SCREENING FOR OTHER VIRAL DISEASES: Primary | ICD-10-CM

## 2022-04-04 DIAGNOSIS — Z01.812 ENCOUNTER FOR PRE-OPERATIVE LABORATORY TESTING: Primary | ICD-10-CM

## 2022-04-04 NOTE — TELEPHONE ENCOUNTER
FUTURE VISIT INFORMATION      SURGERY INFORMATION:    Date: 4/19/22    Location: [facility] or    Surgeon:  Kelsey Love MD    Anesthesia Type:  Combined General with Block    Procedure: total abdominal hysterectomy, bilateral salpingectomy CYSTOSCOPY    RECORDS REQUESTED FROM:        Primary Care Provider: Liz Baker MD  - [facility]    Pertinent Medical History: hypertension    Most recent EKG+ Tracing: 3/3/17- [facility]

## 2022-04-04 NOTE — TELEPHONE ENCOUNTER
Spoke with patient, scheduled surgery. Patient is aware of date, time, location, prep instructions, need for H&P within 30 days and covid test within 96 hours of surgery.     Type of surgery: total abdominal hysterectomy, bilateral salpingectomy [[id number]], CYSTOSCOPY  Location of surgery: [facility] OR  Date and time of surgery: 4/19/22 at 9:30am  Surgeon: Kelsey Love MD  Pre-Op Appt Date: 4/7/22  Post-Op Appt Date: 6/2/22   Covid test: 4/15/22  Packet sent out: Yes  Pre-cert/Authorization completed:  Yes  Date: 4/4/22    Damari Araiza  Clinical Services Assistant

## 2022-04-04 NOTE — LETTER
April 4, 2022    Irma Evans   [address] [place] [address]       Dear Irma Evans,    Thank you for choosing [facility] for your surgical procedure.  You will enter the hospital as a morning admission which means that you will spend at least one night in the hospital.    You procedure is scheduled on:    Date: Tuesday April 19th, 2022    Time: 9:30am    Please arrive at: 7:30am    Procedure: total abdominal hysterectomy, bilateral salpingectomy [[id number]], CYSTOSCOPY    MD: Kelsey Love MD    Go to:  The main hospital entrance ([facility] entrance) is located on [address] [place] [address] ([address] [facility] [address])  There is signage in the lobby directing you to check in on 3rd floor of the [facility].       parking is available (no charge to park your car, regular parking rates do apply). You may also self park your car in the [facility] garage - the entrance is located on [address].     Please keep your ticket with you as your ticket will be validated to give you a reduced rate for the parking garage.     NOTE: The times noted above may change.  A nurse from the hospital pre-admission department will call to confirm your procedure.  He or she will answer any questions you have about the procedure and will provide you with instructions for taking medications the day of the procedure.  If you have not received a call, or if you have more questions please call us one working day before your procedure.  Call pre-admission department at [phone].  If you need to cancel or reschedule your surgery please call [phone].    FOLLOW-UP/POST OPERATIVE VISIT     Your follow-up/postoperative visit is due approximately 6 weeks after your procedure.    We scheduled a postoperative visit with Dr. Kelsey Love on 6/2/22 at 10:45am at the [facility].     GETTING READY FOR SURGERY    You must have a preoperative physical exam within  30 days of your procedure. Your preoperative exam will be done during your Preoperative Assessment Center video visit that we scheduled for 4/7/22 at 11:30am.    You must have a preoperative COVID-19 PCR test within 4 days (96 hours) of your procedure, unless you have tested positive within the 90 days prior to the procedure. You will receive a call to schedule this test, otherwise you may call [phone] to schedule. We scheduled this COVID test for 4/15/22 at 11:30am at the [facility].     *If you get a fever, cold, or rash please call the [facility] Triage Nurses at [phone] - we may need to postpone your procedure.    TEN DAYS BEFORE SURGERY    [place] with the hospital 10 days before your procedure date.     Have your insurance card ready.     To register online, go to [email or web address].     You may also call the hospital Pre Registration Department at [phone].    THE DAY BEFORE SURGERY    If you smoke - quit or at least cut down.    Stop drinking alcohol (liquor, beer, and wine) at least 24 hours before your procedure.    Shower or bathe the night before and the morning of your procedure.  Use an antiseptic surgical soap such as Hibiclens, Scrub Care or Exidine. You can find it at your local pharmacy..  If your doctor does not give you a special soap, buy Hibiclens or Shantal-Star at the drug store.  You can also ask your pharmacist to recommend an antiseptic alternative soap.    Do not put on lotion, powder, perfume, deodorant, or make-up after bathing.    Remove all nail polish.    THE DAY OF SURGERY    Have nothing to eat or drink starting eight hours before your procedure.    You may drink small sips of water up until two hours before your procedure.    Nothing by mouth within two hours of the procedure, including gum, candy and mints.     Take prescription medicines as instructed by the hospital preadmissions nursing staff.    Alternatively you should follow any  directives you receive from the MD doing your procedure.     Do not take insulin or oral diabetes medicine on the day of the surgery.     Take off jewelry, including rings and body piercings.    Leave valuables at home.    Bring these items to the hospital:  1. Insurance cards.  2. Forms your doctor asked you to bring.  3. Health care directive or living will, if you have one.    If you are under 18, a parent or legal guardian must come with you to the hospital.      Sincerely,    [facility]      If you are hard of hearing, please let us know. We provide many free services including sign language and oral interpreters, TTYs, telephone amplifiers, note takers, and written materials.

## 2022-04-05 ENCOUNTER — TELEPHONE (OUTPATIENT)
Dept: OBGYN | Facility: CLINIC | Age: 47
End: 2022-04-05
Payer: COMMERCIAL

## 2022-04-05 NOTE — TELEPHONE ENCOUNTER
SHORT TERM DISABILITY PAPERWORK COMPLETED SIGNED FAXED AND COPIED.  paperwork completed and placed on Dr. Love's desk for signature.        Received [facility] paperwork to be completed.     Paperwork placed in Dr. Love's basket.     - Kim Beverly

## 2022-04-06 RX ORDER — FERROUS SULFATE 325(65) MG
325 TABLET ORAL
COMMUNITY
End: 2022-08-17

## 2022-04-06 NOTE — PROGRESS NOTES
Preoperative Assessment Center Medication History Note    Medication history completed on April 6, 2022 by this writer. See Epic admission navigator for prior to admission medications. Operating room staff will still need to confirm medications and last dose information on day of surgery.     Medication history interview sources  Patient interview: Yes  Care Everywhere records: No  Surescripts pharmacy refill records: Yes  Other (if applicable):     Changes made to PTA medication list  Added: iron supplement.   Deleted: clonazepam, fluoxetine (stopped months ago), montelukast,   Changed: flonase, hydrocortisone cream,     Additional medication history information (including reliability of information, actions taken by pharmacist):    -- No recent (within 30 days) course of antibiotics except for minocycline which is for acne prevention  -- No recent (within 30 days) course of systemic glucocorticoid steroids (is on hormone therapy)   -- Patient reports not being on blood thinning medications    -- Patient declines being on any other prescription or over-the-counter medications    Prior to Admission medications    Medication Sig Last Dose Taking? Auth Provider   amphetamine-dextroamphetamine (ADDERALL) 20 MG tablet Take 1 tablet (20 mg) by mouth 2 times daily Taking Yes Liz Baker MD   estradiol (ESTRACE) 0.1 MG/GM vaginal cream Place 2 g vaginally twice a week  Patient taking differently: Place 2 g vaginally twice a week As needed Taking Yes Liz Baker MD   ethynodiol-ethinyl estradiol (ZOVIA) 1-50 MG-MCG tablet Take 1 tablet by mouth daily for 10 days Taking Yes Liz Baker MD   etonogestrel (NEXPLANON) 68 MG IMPL 1 each (68 mg) by Subdermal route once Taking Yes Vitaly Monique MD   ferrous sulfate (FEROSUL) 325 (65 Fe) MG tablet Take 325 mg by mouth daily (with breakfast) Taking Yes Unknown, Entered By History   fluticasone (FLONASE) 50 MCG/ACT spray Spray 2 sprays into both nostrils daily  Patient taking  "differently: Spray 2 sprays into both nostrils daily as needed for rhinitis  Taking Yes Liz Baker MD   hydrocortisone (WESTCORT) 0.2 % external cream Apply topically 2 times daily  Patient taking differently: Apply topically 2 times daily as needed  Taking Yes Liz Baker MD   lisinopril (ZESTRIL) 5 MG tablet Take 1 tablet (5 mg) by mouth daily Taking Yes Liz Baker MD   minocycline (MINOCIN) 100 MG capsule Take 1 capsule (100 mg) by mouth 2 times daily Taking Yes Liz Baker MD   testosterone cypionate (DEPOTESTOSTERONE) 200 MG/ML injection Inject 0.4 mLs (80 mg) into the muscle once a week In cottonseed oil ok. Pt needs 3 mo supply =13ml given on single use vials Taking Yes Liz Baker MD   ethynodiol-ethinyl estradiol (ZOVIA) 1-50 MG-MCG tablet Take 3 pills a day for the next 3 days, then 2 pills a day for the next 2 days then 1 pill a day until follow-up with the OB/GYN clinic  Patient not taking: Reported on 4/6/2022 Not Taking  Raquel Sheffield MD   needle, disp, 18G X 1\" MISC 1 each once a week   Liz Baker MD   Needle, Disp, 25G X 1\" MISC Use to inject testosterone into muscle weekly as directed   Liz Baker MD   ondansetron (ZOFRAN ODT) 4 MG ODT tab Take 1 tablet (4 mg) by mouth every 8 hours as needed for nausea or vomiting   Raquel Sheffield MD   Sharps Container MISC Dispose sharps   Liz Baker MD   syringe, disposable, (BD TUBERCULIN SYRINGE) 1 ML MISC BD 1ml Tuberculing syringe SLIP TIP (no needle attached). Use to administer IM medications as instructed   Liz Baker MD          Medication history completed by: Mario Maddox [facility]    "

## 2022-04-07 ENCOUNTER — TELEPHONE (OUTPATIENT)
Dept: OBGYN | Facility: CLINIC | Age: 47
End: 2022-04-07

## 2022-04-07 ENCOUNTER — ANESTHESIA EVENT (OUTPATIENT)
Dept: SURGERY | Facility: CLINIC | Age: 47
End: 2022-04-07
Payer: COMMERCIAL

## 2022-04-07 ENCOUNTER — PRE VISIT (OUTPATIENT)
Dept: SURGERY | Facility: CLINIC | Age: 47
End: 2022-04-07

## 2022-04-07 ENCOUNTER — VIRTUAL VISIT (OUTPATIENT)
Dept: SURGERY | Facility: CLINIC | Age: 47
End: 2022-04-07
Attending: STUDENT IN AN ORGANIZED HEALTH CARE EDUCATION/TRAINING PROGRAM
Payer: COMMERCIAL

## 2022-04-07 DIAGNOSIS — N93.9 ABNORMAL UTERINE BLEEDING (AUB): ICD-10-CM

## 2022-04-07 DIAGNOSIS — Z01.818 PREOP EXAMINATION: Primary | ICD-10-CM

## 2022-04-07 PROCEDURE — 99204 OFFICE O/P NEW MOD 45 MIN: CPT | Mod: 95 | Performed by: CLINICAL NURSE SPECIALIST

## 2022-04-07 ASSESSMENT — LIFESTYLE VARIABLES: TOBACCO_USE: 1

## 2022-04-07 ASSESSMENT — PAIN SCALES - GENERAL: PAINLEVEL: NO PAIN (0)

## 2022-04-07 NOTE — H&P
Pre-Operative H & P     CC:  Preoperative exam to assess for increased cardiopulmonary risk while undergoing surgery and anesthesia.    Date of Encounter: 4/7/2022  Primary Care Physician:  Liz Baker     Reason for visit:   Encounter Diagnoses   Name Primary?     Abnormal uterine bleeding (AUB)      Preop examination Yes       HPI  Trevin Evans is a 46 year old adult who presents for pre-operative H & P in preparation for  Procedure Information     Case: [id number] Date/Time: 04/19/22 0930    Procedures:       total abdominal hysterectomy, bilateral salpingectomy (Bilateral Abdomen)      CYSTOSCOPY (N/A Urethra)    Anesthesia type: Combined General with Block    Diagnosis:       Abnormal uterine bleeding (AUB) [N93.9]      Uterine leiomyoma, unspecified location [D25.9]    Pre-op diagnosis:       Abnormal uterine bleeding (AUB) [N93.9]      Uterine leiomyoma, unspecified location [D25.9]    [facility]: [facility]    Providers: Kelsey Love MD        History is obtained from the patient and chart review    Patient who is transgender male, preferred pronouns he/him/ his presented to the ED on 3/15/22 with vaginal bleeding. He was symptomatic at that time with lightheadedness. Gynecology consulted at that time and he was started on BCP in attempt to stop the bleeding. He had a follow up visit with Dr. Love where above hysterectomy was planned.      Today patient reports some intermittent pelvic pain and that bleeding has restarted but lighter than before and he is not having symptoms. He reports that his job requires heavy labor and he is currently walking up and down stairs carrying 30 pound boxes without difficulty. He is taking a daily iron supplement and eating iron rich foods.     His history is otherwise significant for obesity, HTN well controlled on lisinopril, depression/anxiety and ADHD. He does take testosterone for gender affirmation.     Hx of abnormal bleeding or anti-platelet use:  AUB-bleeding has started again but reportedly much lighter    Menstrual history: No LMP recorded. Patient has had an implant.     Past Medical History  Past Medical History:   Diagnosis Date     Abnormal uterine bleeding      Attention deficit hyperactivity disorder (ADHD), combined type 10/20/2016    To be seen every 6 months, can request refill once between visits by [email or web address]  Rapid drug screen annually done last April 2021     Broken foot 01/2012     Depressive disorder      Gender dysphoria 12/12/2012     Hypertension      Migraines since [employer]     CASSIDY (nonalcoholic steatohepatitis) 01/17/2013     Patient overweight     BMI 56     Seasonal allergies     spring       Past Surgical History  Past Surgical History:   Procedure Laterality Date     RELEASE CARPAL TUNNEL Left 12/19/2017    Procedure: RELEASE CARPAL TUNNEL;  Left Open Carpal Tunnel Release  ;  Surgeon: Khoi Reyes MD;  Location:  OR     [facility] OPEN RX ANKLE DISLOCATN+FIXATN  01/09    right       Prior to Admission Medications  Current Outpatient Medications   Medication Sig Dispense Refill     amphetamine-dextroamphetamine (ADDERALL) 20 MG tablet Take 1 tablet (20 mg) by mouth 2 times daily 180 tablet 0     estradiol (ESTRACE) 0.1 MG/GM vaginal cream Place 2 g vaginally twice a week (Patient taking differently: Place 2 g vaginally twice a week As needed) 42.5 g 3     ethynodiol-ethinyl estradiol (ZOVIA) 1-50 MG-MCG tablet Take 1 tablet by mouth daily for 10 days 28 tablet 0     etonogestrel (NEXPLANON) 68 MG IMPL 1 each (68 mg) by Subdermal route once       ferrous sulfate (FEROSUL) 325 (65 Fe) MG tablet Take 325 mg by mouth daily (with breakfast)       fluticasone (FLONASE) 50 MCG/ACT spray Spray 2 sprays into both nostrils daily (Patient taking differently: Spray 2 sprays into both nostrils daily as needed for rhinitis ) 48 g 3     hydrocortisone (WESTCORT) 0.2 % external cream Apply topically 2 times daily (Patient taking differently: Apply  "topically 2 times daily as needed ) 60 g 1     lisinopril (ZESTRIL) 5 MG tablet Take 1 tablet (5 mg) by mouth daily 90 tablet 0     minocycline (MINOCIN) 100 MG capsule Take 1 capsule (100 mg) by mouth 2 times daily 180 capsule 3     testosterone cypionate (DEPOTESTOSTERONE) 200 MG/ML injection Inject 0.4 mLs (80 mg) into the muscle once a week In cottonseed oil ok. Pt needs 3 mo supply =13ml given on single use vials 13 mL 0     ethynodiol-ethinyl estradiol (ZOVIA) 1-50 MG-MCG tablet Take 3 pills a day for the next 3 days, then 2 pills a day for the next 2 days then 1 pill a day until follow-up with the OB/GYN clinic (Patient not taking: Reported on 4/6/2022) 56 tablet 1     needle, disp, 18G X 1\" MISC 1 each once a week 15 each 3     Needle, Disp, 25G X 1\" MISC Use to inject testosterone into muscle weekly as directed 10 each 3     ondansetron (ZOFRAN ODT) 4 MG ODT tab Take 1 tablet (4 mg) by mouth every 8 hours as needed for nausea or vomiting 12 tablet 0     Sharps Container MISC Dispose sharps 1 each 3     syringe, disposable, (BD TUBERCULIN SYRINGE) 1 ML MISC BD 1ml Tuberculing syringe SLIP TIP (no needle attached). Use to administer IM medications as instructed 15 each 3       Allergies  No Known Allergies    Social History  Social History     Socioeconomic History     Marital status: Single     Spouse name: Not on file     Number of children: Not on file     Years of education: Not on file     Highest education level: Not on file   Occupational History     Not on file   Tobacco Use     Smoking status: Former Smoker     Packs/day: 1.00     Years: 12.00     Pack years: 12.00     Types: Cigarettes     Smokeless tobacco: Never Used     Tobacco comment: quit 6/29/13   Vaping Use     Vaping Use: Never used   Substance and Sexual Activity     Alcohol use: Yes     Comment: Occ     Drug use: Yes     Types: Marijuana     Comment: THC only     Sexual activity: Not Currently     Partners: Female, Male     Birth " control/protection: Implant, None   Other Topics Concern     Parent/sibling w/ CABG, MI or angioplasty before 65F 55M? No   Social History Narrative    Works for [employer] and likes it a lot    Frequent exercise.     Lives in his own home.      Social Determinants of Health     Financial Resource Strain: Not on file   Food Insecurity: Not on file   Transportation Needs: Not on file   Physical Activity: Sufficiently Active     Days of Exercise per Week: 4 days     Minutes of Exercise per Session: 60 min   Stress: Stress Concern Present     Feeling of Stress : Very much   Social Connections: Unknown     Frequency of Communication with Friends and Family: Not on file     Frequency of Social Gatherings with Friends and Family: Never     Attends Sabianist Services: Not on file     Active Member of Clubs or Organizations: No     Attends Club or Organization Meetings: Never     Marital Status: Not on file   Intimate Partner Violence: Not on file   Housing Stability: Unknown     Unable to Pay for Housing in the Last Year: No     Number of Places Lived in the Last Year: Not on file     Unstable Housing in the Last Year: No       Family History  Family History   Problem Relation Age of Onset     Arthritis Mother      Alcohol/Drug Mother      Asthma Mother      Depression Mother         and many on mom's side of family     Hypertension Mother      Thyroid Disease Mother         s/p removal     Alcohol/Drug Father      Hypertension Father      Obesity Father      Stomach Cancer Father      Gastrointestinal Disease Sister         CROHN disease     Hypertension Maternal Uncle      Hypertension Maternal Grandfather      Arthritis Maternal Grandfather      Attention Deficit Disorder Other        Review of Systems  The complete review of systems is negative other than noted in the HPI or here.   Anesthesia Evaluation   Pt has had prior anesthetic. Type: General.    No history of anesthetic complications       ROS/MED  HX  ENT/Pulmonary:     (+) STAR risk factors, obese, observed stopped breathing, tobacco use, Past use,  (-) recent URI   Neurologic: Comment: ADHD    (+) migraines,     Cardiovascular:     (+) hypertension-range: controlled/ ----Previous cardiac testing   Echo: Date: Results:    Stress Test: Date: Results:    ECG Reviewed: Date: 2017 Results:  SR, marked SA, possible LAE  Cath: Date: Results:   (-) taking anticoagulants/antiplatelets   METS/Exercise Tolerance: >4 METS Comment: Work involves heavy labor, climbing stairs with 30 pound boxes   Hematologic:     (+) anemia,  (-) history of blood transfusion   Musculoskeletal:  - neg musculoskeletal ROS     GI/Hepatic: Comment: CASSIDY    (+) liver disease,  (-) GERD   Renal/Genitourinary: Comment: AUB      Endo:     (+) Obesity,     Psychiatric/Substance Use: Comment: Gender dysphoria    (+) anxiety and depression : THC.    Infectious Disease:  - neg infectious disease ROS     Malignancy:  - neg malignancy ROS     Other:  - neg other ROS          Virtual visit -  No vitals were obtained    Physical Exam  Constitutional: Awake, alert, no apparent distress, and appears stated age.  HENT: Normocephalic  Respiratory: non labored breathing; no cough  Neurologic: Oriented to name, place and time.   Neuropsychiatric: Calm, cooperative. Normal affect.      Prior Labs/Diagnostic Studies   All labs and imaging personally reviewed   Lab Results   Component Value Date    WBC 9.5 03/24/2022    WBC 11.6 06/22/2019     Lab Results   Component Value Date    RBC 3.86 03/24/2022    RBC 5.20 06/22/2019     Lab Results   Component Value Date    HGB 9.7 03/24/2022    HGB 16.5 04/26/2021     Lab Results   Component Value Date    HCT 31.4 03/24/2022    HCT 51.2 06/27/2019     Lab Results   Component Value Date    MCV 81 03/24/2022    MCV 95.1 06/27/2019     Lab Results   Component Value Date    MCH 25.1 03/24/2022    MCH 29.9 06/27/2019     Lab Results   Component Value Date    MCHC 30.9  03/24/2022    MCHC 31.4 06/27/2019     Lab Results   Component Value Date    RDW 14.9 03/24/2022    RDW 13.1 06/22/2019     Lab Results   Component Value Date     03/24/2022     06/22/2019     Last Comprehensive Metabolic Panel:  Sodium   Date Value Ref Range Status   03/15/2022 137 133 - 144 mmol/L Final   04/26/2021 137.2 132.6 - 141.4 mmol/L Final     Potassium   Date Value Ref Range Status   03/15/2022 3.8 3.4 - 5.3 mmol/L Final   04/26/2021 4.3 3.3 - 4.5 mmol/L Final     Chloride   Date Value Ref Range Status   03/15/2022 108 94 - 109 mmol/L Final     Comment:     0-20 years:       Female:  mmol/L  Male:    mmol/L       20 years and older:   Female:  mmol/L   Male:    mmol/L     04/26/2021 100.9 98.0 - 110.0 mmol/L Final     Carbon Dioxide   Date Value Ref Range Status   04/26/2021 30.1 20.0 - 32.0 mmol/L Final     Carbon Dioxide (CO2)   Date Value Ref Range Status   03/15/2022 25 20 - 32 mmol/L Final     Anion Gap   Date Value Ref Range Status   03/15/2022 4 3 - 14 mmol/L Final   06/22/2019 7 3 - 14 mmol/L Final     Glucose   Date Value Ref Range Status   03/15/2022 153 (H) 70 - 99 mg/dL Final   04/26/2021 110.1 (H) 70.0 - 99.0 mg'dL Final     Urea Nitrogen   Date Value Ref Range Status   03/15/2022 13 7 - 30 mg/dL Final     Comment:     Female  0 to 15 days           3-23  15 days to 1 year      3-17  1 to 10 years          9-22  10 to 19 years         7-19  19 years and older     7-30    Male  0 to 15 days           3-23  15 days to 1 year      3-17  1 to 10 years          9-22  10 to 19 years         7-21  19 years and older     7-30   04/26/2021 15.4 7.0 - 19.0 mg/dL Final     Creatinine   Date Value Ref Range Status   03/15/2022 0.85 0.52 - 1.25 mg/dL Final     Comment:     20 y and older Female0.52-1.04 mg/dL  20 y and older Male0.66-1.25 mg/dL    Varies with the amount of muscle mass present.    GICH  Female: 0.6-1.2 mg/dL  Male: 0.7-1.3 mg/dL     04/26/2021 0.7 0.5  - 1.0 mg/dL Final     GFR Estimate   Date Value Ref Range Status   03/15/2022 86 >60 mL/min/1.73m2 Final     Comment:     GFR not calculated when sex unspecified or nonbinary.  Effective 2021 eGFRcr in adults is calculated using the  CKD-EPI creatinine equation which includes age and gender (Fernando olivares al., NEJ, DOI: 10.1056/DMDSig7865933)   2021 >90 >60.0 mL/min/1.7 m2 Final     Comment:     eGFR is calculated by the CKD-EPI creatinine equation, without race   adjustment. eGFR can be influenced by muscle mass, exercise, and diet. The   reported eGFR is an estimation only and is only applicable if the renal   function is stable.       Calcium   Date Value Ref Range Status   03/15/2022 8.7 8.5 - 10.1 mg/dL Final   2021 9.0 8.5 - 10.1 mg/dL Final     Lab Results   Component Value Date    AST 50 03/15/2022    AST 31 2021     Lab Results   Component Value Date     03/15/2022    ALT 49 2021     Lab Results   Component Value Date    BILICONJ 0.0 2014      Lab Results   Component Value Date    BILITOTAL 0.2 03/15/2022    BILITOTAL 0.2 2021     Lab Results   Component Value Date    ALBUMIN 3.3 03/15/2022    ALBUMIN 3.8 2021     Lab Results   Component Value Date    PROTTOTAL 6.8 03/15/2022    PROTTOTAL 7.1 2021      Lab Results   Component Value Date    ALKPHOS 46 03/15/2022    ALKPHOS 74 2021     EK Sinus rhythm, marked sinus arrhythmia, possible left atrial enlargement    Pelvic US 3/2/22  IMPRESSION:  1. Enlarged uterus (15 cm) with suspicion for a large 6.1 cm fibroid,  challenging to sonographically delineate given overall heterogeneous  uterine echotexture. Question adenomyosis. MRI may be useful in  further evaluation.  2. 6 mm endometrial stripe, ill-defined.  The patient's records and results personally reviewed by this provider.     Outside records reviewed from: Care Everywhere    Assessment      Trevin Evans is a 46 year old adult  "seen as a PAC referral for risk assessment and optimization for anesthesia.    Plan/Recommendations  Pt will be optimized for the proposed procedure.  See below for details on the assessment, risk, and preoperative recommendations    NEUROLOGY  - No history of TIA, CVA or seizure  - ADHD Will hold Adderall on DOS  - No recent migraines  -Post Op delirium risk factors:  No risk identified    ENT  - No current airway concerns.  Will need to be reassessed day of surgery.  Mallampati: Unable to assess  TM: Unable to assess   Upper, back left side dental bridge    CARDIAC  HYPERTENSION. Controlled with lisinopril but will hold on DOS. Denies chest pain, irregular HR or DYSPNEA ON EXERTION  - METS (Metabolic Equivalents)>4  Patient performs 4 or more METS exercise without symptoms            Total Score: 0       RCRI: 0.9% risk of serious cardiac event    PULMONARY  Denies cough or shortness of breath  STAR High Risk            Total Score: 5    STAR: Observed stopped breathing    STAR: Hypertension    SATR: BMI over 35 kg/m2    STAR: Neck Circum >16 in    STAR: Male      - Tobacco History      History   Smoking Status     Former Smoker     Packs/day: 1.00     Years: 12.00     Types: Cigarettes   Smokeless Tobacco     Never Used     Comment: quit 6/29/13       GI: CASSIDY  PONV High Risk  Total Score: 3           1 AN PONV: Pt is Female    1 AN PONV: Patient is not a current smoker    1 AN PONV: Intended Post Op Opioids        /RENAL  - Baseline Creatinine 0.85    ENDOCRINE   - BMI: Estimated body mass index is 42.77 kg/m  as calculated from the following:    Height as of 3/24/22: 1.676 m (5' 6\").    Weight as of 3/24/22: 120.2 kg (265 lb).  Class 3 Obesity (BMI > 40)  - No history of Diabetes Mellitus    HEME  VTE Low Risk 0.5%            Total Score: 3    VTE: BMI greater than 39    VTE: Male      Anemia after vaginal bleeding. Last Hgb 9.7. Denies history of blood transfusion. Last type and screen 3/15/22 antibody screen " neg    MSK  Patient is NOT Frail            Total Score: 0        PSYCH  - History of anxiety/depression.  - Takes testosterone for gender affirmation    Pain management. Discussed possibility of nerve block if appropriate for patient's procedure. Final counseling and decisions by regional team on DOS.    Patient was discussed with Dr Shook    The patient is optimized for their procedure. AVS with information on surgery time/arrival time, meds and NPO status given by nursing staff. No further diagnostic testing indicated.    Please refer to the physical examination documented by the anesthesiologist in the anesthesia record on the day of surgery.    Video-Visit Details    Type of service:  Video Visit    Patient verbally consented to video service today: YES    Video Start Time: 1:47pm  Video End Time (time video stopped): 1:58pm     Originating Location (pt. Location): Home    Distant Location (provider location):  [facility] PREOPERATIVE [facility]     Mode of Communication:  Video Conference via [employer]  On the day of service:     Prep time: 15 minutes  Visit time: 11 minutes  Documentation time: 30 minutes  ------------------------------------------  Total time: 56 minutes      MACO Hoyos CNS  Preoperative Assessment Center  [facility]  Phone: [phone]  Fax: [phone]

## 2022-04-07 NOTE — H&P (VIEW-ONLY)
Pre-Operative H & P     CC:  Preoperative exam to assess for increased cardiopulmonary risk while undergoing surgery and anesthesia.    Date of Encounter: 4/7/2022  Primary Care Physician:  Liz Baker     Reason for visit:   Encounter Diagnoses   Name Primary?     Abnormal uterine bleeding (AUB)      Preop examination Yes       HPI  Trevin Evans is a 46 year old adult who presents for pre-operative H & P in preparation for  Procedure Information     Case: [id number] Date/Time: 04/19/22 0930    Procedures:       total abdominal hysterectomy, bilateral salpingectomy (Bilateral Abdomen)      CYSTOSCOPY (N/A Urethra)    Anesthesia type: Combined General with Block    Diagnosis:       Abnormal uterine bleeding (AUB) [N93.9]      Uterine leiomyoma, unspecified location [D25.9]    Pre-op diagnosis:       Abnormal uterine bleeding (AUB) [N93.9]      Uterine leiomyoma, unspecified location [D25.9]    Location: [facility]    Providers: Kelsey Love MD        History is obtained from the patient and chart review    Patient who is transgender male, preferred pronouns he/him/ his presented to the ED on 3/15/22 with vaginal bleeding. He was symptomatic at that time with lightheadedness. Gynecology consulted at that time and he was started on BCP in attempt to stop the bleeding. He had a follow up visit with Dr. Love where above hysterectomy was planned.      Today patient reports some intermittent pelvic pain and that bleeding has restarted but lighter than before and he is not having symptoms. He reports that his job requires heavy labor and he is currently walking up and down stairs carrying 30 pound boxes without difficulty. He is taking a daily iron supplement and eating iron rich foods.     His history is otherwise significant for obesity, HTN well controlled on lisinopril, depression/anxiety and ADHD. He does take testosterone for gender affirmation.     Hx of abnormal bleeding or anti-platelet use:  AUB-bleeding has started again but reportedly much lighter    Menstrual history: No LMP recorded. Patient has had an implant.     Past Medical History  Past Medical History:   Diagnosis Date     Abnormal uterine bleeding      Attention deficit hyperactivity disorder (ADHD), combined type 10/20/2016    To be seen every 6 months, can request refill once between visits by [facility]  Rapid drug screen annually done last April 2021     Broken foot 01/2012     Depressive disorder      Gender dysphoria 12/12/2012     Hypertension      Migraines since [employer]     CASSIDY (nonalcoholic steatohepatitis) 01/17/2013     Patient overweight     BMI 56     Seasonal allergies     spring       Past Surgical History  Past Surgical History:   Procedure Laterality Date     RELEASE CARPAL TUNNEL Left 12/19/2017    Procedure: RELEASE CARPAL TUNNEL;  Left Open Carpal Tunnel Release  ;  Surgeon: Khoi Reyes MD;  Location:  OR     [facility] OPEN RX ANKLE DISLOCATN+FIXATN  01/09    right       Prior to Admission Medications  Current Outpatient Medications   Medication Sig Dispense Refill     amphetamine-dextroamphetamine (ADDERALL) 20 MG tablet Take 1 tablet (20 mg) by mouth 2 times daily 180 tablet 0     estradiol (ESTRACE) 0.1 MG/GM vaginal cream Place 2 g vaginally twice a week (Patient taking differently: Place 2 g vaginally twice a week As needed) 42.5 g 3     ethynodiol-ethinyl estradiol (ZOVIA) 1-50 MG-MCG tablet Take 1 tablet by mouth daily for 10 days 28 tablet 0     etonogestrel (NEXPLANON) 68 MG IMPL 1 each (68 mg) by Subdermal route once       ferrous sulfate (FEROSUL) 325 (65 Fe) MG tablet Take 325 mg by mouth daily (with breakfast)       fluticasone (FLONASE) 50 MCG/ACT spray Spray 2 sprays into both nostrils daily (Patient taking differently: Spray 2 sprays into both nostrils daily as needed for rhinitis ) 48 g 3     hydrocortisone (WESTCORT) 0.2 % external cream Apply topically 2 times daily (Patient taking differently: Apply  "topically 2 times daily as needed ) 60 g 1     lisinopril (ZESTRIL) 5 MG tablet Take 1 tablet (5 mg) by mouth daily 90 tablet 0     minocycline (MINOCIN) 100 MG capsule Take 1 capsule (100 mg) by mouth 2 times daily 180 capsule 3     testosterone cypionate (DEPOTESTOSTERONE) 200 MG/ML injection Inject 0.4 mLs (80 mg) into the muscle once a week In cottonseed oil ok. Pt needs 3 mo supply =13ml given on single use vials 13 mL 0     ethynodiol-ethinyl estradiol (ZOVIA) 1-50 MG-MCG tablet Take 3 pills a day for the next 3 days, then 2 pills a day for the next 2 days then 1 pill a day until follow-up with the OB/GYN clinic (Patient not taking: Reported on 4/6/2022) 56 tablet 1     needle, disp, 18G X 1\" MISC 1 each once a week 15 each 3     Needle, Disp, 25G X 1\" MISC Use to inject testosterone into muscle weekly as directed 10 each 3     ondansetron (ZOFRAN ODT) 4 MG ODT tab Take 1 tablet (4 mg) by mouth every 8 hours as needed for nausea or vomiting 12 tablet 0     Sharps Container MISC Dispose sharps 1 each 3     syringe, disposable, (BD TUBERCULIN SYRINGE) 1 ML MISC BD 1ml Tuberculing syringe SLIP TIP (no needle attached). Use to administer IM medications as instructed 15 each 3       Allergies  No Known Allergies    Social History  Social History     Socioeconomic History     Marital status: Single     Spouse name: Not on file     Number of children: Not on file     Years of education: Not on file     Highest education level: Not on file   Occupational History     Not on file   Tobacco Use     Smoking status: Former Smoker     Packs/day: 1.00     Years: 12.00     Pack years: 12.00     Types: Cigarettes     Smokeless tobacco: Never Used     Tobacco comment: quit 6/29/13   Vaping Use     Vaping Use: Never used   Substance and Sexual Activity     Alcohol use: Yes     Comment: Occ     Drug use: Yes     Types: Marijuana     Comment: THC only     Sexual activity: Not Currently     Partners: Female, Male     Birth " control/protection: Implant, None   Other Topics Concern     Parent/sibling w/ CABG, MI or angioplasty before 65F 55M? No   Social History Narrative    Works for [employer] and likes it a lot    Frequent exercise.     Lives in his own home.      Social Determinants of Health     Financial Resource Strain: Not on file   Food Insecurity: Not on file   Transportation Needs: Not on file   Physical Activity: Sufficiently Active     Days of Exercise per Week: 4 days     Minutes of Exercise per Session: 60 min   Stress: Stress Concern Present     Feeling of Stress : Very much   Social Connections: Unknown     Frequency of Communication with Friends and Family: Not on file     Frequency of Social Gatherings with Friends and Family: Never     Attends Sabianism Services: Not on file     Active Member of Clubs or Organizations: No     Attends Club or Organization Meetings: Never     Marital Status: Not on file   Intimate Partner Violence: Not on file   Housing Stability: Unknown     Unable to Pay for Housing in the Last Year: No     Number of Places Lived in the Last Year: Not on file     Unstable Housing in the Last Year: No       Family History  Family History   Problem Relation Age of Onset     Arthritis Mother      Alcohol/Drug Mother      Asthma Mother      Depression Mother         and many on mom's side of family     Hypertension Mother      Thyroid Disease Mother         s/p removal     Alcohol/Drug Father      Hypertension Father      Obesity Father      Stomach Cancer Father      Gastrointestinal Disease Sister         CROHN disease     Hypertension Maternal Uncle      Hypertension Maternal Grandfather      Arthritis Maternal Grandfather      Attention Deficit Disorder Other        Review of Systems  The complete review of systems is negative other than noted in the HPI or here.   Anesthesia Evaluation   Pt has had prior anesthetic. Type: General.    No history of anesthetic complications       ROS/MED  HX  ENT/Pulmonary:     (+) STAR risk factors, obese, observed stopped breathing, tobacco use, Past use,  (-) recent URI   Neurologic: Comment: ADHD    (+) migraines,     Cardiovascular:     (+) hypertension-range: controlled/ ----Previous cardiac testing   Echo: Date: Results:    Stress Test: Date: Results:    ECG Reviewed: Date: 2017 Results:  SR, marked SA, possible LAE  Cath: Date: Results:   (-) taking anticoagulants/antiplatelets   METS/Exercise Tolerance: >4 METS Comment: Work involves heavy labor, climbing stairs with 30 pound boxes   Hematologic:     (+) anemia,  (-) history of blood transfusion   Musculoskeletal:  - neg musculoskeletal ROS     GI/Hepatic: Comment: CASSIDY    (+) liver disease,  (-) GERD   Renal/Genitourinary: Comment: AUB      Endo:     (+) Obesity,     Psychiatric/Substance Use: Comment: Gender dysphoria    (+) anxiety and depression : THC.    Infectious Disease:  - neg infectious disease ROS     Malignancy:  - neg malignancy ROS     Other:  - neg other ROS          Virtual visit -  No vitals were obtained    Physical Exam  Constitutional: Awake, alert, no apparent distress, and appears stated age.  HENT: Normocephalic  Respiratory: non labored breathing; no cough  Neurologic: Oriented to name, place and time.   Neuropsychiatric: Calm, cooperative. Normal affect.      Prior Labs/Diagnostic Studies   All labs and imaging personally reviewed   Lab Results   Component Value Date    WBC 9.5 03/24/2022    WBC 11.6 06/22/2019     Lab Results   Component Value Date    RBC 3.86 03/24/2022    RBC 5.20 06/22/2019     Lab Results   Component Value Date    HGB 9.7 03/24/2022    HGB 16.5 04/26/2021     Lab Results   Component Value Date    HCT 31.4 03/24/2022    HCT 51.2 06/27/2019     Lab Results   Component Value Date    MCV 81 03/24/2022    MCV 95.1 06/27/2019     Lab Results   Component Value Date    MCH 25.1 03/24/2022    MCH 29.9 06/27/2019     Lab Results   Component Value Date    MCHC 30.9  03/24/2022    MCHC 31.4 06/27/2019     Lab Results   Component Value Date    RDW 14.9 03/24/2022    RDW 13.1 06/22/2019     Lab Results   Component Value Date     03/24/2022     06/22/2019     Last Comprehensive Metabolic Panel:  Sodium   Date Value Ref Range Status   03/15/2022 137 133 - 144 mmol/L Final   04/26/2021 137.2 132.6 - 141.4 mmol/L Final     Potassium   Date Value Ref Range Status   03/15/2022 3.8 3.4 - 5.3 mmol/L Final   04/26/2021 4.3 3.3 - 4.5 mmol/L Final     Chloride   Date Value Ref Range Status   03/15/2022 108 94 - 109 mmol/L Final     Comment:     0-20 years:       Female:  mmol/L  Male:    mmol/L       20 years and older:   Female:  mmol/L   Male:    mmol/L     04/26/2021 100.9 98.0 - 110.0 mmol/L Final     Carbon Dioxide   Date Value Ref Range Status   04/26/2021 30.1 20.0 - 32.0 mmol/L Final     Carbon Dioxide (CO2)   Date Value Ref Range Status   03/15/2022 25 20 - 32 mmol/L Final     Anion Gap   Date Value Ref Range Status   03/15/2022 4 3 - 14 mmol/L Final   06/22/2019 7 3 - 14 mmol/L Final     Glucose   Date Value Ref Range Status   03/15/2022 153 (H) 70 - 99 mg/dL Final   04/26/2021 110.1 (H) 70.0 - 99.0 mg'dL Final     Urea Nitrogen   Date Value Ref Range Status   03/15/2022 13 7 - 30 mg/dL Final     Comment:     Female  0 to 15 days           3-23  15 days to 1 year      3-17  1 to 10 years          9-22  10 to 19 years         7-19  19 years and older     7-30    Male  0 to 15 days           3-23  15 days to 1 year      3-17  1 to 10 years          9-22  10 to 19 years         7-21  19 years and older     7-30   04/26/2021 15.4 7.0 - 19.0 mg/dL Final     Creatinine   Date Value Ref Range Status   03/15/2022 0.85 0.52 - 1.25 mg/dL Final     Comment:     20 y and older Female0.52-1.04 mg/dL  20 y and older Male0.66-1.25 mg/dL    Varies with the amount of muscle mass present.    GICH  Female: 0.6-1.2 mg/dL  Male: 0.7-1.3 mg/dL     04/26/2021 0.7 0.5  - 1.0 mg/dL Final     GFR Estimate   Date Value Ref Range Status   03/15/2022 86 >60 mL/min/1.73m2 Final     Comment:     GFR not calculated when sex unspecified or nonbinary.  Effective 2021 eGFRcr in adults is calculated using the  CKD-EPI creatinine equation which includes age and gender (Fernando olivares al., NEJ, DOI: 10.1056/FWEFiw8985849)   2021 >90 >60.0 mL/min/1.7 m2 Final     Comment:     eGFR is calculated by the CKD-EPI creatinine equation, without race   adjustment. eGFR can be influenced by muscle mass, exercise, and diet. The   reported eGFR is an estimation only and is only applicable if the renal   function is stable.       Calcium   Date Value Ref Range Status   03/15/2022 8.7 8.5 - 10.1 mg/dL Final   2021 9.0 8.5 - 10.1 mg/dL Final     Lab Results   Component Value Date    AST 50 03/15/2022    AST 31 2021     Lab Results   Component Value Date     03/15/2022    ALT 49 2021     Lab Results   Component Value Date    BILICONJ 0.0 2014      Lab Results   Component Value Date    BILITOTAL 0.2 03/15/2022    BILITOTAL 0.2 2021     Lab Results   Component Value Date    ALBUMIN 3.3 03/15/2022    ALBUMIN 3.8 2021     Lab Results   Component Value Date    PROTTOTAL 6.8 03/15/2022    PROTTOTAL 7.1 2021      Lab Results   Component Value Date    ALKPHOS 46 03/15/2022    ALKPHOS 74 2021     EK Sinus rhythm, marked sinus arrhythmia, possible left atrial enlargement    Pelvic US 3/2/22  IMPRESSION:  1. Enlarged uterus (15 cm) with suspicion for a large 6.1 cm fibroid,  challenging to sonographically delineate given overall heterogeneous  uterine echotexture. Question adenomyosis. MRI may be useful in  further evaluation.  2. 6 mm endometrial stripe, ill-defined.  The patient's records and results personally reviewed by this provider.     Outside records reviewed from: Care Everywhere    Assessment      Trevin Evans is a 46 year old adult  "seen as a PAC referral for risk assessment and optimization for anesthesia.    Plan/Recommendations  Pt will be optimized for the proposed procedure.  See below for details on the assessment, risk, and preoperative recommendations    NEUROLOGY  - No history of TIA, CVA or seizure  - ADHD Will hold Adderall on DOS  - No recent migraines  -Post Op delirium risk factors:  No risk identified    ENT  - No current airway concerns.  Will need to be reassessed day of surgery.  Mallampati: Unable to assess  TM: Unable to assess   Upper, back left side dental bridge    CARDIAC  HYPERTENSION. Controlled with lisinopril but will hold on DOS. Denies chest pain, irregular HR or DYSPNEA ON EXERTION  - METS (Metabolic Equivalents)>4  Patient performs 4 or more METS exercise without symptoms            Total Score: 0       RCRI: 0.9% risk of serious cardiac event    PULMONARY  Denies cough or shortness of breath  STAR High Risk            Total Score: 5    STAR: Observed stopped breathing    STAR: Hypertension    STAR: BMI over 35 kg/m2    STAR: Neck Circum >16 in    STAR: Male      - Tobacco History      History   Smoking Status     Former Smoker     Packs/day: 1.00     Years: 12.00     Types: Cigarettes   Smokeless Tobacco     Never Used     Comment: quit 6/29/13       GI: CASSIDY  PONV High Risk  Total Score: 3           1 AN PONV: Pt is Female    1 AN PONV: Patient is not a current smoker    1 AN PONV: Intended Post Op Opioids        /RENAL  - Baseline Creatinine 0.85    ENDOCRINE   - BMI: Estimated body mass index is 42.77 kg/m  as calculated from the following:    Height as of 3/24/22: 1.676 m (5' 6\").    Weight as of 3/24/22: 120.2 kg (265 lb).  Class 3 Obesity (BMI > 40)  - No history of Diabetes Mellitus    HEME  VTE Low Risk 0.5%            Total Score: 3    VTE: BMI greater than 39    VTE: Male      Anemia after vaginal bleeding. Last Hgb 9.7. Denies history of blood transfusion. Last type and screen 3/15/22 antibody screen " neg    MSK  Patient is NOT Frail            Total Score: 0        PSYCH  - History of anxiety/depression.  - Takes testosterone for gender affirmation    Pain management. Discussed possibility of nerve block if appropriate for patient's procedure. Final counseling and decisions by regional team on DOS.    Patient was discussed with Dr Shook    The patient is optimized for their procedure. AVS with information on surgery time/arrival time, meds and NPO status given by nursing staff. No further diagnostic testing indicated.    Please refer to the physical examination documented by the anesthesiologist in the anesthesia record on the day of surgery.    Video-Visit Details    Type of service:  Video Visit    Patient verbally consented to video service today: YES    Video Start Time: 1:47pm  Video End Time (time video stopped): 1:58pm     Originating Location (pt. Location): Home    Distant Location (provider location):  [facility] PREOPERATIVE ASSESSMENT [facility]ENTER     Mode of Communication:  Video Conference via [employer]  On the day of service:     Prep time: 15 minutes  Visit time: 11 minutes  Documentation time: 30 minutes  ------------------------------------------  Total time: 56 minutes      MACO Hoyos CNS  Preoperative Assessment Center  [facility]  Clinic and Surgery Center  Phone: [phone]  Fax: [phone]

## 2022-04-07 NOTE — PROGRESS NOTES
Trevin is a 46 year old who is being evaluated via a billable video visit.      How would you like to obtain your AVS? MyChart  If the video visit is dropped, the invitation should be resent by: Text to cell phone: [phone]      HPI       Review of Systems         Objective    Vitals - Patient Reported  Pain Score: No Pain (0)        Physical Exam

## 2022-04-07 NOTE — PATIENT INSTRUCTIONS
Preparing for Your Surgery      Name:  Irma Evans   MRN:  [id number]   :  1975   Today's Date:  2022       Arriving for surgery:  Surgery date:  22  Arrival time:  7:30 am    Restrictions due to COVID 19       Effective 22 [facility] is implementing the following visitor policy:     1 person may accompany the patient through the Pre-Op process.      That same person may wait in the Surgery Waiting room, provided there is enough room to social distance         Inpatients are allowed 2 visitors per day for the duration of their stay.        Visitors must wear a mask.      Visitors must not be ill.      Visiting hours are 8 am to 8 pm.    [place] parking is available for anyone with mobility limitations or disabilities.  ([place]  24 hours/ 7 days a week; [facility]  7 am- 3:30 pm, Mon- Fri)    Please come to:     [facility] [address]  (Address takes you to the [facility].)  [address] [place]  [address]    -Parking is available in the Green Lot.    -Proceed to the 3rd floor, check in at the Adult Surgery Waiting Lounge. [phone]    If an escort is needed stop at the Information Desk in the lobby. Inform the information person that you are here for surgery. An escort to the Adult Surgery Waiting Lounge will be provided.    What can I eat or drink?  -  You may eat and drink normally for up to 8 hours before your surgery. (Until 1:30 am)  -  You may have clear liquids until 2 hours before surgery. (Until 7:30 am)    Examples of clear liquids:  Water  Clear broth  Juices (apple, white grape, white cranberry  and cider) without pulp  Noncarbonated, powder based beverages  (lemonade and Hayes-Aid)  Sodas (Sprite, 7-Up, ginger ale and seltzer)  Coffee or tea (without milk or cream)  Gatorade    -  No Alcohol for at least 24 hours before surgery     Which medicines can I take?  Hold Aspirin for 7 days  before surgery.   Hold Multivitamins for 7 days before surgery.  Hold Supplements for 7 days before surgery.  Hold Ibuprofen (Advil, Motrin) for 1 day before surgery--unless otherwise directed by surgeon.  Hold Naproxen (Aleve) for 4 days before surgery.    -  DO NOT take these medications the day of surgery:  Lisinopril, Adderall, Ferrous Sulfate,     -  PLEASE TAKE these medications the day of surgery:  Minocycline (Minocin),   Flonase nasal spray if needed  Ondansetron (Zofran) if needed  Acetaminophen (Tylenol) if needed    How do I prepare myself?  - Please take 2 showers before surgery using Scrubcare or Hibiclens soap.    Use this soap only from the neck to your toes.     Leave the soap on your skin for one minute--then rinse thoroughly.      You may use your own shampoo and conditioner; no other hair products.   - Please remove all jewelry and body piercings.  - No lotions, deodorants or fragrance.  - Bring your ID and insurance card.    -If you have a Deep Brain Stimulator, Spinal Cord Stimulator or any neuro stimulator device---you must bring the remote control to the hospital     - All patients are required to have a Covid-19 test within 4 days of surgery/procedure.      -Patients will be contacted by the [facility] scheduling team within 1 week of surgery to make an appointment.      - Patients may call the Scheduling team at [phone] if they have not been scheduled within 4 days of  surgery.      ALL PATIENTS GOING HOME THE SAME DAY OF SURGERY ARE REQUIRED TO HAVE A RESPONSIBLE ADULT TO DRIVE AND BE IN ATTENDANCE WITH THEM FOR 24 HOURS FOLLOWING SURGERY.      Questions or Concerns:    - For any questions regarding the day of surgery or your hospital stay, please contact the Pre Admission Nursing Office at [phone].       - If you have health changes between today and your surgery please call your surgeon.       For questions after surgery please call your surgeons office.

## 2022-04-11 ENCOUNTER — TELEPHONE (OUTPATIENT)
Dept: OBGYN | Facility: CLINIC | Age: 47
End: 2022-04-11
Payer: COMMERCIAL

## 2022-04-12 ENCOUNTER — MYC REFILL (OUTPATIENT)
Dept: FAMILY MEDICINE | Facility: CLINIC | Age: 47
End: 2022-04-12
Payer: COMMERCIAL

## 2022-04-12 DIAGNOSIS — F90.2 ATTENTION DEFICIT HYPERACTIVITY DISORDER (ADHD), COMBINED TYPE: ICD-10-CM

## 2022-04-13 RX ORDER — DEXTROAMPHETAMINE SACCHARATE, AMPHETAMINE ASPARTATE, DEXTROAMPHETAMINE SULFATE AND AMPHETAMINE SULFATE 5; 5; 5; 5 MG/1; MG/1; MG/1; MG/1
20 TABLET ORAL 2 TIMES DAILY
Qty: 180 TABLET | Refills: 0 | Status: SHIPPED | OUTPATIENT
Start: 2022-04-13 | End: 2022-07-20

## 2022-04-13 NOTE — TELEPHONE ENCOUNTER

## 2022-04-14 ENCOUNTER — TELEPHONE (OUTPATIENT)
Dept: OBGYN | Facility: CLINIC | Age: 47
End: 2022-04-14
Payer: COMMERCIAL

## 2022-04-14 NOTE — TELEPHONE ENCOUNTER
Paperwork signed faed and copied today.          Received fax for Short Term Disability paperwork to be completed. They request the letter be completed and returned by 4/21.      Fax placed in Dr. Love's basket in the nurse triage area.     - Kim Beverly

## 2022-04-15 ENCOUNTER — LAB (OUTPATIENT)
Dept: LAB | Facility: CLINIC | Age: 47
End: 2022-04-15
Attending: OBSTETRICS & GYNECOLOGY
Payer: COMMERCIAL

## 2022-04-15 DIAGNOSIS — Z01.812 ENCOUNTER FOR PRE-OPERATIVE LABORATORY TESTING: ICD-10-CM

## 2022-04-15 DIAGNOSIS — Z11.59 ENCOUNTER FOR SCREENING FOR OTHER VIRAL DISEASES: ICD-10-CM

## 2022-04-15 LAB — SARS-COV-2 RNA RESP QL NAA+PROBE: NEGATIVE

## 2022-04-15 PROCEDURE — U0003 INFECTIOUS AGENT DETECTION BY NUCLEIC ACID (DNA OR RNA); SEVERE ACUTE RESPIRATORY SYNDROME CORONAVIRUS 2 (SARS-COV-2) (CORONAVIRUS DISEASE [COVID-19]), AMPLIFIED PROBE TECHNIQUE, MAKING USE OF HIGH THROUGHPUT TECHNOLOGIES AS DESCRIBED BY CMS-2020-01-R: HCPCS

## 2022-04-15 PROCEDURE — U0005 INFEC AGEN DETEC AMPLI PROBE: HCPCS

## 2022-04-18 ASSESSMENT — LIFESTYLE VARIABLES: TOBACCO_USE: 1

## 2022-04-18 NOTE — ANESTHESIA PREPROCEDURE EVALUATION
Anesthesia Pre-Procedure Evaluation    Patient: Irma Evans   MRN: [id number] : 1975        Procedure : Procedure(s):  total abdominal hysterectomy, bilateral salpingectomy  CYSTOSCOPY          Past Medical History:   Diagnosis Date     Abnormal uterine bleeding      Attention deficit hyperactivity disorder (ADHD), combined type 10/20/2016    To be seen every 6 months, can request refill once between visits by [facility]  Rapid drug screen annually done last 2021     Broken foot 2012     Depressive disorder      Gender dysphoria 2012     Hypertension      Migraines since [employer] school     CASSIDY (nonalcoholic steatohepatitis) 2013     Patient overweight     BMI 56     Seasonal allergies     spring      Past Surgical History:   Procedure Laterality Date     RELEASE CARPAL TUNNEL Left 2017    Procedure: RELEASE CARPAL TUNNEL;  Left Open Carpal Tunnel Release  ;  Surgeon: Khoi Reyes MD;  Location:  OR     [facility] OPEN RX ANKLE DISLOCATN+FIXATN      right      No Known Allergies   Social History     Tobacco Use     Smoking status: Former Smoker     Packs/day: 1.00     Years: 12.00     Pack years: 12.00     Types: Cigarettes     Smokeless tobacco: Never Used     Tobacco comment: quit 13   Substance Use Topics     Alcohol use: Yes     Comment: Occ      Wt Readings from Last 1 Encounters:   22 120.2 kg (265 lb)        Anesthesia Evaluation   Pt has had prior anesthetic. Type: General.    No history of anesthetic complications       ROS/MED HX  ENT/Pulmonary:     (+) STAR risk factors, obese, observed stopped breathing, tobacco use, Past use,  (-) recent URI   Neurologic: Comment: ADHD    (+) migraines,     Cardiovascular:     (+) hypertension-range: controlled/ ----Previous cardiac testing   Echo: Date: Results:    Stress Test: Date: Results:    ECG Reviewed: Date:  Results:  SR, marked SA, possible LAE  Cath: Date: Results:   (-) taking anticoagulants/antiplatelets    METS/Exercise Tolerance: >4 METS Comment: Work involves heavy labor, climbing stairs with 30 pound boxes   Hematologic:     (+) anemia,  (-) history of blood transfusion   Musculoskeletal:  - neg musculoskeletal ROS     GI/Hepatic: Comment: CASSIDY    (+) liver disease,  (-) GERD   Renal/Genitourinary: Comment: AUB      Endo:     (+) Obesity,     Psychiatric/Substance Use: Comment: Gender dysphoria    (+) anxiety and depression : THC.    Infectious Disease:  - neg infectious disease ROS     Malignancy:  - neg malignancy ROS     Other:  - neg other ROS          Physical Exam    Airway        Mallampati: II   TM distance: > 3 FB   Neck ROM: full   Mouth opening: > 3 cm    Respiratory Devices and Support         Dental  no notable dental history         Cardiovascular   cardiovascular exam normal          Pulmonary   pulmonary exam normal                OUTSIDE LABS:  CBC:   Lab Results   Component Value Date    WBC 9.5 03/24/2022    WBC 8.8 03/15/2022    HGB 9.7 (L) 03/24/2022    HGB 10.5 (L) 03/15/2022    HCT 31.4 (L) 03/24/2022    HCT 33.0 (L) 03/15/2022     03/24/2022     03/15/2022     BMP:   Lab Results   Component Value Date     03/15/2022    .2 04/26/2021    POTASSIUM 3.8 03/15/2022    POTASSIUM 4.3 04/26/2021    CHLORIDE 108 03/15/2022    CHLORIDE 100.9 04/26/2021    CO2 25 03/15/2022    CO2 30.1 04/26/2021    BUN 13 03/15/2022    BUN 15.4 04/26/2021    CR 0.85 03/15/2022    CR 0.7 04/26/2021     (H) 03/15/2022    .1 (H) 04/26/2021     COAGS:   Lab Results   Component Value Date    PTT 30 12/18/2017    INR 1.01 12/18/2017     POC:   Lab Results   Component Value Date    HCG Negative 12/15/2021    HCGS Negative 03/15/2022     HEPATIC:   Lab Results   Component Value Date    ALBUMIN 3.3 (L) 03/15/2022    PROTTOTAL 6.8 03/15/2022     (H) 03/15/2022    AST 50 (H) 03/15/2022    ALKPHOS 46 03/15/2022    BILITOTAL 0.2 03/15/2022     OTHER:   Lab Results   Component Value  Date    LACT 0.7 06/21/2019    A1C 5.9 (H) 04/26/2021    CHARLIE 8.7 03/15/2022    PHOS 4.2 07/06/2011    LIPASE 143 05/12/2016    TSH 1.95 05/31/2017    CRP 10.0 (H) 05/12/2016       Anesthesia Plan    ASA Status:  3   NPO Status:  NPO Appropriate    Anesthesia Type: General.     - Airway: ETT   Induction: Intravenous.   Maintenance: Balanced.   Techniques and Equipment:     - Lines/Monitors: 2nd IV     Consents            Postoperative Care    Pain management: Multi-modal analgesia, Peripheral nerve block (Single Shot), IV analgesics.   PONV prophylaxis: Ondansetron (or other 5HT-3), Dexamethasone or Solumedrol     Comments:                Bryant Lowe MD

## 2022-04-19 ENCOUNTER — HOSPITAL ENCOUNTER (INPATIENT)
Facility: CLINIC | Age: 47
LOS: 1 days | Discharge: HOME | End: 2022-04-20
Attending: OBSTETRICS & GYNECOLOGY | Admitting: OBSTETRICS & GYNECOLOGY
Payer: COMMERCIAL

## 2022-04-19 ENCOUNTER — ANESTHESIA (OUTPATIENT)
Dept: SURGERY | Facility: CLINIC | Age: 47
End: 2022-04-19
Payer: COMMERCIAL

## 2022-04-19 DIAGNOSIS — N93.9 ABNORMAL UTERINE BLEEDING: Primary | ICD-10-CM

## 2022-04-19 PROBLEM — I97.3 POSTOPERATIVE HYPERTENSION: Status: ACTIVE | Noted: 2022-04-19

## 2022-04-19 LAB
ABO/RH(D): NORMAL
ANTIBODY SCREEN: NEGATIVE
BASOPHILS # BLD AUTO: 0.1 10E3/UL (ref 0–0.2)
BASOPHILS NFR BLD AUTO: 1 %
CREAT SERPL-MCNC: 0.69 MG/DL (ref 0.52–1.25)
CREAT SERPL-MCNC: 0.8 MG/DL (ref 0.52–1.25)
EOSINOPHIL # BLD AUTO: 0.5 10E3/UL (ref 0–0.7)
EOSINOPHIL NFR BLD AUTO: 6 %
ERYTHROCYTE [DISTWIDTH] IN BLOOD BY AUTOMATED COUNT: 15.1 % (ref 10–15)
GFR SERPL CREATININE-BSD FRML MDRD: >90 ML/MIN/1.73M2
GFR SERPL CREATININE-BSD FRML MDRD: >90 ML/MIN/1.73M2
GLUCOSE BLD-MCNC: 127 MG/DL (ref 70–99)
GLUCOSE BLDC GLUCOMTR-MCNC: 175 MG/DL (ref 70–99)
HCT VFR BLD AUTO: 37.6 % (ref 35–53)
HGB BLD-MCNC: 11 G/DL (ref 11.7–17.7)
HGB BLD-MCNC: 11.2 G/DL (ref 11.7–17.7)
IMM GRANULOCYTES # BLD: 0 10E3/UL
IMM GRANULOCYTES NFR BLD: 1 %
LYMPHOCYTES # BLD AUTO: 2.1 10E3/UL (ref 0.8–5.3)
LYMPHOCYTES NFR BLD AUTO: 26 %
MCH RBC QN AUTO: 24 PG (ref 26.5–33)
MCHC RBC AUTO-ENTMCNC: 29.8 G/DL (ref 31.5–36.5)
MCV RBC AUTO: 81 FL (ref 78–100)
MONOCYTES # BLD AUTO: 0.8 10E3/UL (ref 0–1.3)
MONOCYTES NFR BLD AUTO: 10 %
NEUTROPHILS # BLD AUTO: 4.6 10E3/UL (ref 1.6–8.3)
NEUTROPHILS NFR BLD AUTO: 56 %
NRBC # BLD AUTO: 0 10E3/UL
NRBC BLD AUTO-RTO: 0 /100
PLATELET # BLD AUTO: 315 10E3/UL (ref 150–450)
POTASSIUM BLD-SCNC: 4.3 MMOL/L (ref 3.4–5.3)
RBC # BLD AUTO: 4.67 10E6/UL (ref 3.8–5.9)
SPECIMEN EXPIRATION DATE: NORMAL
WBC # BLD AUTO: 8.1 10E3/UL (ref 4–11)

## 2022-04-19 PROCEDURE — 250N000011 HC RX IP 250 OP 636: Performed by: STUDENT IN AN ORGANIZED HEALTH CARE EDUCATION/TRAINING PROGRAM

## 2022-04-19 PROCEDURE — 250N000011 HC RX IP 250 OP 636: Performed by: ANESTHESIOLOGY

## 2022-04-19 PROCEDURE — 258N000003 HC RX IP 258 OP 636: Performed by: STUDENT IN AN ORGANIZED HEALTH CARE EDUCATION/TRAINING PROGRAM

## 2022-04-19 PROCEDURE — 93010 ELECTROCARDIOGRAM REPORT: CPT | Performed by: INTERNAL MEDICINE

## 2022-04-19 PROCEDURE — 82565 ASSAY OF CREATININE: CPT | Performed by: STUDENT IN AN ORGANIZED HEALTH CARE EDUCATION/TRAINING PROGRAM

## 2022-04-19 PROCEDURE — 0JHF3HZ INSERTION OF CONTRACEPTIVE DEVICE INTO LEFT UPPER ARM SUBCUTANEOUS TISSUE AND FASCIA, PERCUTANEOUS APPROACH: ICD-10-PCS | Performed by: OBSTETRICS & GYNECOLOGY

## 2022-04-19 PROCEDURE — 88307 TISSUE EXAM BY PATHOLOGIST: CPT | Mod: TC | Performed by: OBSTETRICS & GYNECOLOGY

## 2022-04-19 PROCEDURE — 85018 HEMOGLOBIN: CPT | Performed by: STUDENT IN AN ORGANIZED HEALTH CARE EDUCATION/TRAINING PROGRAM

## 2022-04-19 PROCEDURE — 120N000002 HC R&B MED SURG/OB UMMC

## 2022-04-19 PROCEDURE — 0UT90ZZ RESECTION OF UTERUS, OPEN APPROACH: ICD-10-PCS | Performed by: OBSTETRICS & GYNECOLOGY

## 2022-04-19 PROCEDURE — 99232 SBSQ HOSP IP/OBS MODERATE 35: CPT | Performed by: INTERNAL MEDICINE

## 2022-04-19 PROCEDURE — 710N000010 HC RECOVERY PHASE 1, LEVEL 2, PER MIN: Performed by: OBSTETRICS & GYNECOLOGY

## 2022-04-19 PROCEDURE — 58150 TOTAL HYSTERECTOMY: CPT | Mod: GC | Performed by: OBSTETRICS & GYNECOLOGY

## 2022-04-19 PROCEDURE — C9290 INJ, BUPIVACAINE LIPOSOME: HCPCS | Performed by: ANESTHESIOLOGY

## 2022-04-19 PROCEDURE — 250N000011 HC RX IP 250 OP 636: Performed by: NURSE PRACTITIONER

## 2022-04-19 PROCEDURE — 99207 PR APP CREDIT; MD BILLING SHARED VISIT: CPT | Performed by: NURSE PRACTITIONER

## 2022-04-19 PROCEDURE — 36415 COLL VENOUS BLD VENIPUNCTURE: CPT | Performed by: STUDENT IN AN ORGANIZED HEALTH CARE EDUCATION/TRAINING PROGRAM

## 2022-04-19 PROCEDURE — 250N000013 HC RX MED GY IP 250 OP 250 PS 637: Performed by: STUDENT IN AN ORGANIZED HEALTH CARE EDUCATION/TRAINING PROGRAM

## 2022-04-19 PROCEDURE — 82565 ASSAY OF CREATININE: CPT | Performed by: ANESTHESIOLOGY

## 2022-04-19 PROCEDURE — 250N000009 HC RX 250: Performed by: STUDENT IN AN ORGANIZED HEALTH CARE EDUCATION/TRAINING PROGRAM

## 2022-04-19 PROCEDURE — 88307 TISSUE EXAM BY PATHOLOGIST: CPT | Mod: 26 | Performed by: PATHOLOGY

## 2022-04-19 PROCEDURE — 11982 REMOVE DRUG IMPLANT DEVICE: CPT | Mod: GC | Performed by: OBSTETRICS & GYNECOLOGY

## 2022-04-19 PROCEDURE — 272N000001 HC OR GENERAL SUPPLY STERILE: Performed by: OBSTETRICS & GYNECOLOGY

## 2022-04-19 PROCEDURE — 85004 AUTOMATED DIFF WBC COUNT: CPT | Performed by: STUDENT IN AN ORGANIZED HEALTH CARE EDUCATION/TRAINING PROGRAM

## 2022-04-19 PROCEDURE — 82947 ASSAY GLUCOSE BLOOD QUANT: CPT | Performed by: ANESTHESIOLOGY

## 2022-04-19 PROCEDURE — 360N000076 HC SURGERY LEVEL 3, PER MIN: Performed by: OBSTETRICS & GYNECOLOGY

## 2022-04-19 PROCEDURE — 84132 ASSAY OF SERUM POTASSIUM: CPT | Performed by: ANESTHESIOLOGY

## 2022-04-19 PROCEDURE — 0UT70ZZ RESECTION OF BILATERAL FALLOPIAN TUBES, OPEN APPROACH: ICD-10-PCS | Performed by: OBSTETRICS & GYNECOLOGY

## 2022-04-19 PROCEDURE — 999N000141 HC STATISTIC PRE-PROCEDURE NURSING ASSESSMENT: Performed by: OBSTETRICS & GYNECOLOGY

## 2022-04-19 PROCEDURE — 93005 ELECTROCARDIOGRAM TRACING: CPT

## 2022-04-19 PROCEDURE — 88342 IMHCHEM/IMCYTCHM 1ST ANTB: CPT | Mod: 26 | Performed by: PATHOLOGY

## 2022-04-19 PROCEDURE — 86901 BLOOD TYPING SEROLOGIC RH(D): CPT | Performed by: STUDENT IN AN ORGANIZED HEALTH CARE EDUCATION/TRAINING PROGRAM

## 2022-04-19 PROCEDURE — 250N000009 HC RX 250: Performed by: OBSTETRICS & GYNECOLOGY

## 2022-04-19 PROCEDURE — 370N000017 HC ANESTHESIA TECHNICAL FEE, PER MIN: Performed by: OBSTETRICS & GYNECOLOGY

## 2022-04-19 PROCEDURE — 250N000025 HC SEVOFLURANE, PER MIN: Performed by: OBSTETRICS & GYNECOLOGY

## 2022-04-19 PROCEDURE — 250N000013 HC RX MED GY IP 250 OP 250 PS 637: Performed by: NURSE PRACTITIONER

## 2022-04-19 RX ORDER — PROCHLORPERAZINE MALEATE 10 MG
10 TABLET ORAL EVERY 6 HOURS PRN
Status: DISCONTINUED | OUTPATIENT
Start: 2022-04-19 | End: 2022-04-20 | Stop reason: HOSPADM

## 2022-04-19 RX ORDER — ACETAMINOPHEN 325 MG/1
650 TABLET ORAL EVERY 6 HOURS
Status: DISCONTINUED | OUTPATIENT
Start: 2022-04-22 | End: 2022-04-20 | Stop reason: HOSPADM

## 2022-04-19 RX ORDER — HYDROMORPHONE HCL IN WATER/PF 6 MG/30 ML
0.2 PATIENT CONTROLLED ANALGESIA SYRINGE INTRAVENOUS
Status: DISCONTINUED | OUTPATIENT
Start: 2022-04-19 | End: 2022-04-20

## 2022-04-19 RX ORDER — NALOXONE HYDROCHLORIDE 0.4 MG/ML
0.4 INJECTION, SOLUTION INTRAMUSCULAR; INTRAVENOUS; SUBCUTANEOUS
Status: DISCONTINUED | OUTPATIENT
Start: 2022-04-19 | End: 2022-04-20 | Stop reason: HOSPADM

## 2022-04-19 RX ORDER — LISINOPRIL 5 MG/1
5 TABLET ORAL DAILY
Status: DISCONTINUED | OUTPATIENT
Start: 2022-04-19 | End: 2022-04-20 | Stop reason: HOSPADM

## 2022-04-19 RX ORDER — AMOXICILLIN 250 MG
1 CAPSULE ORAL 2 TIMES DAILY
Status: DISCONTINUED | OUTPATIENT
Start: 2022-04-19 | End: 2022-04-20 | Stop reason: HOSPADM

## 2022-04-19 RX ORDER — PHENAZOPYRIDINE HYDROCHLORIDE 200 MG/1
200 TABLET, FILM COATED ORAL ONCE
Status: COMPLETED | OUTPATIENT
Start: 2022-04-19 | End: 2022-04-19

## 2022-04-19 RX ORDER — SODIUM CHLORIDE, SODIUM LACTATE, POTASSIUM CHLORIDE, CALCIUM CHLORIDE 600; 310; 30; 20 MG/100ML; MG/100ML; MG/100ML; MG/100ML
INJECTION, SOLUTION INTRAVENOUS CONTINUOUS PRN
Status: DISCONTINUED | OUTPATIENT
Start: 2022-04-19 | End: 2022-04-19

## 2022-04-19 RX ORDER — NALOXONE HYDROCHLORIDE 0.4 MG/ML
0.2 INJECTION, SOLUTION INTRAMUSCULAR; INTRAVENOUS; SUBCUTANEOUS
Status: DISCONTINUED | OUTPATIENT
Start: 2022-04-19 | End: 2022-04-20 | Stop reason: HOSPADM

## 2022-04-19 RX ORDER — LIDOCAINE 40 MG/G
CREAM TOPICAL
Status: DISCONTINUED | OUTPATIENT
Start: 2022-04-19 | End: 2022-04-20 | Stop reason: HOSPADM

## 2022-04-19 RX ORDER — PROPOFOL 10 MG/ML
INJECTION, EMULSION INTRAVENOUS PRN
Status: DISCONTINUED | OUTPATIENT
Start: 2022-04-19 | End: 2022-04-19

## 2022-04-19 RX ORDER — MAGNESIUM HYDROXIDE 1200 MG/15ML
LIQUID ORAL PRN
Status: DISCONTINUED | OUTPATIENT
Start: 2022-04-19 | End: 2022-04-19 | Stop reason: HOSPADM

## 2022-04-19 RX ORDER — ONDANSETRON 4 MG/1
4 TABLET, ORALLY DISINTEGRATING ORAL EVERY 6 HOURS PRN
Status: DISCONTINUED | OUTPATIENT
Start: 2022-04-19 | End: 2022-04-20 | Stop reason: HOSPADM

## 2022-04-19 RX ORDER — FENTANYL CITRATE 50 UG/ML
INJECTION, SOLUTION INTRAMUSCULAR; INTRAVENOUS PRN
Status: DISCONTINUED | OUTPATIENT
Start: 2022-04-19 | End: 2022-04-19

## 2022-04-19 RX ORDER — ACETAMINOPHEN 325 MG/1
975 TABLET ORAL ONCE
Status: COMPLETED | OUTPATIENT
Start: 2022-04-19 | End: 2022-04-19

## 2022-04-19 RX ORDER — FLUTICASONE PROPIONATE 50 MCG
2 SPRAY, SUSPENSION (ML) NASAL DAILY PRN
Status: DISCONTINUED | OUTPATIENT
Start: 2022-04-19 | End: 2022-04-20 | Stop reason: HOSPADM

## 2022-04-19 RX ORDER — ONDANSETRON 4 MG/1
4 TABLET, ORALLY DISINTEGRATING ORAL EVERY 30 MIN PRN
Status: DISCONTINUED | OUTPATIENT
Start: 2022-04-19 | End: 2022-04-19 | Stop reason: HOSPADM

## 2022-04-19 RX ORDER — MINOCYCLINE HYDROCHLORIDE 100 MG/1
100 CAPSULE ORAL 2 TIMES DAILY
Status: DISCONTINUED | OUTPATIENT
Start: 2022-04-19 | End: 2022-04-20 | Stop reason: HOSPADM

## 2022-04-19 RX ORDER — ONDANSETRON 2 MG/ML
4 INJECTION INTRAMUSCULAR; INTRAVENOUS EVERY 6 HOURS PRN
Status: DISCONTINUED | OUTPATIENT
Start: 2022-04-19 | End: 2022-04-20 | Stop reason: HOSPADM

## 2022-04-19 RX ORDER — BISACODYL 10 MG
10 SUPPOSITORY, RECTAL RECTAL DAILY PRN
Status: DISCONTINUED | OUTPATIENT
Start: 2022-04-19 | End: 2022-04-20 | Stop reason: HOSPADM

## 2022-04-19 RX ORDER — HYDRALAZINE HYDROCHLORIDE 20 MG/ML
10 INJECTION INTRAMUSCULAR; INTRAVENOUS EVERY 6 HOURS PRN
Status: DISCONTINUED | OUTPATIENT
Start: 2022-04-19 | End: 2022-04-19

## 2022-04-19 RX ORDER — KETOROLAC TROMETHAMINE 30 MG/ML
30 INJECTION, SOLUTION INTRAMUSCULAR; INTRAVENOUS ONCE
Status: COMPLETED | OUTPATIENT
Start: 2022-04-19 | End: 2022-04-19

## 2022-04-19 RX ORDER — NALOXONE HYDROCHLORIDE 0.4 MG/ML
0.2 INJECTION, SOLUTION INTRAMUSCULAR; INTRAVENOUS; SUBCUTANEOUS
Status: DISCONTINUED | OUTPATIENT
Start: 2022-04-19 | End: 2022-04-19 | Stop reason: HOSPADM

## 2022-04-19 RX ORDER — NALOXONE HYDROCHLORIDE 0.4 MG/ML
0.4 INJECTION, SOLUTION INTRAMUSCULAR; INTRAVENOUS; SUBCUTANEOUS
Status: DISCONTINUED | OUTPATIENT
Start: 2022-04-19 | End: 2022-04-19 | Stop reason: HOSPADM

## 2022-04-19 RX ORDER — FENTANYL CITRATE 50 UG/ML
25 INJECTION, SOLUTION INTRAMUSCULAR; INTRAVENOUS EVERY 5 MIN PRN
Status: DISCONTINUED | OUTPATIENT
Start: 2022-04-19 | End: 2022-04-19 | Stop reason: HOSPADM

## 2022-04-19 RX ORDER — OXYCODONE HYDROCHLORIDE 5 MG/1
10 TABLET ORAL EVERY 4 HOURS PRN
Status: DISCONTINUED | OUTPATIENT
Start: 2022-04-19 | End: 2022-04-20 | Stop reason: HOSPADM

## 2022-04-19 RX ORDER — FLUMAZENIL 0.1 MG/ML
0.2 INJECTION, SOLUTION INTRAVENOUS
Status: DISCONTINUED | OUTPATIENT
Start: 2022-04-19 | End: 2022-04-19 | Stop reason: HOSPADM

## 2022-04-19 RX ORDER — SODIUM CHLORIDE, SODIUM LACTATE, POTASSIUM CHLORIDE, CALCIUM CHLORIDE 600; 310; 30; 20 MG/100ML; MG/100ML; MG/100ML; MG/100ML
INJECTION, SOLUTION INTRAVENOUS CONTINUOUS
Status: DISCONTINUED | OUTPATIENT
Start: 2022-04-19 | End: 2022-04-20

## 2022-04-19 RX ORDER — FAMOTIDINE 20 MG/1
20 TABLET, FILM COATED ORAL 2 TIMES DAILY
Status: DISCONTINUED | OUTPATIENT
Start: 2022-04-19 | End: 2022-04-20 | Stop reason: HOSPADM

## 2022-04-19 RX ORDER — LABETALOL HYDROCHLORIDE 5 MG/ML
15 INJECTION, SOLUTION INTRAVENOUS ONCE
Status: COMPLETED | OUTPATIENT
Start: 2022-04-19 | End: 2022-04-19

## 2022-04-19 RX ORDER — ONDANSETRON 2 MG/ML
INJECTION INTRAMUSCULAR; INTRAVENOUS PRN
Status: DISCONTINUED | OUTPATIENT
Start: 2022-04-19 | End: 2022-04-19

## 2022-04-19 RX ORDER — DEXAMETHASONE SODIUM PHOSPHATE 4 MG/ML
INJECTION, SOLUTION INTRA-ARTICULAR; INTRALESIONAL; INTRAMUSCULAR; INTRAVENOUS; SOFT TISSUE PRN
Status: DISCONTINUED | OUTPATIENT
Start: 2022-04-19 | End: 2022-04-19

## 2022-04-19 RX ORDER — ONDANSETRON 2 MG/ML
4 INJECTION INTRAMUSCULAR; INTRAVENOUS EVERY 30 MIN PRN
Status: DISCONTINUED | OUTPATIENT
Start: 2022-04-19 | End: 2022-04-19 | Stop reason: HOSPADM

## 2022-04-19 RX ORDER — KETOROLAC TROMETHAMINE 30 MG/ML
30 INJECTION, SOLUTION INTRAMUSCULAR; INTRAVENOUS ONCE
Status: DISCONTINUED | OUTPATIENT
Start: 2022-04-19 | End: 2022-04-19 | Stop reason: HOSPADM

## 2022-04-19 RX ORDER — LABETALOL HYDROCHLORIDE 5 MG/ML
10 INJECTION, SOLUTION INTRAVENOUS ONCE
Status: COMPLETED | OUTPATIENT
Start: 2022-04-19 | End: 2022-04-19

## 2022-04-19 RX ORDER — LIDOCAINE HYDROCHLORIDE 20 MG/ML
INJECTION, SOLUTION EPIDURAL; INFILTRATION; INTRACAUDAL; PERINEURAL PRN
Status: DISCONTINUED | OUTPATIENT
Start: 2022-04-19 | End: 2022-04-19 | Stop reason: HOSPADM

## 2022-04-19 RX ORDER — BUPIVACAINE HYDROCHLORIDE 2.5 MG/ML
INJECTION, SOLUTION EPIDURAL; INFILTRATION; INTRACAUDAL
Status: COMPLETED | OUTPATIENT
Start: 2022-04-19 | End: 2022-04-19

## 2022-04-19 RX ORDER — CEFAZOLIN SODIUM/WATER 3 G/30 ML
3 SYRINGE (ML) INTRAVENOUS SEE ADMIN INSTRUCTIONS
Status: DISCONTINUED | OUTPATIENT
Start: 2022-04-19 | End: 2022-04-19 | Stop reason: HOSPADM

## 2022-04-19 RX ORDER — CEFAZOLIN SODIUM/WATER 3 G/30 ML
3 SYRINGE (ML) INTRAVENOUS
Status: COMPLETED | OUTPATIENT
Start: 2022-04-19 | End: 2022-04-19

## 2022-04-19 RX ORDER — CYCLOBENZAPRINE HCL 5 MG
10 TABLET ORAL EVERY 8 HOURS PRN
Status: DISCONTINUED | OUTPATIENT
Start: 2022-04-19 | End: 2022-04-20 | Stop reason: HOSPADM

## 2022-04-19 RX ORDER — FENTANYL CITRATE 50 UG/ML
25-50 INJECTION, SOLUTION INTRAMUSCULAR; INTRAVENOUS
Status: DISCONTINUED | OUTPATIENT
Start: 2022-04-19 | End: 2022-04-19

## 2022-04-19 RX ORDER — LIDOCAINE HYDROCHLORIDE 20 MG/ML
INJECTION, SOLUTION INFILTRATION; PERINEURAL PRN
Status: DISCONTINUED | OUTPATIENT
Start: 2022-04-19 | End: 2022-04-19

## 2022-04-19 RX ORDER — LABETALOL HYDROCHLORIDE 5 MG/ML
10 INJECTION, SOLUTION INTRAVENOUS
Status: COMPLETED | OUTPATIENT
Start: 2022-04-19 | End: 2022-04-19

## 2022-04-19 RX ORDER — SIMETHICONE 80 MG
80 TABLET,CHEWABLE ORAL EVERY 6 HOURS PRN
Status: DISCONTINUED | OUTPATIENT
Start: 2022-04-19 | End: 2022-04-20 | Stop reason: HOSPADM

## 2022-04-19 RX ORDER — HYDRALAZINE HYDROCHLORIDE 20 MG/ML
10 INJECTION INTRAMUSCULAR; INTRAVENOUS EVERY 6 HOURS PRN
Status: DISCONTINUED | OUTPATIENT
Start: 2022-04-19 | End: 2022-04-20 | Stop reason: HOSPADM

## 2022-04-19 RX ORDER — OXYCODONE HYDROCHLORIDE 5 MG/1
5 TABLET ORAL EVERY 4 HOURS PRN
Status: DISCONTINUED | OUTPATIENT
Start: 2022-04-19 | End: 2022-04-19 | Stop reason: HOSPADM

## 2022-04-19 RX ORDER — KETOROLAC TROMETHAMINE 30 MG/ML
15 INJECTION, SOLUTION INTRAMUSCULAR; INTRAVENOUS EVERY 6 HOURS
Status: DISCONTINUED | OUTPATIENT
Start: 2022-04-19 | End: 2022-04-20 | Stop reason: HOSPADM

## 2022-04-19 RX ORDER — HYDROXYZINE HYDROCHLORIDE 25 MG/1
25 TABLET, FILM COATED ORAL EVERY 6 HOURS PRN
Status: DISCONTINUED | OUTPATIENT
Start: 2022-04-19 | End: 2022-04-20 | Stop reason: HOSPADM

## 2022-04-19 RX ORDER — GABAPENTIN 100 MG/1
200 CAPSULE ORAL AT BEDTIME
Status: DISCONTINUED | OUTPATIENT
Start: 2022-04-19 | End: 2022-04-20 | Stop reason: HOSPADM

## 2022-04-19 RX ORDER — LISINOPRIL 5 MG/1
5 TABLET ORAL DAILY
Status: DISCONTINUED | OUTPATIENT
Start: 2022-04-19 | End: 2022-04-19

## 2022-04-19 RX ORDER — HYDROMORPHONE HCL IN WATER/PF 6 MG/30 ML
0.4 PATIENT CONTROLLED ANALGESIA SYRINGE INTRAVENOUS
Status: DISCONTINUED | OUTPATIENT
Start: 2022-04-19 | End: 2022-04-20

## 2022-04-19 RX ORDER — SODIUM CHLORIDE, SODIUM LACTATE, POTASSIUM CHLORIDE, CALCIUM CHLORIDE 600; 310; 30; 20 MG/100ML; MG/100ML; MG/100ML; MG/100ML
INJECTION, SOLUTION INTRAVENOUS CONTINUOUS
Status: DISCONTINUED | OUTPATIENT
Start: 2022-04-19 | End: 2022-04-19 | Stop reason: HOSPADM

## 2022-04-19 RX ORDER — CALCIUM CARBONATE 500 MG/1
500 TABLET, CHEWABLE ORAL 4 TIMES DAILY PRN
Status: DISCONTINUED | OUTPATIENT
Start: 2022-04-19 | End: 2022-04-20 | Stop reason: HOSPADM

## 2022-04-19 RX ORDER — ACETAMINOPHEN 325 MG/1
975 TABLET ORAL EVERY 6 HOURS
Status: DISCONTINUED | OUTPATIENT
Start: 2022-04-19 | End: 2022-04-20 | Stop reason: HOSPADM

## 2022-04-19 RX ORDER — HYDRALAZINE HYDROCHLORIDE 20 MG/ML
2.5-5 INJECTION INTRAMUSCULAR; INTRAVENOUS EVERY 10 MIN PRN
Status: DISCONTINUED | OUTPATIENT
Start: 2022-04-19 | End: 2022-04-19 | Stop reason: HOSPADM

## 2022-04-19 RX ORDER — HYDROMORPHONE HYDROCHLORIDE 1 MG/ML
0.2 INJECTION, SOLUTION INTRAMUSCULAR; INTRAVENOUS; SUBCUTANEOUS EVERY 5 MIN PRN
Status: DISCONTINUED | OUTPATIENT
Start: 2022-04-19 | End: 2022-04-19 | Stop reason: HOSPADM

## 2022-04-19 RX ORDER — HYDRALAZINE HYDROCHLORIDE 20 MG/ML
10 INJECTION INTRAMUSCULAR; INTRAVENOUS ONCE
Status: COMPLETED | OUTPATIENT
Start: 2022-04-19 | End: 2022-04-19

## 2022-04-19 RX ORDER — LABETALOL HYDROCHLORIDE 5 MG/ML
10 INJECTION, SOLUTION INTRAVENOUS
Status: DISCONTINUED | OUTPATIENT
Start: 2022-04-19 | End: 2022-04-20 | Stop reason: HOSPADM

## 2022-04-19 RX ORDER — POLYETHYLENE GLYCOL 3350 17 G/17G
17 POWDER, FOR SOLUTION ORAL DAILY
Status: DISCONTINUED | OUTPATIENT
Start: 2022-04-20 | End: 2022-04-20 | Stop reason: HOSPADM

## 2022-04-19 RX ORDER — OXYCODONE HYDROCHLORIDE 5 MG/1
5 TABLET ORAL EVERY 4 HOURS PRN
Status: DISCONTINUED | OUTPATIENT
Start: 2022-04-19 | End: 2022-04-20 | Stop reason: HOSPADM

## 2022-04-19 RX ORDER — ENOXAPARIN SODIUM 100 MG/ML
40 INJECTION SUBCUTANEOUS EVERY 24 HOURS
Status: DISCONTINUED | OUTPATIENT
Start: 2022-04-20 | End: 2022-04-20 | Stop reason: HOSPADM

## 2022-04-19 RX ORDER — HYDRALAZINE HYDROCHLORIDE 20 MG/ML
5 INJECTION INTRAMUSCULAR; INTRAVENOUS EVERY 6 HOURS PRN
Status: DISCONTINUED | OUTPATIENT
Start: 2022-04-19 | End: 2022-04-19

## 2022-04-19 RX ADMIN — MIDAZOLAM HYDROCHLORIDE 1 MG: 1 INJECTION, SOLUTION INTRAMUSCULAR; INTRAVENOUS at 07:44

## 2022-04-19 RX ADMIN — FENTANYL CITRATE 50 MCG: 50 INJECTION, SOLUTION INTRAMUSCULAR; INTRAVENOUS at 09:07

## 2022-04-19 RX ADMIN — FENTANYL CITRATE 25 MCG: 50 INJECTION INTRAMUSCULAR; INTRAVENOUS at 12:01

## 2022-04-19 RX ADMIN — PHENAZOPYRIDINE HYDROCHLORIDE 200 MG: 200 TABLET, FILM COATED ORAL at 07:18

## 2022-04-19 RX ADMIN — ROCURONIUM BROMIDE 20 MG: 50 INJECTION, SOLUTION INTRAVENOUS at 10:27

## 2022-04-19 RX ADMIN — MIDAZOLAM 1 MG: 1 INJECTION INTRAMUSCULAR; INTRAVENOUS at 08:18

## 2022-04-19 RX ADMIN — HYDRALAZINE HYDROCHLORIDE 5 MG: 20 INJECTION INTRAMUSCULAR; INTRAVENOUS at 12:32

## 2022-04-19 RX ADMIN — PROPOFOL 30 MG: 10 INJECTION, EMULSION INTRAVENOUS at 09:52

## 2022-04-19 RX ADMIN — OXYCODONE HYDROCHLORIDE 5 MG: 5 TABLET ORAL at 13:43

## 2022-04-19 RX ADMIN — PROPOFOL 200 MG: 10 INJECTION, EMULSION INTRAVENOUS at 08:18

## 2022-04-19 RX ADMIN — LISINOPRIL 5 MG: 5 TABLET ORAL at 21:14

## 2022-04-19 RX ADMIN — OXYCODONE HYDROCHLORIDE 5 MG: 5 TABLET ORAL at 19:21

## 2022-04-19 RX ADMIN — HYDROMORPHONE HYDROCHLORIDE 0.2 MG: 1 INJECTION, SOLUTION INTRAMUSCULAR; INTRAVENOUS; SUBCUTANEOUS at 12:17

## 2022-04-19 RX ADMIN — LABETALOL HYDROCHLORIDE 15 MG: 5 INJECTION, SOLUTION INTRAVENOUS at 15:04

## 2022-04-19 RX ADMIN — SODIUM CHLORIDE, POTASSIUM CHLORIDE, SODIUM LACTATE AND CALCIUM CHLORIDE: 600; 310; 30; 20 INJECTION, SOLUTION INTRAVENOUS at 08:13

## 2022-04-19 RX ADMIN — HYDRALAZINE HYDROCHLORIDE 10 MG: 20 INJECTION INTRAMUSCULAR; INTRAVENOUS at 14:30

## 2022-04-19 RX ADMIN — FENTANYL CITRATE 50 MCG: 50 INJECTION, SOLUTION INTRAMUSCULAR; INTRAVENOUS at 08:18

## 2022-04-19 RX ADMIN — ACETAMINOPHEN 975 MG: 325 TABLET ORAL at 14:35

## 2022-04-19 RX ADMIN — ONDANSETRON 4 MG: 2 INJECTION INTRAMUSCULAR; INTRAVENOUS at 11:07

## 2022-04-19 RX ADMIN — FENTANYL CITRATE 50 MCG: 50 INJECTION, SOLUTION INTRAMUSCULAR; INTRAVENOUS at 08:49

## 2022-04-19 RX ADMIN — BUPIVACAINE 20 ML: 13.3 INJECTION, SUSPENSION, LIPOSOMAL INFILTRATION at 07:55

## 2022-04-19 RX ADMIN — HYDRALAZINE HYDROCHLORIDE 10 MG: 20 INJECTION INTRAMUSCULAR; INTRAVENOUS at 17:46

## 2022-04-19 RX ADMIN — ROCURONIUM BROMIDE 20 MG: 50 INJECTION, SOLUTION INTRAVENOUS at 09:24

## 2022-04-19 RX ADMIN — FENTANYL CITRATE 25 MCG: 50 INJECTION INTRAMUSCULAR; INTRAVENOUS at 11:51

## 2022-04-19 RX ADMIN — HYDROMORPHONE HYDROCHLORIDE 0.25 MG: 1 INJECTION, SOLUTION INTRAMUSCULAR; INTRAVENOUS; SUBCUTANEOUS at 10:52

## 2022-04-19 RX ADMIN — HYDROMORPHONE HYDROCHLORIDE 0.2 MG: 1 INJECTION, SOLUTION INTRAMUSCULAR; INTRAVENOUS; SUBCUTANEOUS at 12:30

## 2022-04-19 RX ADMIN — FENTANYL CITRATE 25 MCG: 50 INJECTION INTRAMUSCULAR; INTRAVENOUS at 11:46

## 2022-04-19 RX ADMIN — HYDRALAZINE HYDROCHLORIDE 5 MG: 20 INJECTION INTRAMUSCULAR; INTRAVENOUS at 13:02

## 2022-04-19 RX ADMIN — BUPIVACAINE HYDROCHLORIDE 40 ML: 2.5 INJECTION, SOLUTION EPIDURAL; INFILTRATION; INTRACAUDAL at 07:55

## 2022-04-19 RX ADMIN — SODIUM CHLORIDE, POTASSIUM CHLORIDE, SODIUM LACTATE AND CALCIUM CHLORIDE: 600; 310; 30; 20 INJECTION, SOLUTION INTRAVENOUS at 16:41

## 2022-04-19 RX ADMIN — FENTANYL CITRATE 50 MCG: 50 INJECTION INTRAMUSCULAR; INTRAVENOUS at 07:44

## 2022-04-19 RX ADMIN — LABETALOL HYDROCHLORIDE 10 MG: 5 INJECTION, SOLUTION INTRAVENOUS at 13:52

## 2022-04-19 RX ADMIN — DOCUSATE SODIUM AND SENNOSIDES 1 TABLET: 8.6; 5 TABLET ORAL at 21:08

## 2022-04-19 RX ADMIN — HYDROMORPHONE HYDROCHLORIDE 0.4 MG: 0.2 INJECTION, SOLUTION INTRAMUSCULAR; INTRAVENOUS; SUBCUTANEOUS at 16:45

## 2022-04-19 RX ADMIN — FENTANYL CITRATE 50 MCG: 50 INJECTION, SOLUTION INTRAMUSCULAR; INTRAVENOUS at 08:58

## 2022-04-19 RX ADMIN — HYDRALAZINE HYDROCHLORIDE 5 MG: 20 INJECTION INTRAMUSCULAR; INTRAVENOUS at 12:46

## 2022-04-19 RX ADMIN — ROCURONIUM BROMIDE 20 MG: 50 INJECTION, SOLUTION INTRAVENOUS at 09:41

## 2022-04-19 RX ADMIN — PROPOFOL 20 MG: 10 INJECTION, EMULSION INTRAVENOUS at 11:18

## 2022-04-19 RX ADMIN — HYDROMORPHONE HYDROCHLORIDE 0.2 MG: 1 INJECTION, SOLUTION INTRAMUSCULAR; INTRAVENOUS; SUBCUTANEOUS at 12:50

## 2022-04-19 RX ADMIN — HYDROXYZINE HYDROCHLORIDE 25 MG: 25 TABLET, FILM COATED ORAL at 13:43

## 2022-04-19 RX ADMIN — FENTANYL CITRATE 50 MCG: 50 INJECTION INTRAMUSCULAR; INTRAVENOUS at 07:51

## 2022-04-19 RX ADMIN — KETOROLAC TROMETHAMINE 30 MG: 30 INJECTION, SOLUTION INTRAMUSCULAR at 13:11

## 2022-04-19 RX ADMIN — Medication 3 G: at 08:25

## 2022-04-19 RX ADMIN — ROCURONIUM BROMIDE 60 MG: 50 INJECTION, SOLUTION INTRAVENOUS at 08:18

## 2022-04-19 RX ADMIN — GABAPENTIN 200 MG: 100 CAPSULE ORAL at 21:08

## 2022-04-19 RX ADMIN — IBUPROFEN 800 MG: 600 TABLET ORAL at 18:10

## 2022-04-19 RX ADMIN — TRANEXAMIC ACID 1 G: 1 INJECTION, SOLUTION INTRAVENOUS at 10:31

## 2022-04-19 RX ADMIN — LABETALOL HYDROCHLORIDE 10 MG: 5 INJECTION, SOLUTION INTRAVENOUS at 18:39

## 2022-04-19 RX ADMIN — OXYCODONE HYDROCHLORIDE 10 MG: 5 TABLET ORAL at 23:50

## 2022-04-19 RX ADMIN — HYDROMORPHONE HYDROCHLORIDE 0.2 MG: 1 INJECTION, SOLUTION INTRAMUSCULAR; INTRAVENOUS; SUBCUTANEOUS at 12:39

## 2022-04-19 RX ADMIN — ACETAMINOPHEN 975 MG: 325 TABLET ORAL at 07:18

## 2022-04-19 RX ADMIN — DEXAMETHASONE SODIUM PHOSPHATE 8 MG: 4 INJECTION, SOLUTION INTRAMUSCULAR; INTRAVENOUS at 08:39

## 2022-04-19 RX ADMIN — SODIUM CHLORIDE, SODIUM LACTATE, POTASSIUM CHLORIDE, CALCIUM CHLORIDE: 600; 310; 30; 20 INJECTION, SOLUTION INTRAVENOUS at 08:22

## 2022-04-19 RX ADMIN — ROCURONIUM BROMIDE 20 MG: 50 INJECTION, SOLUTION INTRAVENOUS at 08:50

## 2022-04-19 RX ADMIN — FENTANYL CITRATE 50 MCG: 50 INJECTION, SOLUTION INTRAMUSCULAR; INTRAVENOUS at 11:14

## 2022-04-19 RX ADMIN — FAMOTIDINE 20 MG: 20 TABLET ORAL at 21:08

## 2022-04-19 RX ADMIN — MINOCYCLINE HYDROCHLORIDE 100 MG: 100 CAPSULE ORAL at 21:14

## 2022-04-19 RX ADMIN — HYDRALAZINE HYDROCHLORIDE 5 MG: 20 INJECTION INTRAMUSCULAR; INTRAVENOUS at 13:28

## 2022-04-19 RX ADMIN — IBUPROFEN 800 MG: 600 TABLET ORAL at 23:49

## 2022-04-19 RX ADMIN — ACETAMINOPHEN 975 MG: 325 TABLET ORAL at 17:46

## 2022-04-19 RX ADMIN — FENTANYL CITRATE 25 MCG: 50 INJECTION INTRAMUSCULAR; INTRAVENOUS at 11:56

## 2022-04-19 RX ADMIN — HYDROMORPHONE HYDROCHLORIDE 0.2 MG: 1 INJECTION, SOLUTION INTRAMUSCULAR; INTRAVENOUS; SUBCUTANEOUS at 12:59

## 2022-04-19 RX ADMIN — ROCURONIUM BROMIDE 10 MG: 50 INJECTION, SOLUTION INTRAVENOUS at 10:31

## 2022-04-19 RX ADMIN — LABETALOL HYDROCHLORIDE 10 MG: 5 INJECTION, SOLUTION INTRAVENOUS at 12:06

## 2022-04-19 RX ADMIN — HYDROMORPHONE HYDROCHLORIDE 0.5 MG: 1 INJECTION, SOLUTION INTRAMUSCULAR; INTRAVENOUS; SUBCUTANEOUS at 09:42

## 2022-04-19 RX ADMIN — SUGAMMADEX 400 MG: 100 INJECTION, SOLUTION INTRAVENOUS at 11:12

## 2022-04-19 RX ADMIN — LIDOCAINE HYDROCHLORIDE 100 MG: 20 INJECTION, SOLUTION INFILTRATION; PERINEURAL at 08:18

## 2022-04-19 ASSESSMENT — ACTIVITIES OF DAILY LIVING (ADL)
ADLS_ACUITY_SCORE: 6
ADLS_ACUITY_SCORE: 3
ADLS_ACUITY_SCORE: 6
ADLS_ACUITY_SCORE: 3
ADLS_ACUITY_SCORE: 6
ADLS_ACUITY_SCORE: 3
ADLS_ACUITY_SCORE: 6
ADLS_ACUITY_SCORE: 3
ADLS_ACUITY_SCORE: 6
ADLS_ACUITY_SCORE: 3
ADLS_ACUITY_SCORE: 3
ADLS_ACUITY_SCORE: 6

## 2022-04-19 NOTE — PROVIDER NOTIFICATION
Pt arrived to PACU with BP cuff reading 218/127 (152). BP cuff small, so exchanged out for a new BP cuff and tried upper R arm as well as left lower forearm. BP still elevated at 186/136 (151). Pain rating 7-8. Fentanyl given. BP still elevated. MDA Ngobi to bedside and instructed to give labetalol as ordered. Labetalol given with minimal response. Continued to give pain meds during this time and pt falling asleep off and on noting pain was getting better. Switched to Hydralazine and gave a total of 15mg per orders in 5mg increments (see eMAR). BP is continuing to improve, but still elevated at 160s-180s/90s-100s. Remaining in close contact with MDA. Report given to Siomara Huerta and handed over care at 1330.

## 2022-04-19 NOTE — PROGRESS NOTES
Post-Op Check      Irma Evans is a 46 year old trans male POD#0 s/p CATHRYN, BS, Nexplanon removal for AUB-L/-A and gender affirmation    S: Pt doing well with pain well controlled with a recent dose of IV dilaudid. Is asleep/snoring. Wakes to questions. No other concerns. Very early in post-op period, recently arrived to the floor due to prolonged PACU stay for hypertension management.     O:    Patient Vitals for the past 24 hrs:   BP Temp Temp src Pulse Resp SpO2 Height Weight   04/19/22 1714 (!) 168/94 -- -- 72 17 97 % -- --   04/19/22 1701 (!) 169/89 -- -- 80 17 97 % -- --   04/19/22 1632 125/86 97.4  F (36.3  C) Oral 85 12 100 % -- --   04/19/22 1545 (!) 156/98 -- -- -- 14 100 % -- --   04/19/22 1530 (!) 152/89 97.6  F (36.4  C) Oral 76 14 99 % -- --   04/19/22 1520 (!) 156/97 -- -- 74 14 99 % -- --   04/19/22 1515 (!) 150/89 97.6  F (36.4  C) Oral 78 15 99 % -- --   04/19/22 1500 (!) 166/99 -- -- 83 20 100 % -- --   04/19/22 1455 (!) 163/104 -- -- 91 (!) 35 100 % -- --   04/19/22 1445 (!) 156/84 -- -- 87 16 99 % -- --   04/19/22 1440 (!) 170/95 -- -- 89 21 100 % -- --   04/19/22 1435 (!) 170/100 -- -- 84 16 100 % -- --   04/19/22 1430 (!) 171/108 -- -- 70 24 100 % -- --   04/19/22 1415 (!) 170/92 -- -- 75 17 99 % -- --   04/19/22 1400 (!) 185/99 -- -- 85 11 97 % -- --   04/19/22 1345 (!) 183/98 -- -- 94 16 100 % -- --   04/19/22 1330 (!) 184/98 -- -- 81 13 100 % -- --   04/19/22 1315 (!) 164/108 -- -- 90 12 100 % -- --   04/19/22 1300 (!) 186/110 -- -- 86 9 98 % -- --   04/19/22 1250 (!) 171/110 -- -- 82 10 98 % -- --   04/19/22 1245 (!) 188/112 -- -- 83 14 99 % -- --   04/19/22 1240 (!) 186/116 -- -- 87 11 99 % -- --   04/19/22 1235 -- -- -- -- 14 -- -- --   04/19/22 1230 (!) 195/119 97.9  F (36.6  C) Axillary 84 15 98 % -- --   04/19/22 1225 (!) 178/112 -- -- 85 15 97 % -- --   04/19/22 1220 (!) 167/111 -- -- 92 17 98 % -- --   04/19/22 1215 (!) 178/110 -- -- 88 10 99 % -- --   04/19/22 1210 (!) 178/122  "-- -- 104 14 100 % -- --   04/19/22 1200 (!) 193/143 -- -- 119 10 100 % -- --   04/19/22 1155 (!) 186/128 -- -- (!) 122 12 100 % -- --   04/19/22 1150 (!) 195/120 -- -- (!) 122 25 100 % -- --   04/19/22 1145 (!) 186/136 -- -- -- 15 100 % -- --   04/19/22 1140 (!) 226/133 -- -- (!) 126 19 100 % -- --   04/19/22 1135 (!) 218/127 98.1  F (36.7  C) Oral (!) 121 14 99 % -- --   04/19/22 0805 -- -- -- 76 18 100 % -- --   04/19/22 0800 129/71 -- -- 84 12 100 % -- --   04/19/22 0755 (!) 142/84 -- -- 84 9 100 % -- --   04/19/22 0750 -- -- -- 78 11 100 % -- --   04/19/22 0745 104/67 -- -- 75 29 100 % -- --   04/19/22 0653 (!) 169/104 98.2  F (36.8  C) Oral 84 16 100 % 1.651 m (5' 5\") 121.9 kg (268 lb 11.9 oz)       General:  A&Ox3, NAD  CV:  RRR, no m/r/g  Pulm:  CTAB, good inspiratory effort  Abd: soft, appropriately tender to palpation, minimally distended.  No rebound or guarding  Incision: c/d/i, under pannus with dermabond in place  Fonseca: in place, draining orange urine  LE: no edema, no calf tenderness      A:  46 year old transmale POD#0 s/p CATHRYN, BS, nexplanon removal for AUB-L, -A and gender affirmation. Early in post-op recovery, complicated by hypertension requiring multiple doses of IV antihypertensives in the PACU.    P:     # Post-op:  FEN: reg diet,  ml/hr. AM  Pain: toradol, tylenol, oxycodone, gabapentin, and flexeril PRN. IV dilaudid breakthrough pain.  Heme: Hgb 11.2 > . PM Hgb ordered  CV: HTN, PTA lisinopril held. Required multiple doses of IV antihypertensives in PACU. [facility] requested that hospitalist be consulted to assist with HTN management. IV hydral 10 mg q6h PRN ordered currently.   Pulm: Suspect STAR, on capno  GI: clears, ADAT. Bowel regimen, zofran PRN for nausea.  : fonseca out in AM pending UOP.   Endo: Trans male on testosterone. Next injection 4/21. Nexplanon removed. OCPs discontinued.  PPX: SCDs, PPI, IS, lovenox in AM if Hgb stable  Dispo: Discharge home when meeting " postoperative milestones    Khadijah Maurice MD, MPH  OB/GYN Resident, PGY-4

## 2022-04-19 NOTE — PLAN OF CARE
Goal Outcome Evaluation:    Pt arrived from PACU around 1615 via cart, transferred to bed. A & Ox4. On RA, denies chest pain or SOB. Capnography. BP continues to be elevated. PRN Hydralazine and 1x dose of Labetalol given per MAR. Dr. Langley here to assess pt. Decreased IV fluids to 60 mL/hr. See provider notification note.  Sleeping b/t cares. Rating pain 8/10. PRN IV Dilaudid 0.4 mg and Oxy 5mg given. Scheduled Tylenol and Ibuprofen.   Abd binder in place, incision ANGELES w/ liquid bandage, no drainage noted. Lainez draining orange urine (from Pyridium). Diet advanced to regular - ordered dinner. LBM: 4/19 this AM prior to surgery.     Continue w/ POC. Call light in reach, able to make needs known.

## 2022-04-19 NOTE — INTERVAL H&P NOTE
"I have reviewed the surgical (or preoperative) H&P that is linked to this encounter, and examined the patient. There are no significant changes    Clinical Conditions Present on Arrival:  Clinically Significant Risk Factors Present on Admission                     # Severe Obesity: Estimated body mass index is 44.72 kg/m  as calculated from the following:    Height as of this encounter: 1.651 m (5' 5\").    Weight as of this encounter: 121.9 kg (268 lb 11.9 oz).       "

## 2022-04-19 NOTE — BRIEF OP NOTE
[facility]    Brief Operative Note    Pre-operative diagnosis: Abnormal uterine bleeding (AUB) [N93.9]  Uterine leiomyoma, unspecified location [D25.9]  Post-operative diagnosis Same as pre-operative diagnosis    Procedure: Procedure(s):  Total abdominal hysterectomy, bilateral salpingectomy  Removal Nexplanon  Surgeon: Surgeon(s) and Role:     * Kelsey Love MD - Primary     * Khadijah Maurice MD - Resident - Assisting  Anesthesia: Combined General with Block   Estimated Blood Loss: 300 ml  IVF: 1200 ml crystalloid  UOP: 200 ml orange-tinged urine    Drains: None  Specimens:   ID Type Source Tests Collected by Time Destination   1 :  Tissue Uterus, Cervix, Bilateral Fallopian Tubes SURGICAL PATHOLOGY EXAM Kelsey Love MD 4/19/2022 10:29 AM      Findings:   On EUA, enlarged mobile uterus (14 week size). Clitoral hypertrophy with normal caliber legnth vagina and normal cervix. On laparotomy, enlarged bulky uterus with fundal intramural fibroid taking up the entirety of the uterine body. Small atrophic ovaries and normal bilateral tubes. Hemostasis of the surgical pedicles at the end of the case. Bilateral ureters visualized and palpated transperitoneally. Nexplanon removed intact from the left arm. .  Complications: None.  Implants:   Implant Name Type Inv. Item Serial No.  Lot No. LRB No. Used Action   NEXPLANON      Left 1 Explanted     Khadijah Maurice MD, MPH  OBGYN PGY-4  4/19/2022 11:31 AM

## 2022-04-19 NOTE — ANESTHESIA PROCEDURE NOTES
Airway       Patient location during procedure: OR       Procedure Start/Stop Times: 4/19/2022 8:21 AM  Staff -        Anesthesiologist:  Ana Butts MD       Resident/Fellow: Bryant Lowe MD       Performed By: resident  Consent for Airway        Urgency: elective  Indications and Patient Condition       Indications for airway management: leeroy-procedural       Induction type:intravenous       Mask difficulty assessment: 1 - vent by mask    Final Airway Details       Final airway type: endotracheal airway       Successful airway: ETT - single  Endotracheal Airway Details        ETT size (mm): 7.0       Cuffed: yes       Successful intubation technique: direct laryngoscopy       DL Blade Type: MAC 4       Grade View of Cords: 1       Adjucts: stylet       Position: Right       Measured from: lips       Secured at (cm): 22       Bite block used: Soft    Post intubation assessment        Number of attempts at approach: 1       Secured with: pink tape       Ease of procedure: easy       Dentition: Intact and Unchanged    Medication(s) Administered   Medication Administration Time: 4/19/2022 8:21 AM

## 2022-04-19 NOTE — PROGRESS NOTES
PACU to Inpatient Nursing Handoff    Patient Irma Evans is a 46 year old adult who speaks English.   Procedure Procedure(s):  Total abdominal hysterectomy, bilateral salpingectomy  Removal Nexplanon   Surgeon(s) Primary: Kelsey Love MD  Resident - Assisting: Josafat, Khadijah Wolfe MD     No Known Allergies    Isolation  No active isolations     Past Medical History   has a past medical history of Abnormal uterine bleeding, Attention deficit hyperactivity disorder (ADHD), combined type (10/20/2016), Broken foot (01/2012), Depressive disorder, Gender dysphoria (12/12/2012), Hypertension, Migraines (since [employer]), CASSIDY (nonalcoholic steatohepatitis) (01/17/2013), Patient overweight, and Seasonal allergies.    Anesthesia Combined General with Block   Dermatome Level     Preop Meds acetaminophen (Tylenol) - time given: 0718  phenazopyridine (Pyridium) - time given: 0718   Nerve block Quadratus Lumborum block .  Location:bilateral. Med:Exparel (liposomal bupivacaine). Time given: 0755   Intraop Meds dexamethasone (Decadron)  fentanyl (Sublimaze): 250 mcg total  hydromorphone (Dilaudid): 0.75 mg total  ondansetron (Zofran): last given at 1107   Local Meds Yes and Lidocaine given in L arm (nexplanon site)   Antibiotics cefazolin (Ancef) - last given at 0825     Pain Patient Currently in Pain: yes   PACU meds  acetaminophen (Tylenol): 975 mg (total dose) last given at 1435   fentanyl (Sublimaze): 100 mcg (total dose) last given at 1201   hydromorphone (Dilaudid): 1 mg (total dose) last given at 1259   hydralazine (Apresoline): 30 mg (total dose) last given at 1430   hydroxizine (Vistaril/Atarax): 25 mg (total dose) last given at 1343   ketorolac (Toradol): 30 mg last given at 1311  labetalol (Normodyne/Trandate): 35 mg (total dose) last given at 1504   oxycodone (Roxicodone): 5 mg (total dose) last given at 1343    PCA / epidural No   Capnography Respiratory Monitoring (EtCO2): 34 mmHg   Telemetry  ECG Rhythm: Sinus rhythm   Inpatient Telemetry Monitor Ordered? No        Labs Glucose Lab Results   Component Value Date     04/19/2022    .1 04/26/2021       Hgb Lab Results   Component Value Date    HGB 11.2 04/19/2022    HGB 16.5 04/26/2021       INR Lab Results   Component Value Date    INR 1.01 12/18/2017      PACU Imaging Not applicable     Wound/Incision Incision/Surgical Site 12/19/17 Left Hand (Active)   Number of days: 1582       Incision/Surgical Site 04/19/22 Lower;Anterior Abdomen (Active)   Incision Assessment WDL 04/19/22 1330   Closure Sutures;Liquid bandage 04/19/22 1330   Incision Drainage Amount None 04/19/22 1330   Dressing Intervention Open to air / No Dressing 04/19/22 1330   Number of days: 0       Incision/Surgical Site 04/19/22 Left Arm (Active)   Incision Assessment UTV 04/19/22 1330   Closure LAURENCE 04/19/22 1330   Incision Drainage Amount UTV 04/19/22 1330   Dressing Intervention Clean, dry, intact 04/19/22 1330   Number of days: 0      CMS        Equipment ice pack and abdominal binder   Other LDA       IV Access Peripheral IV 04/19/22 Right Hand (Active)   Site Assessment [facility] 04/19/22 1330   Line Status Infusing 04/19/22 1330   Phlebitis Scale 0-->no symptoms 04/19/22 1330   Infiltration Scale 0 04/19/22 1330   Number of days: 0       Peripheral IV 04/19/22 Left;Dorsal Hand (Active)   Site Assessment [facility] 04/19/22 1330   Line Status Saline locked 04/19/22 1330   Phlebitis Scale 0-->no symptoms 04/19/22 1330   Infiltration Scale 0 04/19/22 1330   Number of days: 0      Blood Products Not applicable  mL   Intake/Output Date 04/19/22 0700 - 04/20/22 0659   Shift 7314-5768 8334-4145 0916-4127 24 Hour Total   INTAKE   P.O. 300   300   I.V. 1200   1200   Shift Total(mL/kg) 1500(12.31)   1500(12.31)   OUTPUT   Urine 200   200   Shift Total(mL/kg) 200(1.64)   200(1.64)   Weight (kg) 121.9 121.9 121.9 121.9      Drains / Lainez Urethral Catheter 04/19/22 Latex 16 fr (Active)    Collection Container Standard 04/19/22 1300   Securement Method Securing device (Describe) 04/19/22 1300   Rationale for Continued Use /GI/GYN Pelvic Procedure 04/19/22 1300   Number of days: 0      Time of void PreOp      PostOp      Diapered? No   Bladder Scan     PO 60 mL (water) (04/19/22 1400)  tolerating sips and applesauce     Vitals    B/P: (!) 150/89  T: 97.6  F (36.4  C)    Temp src: Oral  P:  Pulse: 78 (04/19/22 1515)          R: 15  O2:  SpO2: 99 %         Oxygen Delivery: 8 LPM (04/19/22 1215)         Family/support present Sister    Patient belongings  pt belonging bag x1, cane    Patient transported on cart   DC meds/scripts (obs/outpt) Not applicable   Inpatient Pain Meds Released? Yes       Special needs/considerations None   Tasks needing completion None       Jud Lorenzana RN  ASCOM [id number]

## 2022-04-19 NOTE — ANESTHESIA CARE TRANSFER NOTE
Patient: Irma Evans    Procedure: Procedure(s):  Total abdominal hysterectomy, bilateral salpingectomy  Removal Nexplanon       Diagnosis: Abnormal uterine bleeding (AUB) [N93.9]  Uterine leiomyoma, unspecified location [D25.9]  Diagnosis Additional Information: No value filed.    Anesthesia Type:   General     Note:    Oropharynx: oropharynx clear of all foreign objects  Level of Consciousness: awake  Oxygen Supplementation: face mask  Level of Supplemental Oxygen (L/min / FiO2): 4  Independent Airway: airway patency satisfactory and stable  Dentition: dentition unchanged  Vital Signs Stable: post-procedure vital signs reviewed and stable  Report to RN Given: handoff report given  Patient transferred to: PACU    Handoff Report: Identifed the Patient, Identified the Reponsible Provider, Reviewed the pertinent medical history, Discussed the surgical course, Reviewed Intra-OP anesthesia mangement and issues during anesthesia, Set expectations for post-procedure period and Allowed opportunity for questions and acknowledgement of understanding      Vitals:  Vitals Value Taken Time   BP     Temp     Pulse 121 04/19/22 1135   Resp 14 04/19/22 1135   SpO2 99 % 04/19/22 1135   Vitals shown include unvalidated device data.    Electronically Signed By: Bryant Lowe MD  April 19, 2022  11:36 AM

## 2022-04-19 NOTE — ANESTHESIA PROCEDURE NOTES
Other Procedure Note    Pre-Procedure   Staff -        Anesthesiologist:  Luis Becker MD       Performed By: anesthesiologist       Location: pre-op       Procedure Start/Stop Times: 4/19/2022 7:40 AM       Pre-Anesthestic Checklist: patient identified, IV checked, site marked, risks and benefits discussed, informed consent, monitors and equipment checked, pre-op evaluation, at physician/surgeon's request and post-op pain management  Timeout:       Correct Patient: Yes        Correct Procedure: Yes        Correct Site: Yes        Correct Position: Yes        Correct Laterality: Yes        Site Marked: Yes  Procedure Documentation  Procedure: Other       Diagnosis: HYSTERECTOMY       Laterality: bilateral       Patient Position: supine       Skin prep: Chloraprep       Needle Type: short bevel       Needle Gauge: 21.        Needle Length (Inches): 4        Ultrasound guided       1. Ultrasound was used to identify targeted nerve, plexus, vascular marker, or fascial plane and place a needle adjacent to it in real-time.       2. Ultrasound was used to visualize the spread of anesthetic in close proximity to the above referenced structure.       3. A permanent image is entered into the patient's record.       4. The visualized anatomic structures appeared normal.    Assessment/Narrative         The placement was negative for: blood aspirated, painful injection and site bleeding       Paresthesias: No.       Bolus given via needle..        Secured via.        Insertion/Infusion Method: Single Shot       Complications: none    Medication(s) Administered   Bupivacaine 0.25% PF (Infiltration) - Infiltration   40 mL - 4/19/2022 7:55:00 AM  Bupivacaine liposome (Exparel) 1.3% LA inj susp (Infiltration) - Infiltration   20 mL - 4/19/2022 7:55:00 AM  Medication Administration Time: 4/19/2022 7:40 AM     Comments:  Bilateral Quadratus Lumborum block

## 2022-04-19 NOTE — ANESTHESIA POSTPROCEDURE EVALUATION
Patient: Irma Evans    Procedure: Procedure(s):  Total abdominal hysterectomy, bilateral salpingectomy  Removal Nexplanon       Anesthesia Type:  General    Note:  Disposition: Admission   Postop Pain Control: Uneventful            Sign Out: Well controlled pain   PONV: No   Neuro/Psych: Uneventful            Sign Out: Acceptable/Baseline neuro status   Airway/Respiratory: Uneventful            Sign Out: Acceptable/Baseline resp. status   CV/Hemodynamics: Uneventful            Sign Out: Acceptable CV status; No obvious hypovolemia; No obvious fluid overload   Other NRE: NONE   DID A NON-ROUTINE EVENT OCCUR? No    Event details/Postop Comments:  Pt hypertensive in PACU. Minimal response to pain medications, multiple doses of Labetalol and Hydralazine. BP cuff location changed. BPs still elevated but improving. Pt discharged to the floor with SBPs in 160s. Discussed with PACU RN to page primary team regarding BP and recommend medicine consult to help with further management. RN agreed with the plan  Nilda Jiménez MD  4/19/2022  4:27 PM                   Last vitals:  Vitals Value Taken Time   /98 04/19/22 1547   Temp 36.4  C (97.6  F) 04/19/22 1530   Pulse 68 04/19/22 1547   Resp 14 04/19/22 1545   SpO2 99 % 04/19/22 1551   Vitals shown include unvalidated device data.    Electronically Signed By: Nilda Jiménez MD  April 19, 2022  4:20 PM

## 2022-04-20 ENCOUNTER — TELEPHONE (OUTPATIENT)
Dept: OBGYN | Facility: CLINIC | Age: 47
End: 2022-04-20
Payer: COMMERCIAL

## 2022-04-20 VITALS
RESPIRATION RATE: 17 BRPM | SYSTOLIC BLOOD PRESSURE: 132 MMHG | WEIGHT: 268.74 LBS | HEIGHT: 65 IN | OXYGEN SATURATION: 100 % | TEMPERATURE: 98.1 F | BODY MASS INDEX: 44.78 KG/M2 | HEART RATE: 93 BPM | DIASTOLIC BLOOD PRESSURE: 75 MMHG

## 2022-04-20 DIAGNOSIS — G89.18 ACUTE POST-OPERATIVE PAIN: Primary | ICD-10-CM

## 2022-04-20 PROBLEM — D25.1 INTRAMURAL AND SUBMUCOUS LEIOMYOMA OF UTERUS: Status: ACTIVE | Noted: 2022-04-20

## 2022-04-20 PROBLEM — D25.0 INTRAMURAL AND SUBMUCOUS LEIOMYOMA OF UTERUS: Status: ACTIVE | Noted: 2022-04-20

## 2022-04-20 LAB
ANION GAP SERPL CALCULATED.3IONS-SCNC: 7 MMOL/L (ref 3–14)
ATRIAL RATE - MUSE: 72 BPM
BUN SERPL-MCNC: 10 MG/DL (ref 7–30)
CALCIUM SERPL-MCNC: 8.4 MG/DL (ref 8.5–10.1)
CHLORIDE BLD-SCNC: 108 MMOL/L (ref 94–109)
CO2 SERPL-SCNC: 24 MMOL/L (ref 20–32)
CREAT SERPL-MCNC: 0.77 MG/DL (ref 0.52–1.25)
DIASTOLIC BLOOD PRESSURE - MUSE: NORMAL MMHG
GFR SERPL CREATININE-BSD FRML MDRD: >90 ML/MIN/1.73M2
GLUCOSE BLD-MCNC: 123 MG/DL (ref 70–99)
GLUCOSE BLD-MCNC: 131 MG/DL (ref 70–99)
GLUCOSE BLDC GLUCOMTR-MCNC: 142 MG/DL (ref 70–99)
HGB BLD-MCNC: 9.8 G/DL (ref 11.7–17.7)
INTERPRETATION ECG - MUSE: NORMAL
P AXIS - MUSE: 51 DEGREES
POTASSIUM BLD-SCNC: 3.9 MMOL/L (ref 3.4–5.3)
PR INTERVAL - MUSE: 138 MS
QRS DURATION - MUSE: 100 MS
QT - MUSE: 424 MS
QTC - MUSE: 464 MS
R AXIS - MUSE: 56 DEGREES
SODIUM SERPL-SCNC: 139 MMOL/L (ref 133–144)
SYSTOLIC BLOOD PRESSURE - MUSE: NORMAL MMHG
T AXIS - MUSE: 51 DEGREES
VENTRICULAR RATE- MUSE: 72 BPM

## 2022-04-20 PROCEDURE — 250N000013 HC RX MED GY IP 250 OP 250 PS 637: Performed by: NURSE PRACTITIONER

## 2022-04-20 PROCEDURE — 250N000013 HC RX MED GY IP 250 OP 250 PS 637: Performed by: STUDENT IN AN ORGANIZED HEALTH CARE EDUCATION/TRAINING PROGRAM

## 2022-04-20 PROCEDURE — 82947 ASSAY GLUCOSE BLOOD QUANT: CPT | Performed by: INTERNAL MEDICINE

## 2022-04-20 PROCEDURE — 85018 HEMOGLOBIN: CPT | Performed by: STUDENT IN AN ORGANIZED HEALTH CARE EDUCATION/TRAINING PROGRAM

## 2022-04-20 PROCEDURE — 250N000011 HC RX IP 250 OP 636: Performed by: STUDENT IN AN ORGANIZED HEALTH CARE EDUCATION/TRAINING PROGRAM

## 2022-04-20 PROCEDURE — 36415 COLL VENOUS BLD VENIPUNCTURE: CPT | Performed by: STUDENT IN AN ORGANIZED HEALTH CARE EDUCATION/TRAINING PROGRAM

## 2022-04-20 PROCEDURE — 258N000003 HC RX IP 258 OP 636: Performed by: STUDENT IN AN ORGANIZED HEALTH CARE EDUCATION/TRAINING PROGRAM

## 2022-04-20 PROCEDURE — 80048 BASIC METABOLIC PNL TOTAL CA: CPT | Performed by: STUDENT IN AN ORGANIZED HEALTH CARE EDUCATION/TRAINING PROGRAM

## 2022-04-20 RX ORDER — OXYCODONE HYDROCHLORIDE 5 MG/1
5 TABLET ORAL EVERY 6 HOURS PRN
Qty: 12 TABLET | Refills: 0 | Status: SHIPPED | OUTPATIENT
Start: 2022-04-20 | End: 2022-04-23

## 2022-04-20 RX ORDER — IBUPROFEN 600 MG/1
600 TABLET, FILM COATED ORAL EVERY 6 HOURS PRN
Qty: 60 TABLET | Refills: 0 | Status: SHIPPED | OUTPATIENT
Start: 2022-04-20 | End: 2022-08-16

## 2022-04-20 RX ORDER — ACETAMINOPHEN 325 MG/1
650 TABLET ORAL EVERY 6 HOURS PRN
Qty: 100 TABLET | Refills: 0 | Status: SHIPPED | OUTPATIENT
Start: 2022-04-20 | End: 2022-08-16

## 2022-04-20 RX ORDER — AMOXICILLIN 250 MG
1 CAPSULE ORAL DAILY
Qty: 100 TABLET | Refills: 0 | Status: SHIPPED | OUTPATIENT
Start: 2022-04-20 | End: 2022-08-16

## 2022-04-20 RX ADMIN — OXYCODONE HYDROCHLORIDE 10 MG: 5 TABLET ORAL at 12:44

## 2022-04-20 RX ADMIN — FAMOTIDINE 20 MG: 20 TABLET ORAL at 07:55

## 2022-04-20 RX ADMIN — SODIUM CHLORIDE, POTASSIUM CHLORIDE, SODIUM LACTATE AND CALCIUM CHLORIDE: 600; 310; 30; 20 INJECTION, SOLUTION INTRAVENOUS at 03:08

## 2022-04-20 RX ADMIN — DOCUSATE SODIUM AND SENNOSIDES 1 TABLET: 8.6; 5 TABLET ORAL at 07:55

## 2022-04-20 RX ADMIN — ACETAMINOPHEN 975 MG: 325 TABLET ORAL at 00:28

## 2022-04-20 RX ADMIN — ACETAMINOPHEN 975 MG: 325 TABLET ORAL at 06:45

## 2022-04-20 RX ADMIN — IBUPROFEN 800 MG: 600 TABLET ORAL at 12:44

## 2022-04-20 RX ADMIN — OXYCODONE HYDROCHLORIDE 10 MG: 5 TABLET ORAL at 07:54

## 2022-04-20 RX ADMIN — MINOCYCLINE HYDROCHLORIDE 100 MG: 100 CAPSULE ORAL at 07:56

## 2022-04-20 RX ADMIN — LISINOPRIL 5 MG: 5 TABLET ORAL at 07:54

## 2022-04-20 RX ADMIN — IBUPROFEN 800 MG: 600 TABLET ORAL at 05:46

## 2022-04-20 RX ADMIN — POLYETHYLENE GLYCOL 3350 17 G: 17 POWDER, FOR SOLUTION ORAL at 07:55

## 2022-04-20 RX ADMIN — ENOXAPARIN SODIUM 40 MG: 40 INJECTION SUBCUTANEOUS at 07:56

## 2022-04-20 ASSESSMENT — ACTIVITIES OF DAILY LIVING (ADL)
ADLS_ACUITY_SCORE: 5
ADLS_ACUITY_SCORE: 5
ADLS_ACUITY_SCORE: 6
ADLS_ACUITY_SCORE: 5
ADLS_ACUITY_SCORE: 6
ADLS_ACUITY_SCORE: 5
ADLS_ACUITY_SCORE: 6
ADLS_ACUITY_SCORE: 5

## 2022-04-20 NOTE — CONSULTS
"[facility]  Consult Note - Hospitalist Service, GOLD TEAM   Date of Admission:  4/19/2022  Consult Requested by: Dr. Maurice  Reason for Consult: \" Postop hypertension after abdominal hysterectomy requiring multiple doses of IV hydralazine/labetalol in the PACU.  MD requested hospitalist assistance with managing hypertension in the early postop period\"    Assessment & Plan   Irma Evans is a 46 year old adult admitted on 4/19/2022. He  has a past medical history of Abnormal uterine bleeding, Attention deficit hyperactivity disorder (ADHD), combined type (10/20/2016), Broken foot (01/2012), Depressive disorder, Gender dysphoria (12/12/2012), Hypertension, Migraines (since [employer]), CASSIDY (nonalcoholic steatohepatitis) (01/17/2013), Patient overweight, and Seasonal allergies.  Hospital medicine consulted for assistance with management of postoperative hypertension.    #Postoperative hypertension  Patient had abdominal hysterectomy today for abnormal uterine bleeding and was hypertensive postoperatively with systolic greater than 200 and diastolic greater than 120, MAPs 150s.  Labetalol was given in addition to hydralazine with moderate improvement in BP over time.  35 mg labetalol and 30 mg hydralazine in PACU.    -Hydralazine IV as needed for SBP greater than 170 and/or DBP greater than 100    -Continued management of pain and anxiety    -Check BMP, monitor creatinine    -Obtain EKG    -resume PTA lisinopril 5 mg if creatinine is within normal limits    -Pending blood pressure trend, may increase lisinopril dosing or add additional antihypertensive.    Hospital medicine team will follow with this patient in the morning as well.     The patient's care was discussed with the Bedside Nurse and Patient.  Recommendations are made to primary team via this note.    MAC[facility]  Securely message with " "the [employer] Web Console (learn more here)  Text page via [facility] Paging/Directory   Please see signed in provider for up to date coverage information    Hospitalist Service, GOLD TEAM     Clinically Significant Risk Factors Present on Admission                  # Severe Obesity: Estimated body mass index is 44.72 kg/m  as calculated from the following:    Height as of this encounter: 1.651 m (5' 5\").    Weight as of this encounter: 121.9 kg (268 lb 11.9 oz).      ______________________________________________________________________    Chief Complaint   Abdominal pain    History is obtained from the patient and electronic health record    History of Present Illness   Trevin Evans is a 46 year old adult who has past medical history as outlined above and below who was admitted after planned abdominal hysterectomy for abnormal uterine bleeding.  He was quite hypertensive postoperatively with MAPs in the 150s requiring multiple doses of IV labetalol and IV hydralazine to control BP.    Patient seen postoperatively in his room.  We discussed his hypertension.  He states that he carries a diagnosis of hypertension and takes lisinopril 5 mg daily at home prior to admission.  He thinks that his blood pressure when he goes to medical appointments is around 140s over 80s.  We discussed that this represents suboptimal blood pressure control.  Likely, there is a component of uncontrolled blood pressure as an outpatient in addition to stress of surgery and pain leading to severe hypertension.  Fortunately, his creatinine this evening was within normal limits and EKG without any ST changes.  He denies any associated headache, chest pain, neurologic symptoms, including changes in vision.  We discussed that he will need to follow-up as an outpatient with primary care provider for his blood pressure.  He is in agreement with the plan.    Review of Systems   The 5 point Review of Systems is negative other than noted in the HPI or here. "     Past Medical History    I have reviewed this patient's medical history and updated it with pertinent information if needed.   Past Medical History:   Diagnosis Date     Abnormal uterine bleeding      Attention deficit hyperactivity disorder (ADHD), combined type 10/20/2016    To be seen every 6 months, can request refill once between visits by [employer]  Rapid drug screen annually done last April 2021     Broken foot 01/2012     Depressive disorder      Gender dysphoria 12/12/2012     Hypertension      Migraines since [employer] school     CASSIDY (nonalcoholic steatohepatitis) 01/17/2013     Patient overweight     BMI 56     Seasonal allergies     spring       Past Surgical History   I have reviewed this patient's surgical history and updated it with pertinent information if needed.  Past Surgical History:   Procedure Laterality Date     RELEASE CARPAL TUNNEL Left 12/19/2017    Procedure: RELEASE CARPAL TUNNEL;  Left Open Carpal Tunnel Release  ;  Surgeon: Khoi Reyes MD;  Location:  OR     [email or web address]] OPEN RX ANKLE DISLOCATN+FIXATN  01/09    right     Social History   I have reviewed this patient's social history and updated it with pertinent information if needed.  Social History     Tobacco Use     Smoking status: Former Smoker     Packs/day: 1.00     Years: 12.00     Pack years: 12.00     Types: Cigarettes     Smokeless tobacco: Never Used     Tobacco comment: quit 6/29/13   Vaping Use     Vaping Use: Never used   Substance Use Topics     Alcohol use: Yes     Comment: Occ     Drug use: Yes     Types: Marijuana     Comment: THC only     Family History   I have reviewed this patient's family history and updated it with pertinent information if needed.  Family History   Problem Relation Age of Onset     Arthritis Mother      Alcohol/Drug Mother      Asthma Mother      Depression Mother         and many on mom's side of family     Hypertension Mother      Thyroid Disease Mother         s/p removal     Alcohol/Drug Father       Hypertension Father      Obesity Father      Stomach Cancer Father      Gastrointestinal Disease Sister         CROHN disease     Hypertension Maternal Uncle      Hypertension Maternal Grandfather      Arthritis Maternal Grandfather      Attention Deficit Disorder Other      Medications   I have reviewed this patient's current medications    Allergies   No Known Allergies    Physical Exam   Vital Signs: Temp: 97.7  F (36.5  C) Temp src: Oral BP: (!) 152/91 Pulse: 82   Resp: 18 SpO2: 98 % O2 Device: None (Room air) Oxygen Delivery: 8 LPM  Weight: 268 lbs 11.85 oz    General Appearance: Drowsy, easily aroused.  No acute distress  Eyes: Pupils are equal and round  HEENT: MMM  Respiratory: Lung sounds clear to auscultation bilaterally  Cardiovascular: S1, S2, RRR.  No murmurs, rubs auscultated  GI: Hypoactive bowel sounds.  Abdominal binder in place.  Skin: No jaundice or rashes noted to exposed skin  Neurologic: Oriented, alert.  No focal deficits are noted on exam  Psychiatric: Appropriate affect    Data   I personally reviewed the EKG tracing showing SR.

## 2022-04-20 NOTE — PLAN OF CARE
"  VS: /75   Pulse 93   Temp 98.1  F (36.7  C) (Oral)   Resp 17   Ht 1.651 m (5' 5\")   Wt 121.9 kg (268 lb 11.9 oz)   SpO2 100%   BMI 44.72 kg/m     O2: 100% on RA, denies SOB or respiratory distress    Output: Voiding adequate amounts with use of toilet. PVR 43 & 55   Last BM: 4/19. Bowel sounds present, passing flatus    Activity: Up ad анна    Up for meals? Sat on side of bed for meals    Skin: Lower abdominal incision    Pain: Oxycodone PRN given x2 for incisional pain with relief    CMS: Alert & oriented, denies numbness or tingling    Dressing: Abdominal incision ANGELES    Diet: Regular, appetite 100% for breakfast    LDA: None    Equipment: Personal belongings    Plan: Discharge home with family    Additional Info:                            "

## 2022-04-20 NOTE — OP NOTE
Gynecologic Oncology Operative Report    Irma Evans  [id number]  4/19/2022    Pre-operative Diagnosis:  AUB, Fibroid uterus, gender dysphoria    Post-operative Diagnosis:  AUB, Fibroid uterus, gender dysphoria    Surgeon:  Kelsey Love MD    Assistants:  Khadijah Maurice MD PGY4         Anesthesia:  GETA, TAP block    Procedure:  Exploratory laparotomy, total abdominal hysterectomy, bilateral salpingectomy, Nexplanon removal    EBL:  300 cc    IVF:  1200 cc crystalloid    UOP:  200 cc clear orange urine    Specimens:   Specimens:       ID Type Source Tests Collected by Time Destination   1 :  Tissue Uterus, Cervix, Bilateral Fallopian Tubes SURGICAL PATHOLOGY EXAM Kelsey Love MD 4/19/2022 10:29 AM       Implants:           Implant Name Type Inv. Item Serial No.  Lot No. LRB No. Used Action   NEXPLANON           Left 1 Explanted      Complications:  None apparent    Indications:  Trevin Evans is a 46 year old trans male with AUB secondary to fibroid uterus who has failed medical management with chronic blood loss anemia. He desires definitive management as he does not desire fertility.  Following a thorough discussion of the risks, benefits, indications and alternatives he consented to the above procedure.    Operative Findings:   On EUA, enlarged mobile uterus (14 week size). Clitoral hypertrophy with normal caliber legnth vagina and normal cervix. On laparotomy, enlarged bulky uterus with fundal intramural fibroid taking up the entirety of the uterine body. Small atrophic ovaries and normal bilateral tubes. Hemostasis of the surgical pedicles at the end of the case. Bilateral ureters visualized and palpated transperitoneally. Nexplanon removed intact from the left arm.    Operative Procedure:  The consent was reviewed with the patient in the preoperative setting. He received prophylactic antibiotics. He was subsequently transferred to the operating room for the above-mentioned  procedure. The patient was placed in dorsal supine position. General anesthetic was obtained without noted difficulties. Lainez catheter was placed under sterile techniques. The patient was then prepped and draped for an abdominal procedure. Timeout was called at which point the patient's name, operative procedure and site was confirmed by the operative team. After applying the TRAXI pannus retractor, a Pfannenstiel skin incision was then made. Incision was performed sharply and carried through the subcutaneous layer with cautery. Fascia was incised and extended bilaterally with the cautery. Peritoneum was then enter bluntly. Peritoneal incision was extended without any injury to nearby structures.     At this point, exploration of the pelvis was performed with large fibroid uterus noted. The XL Abhijeet O retractor was placed and utilized throughout the course of the procedure. Bowels were packed up into the upper abdomen. Initially on the right side, the round ligament was isolated, suture ligated and transected.  We extended the peritoneal incision toward the bladder, creating the beginnings of bladder flap. A window below the uterine ovarian complex was identified and a created with the cautery. The right uterine ovarian vessels were then isolated, doubly clamped, cut and suture ligated, lateralizing the right ovary and tubal remnant.     Attention was then turned to the left and the round ligament was similarly isolated, suture ligated and transected with catuery. We then extended the peritoneal incision toward the bladder and the midline, completing the bladder flap. A window was similarly created below the uterine-ovarian complex and the left uterine ovarian vessels were then isolated, doubly clamped, cut and suture ligated, lateralizing the right ovary and tubal remnant. The vesicouterine peritoneum was then developed even futher. The bladder was retracted to the level of the upper vagina. Bilateral uterine  arteries were now isolated which did require additional skeletonization. The bilateral uterine arteries were then clamped in a serial manner. We then cut and suture ligated with 2-0 Vicryl.  The bilateral cardinal ligaments were next clamped and cut and suture ligated with Vicryl. We proceeded until we could isolate out the uterosacral ligaments, which were similarly cut and suture ligated.  The apex of the vagina was then clamped with a curved Amie clamps and the marissa scissors were used to perform the colpotomy. It was noted with removal of the specimen that only part of the cervix was resected with the uterus. We then replaced the Amie clamps lower more distal on the vagina until the entire cervix was above the clamp and similarly used the marissa scissors to resect the remaining cervix.  The vaginal apices were closed using a Amie transfixion stitch using 0-Vicryl and the remainder of the cuff was then grasped and suture ligated with interurupted figure of X stitches with 0-Vicryl. Additional stitches required to obtain hemostasis. The bladder was well away from the cuff closure. At this point, the pelvis was hemostatic.     Attention was returned to bilateral fallopian tube remanants, which were grasped with Des Arc clamps, elevated, and using a Amie clamp, the mesosalpinx was clamped, cut, and the pedicle was free-tied x2 using 0-Vicryl. Pedicles were hemostatic and bilateral tubes were sent with the specimen.     At this point, we performed irrigation of the pelvis with warm saline. All laparotomy sponges were next removed. Hemostasis of all pedicles was ensured and bilateral ureters were visualized. Fascia was closed with 0 looped PDS. Subcutaneous tissue was reapproximated with 3-0 Vicryl in a running fashion and skin closed with running 4-0 Monocryl subcuticular suture. Skin glue was placed over the incision.    Attention was then turned to the left upper arm where the Nexplanon was palpated.  Chloroprep was applied and 3 ml of 2% plain lidocaine was injected. A stab incision was made with a 10 blade and the Nexplanon was removed intact without difficulty. A pressure bandage was placed around the bicep.    Sponge, lap, instrument and needle counts were reported as correct x 2. Dr. Love was scrubbed and present for the entire procedure. The patient was then repositioned appropriately, recalled from the general anesthetic and was transferred to recovery unit in stable condition.       Khadijah Maurice MD, MPH  OBGYN PGY-4  4/19/2022 8:08 PM     I was scrubbed and present for the entire procedure.  I have reviewed and edited the above note.     Kelsey Love MD, FACOG

## 2022-04-20 NOTE — PROGRESS NOTES
Gynecology Progress Note    Subjective:  Patient is resting comfortably in bed. Complains of pain when he is moving, no pain at rest. States he is tolerating pain with the current medication regiment, with two doses of oxycodone PO overnight. Lainez in place with adequate urine output. Denies passing flatus/BM thus far. Has not ambulated thus far. He denies any nausea/vomiting, chest pain, shortness of breath, or other systemic complaints.    Objective:  Patient Vitals for the past 24 hrs:   BP Temp Temp src Pulse Resp SpO2   04/20/22 0754 132/75 -- -- -- -- --   04/20/22 0746 132/75 98.1  F (36.7  C) Oral 93 17 100 %   04/20/22 0315 (!) 144/75 98.5  F (36.9  C) Oral 108 16 100 %   04/19/22 2348 (!) 162/82 98.2  F (36.8  C) Oral 91 18 100 %   04/19/22 2114 (!) 157/82 -- -- -- -- --   04/19/22 1915 (!) 150/88 -- -- -- -- --   04/19/22 1900 117/75 -- -- -- -- --   04/19/22 1846 (!) 152/91 -- -- 82 18 98 %   04/19/22 1759 (!) 147/114 97.7  F (36.5  C) Oral 95 18 100 %   04/19/22 1742 (!) 181/102 97.6  F (36.4  C) Oral 81 17 97 %   04/19/22 1714 (!) 168/94 -- -- 72 17 97 %   04/19/22 1701 (!) 169/89 -- -- 80 17 97 %   04/19/22 1632 125/86 97.4  F (36.3  C) Oral 85 12 100 %   04/19/22 1545 (!) 156/98 -- -- -- 14 100 %   04/19/22 1530 (!) 152/89 97.6  F (36.4  C) Oral 76 14 99 %   04/19/22 1520 (!) 156/97 -- -- 74 14 99 %   04/19/22 1515 (!) 150/89 97.6  F (36.4  C) Oral 78 15 99 %   04/19/22 1500 (!) 166/99 -- -- 83 20 100 %   04/19/22 1455 (!) 163/104 -- -- 91 (!) 35 100 %   04/19/22 1445 (!) 156/84 -- -- 87 16 99 %   04/19/22 1440 (!) 170/95 -- -- 89 21 100 %   04/19/22 1435 (!) 170/100 -- -- 84 16 100 %   04/19/22 1430 (!) 171/108 -- -- 70 24 100 %   04/19/22 1415 (!) 170/92 -- -- 75 17 99 %   04/19/22 1400 (!) 185/99 -- -- 85 11 97 %   04/19/22 1345 (!) 183/98 -- -- 94 16 100 %   04/19/22 1330 (!) 184/98 -- -- 81 13 100 %   04/19/22 1315 (!) 164/108 -- -- 90 12 100 %   04/19/22 1300 (!) 186/110 -- -- 86 9 98 %    04/19/22 1250 (!) 171/110 -- -- 82 10 98 %   04/19/22 1245 (!) 188/112 -- -- 83 14 99 %   04/19/22 1240 (!) 186/116 -- -- 87 11 99 %   04/19/22 1235 -- -- -- -- 14 --   04/19/22 1230 (!) 195/119 97.9  F (36.6  C) Axillary 84 15 98 %   04/19/22 1225 (!) 178/112 -- -- 85 15 97 %   04/19/22 1220 (!) 167/111 -- -- 92 17 98 %   04/19/22 1215 (!) 178/110 -- -- 88 10 99 %   04/19/22 1210 (!) 178/122 -- -- 104 14 100 %   04/19/22 1200 (!) 193/143 -- -- 119 10 100 %   04/19/22 1155 (!) 186/128 -- -- (!) 122 12 100 %   04/19/22 1150 (!) 195/120 -- -- (!) 122 25 100 %   04/19/22 1145 (!) 186/136 -- -- -- 15 100 %   04/19/22 1140 (!) 226/133 -- -- (!) 126 19 100 %   04/19/22 1135 (!) 218/127 98.1  F (36.7  C) Oral (!) 121 14 99 %       General:  A&Ox3. NAD. Resting in bed  CV: RRR.  Pulm: CTAB. Normal respiratory effort.  Abd: Soft, non-distended. Incision clean, dry, and intact.   Ext: Trace edema    Labs   Latest Reference Range & Units 04/20/22 07:04   Sodium 133 - 144 mmol/L 139   Potassium 3.4 - 5.3 mmol/L 3.9   Chloride 94 - 109 mmol/L 108 [1]   Carbon Dioxide 20 - 32 mmol/L 24   Urea Nitrogen 7 - 30 mg/dL 10 [2]   Creatinine 0.52 - 1.25 mg/dL 0.77 [3]   GFR Estimate >60 mL/min/1.73m2 >90 [4]   Calcium 8.5 - 10.1 mg/dL 8.4 (L)   Anion Gap 3 - 14 mmol/L 7   Glucose 70 - 99 mg/dL  70 - 99 mg/dL 131 (H)  123 (H)         Latest Reference Range & Units 04/20/22 07:04   Hemoglobin 11.7 - 17.7 g/dL 9.8 (L) [1]         Assessment/Plan:  46 year old transmale POD#1 s/p CATHRYN, BS, nexplanon removal for AUB-L, -A and gender affirmation. Early in post-op recovery, complicated by hypertension requiring multiple doses of IV antihypertensives in the PACU. Overnight, blood pressure improved with restarting home medication, otherwise recovering appropriately for POD#1. Internal medicine assisting with BP management.     Post-op  Routine post-operative goals: Encourage ambulation and spirometry.  FEN: reg diet. DC IVF this AM  Pain: s/p  TAP block. Scheduled ibuprofen, tylenol, and gabapentin; flexeril PRN; oxycodone PO  Heme: . Hgb 11.2 > 11.0 >9.8  CV: HTN. Required multiple doses of IV antihypertensives in PACU. [facility] requested that hospitalist be consulted to assist with HTN management. 5mg lisinopril, PRN 10mg IV labetolol for >170. F/u BP, increase lisinopril as needed for HTN control.  Pulm: Suspect STAR, on capno for 24h post-op  GI: Bowel regimen, zofran PRN for nausea.  : Lainez out this AM. Follow up void.   Endo: Trans male on testosterone. Next injection 4/21. Nexplanon removed. OCPs discontinued.  PPX: SCDs, IS, lovenox ordered this AM  Dispo: Discharge home when meeting postoperative milestones, anticipate POD#2-3      Kayla Gilmore, MS3   [employer] [facility] [employer]  6:51 AM 4/20/2022    Khadijah Maurice MD, MPH  OBGYN PGY-4  4/20/2022 7:53 AM     [facility] staff:  Appreciate note by Dr. Maurice.  I have seen and examined the patient without the resident. I have reviewed, edited, and agree with the note.   BPs are well controlled.  Pt comfortable and feels ready for discharge.  Hospitalist team has signed off on further care.     Tammi Ford MD, FACOG  4/20/2022  10:08 AM

## 2022-04-20 NOTE — PLAN OF CARE
"BP (!) 144/75 (BP Location: Left arm, Patient Position: Semi-Nichole's, Cuff Size: Adult Large)   Pulse 108   Temp 98.5  F (36.9  C) (Oral)   Resp 16   Ht 1.651 m (5' 5\")   Wt 121.9 kg (268 lb 11.9 oz)   SpO2 100%   BMI 44.72 kg/m       Pt A&Ox4. BP elevated (provider aware and PRN hydralazine + labetalol available - not needed overnight).     Abdominal incision ANGELES with ABD placed in fold. Abdominal binder on. Pt has mesh panties on w/ pad. Pad has small amount of dried blood.     Pt rated abdominal pain 1-7 overnight and received ibuprofen, tylenol, and oxycodone to manage pain.     Pt was not oob overnight. Has fonseca catheter in - draining adequate. LBM 4/19 prior to surgery. Pt not passing gas - bowel sounds audible.     R PIV infusing LR at 60 (ordered for 100 but provider told to decrease to 60 per evening nurse report). L PIV - SL.     Plan: Continue POC. Monitor for bleeding and manage pain.              "

## 2022-04-20 NOTE — PROVIDER NOTIFICATION
Notified cross-cover NP of BP still elevated after doses of Hydralazine and Labetalol. Pt otherwise asymptomatic. EKG ordered. Will plan to monitor BP q4h and given PRN hypertension meds as needed.

## 2022-04-20 NOTE — DISCHARGE SUMMARY
Gynecology Discharge Summary    Irma Evans    YOB: 1975  MRN# [id number]   Age: 46 year old         Date of Admission:  4/19/2022  Date of Discharge:  4/20/22  Admitting Physician:  Kelsey Love MD  Discharge Physician:  Tammi Ford MD  Discharging Service:  Gynecology     Primary Provider: Liz Baker          Admission Diagnoses:   Abnormal uterine bleeding  Fibroid uterus  Gender dysphoria  Hypertension  ADHD/MDD  Class III obesity          Discharge Diagnosis:   Abnormal uterine bleeding  Fibroid uterus  Gender dysphoria  Hypertension  ADHD/MDD  Class III obesity         Discharge Disposition:     Discharged to home           Procedures:   - Total abdominal hysterectomy, bilateral salpingectomy, Nexplanon removal          Medications Prior to Admission:     Medications Prior to Admission   Medication Sig Dispense Refill Last Dose     amphetamine-dextroamphetamine (ADDERALL) 20 MG tablet Take 1 tablet (20 mg) by mouth 2 times daily 180 tablet 0 Past Week at Unknown time     ethynodiol-ethinyl estradiol (ZOVIA) 1-50 MG-MCG tablet Take 3 pills a day for the next 3 days, then 2 pills a day for the next 2 days then 1 pill a day until follow-up with the OB/GYN clinic 56 tablet 1 4/18/2022 at 1100     ferrous sulfate (FEROSUL) 325 (65 Fe) MG tablet Take 325 mg by mouth daily (with breakfast)   Past Week at Unknown time     fluticasone (FLONASE) 50 MCG/ACT spray Spray 2 sprays into both nostrils daily (Patient taking differently: Spray 2 sprays into both nostrils daily as needed for rhinitis) 48 g 3 Past Week at Unknown time     hydrocortisone (WESTCORT) 0.2 % external cream Apply topically 2 times daily (Patient taking differently: Apply topically 2 times daily as needed) 60 g 1 Past Month at Unknown time     lisinopril (ZESTRIL) 5 MG tablet Take 1 tablet (5 mg) by mouth daily 90 tablet 0 4/18/2022 at 1100     minocycline (MINOCIN) 100 MG capsule Take 1 capsule (100 mg) by  "mouth 2 times daily 180 capsule 3 4/18/2022 at 1100     ondansetron (ZOFRAN ODT) 4 MG ODT tab Take 1 tablet (4 mg) by mouth every 8 hours as needed for nausea or vomiting 12 tablet 0 Past Week at Unknown time     testosterone cypionate (DEPOTESTOSTERONE) 200 MG/ML injection Inject 0.4 mLs (80 mg) into the muscle once a week In cottonseed oil ok. Pt needs 3 mo supply =13ml given on single use vials 13 mL 0 Past Month at Unknown time     estradiol (ESTRACE) 0.1 MG/GM vaginal cream Place 2 g vaginally twice a week (Patient taking differently: Place 2 g vaginally twice a week As needed) 42.5 g 3      ethynodiol-ethinyl estradiol (ZOVIA) 1-50 MG-MCG tablet Take 1 tablet by mouth daily for 10 days 28 tablet 0      etonogestrel (NEXPLANON) 68 MG IMPL 1 each (68 mg) by Subdermal route once        needle, disp, 18G X 1\" MISC 1 each once a week 15 each 3      Needle, Disp, 25G X 1\" MISC Use to inject testosterone into muscle weekly as directed 10 each 3      Sharps Container MISC Dispose sharps 1 each 3      syringe, disposable, (BD TUBERCULIN SYRINGE) 1 ML MISC BD 1ml Tuberculing syringe SLIP TIP (no needle attached). Use to administer IM medications as instructed 15 each 3              Discharge Medications:     Current Discharge Medication List      CONTINUE these medications which have NOT CHANGED    Details   amphetamine-dextroamphetamine (ADDERALL) 20 MG tablet Take 1 tablet (20 mg) by mouth 2 times daily  Qty: 180 tablet, Refills: 0    Associated Diagnoses: Attention deficit hyperactivity disorder (ADHD), combined type      ethynodiol-ethinyl estradiol (ZOVIA) 1-50 MG-MCG tablet Take 3 pills a day for the next 3 days, then 2 pills a day for the next 2 days then 1 pill a day until follow-up with the OB/GYN clinic  Qty: 56 tablet, Refills: 1      ferrous sulfate (FEROSUL) 325 (65 Fe) MG tablet Take 325 mg by mouth daily (with breakfast)      fluticasone (FLONASE) 50 MCG/ACT spray Spray 2 sprays into both nostrils " "daily  Qty: 48 g, Refills: 3    Associated Diagnoses: Chronic rhinitis      hydrocortisone (WESTCORT) 0.2 % external cream Apply topically 2 times daily  Qty: 60 g, Refills: 1    Associated Diagnoses: Eczema, unspecified type      lisinopril (ZESTRIL) 5 MG tablet Take 1 tablet (5 mg) by mouth daily  Qty: 90 tablet, Refills: 0    Associated Diagnoses: Essential hypertension      minocycline (MINOCIN) 100 MG capsule Take 1 capsule (100 mg) by mouth 2 times daily  Qty: 180 capsule, Refills: 3    Associated Diagnoses: Acne vulgaris      ondansetron (ZOFRAN ODT) 4 MG ODT tab Take 1 tablet (4 mg) by mouth every 8 hours as needed for nausea or vomiting  Qty: 12 tablet, Refills: 0      testosterone cypionate (DEPOTESTOSTERONE) 200 MG/ML injection Inject 0.4 mLs (80 mg) into the muscle once a week In cottonseed oil ok. Pt needs 3 mo supply =13ml given on single use vials  Qty: 13 mL, Refills: 0    Associated Diagnoses: Gender identity disorder      estradiol (ESTRACE) 0.1 MG/GM vaginal cream Place 2 g vaginally twice a week  Qty: 42.5 g, Refills: 3    Associated Diagnoses: Atrophic vaginitis      etonogestrel (NEXPLANON) 68 MG IMPL 1 each (68 mg) by Subdermal route once    Associated Diagnoses: Encounter for other contraceptive management; Nexplanon insertion      needle, disp, 18G X 1\" MISC 1 each once a week  Qty: 15 each, Refills: 3    Associated Diagnoses: Gender dysphoria      Needle, Disp, 25G X 1\" MISC Use to inject testosterone into muscle weekly as directed  Qty: 10 each, Refills: 3    Associated Diagnoses: Gender identity disorder      Sharps Container MISC Dispose sharps  Qty: 1 each, Refills: 3    Associated Diagnoses: Gender dysphoria      syringe, disposable, (BD TUBERCULIN SYRINGE) 1 ML MISC BD 1ml Tuberculing syringe SLIP TIP (no needle attached). Use to administer IM medications as instructed  Qty: 15 each, Refills: 3    Associated Diagnoses: Gender identity disorder                   Consultations: "     Internal medicine             Brief History of Illness:   Trevin Evans is a 46 year old trans male with AUB secondary to fibroid uterus who has failed medical management with chronic blood loss anemia. He desires definitive management as he does not desire fertility.  Following a thorough discussion of the risks, benefits, indications and alternatives he consented to the above procedure. He was admitted post-operatively for recovery.         Intra-operative Course:   Surgery was uncomplicated.  ml.   Findings:  On EUA, enlarged mobile uterus (14 week size). Clitoral hypertrophy with normal caliber legnth vagina and normal cervix. On laparotomy, enlarged bulky uterus with fundal intramural fibroid taking up the entirety of the uterine body. Small atrophic ovaries and normal bilateral tubes. Hemostasis of the surgical pedicles at the end of the case. Bilateral ureters visualized and palpated transperitoneally. Nexplanon removed intact from the left arm.          Hospital Course:     In the early post-op period, Trevin had elevated blood pressure requiring IV antihypertensives. Internal medicine was consulted to assist with BP management.     On POD#1 his fonseca was removed and he was voiding. He ambulated without difficulty. Pain was well controlled. He was tolerating a regular diet. Passing flatus.  Blood pressures had improved on home lisinopril dose.        DC Hemoglobin was stable at 9.8          Discharge Instructions and Follow-Up:     Discharge diet: Regular   Discharge activity: No lifting, driving, or strenuous exercise for 6 week(s)  No driving or operating machinery while on narcotic analgesics  Pelvic rest: abstain from intercourse and do not use tampons for 6 week(s)   Discharge follow-up: Follow up with Dr. Love 6/2/22   Other instructions: None      Khadijah Maurice MD, MPH  OBGYN PGY-4  4/20/2022 3:37 PM     [facility] staff:  Appreciate note by Dr. Maurice.  I have seen and examined  the patient without the resident. I have reviewed, edited, and agree with the note.      Tammi Ford MD, FACOG  4/20/2022  5:27 PM

## 2022-04-21 ENCOUNTER — PATIENT OUTREACH (OUTPATIENT)
Dept: CARE COORDINATION | Facility: CLINIC | Age: 47
End: 2022-04-21
Payer: COMMERCIAL

## 2022-04-21 DIAGNOSIS — Z71.89 OTHER SPECIFIED COUNSELING: ICD-10-CM

## 2022-04-21 NOTE — PROGRESS NOTES
Clinic Care Coordination Contact  [facility]       Clinical Data: Care Coordinator Outreach  Outreach attempted x 2.  Left message on patient's voicemail with call back information and requested return call.  Plan:Care Coordinator will try to reach patient again in 1-2 business days.    HOME [place]  [phone]  [facility]      Clinic Care Coordination Contact  [facility]       Clinical Data: Care Coordinator Outreach  Outreach attempted x 1.  Left message on patient's voicemail with call back information and requested return call.  Plan: Care Coordinator will try to reach patient again in 1-2 business days.    HOME [place]  [phone]  [facility]

## 2022-04-21 NOTE — PROGRESS NOTES
DISCHARGE SUMMARY    Pt discharging to: home with sister   Transportation: sister   AVS given and discussed: yes with all questions answered   Medications given: yes   Belongings returned: yes   Comments:

## 2022-04-21 NOTE — TELEPHONE ENCOUNTER
Forms refaxed today as per chart note for first BATS message opened for this paperwork.          Patient is reaching out to see if this was signed and faxed back.  They have not seen any fax come back yet per note below.  Please fax signed short term disability forms back to [phone] today please.  Thank you.

## 2022-04-25 ENCOUNTER — TELEPHONE (OUTPATIENT)
Dept: OBGYN | Facility: CLINIC | Age: 47
End: 2022-04-25
Payer: COMMERCIAL

## 2022-04-25 NOTE — TELEPHONE ENCOUNTER
Forms were just faxed and emailed as per note        Forms were faxed 4-10, 4-20 and now 4-26-22- talked to patient and will email them a copy as well.    [facility] Call Center    Phone Message    May a detailed message be left on voicemail: yes     Reason for Call: Per patient, short term disability forms were sent on 4/8/22, 4/19/22 and 4/21/22.  Patient to looking to get a status on the forms as patient is out of work and not getting paid.  Please reach out to patient.    Action Taken: Message routed to:  [facility] ([facility]): S    Travel Screening: Not Applicable

## 2022-04-25 NOTE — TELEPHONE ENCOUNTER
Received fax for short term disability paperwork to be completed.     Fax placed in Dr. Love's basket in the nurse triage area.     - Kim Beverly

## 2022-04-27 NOTE — TELEPHONE ENCOUNTER
Forms found and corrected to start date of 3/24/22 from Dr Ruggiero letter.  Paperwork placed on Dr Love's desk to complete and sign.

## 2022-04-28 LAB
PATH REPORT.COMMENTS IMP SPEC: NORMAL
PATH REPORT.FINAL DX SPEC: NORMAL
PATH REPORT.GROSS SPEC: NORMAL
PATH REPORT.MICROSCOPIC SPEC OTHER STN: NORMAL
PATH REPORT.MICROSCOPIC SPEC OTHER STN: NORMAL
PATH REPORT.RELEVANT HX SPEC: NORMAL
PHOTO IMAGE: NORMAL

## 2022-05-04 ENCOUNTER — TELEPHONE (OUTPATIENT)
Dept: OBGYN | Facility: CLINIC | Age: 47
End: 2022-05-04
Payer: COMMERCIAL

## 2022-05-06 ENCOUNTER — TELEPHONE (OUTPATIENT)
Dept: OBGYN | Facility: CLINIC | Age: 47
End: 2022-05-06
Payer: COMMERCIAL

## 2022-05-08 ENCOUNTER — HEALTH MAINTENANCE LETTER (OUTPATIENT)
Age: 47
End: 2022-05-08

## 2022-05-11 ENCOUNTER — MYC MEDICAL ADVICE (OUTPATIENT)
Dept: FAMILY MEDICINE | Facility: CLINIC | Age: 47
End: 2022-05-11
Payer: COMMERCIAL

## 2022-05-11 DIAGNOSIS — Z91.89 AT RISK FOR OBSTRUCTIVE SLEEP APNEA: Primary | ICD-10-CM

## 2022-05-11 NOTE — TELEPHONE ENCOUNTER
"Wt Readings from Last 10 Encounters:   04/19/22 121.9 kg (268 lb 11.9 oz)   03/24/22 120.2 kg (265 lb)   03/15/22 124.7 kg (275 lb)   02/25/22 122.4 kg (269 lb 12.8 oz)   12/15/21 123.3 kg (271 lb 12.8 oz)   08/30/21 124.3 kg (274 lb)   04/26/21 115.2 kg (254 lb)   09/04/20 113.5 kg (250 lb 3.2 oz)   08/05/20 116.1 kg (256 lb)   10/14/19 104.5 kg (230 lb 6.4 oz)     Ht Readings from Last 2 Encounters:   04/19/22 1.651 m (5' 5\")   03/24/22 1.676 m (5' 6\")     Stop bang minimum 4/8  "

## 2022-05-12 ENCOUNTER — MYC REFILL (OUTPATIENT)
Dept: FAMILY MEDICINE | Facility: CLINIC | Age: 47
End: 2022-05-12
Payer: COMMERCIAL

## 2022-05-12 DIAGNOSIS — F64.9 GENDER IDENTITY DISORDER: ICD-10-CM

## 2022-05-13 ENCOUNTER — MYC MEDICAL ADVICE (OUTPATIENT)
Dept: FAMILY MEDICINE | Facility: CLINIC | Age: 47
End: 2022-05-13
Payer: COMMERCIAL

## 2022-05-13 DIAGNOSIS — F64.9 GENDER IDENTITY DISORDER: ICD-10-CM

## 2022-05-13 NOTE — TELEPHONE ENCOUNTER

## 2022-05-16 RX ORDER — TESTOSTERONE CYPIONATE 200 MG/ML
80 INJECTION, SOLUTION INTRAMUSCULAR WEEKLY
Qty: 13 ML | Refills: 0 | Status: SHIPPED | OUTPATIENT
Start: 2022-05-16 | End: 2022-05-16

## 2022-05-16 RX ORDER — TESTOSTERONE CYPIONATE 200 MG/ML
80 INJECTION, SOLUTION INTRAMUSCULAR WEEKLY
Qty: 13 ML | Refills: 0 | Status: SHIPPED | OUTPATIENT
Start: 2022-05-16 | End: 2022-11-19

## 2022-06-02 ENCOUNTER — OFFICE VISIT (OUTPATIENT)
Dept: OBGYN | Facility: CLINIC | Age: 47
End: 2022-06-02
Attending: OBSTETRICS & GYNECOLOGY
Payer: COMMERCIAL

## 2022-06-02 VITALS
BODY MASS INDEX: 44.85 KG/M2 | DIASTOLIC BLOOD PRESSURE: 80 MMHG | HEIGHT: 65 IN | SYSTOLIC BLOOD PRESSURE: 117 MMHG | WEIGHT: 269.2 LBS | HEART RATE: 91 BPM

## 2022-06-02 DIAGNOSIS — Z98.890 POSTOPERATIVE STATE: Primary | ICD-10-CM

## 2022-06-02 DIAGNOSIS — D06.1 CARCINOMA IN SITU OF EXOCERVIX: ICD-10-CM

## 2022-06-02 PROCEDURE — G0463 HOSPITAL OUTPT CLINIC VISIT: HCPCS

## 2022-06-02 PROCEDURE — 99024 POSTOP FOLLOW-UP VISIT: CPT | Performed by: OBSTETRICS & GYNECOLOGY

## 2022-06-02 NOTE — LETTER
June 2, 2022        Irma Evans  [address]  [place] [address]    To Whom it May Concern:    Irma Evans was seen in our office on 6/2/2022 and was given the following instructions:  Patient may return to work on 6/3/2022 without restrictions.    Sincerely,        Kelsey Love MD

## 2022-06-02 NOTE — LETTER
"6/2/2022       RE: Irma Evans  [address] [place] [address]     Dear Colleague,    Thank you for referring your patient, Irma Evans, to the [facility] [place] [facility]. Please see a copy of my visit note below.    S:  Pt is 45 y/o tans male s/p CATHRYN/BS on 4/19/22 secondary to AUB due to large fibroids and gender dysphoria who present today for post operative follow up.  He has been doing very well since his procedure-feeling well recovered, ready to go back to work.  He had a small amount of bleeding after IC a day ago, but otherwise has not had any bleeding.  He previously discussed the KETURAH III on final pathology with Dr. Ruggiero.     O: /80 (BP Location: Right arm, Patient Position: Chair)   Pulse 91   Ht 1.651 m (5' 5\")   Wt 122.1 kg (269 lb 3.2 oz)   BMI 44.80 kg/m    gen-pleasant, NA  abd-soft, NT  Inc-well healed  Pelvic exam-decline    Case Report   Surgical Pathology Report                         Case: [id number]                                   Authorizing Provider:  Kelsey Love       Collected:           04/19/2022 10:29 AM                                  MD Chante                                                                     Ordering Location:     [facility]                 Received:            04/19/2022 11:14 AM           Pathologist:           Gt Garay MD                                                              Specimen:    Uterus, Cervix, Bilateral Fallopian Tubes                                                  Final Diagnosis   Uterus, Cervix, & Bilateral Fallopian Tubes, Total Abdominal Hysterectomy, Bilateral Salpingectomy:  - Inactive endometrium with atrophic changes  - Leiomyomas  - High grade squamous intraepithelial lesion (cervical intraepithelial neoplasia 3); " cervical resection margin negative for dysplasia    - Fallopian tubes, with no significant histological abnormalities   Electronically signed by Gt Garay MD on 4/28/2022 at  3:45 PM     A:  45 y/o doing well s/p CATHRYN/BS.  Unexpected pathology result of KETURAH III.      P:  May return to work with no restrictions at this time-letter given.  Reviewed the KETURAH III results-current ASCCP guidelines recommend annual pap with HPV co-testing x 3 then long term surveillance with pap/HPV co-testing every 3 years to complete 25 years of screening.  Reviewed these recommendations today.  He was comfortable with continuing long term surveillance and had no reservations about returning in a year for a pap only visit.      Kelsey Love MD, FACOG

## 2022-06-02 NOTE — PROGRESS NOTES
"S:  Pt is 47 y/o tans male s/p CATHRYN/BS on 4/19/22 secondary to AUB due to large fibroids and gender dysphoria who present today for post operative follow up.  He has been doing very well since his procedure-feeling well recovered, ready to go back to work.  He had a small amount of bleeding after IC a day ago, but otherwise has not had any bleeding.  He previously discussed the KETURAH III on final pathology with Dr. Ruggiero.     O: /80 (BP Location: Right arm, Patient Position: Chair)   Pulse 91   Ht 1.651 m (5' 5\")   Wt 122.1 kg (269 lb 3.2 oz)   BMI 44.80 kg/m    gen-pleasant, NA  abd-soft, NT  Inc-well healed  Pelvic exam-decline    Case Report   Surgical Pathology Report                         Case: [id number]                                   Authorizing Provider:  Kelsey Love       Collected:           04/19/2022 10:29 AM                                  MD Chante                                                                     Ordering Location:     [facility]                 Received:            04/19/2022 11:14 AM           Pathologist:           Gt Garay MD                                                              Specimen:    Uterus, Cervix, Bilateral Fallopian Tubes                                                  Final Diagnosis   Uterus, Cervix, & Bilateral Fallopian Tubes, Total Abdominal Hysterectomy, Bilateral Salpingectomy:  - Inactive endometrium with atrophic changes  - Leiomyomas  - High grade squamous intraepithelial lesion (cervical intraepithelial neoplasia 3); cervical resection margin negative for dysplasia    - Fallopian tubes, with no significant histological abnormalities   Electronically signed by Gt Garay MD on 4/28/2022 at  3:45 PM     A:  47 y/o doing well s/p CATHRYN/BS.  Unexpected pathology result of KETURAH III.      P:  May return to work " with no restrictions at this time-letter given.  Reviewed the KETURAH III results-current ASCCP guidelines recommend annual pap with HPV co-testing x 3 then long term surveillance with pap/HPV co-testing every 3 years to complete 25 years of screening.  Reviewed these recommendations today.  He was comfortable with continuing long term surveillance and had no reservations about returning in a year for a pap only visit.      Kelsey Love MD, FACOG

## 2022-06-10 ENCOUNTER — MYC REFILL (OUTPATIENT)
Dept: FAMILY MEDICINE | Facility: CLINIC | Age: 47
End: 2022-06-10
Payer: COMMERCIAL

## 2022-06-10 DIAGNOSIS — I10 ESSENTIAL HYPERTENSION: ICD-10-CM

## 2022-06-10 NOTE — TELEPHONE ENCOUNTER

## 2022-06-14 RX ORDER — LISINOPRIL 5 MG/1
5 TABLET ORAL DAILY
Qty: 90 TABLET | Refills: 0 | Status: SHIPPED | OUTPATIENT
Start: 2022-06-14 | End: 2022-09-15

## 2022-07-19 ENCOUNTER — MYC REFILL (OUTPATIENT)
Dept: FAMILY MEDICINE | Facility: CLINIC | Age: 47
End: 2022-07-19

## 2022-07-19 DIAGNOSIS — F90.2 ATTENTION DEFICIT HYPERACTIVITY DISORDER (ADHD), COMBINED TYPE: ICD-10-CM

## 2022-07-20 ENCOUNTER — MYC MEDICAL ADVICE (OUTPATIENT)
Dept: FAMILY MEDICINE | Facility: CLINIC | Age: 47
End: 2022-07-20

## 2022-07-20 DIAGNOSIS — F90.2 ATTENTION DEFICIT HYPERACTIVITY DISORDER (ADHD), COMBINED TYPE: ICD-10-CM

## 2022-07-20 RX ORDER — DEXTROAMPHETAMINE SACCHARATE, AMPHETAMINE ASPARTATE, DEXTROAMPHETAMINE SULFATE AND AMPHETAMINE SULFATE 5; 5; 5; 5 MG/1; MG/1; MG/1; MG/1
20 TABLET ORAL 2 TIMES DAILY
Qty: 60 TABLET | Refills: 0 | Status: SHIPPED | OUTPATIENT
Start: 2022-07-20 | End: 2022-08-17

## 2022-07-20 RX ORDER — DEXTROAMPHETAMINE SACCHARATE, AMPHETAMINE ASPARTATE, DEXTROAMPHETAMINE SULFATE AND AMPHETAMINE SULFATE 5; 5; 5; 5 MG/1; MG/1; MG/1; MG/1
20 TABLET ORAL 2 TIMES DAILY
Qty: 180 TABLET | Refills: 0 | OUTPATIENT
Start: 2022-07-20

## 2022-07-20 NOTE — TELEPHONE ENCOUNTER
"Last OV 2/25/2022 with PCP, last filled for 90d 4/13/2022.    Request for medication refill:    Providers if patient needs an appointment and you are willing to give a one month supply please refill for one month and  send a letter/MyChart using \".SMILLIMITEDREFILL\" .smillimited and route chart to \"P SMI \" (Giving one month refill in non controlled medications is strongly recommended before denial)    If refill has been denied, meaning absolutely no refills without visit, please complete the smart phrase \".smirxrefuse\" and route it to the \"P SMI MED REFILLS\"  pool to inform the patient and the pharmacy.    Letitia Au RN        "

## 2022-07-20 NOTE — TELEPHONE ENCOUNTER
Patient now scheduled for appointment on 8/17/22 at 4:20, routing to PCP for bridge if appropriate.  Letitia Au RN

## 2022-07-20 NOTE — TELEPHONE ENCOUNTER
Medication Refill Denied  Reason: Patient needs: provider visit per controlled substance clinic guidelines.   Provider:  I have NOT called/MyCharted the patient and informed them of the Rx denial.    PCS/ : Please schedule visit for ADHD med refill.    Chip Willoughby, DO

## 2022-08-16 PROBLEM — D25.0 INTRAMURAL AND SUBMUCOUS LEIOMYOMA OF UTERUS: Status: RESOLVED | Noted: 2022-04-20 | Resolved: 2022-08-16

## 2022-08-16 PROBLEM — N93.9 ABNORMAL UTERINE BLEEDING (AUB): Status: RESOLVED | Noted: 2022-03-24 | Resolved: 2022-08-16

## 2022-08-16 PROBLEM — D25.1 INTRAMURAL AND SUBMUCOUS LEIOMYOMA OF UTERUS: Status: RESOLVED | Noted: 2022-04-20 | Resolved: 2022-08-16

## 2022-08-16 PROBLEM — N93.9 ABNORMAL UTERINE BLEEDING: Status: RESOLVED | Noted: 2022-04-19 | Resolved: 2022-08-16

## 2022-08-17 ENCOUNTER — OFFICE VISIT (OUTPATIENT)
Dept: FAMILY MEDICINE | Facility: CLINIC | Age: 47
End: 2022-08-17
Payer: COMMERCIAL

## 2022-08-17 VITALS
RESPIRATION RATE: 16 BRPM | BODY MASS INDEX: 44.93 KG/M2 | HEART RATE: 95 BPM | SYSTOLIC BLOOD PRESSURE: 136 MMHG | TEMPERATURE: 98.1 F | DIASTOLIC BLOOD PRESSURE: 88 MMHG | OXYGEN SATURATION: 100 % | WEIGHT: 270 LBS

## 2022-08-17 DIAGNOSIS — D06.1 CARCINOMA IN SITU OF EXOCERVIX: ICD-10-CM

## 2022-08-17 DIAGNOSIS — F90.2 ATTENTION DEFICIT HYPERACTIVITY DISORDER (ADHD), COMBINED TYPE: Primary | ICD-10-CM

## 2022-08-17 DIAGNOSIS — Z20.2 CONTACT WITH AND (SUSPECTED) EXPOSURE TO INFECTIONS WITH A PREDOMINANTLY SEXUAL MODE OF TRANSMISSION: ICD-10-CM

## 2022-08-17 LAB
AMPHETAMINES UR QL: DETECTED
BARBITURATES UR QL SCN: NOT DETECTED
BENZODIAZ UR QL SCN: NOT DETECTED
BUPRENORPHINE UR QL: NOT DETECTED
CANNABINOIDS UR QL: DETECTED
CLUE CELLS: NORMAL
COCAINE UR QL SCN: NOT DETECTED
D-METHAMPHET UR QL: DETECTED
METHADONE UR QL SCN: NOT DETECTED
OPIATES UR QL SCN: NOT DETECTED
OXYCODONE UR QL SCN: NOT DETECTED
PCP UR QL SCN: NOT DETECTED
PROPOXYPH UR QL: NOT DETECTED
TRICHOMONAS, WET PREP: NORMAL
TRICYCLICS UR QL SCN: NOT DETECTED
WBC'S/HIGH POWER FIELD, WET PREP: NORMAL
YEAST, WET PREP: NORMAL

## 2022-08-17 PROCEDURE — 36415 COLL VENOUS BLD VENIPUNCTURE: CPT | Performed by: FAMILY MEDICINE

## 2022-08-17 PROCEDURE — 87491 CHLMYD TRACH DNA AMP PROBE: CPT | Performed by: FAMILY MEDICINE

## 2022-08-17 PROCEDURE — 86803 HEPATITIS C AB TEST: CPT | Performed by: FAMILY MEDICINE

## 2022-08-17 PROCEDURE — 86780 TREPONEMA PALLIDUM: CPT | Performed by: FAMILY MEDICINE

## 2022-08-17 PROCEDURE — 87389 HIV-1 AG W/HIV-1&-2 AB AG IA: CPT | Performed by: FAMILY MEDICINE

## 2022-08-17 PROCEDURE — 86706 HEP B SURFACE ANTIBODY: CPT | Performed by: FAMILY MEDICINE

## 2022-08-17 PROCEDURE — 87591 N.GONORRHOEAE DNA AMP PROB: CPT | Performed by: FAMILY MEDICINE

## 2022-08-17 PROCEDURE — 80306 DRUG TEST PRSMV INSTRMNT: CPT | Performed by: FAMILY MEDICINE

## 2022-08-17 PROCEDURE — 99214 OFFICE O/P EST MOD 30 MIN: CPT | Performed by: FAMILY MEDICINE

## 2022-08-17 PROCEDURE — 87210 SMEAR WET MOUNT SALINE/INK: CPT | Performed by: FAMILY MEDICINE

## 2022-08-17 RX ORDER — DEXTROAMPHETAMINE SACCHARATE, AMPHETAMINE ASPARTATE, DEXTROAMPHETAMINE SULFATE AND AMPHETAMINE SULFATE 5; 5; 5; 5 MG/1; MG/1; MG/1; MG/1
20 TABLET ORAL 2 TIMES DAILY
Qty: 180 TABLET | Refills: 0 | Status: SHIPPED | OUTPATIENT
Start: 2022-08-17 | End: 2022-11-15

## 2022-08-17 RX ORDER — EMTRICITABINE AND TENOFOVIR DISOPROXIL FUMARATE 200; 300 MG/1; MG/1
1 TABLET, FILM COATED ORAL DAILY
Qty: 90 TABLET | Refills: 0 | Status: SHIPPED | OUTPATIENT
Start: 2022-08-17 | End: 2022-12-23

## 2022-08-17 NOTE — PROGRESS NOTES
Assessment & Plan     Attention deficit hyperactivity disorder (ADHD), combined type  Significant struggles with concentration w/o meds. Still has issues completing tasks. Drug screening ordered today for annual screen. He notes that he uses marijuana and has exposure to someone else who uses other drugs, though I doubt this would cause a positive in him. Use has been appropriate so his refill pattern recommend seen every 6 months and can request refill every 3 months via [employer]. pdmp reviewed and only other controlled substance not currently in his med list was oxycodone related to a surgery he had in April.  - Urine Drugs of Abuse Screen Panel 13  - Urine Drugs of Abuse Screen Panel 13  - amphetamine-dextroamphetamine (ADDERALL) 20 MG tablet  Dispense: 180 tablet; Refill: 0    Carcinoma in situ of exocervix  Diagnosed via pathology from hysterectomy specimen by Dr. Love. Discussed annual co-testing x3 but a need to complete 25 years of screening which he expressed frustration about.     Contact with and (suspected) exposure to infections with a predominantly sexual mode of transmission  Has a serodiscordant partner who has an undetectable viral load which makes him unconcerned about HIV acquisition. Has multiple patterns, one who uses needles to inject drugs. Discussed PrEP. Has been on truvada before, disliked the size but doesn't want to pay more for Descovy. Due for STI screening, no receptive anal sex, so screen throat, front hole, blood, and urine. Added on hep c screening because of risk factors. Results via [facility]. Will be due for 6 month bun/creatinine so this was added on.  - Neisseria gonorrhoeae PCR  - Chlamydia trachomatis PCR  - Wet Prep  - Treponema Abs w Reflex to RPR and Titer  - HIV Antigen Antibody Combo  - Chlamydia trachomatis/Neisseria gonorrhoeae by PCR - Clinic Collect  - Chlamydia trachomatis/Neisseria gonorrhoeae by PCR - Clinic Collect  - Hepatitis C Screen Reflex to HCV RNA Quant  and Genotype  - Hepatitis B Surface Antibody  - emtricitabine-tenofovir (TRUVADA) 200-300 MG per tablet  Dispense: 90 tablet; Refill: 0  - Neisseria gonorrhoeae PCR  - Chlamydia trachomatis PCR  - Treponema Abs w Reflex to RPR and Titer  - HIV Antigen Antibody Combo  - Hepatitis C Screen Reflex to HCV RNA Quant and Genotype  - Hepatitis B Surface Antibody  - Basic metabolic panel      Prescription drug management 2 or more stable chronic illnesses        The information in this document, created by the medical scribe for me, accurately reflects the services I personally performed and the decisions made by me. I have reviewed and approved this document for accuracy prior to leaving the patient care area.  Liz Baker MD  4:38 PM, 08/17/22  [facility] KYLAH Zhong is a 46 year old, presenting for the following health issues:  Recheck Medication (Medication follow up for adderall ) and STD (STD testing, no known symptoms or exposures )      HPI   Preventative  He had covid in June and reports residual coughing symptoms that will go away with a cough drop. He received his first monkeypox vaccine. He states some throat irritation, but will notice when he has been exposed to AC for long periods of time.    ADHD  He reports adderall will help for a few hours, but then he will lose focus. He is taking it twice a day most of the time. He will forget to take a second dose on the weekends.     Social  He has been waiting for previous tenants to move out of his apartment. They finally moved out and he is working on cleaning up the mess they left behind. He is not moving in until his place is clean. He likes his new building and is excited to move in.      STI  He states no known exposure to STI's. Only performs oral sex or receptive vaginal. He knows of partners with HIV infection, but they are undetectable. Does have concerns that his drug screen will become positive due to practice of partner  emptying bladder inside him.     Review of Systems   Positive for: cough, throat irritation  Negative for: fever, body aches    This document serves as a record of the services and decisions personally performed and made by Liz Baker MD. It was created on his/her behalf by Dorene Lazo, a trained medical scribe. The creation of this document is based the provider's statements to the medical scribe.  Scribe Dorene Lazo 4:39 PM, August 17, 2022        Objective    /88   Pulse 95   Temp 98.1  F (36.7  C) (Oral)   Resp 16   Wt 122.5 kg (270 lb)   SpO2 100%   BMI 44.93 kg/m    Body mass index is 44.93 kg/m .  Physical Exam   GENERAL: healthy, alert and no distress  NEURO: Normal strength and tone, mentation intact and speech normal  PSYCH: mentation appears normal. Speech is normal in rate, slightly loud in volume but stable from previous. Affect normal/bright                  .  ..

## 2022-08-17 NOTE — PATIENT INSTRUCTIONS
ADHD  Message me at the 3 month manjula when you are running out of pills and I will set up a refill for another 3 months.     Preventative  Start taking Truvada

## 2022-08-18 LAB
C TRACH DNA SPEC QL NAA+PROBE: NEGATIVE
C TRACH DNA SPEC QL PROBE+SIG AMP: NEGATIVE
HBV SURFACE AB SERPL IA-ACNC: 1.84 M[IU]/ML
HBV SURFACE AB SERPL IA-ACNC: NONREACTIVE M[IU]/ML
HCV AB SERPL QL IA: NONREACTIVE
HIV 1+2 AB+HIV1 P24 AG SERPL QL IA: NONREACTIVE
N GONORRHOEA DNA SPEC QL NAA+PROBE: NEGATIVE
N GONORRHOEA DNA SPEC QL NAA+PROBE: NEGATIVE
T PALLIDUM AB SER QL: NONREACTIVE

## 2022-08-23 ENCOUNTER — MYC MEDICAL ADVICE (OUTPATIENT)
Dept: FAMILY MEDICINE | Facility: CLINIC | Age: 47
End: 2022-08-23

## 2022-08-23 DIAGNOSIS — F90.2 ATTENTION DEFICIT HYPERACTIVITY DISORDER (ADHD), COMBINED TYPE: Primary | ICD-10-CM

## 2022-08-24 RX ORDER — DEXTROAMPHETAMINE SACCHARATE, AMPHETAMINE ASPARTATE, DEXTROAMPHETAMINE SULFATE, AND AMPHETAMINE SULFATE 5; 5; 5; 5 MG/1; MG/1; MG/1; MG/1
20 TABLET ORAL 2 TIMES DAILY
Qty: 60 TABLET | Refills: 0 | Status: SHIPPED | OUTPATIENT
Start: 2022-08-24 | End: 2022-09-12

## 2022-09-02 NOTE — TELEPHONE ENCOUNTER
RN called the number for patient. Looks like they have a new fax number for the request that wasn't on the form. Will refax form to [phone].     Sonia Jones RN

## 2022-09-08 ENCOUNTER — MYC MEDICAL ADVICE (OUTPATIENT)
Dept: FAMILY MEDICINE | Facility: CLINIC | Age: 47
End: 2022-09-08

## 2022-09-08 DIAGNOSIS — F90.2 ATTENTION DEFICIT HYPERACTIVITY DISORDER (ADHD), COMBINED TYPE: ICD-10-CM

## 2022-09-12 RX ORDER — DEXTROAMPHETAMINE SACCHARATE, AMPHETAMINE ASPARTATE, DEXTROAMPHETAMINE SULFATE, AND AMPHETAMINE SULFATE 5; 5; 5; 5 MG/1; MG/1; MG/1; MG/1
20 TABLET ORAL 2 TIMES DAILY
Qty: 120 TABLET | Refills: 0 | Status: SHIPPED | OUTPATIENT
Start: 2022-09-12 | End: 2022-12-16

## 2022-09-14 DIAGNOSIS — I10 ESSENTIAL HYPERTENSION: ICD-10-CM

## 2022-09-15 RX ORDER — LISINOPRIL 5 MG/1
5 TABLET ORAL DAILY
Qty: 90 TABLET | Refills: 0 | Status: SHIPPED | OUTPATIENT
Start: 2022-09-15 | End: 2022-12-16

## 2022-10-02 ENCOUNTER — OFFICE VISIT (OUTPATIENT)
Dept: URGENT CARE | Facility: URGENT CARE | Age: 47
End: 2022-10-02
Payer: COMMERCIAL

## 2022-10-02 ENCOUNTER — NURSE TRIAGE (OUTPATIENT)
Dept: NURSING | Facility: CLINIC | Age: 47
End: 2022-10-02

## 2022-10-02 VITALS
DIASTOLIC BLOOD PRESSURE: 96 MMHG | WEIGHT: 251.6 LBS | BODY MASS INDEX: 41.87 KG/M2 | OXYGEN SATURATION: 100 % | SYSTOLIC BLOOD PRESSURE: 137 MMHG | TEMPERATURE: 97 F | HEART RATE: 109 BPM

## 2022-10-02 DIAGNOSIS — J02.0 STREP THROAT: Primary | ICD-10-CM

## 2022-10-02 DIAGNOSIS — R07.0 THROAT PAIN: ICD-10-CM

## 2022-10-02 LAB — DEPRECATED S PYO AG THROAT QL EIA: POSITIVE

## 2022-10-02 PROCEDURE — 87880 STREP A ASSAY W/OPTIC: CPT | Performed by: PHYSICIAN ASSISTANT

## 2022-10-02 PROCEDURE — 99213 OFFICE O/P EST LOW 20 MIN: CPT | Performed by: PHYSICIAN ASSISTANT

## 2022-10-02 RX ORDER — AZITHROMYCIN 250 MG/1
TABLET, FILM COATED ORAL
Qty: 6 TABLET | Refills: 0 | Status: SHIPPED | OUTPATIENT
Start: 2022-10-02 | End: 2022-10-07

## 2022-10-02 ASSESSMENT — ENCOUNTER SYMPTOMS: SORE THROAT: 1

## 2022-10-02 NOTE — TELEPHONE ENCOUNTER
Strep throat, Rx for zpac, UC this morning    Patient reports pharmacy states their is no Rx for him there.     FNA attempted to call [facility] to confirm receipt but was placed on a long hold. Patient declined for FNA to remain on hold with [facility] and will go there to see if Rx has been received as it wasn't the first time patient was there.     Information only; did not triage.    Norma Gregorio RN on 10/2/2022 at 10:46 AM      Reason for Disposition    [1] Follow-up call to recent contact AND [2] information only call, no triage required    Additional Information    Negative: [1] Caller is not with the adult (patient) AND [2] probable NON-URGENT symptoms    Negative: Question about upcoming scheduled test, no triage required and triager able to answer question    Negative: General information question, no triage required and triager able to answer question    Negative: Health Information question, no triage required and triager able to answer question    Negative: Requesting regular office appointment    Negative: [1] Caller requesting NON-URGENT health information AND [2] PCP's office is the best resource    Negative: RN needs further essential information from caller in order to complete triage    Negative: [1] Caller is not with the adult (patient) AND [2] reporting urgent symptoms    Negative: Lab result questions    Negative: Medication questions    Negative: Caller can't be reached by phone    Negative: Caller has already spoken to PCP or another triager    Protocols used: IN[facility] ONLY CALL - [facility]

## 2022-10-02 NOTE — PROGRESS NOTES
SUBJECTIVE:   Irma Evans is a 46 year old adult presenting with a chief complaint of   Chief Complaint   Patient presents with     Throat Pain     Otalgia     Both right more than left       He is an established patient of [facility].  Patient presents with complaints of ear pain and ST x 2 days. Has had strep frequently.  Approximately 2 times a year.  Has not had since last year.         Review of Systems   HENT: Positive for ear pain and sore throat.    All other systems reviewed and are negative.      Past Medical History:   Diagnosis Date     Abnormal uterine bleeding      Attention deficit hyperactivity disorder (ADHD), combined type 10/20/2016    To be seen every 6 months, can request refill once between visits by [facility]  Rapid drug screen annually done last April 2021     Broken foot 01/2012     Depressive disorder      Gender dysphoria 12/12/2012     Hypertension      Migraines since [place] school     CASSIDY (nonalcoholic steatohepatitis) 01/17/2013     Patient overweight     BMI 56     Postoperative hypertension 4/19/2022     Seasonal allergies     spring     Family History   Problem Relation Age of Onset     Arthritis Mother      Alcohol/Drug Mother      Asthma Mother      Depression Mother         and many on mom's side of family     Hypertension Mother      Thyroid Disease Mother         s/p removal     Alcohol/Drug Father      Hypertension Father      Obesity Father      Stomach Cancer Father      Gastrointestinal Disease Sister         CROHN disease     Hypertension Maternal Uncle      Hypertension Maternal Grandfather      Arthritis Maternal Grandfather      Attention Deficit Disorder Other      Current Outpatient Medications   Medication Sig Dispense Refill     ADDERALL 20 MG tablet Take 1 tablet (20 mg) by mouth 2 times daily for 60 days 120 tablet 0     amphetamine-dextroamphetamine (ADDERALL) 20 MG tablet Take 1 tablet (20 mg) by mouth 2 times daily for 90 days 180 tablet 0     azithromycin  "(ZITHROMAX) 250 MG tablet Take 2 tablets (500 mg) by mouth daily for 1 day, THEN 1 tablet (250 mg) daily for 4 days. 6 tablet 0     emtricitabine-tenofovir (TRUVADA) 200-300 MG per tablet Take 1 tablet by mouth daily 90 tablet 0     lisinopril (ZESTRIL) 5 MG tablet Take 1 tablet (5 mg) by mouth daily 90 tablet 0     minocycline (MINOCIN) 100 MG capsule Take 1 capsule (100 mg) by mouth 2 times daily 180 capsule 3     needle, disp, 18G X 1\" MISC 1 each once a week 15 each 3     Needle, Disp, 25G X 1\" MISC Use to inject testosterone into muscle weekly as directed 10 each 3     Sharps Container MISC Dispose sharps 1 each 3     syringe, disposable, (BD TUBERCULIN SYRINGE) 1 ML MISC BD 1ml Tuberculing syringe SLIP TIP (no needle attached). Use to administer IM medications as instructed 15 each 3     testosterone cypionate (DEPOTESTOSTERONE) 200 MG/ML injection Inject 0.4 mLs (80 mg) into the muscle once a week In cottonseed oil ok. Pt needs 3 mo supply =13ml given on single use vials 13 mL 0     Social History     Tobacco Use     Smoking status: Former Smoker     Packs/day: 1.00     Years: 12.00     Pack years: 12.00     Types: Cigarettes     Smokeless tobacco: Never Used     Tobacco comment: quit 6/29/13   Substance Use Topics     Alcohol use: Yes     Comment: Occ       OBJECTIVE  BP (!) 137/96   Pulse 109   Temp 97  F (36.1  C) (Tympanic)   Wt 114.1 kg (251 lb 9.6 oz)   SpO2 100%   BMI 41.87 kg/m      Physical Exam  Vitals and nursing note reviewed.   Constitutional:       Appearance: Normal appearance. He is normal weight.   HENT:      Head: Normocephalic and atraumatic.      Right Ear: Tympanic membrane, ear canal and external ear normal.      Left Ear: Tympanic membrane, ear canal and external ear normal.      Nose: Nose normal.      Mouth/Throat:      Mouth: Mucous membranes are moist.      Pharynx: Oropharyngeal exudate and posterior oropharyngeal erythema present.   Eyes:      Extraocular Movements: " Extraocular movements intact.      Conjunctiva/sclera: Conjunctivae normal.   Cardiovascular:      Rate and Rhythm: Normal rate and regular rhythm.      Pulses: Normal pulses.      Heart sounds: Normal heart sounds.   Pulmonary:      Effort: Pulmonary effort is normal.      Breath sounds: Normal breath sounds.   Musculoskeletal:      Cervical back: Normal range of motion and neck supple. No rigidity. No muscular tenderness.   Skin:     General: Skin is warm and dry.   Neurological:      General: No focal deficit present.      Mental Status: He is alert.   Psychiatric:         Mood and Affect: Mood normal.         Behavior: Behavior normal.         Labs:  Results for orders placed or performed in visit on 10/02/22 (from the past 24 hour(s))   Streptococcus A Rapid Screen w/Reflex to PCR - Clinic Collect    Specimen: Throat; Swab   Result Value Ref Range    Group A Strep antigen Positive (A) Negative       X-Ray was not done.    ASSESSMENT:      ICD-10-CM    1. Strep throat  J02.0 azithromycin (ZITHROMAX) 250 MG tablet   2. Throat pain  R07.0 Streptococcus A Rapid Screen w/Reflex to PCR - Clinic Collect        Medical Decision Making:    Differential Diagnosis:  URI Adult/Peds:  Acute right otitis media, Acute left otitis media, Strep pharyngitis, Viral pharyngitis and Viral upper respiratory illness    Serious Comorbid Conditions:  Adult:  reviewed    PLAN:    Tylenol/motrin as needed.  Drink plenty of water.  Gargle with salt water.  May use chloraseptic spray as directed for ST.   Rx for zpak.  Declined note for work.      Followup:    If not improving or if condition worsens, follow up with your Primary Care Provider, If not improving or if conditions worsens over the next 12-24 hours, go to the Emergency Department    There are no Patient Instructions on file for this visit.

## 2022-11-19 ENCOUNTER — MYC REFILL (OUTPATIENT)
Dept: FAMILY MEDICINE | Facility: CLINIC | Age: 47
End: 2022-11-19

## 2022-11-19 DIAGNOSIS — F64.9 GENDER INCONGRUENCE: ICD-10-CM

## 2022-11-21 RX ORDER — TESTOSTERONE CYPIONATE 200 MG/ML
80 INJECTION, SOLUTION INTRAMUSCULAR WEEKLY
Qty: 13 ML | Refills: 1 | Status: SHIPPED | OUTPATIENT
Start: 2022-11-21 | End: 2022-11-28

## 2022-11-28 DIAGNOSIS — F64.9 GENDER INCONGRUENCE: ICD-10-CM

## 2022-11-28 RX ORDER — TESTOSTERONE CYPIONATE 200 MG/ML
80 INJECTION, SOLUTION INTRAMUSCULAR WEEKLY
Qty: 13 ML | Refills: 1 | Status: SHIPPED | OUTPATIENT
Start: 2022-11-28 | End: 2023-03-10

## 2022-11-28 NOTE — TELEPHONE ENCOUNTER

## 2022-11-28 NOTE — TELEPHONE ENCOUNTER
[facility] phone call message- patient requesting a refill:    Full Medication Name:   testosterone cypionate (DEPOTESTOSTERONE) 200 MG/ML injection 13 mL 1           Pharmacy confirmed as   [facility]   [address]  [place], [place], [address] [phone]   : Yes    Additional Comments: Patient called and said that his medication that was recently sent on 11/21/22 to [facility] in [place], [place] needs to be transferred to [facility] in [place], [place] because it's a controlled substance the pharmacy can not make prescription transfer. Call back for patient is [phone].     OK to leave a message on voice mail? Yes    Primary language: English      needed? No    Call taken on November 28, 2022 at 10:39 AM by Coral Nelson

## 2022-12-13 DIAGNOSIS — I10 ESSENTIAL HYPERTENSION: ICD-10-CM

## 2022-12-14 ENCOUNTER — MYC MEDICAL ADVICE (OUTPATIENT)
Dept: FAMILY MEDICINE | Facility: CLINIC | Age: 47
End: 2022-12-14

## 2022-12-14 DIAGNOSIS — F90.2 ATTENTION DEFICIT HYPERACTIVITY DISORDER (ADHD), COMBINED TYPE: ICD-10-CM

## 2022-12-14 NOTE — TELEPHONE ENCOUNTER

## 2022-12-16 RX ORDER — DEXTROAMPHETAMINE SACCHARATE, AMPHETAMINE ASPARTATE, DEXTROAMPHETAMINE SULFATE, AND AMPHETAMINE SULFATE 5; 5; 5; 5 MG/1; MG/1; MG/1; MG/1
20 TABLET ORAL 2 TIMES DAILY
Qty: 180 TABLET | Refills: 0 | Status: SHIPPED | OUTPATIENT
Start: 2022-12-16 | End: 2022-12-21

## 2022-12-16 RX ORDER — LISINOPRIL 5 MG/1
TABLET ORAL
Qty: 90 TABLET | Refills: 0 | Status: SHIPPED | OUTPATIENT
Start: 2022-12-16 | End: 2023-02-20

## 2022-12-21 ENCOUNTER — MYC MEDICAL ADVICE (OUTPATIENT)
Dept: FAMILY MEDICINE | Facility: CLINIC | Age: 47
End: 2022-12-21

## 2022-12-21 DIAGNOSIS — F90.2 ATTENTION DEFICIT HYPERACTIVITY DISORDER (ADHD), COMBINED TYPE: ICD-10-CM

## 2022-12-21 RX ORDER — DEXTROAMPHETAMINE SACCHARATE, AMPHETAMINE ASPARTATE, DEXTROAMPHETAMINE SULFATE, AND AMPHETAMINE SULFATE 5; 5; 5; 5 MG/1; MG/1; MG/1; MG/1
20 TABLET ORAL 2 TIMES DAILY
Qty: 180 TABLET | Refills: 0 | Status: SHIPPED | OUTPATIENT
Start: 2022-12-21 | End: 2023-03-10

## 2022-12-21 NOTE — TELEPHONE ENCOUNTER
"Patient requesting generic, I queued up what was previously ordered..    Last seen 8/17/2022    Request for medication refill:  ADDERALL 20 MG tablet  Providers if patient needs an appointment and you are willing to give a one month supply please refill for one month and  send a letter/MyChart using \".SMILLIMITEDREFILL\" .smillimited and route chart to \"P SMI \" (Giving one month refill in non controlled medications is strongly recommended before denial)    If refill has been denied, meaning absolutely no refills without visit, please complete the smart phrase \".smirxrefuse\" and route it to the \"P SMI MED REFILLS\"  pool to inform the patient and the pharmacy.    Letitia Au RN        "

## 2022-12-23 ENCOUNTER — OFFICE VISIT (OUTPATIENT)
Dept: FAMILY MEDICINE | Facility: CLINIC | Age: 47
End: 2022-12-23
Payer: COMMERCIAL

## 2022-12-23 VITALS
RESPIRATION RATE: 16 BRPM | SYSTOLIC BLOOD PRESSURE: 129 MMHG | WEIGHT: 256.8 LBS | DIASTOLIC BLOOD PRESSURE: 87 MMHG | HEIGHT: 65 IN | BODY MASS INDEX: 42.78 KG/M2 | OXYGEN SATURATION: 100 % | TEMPERATURE: 98.7 F | HEART RATE: 102 BPM

## 2022-12-23 DIAGNOSIS — I10 ESSENTIAL HYPERTENSION: ICD-10-CM

## 2022-12-23 DIAGNOSIS — Z23 HIGH PRIORITY FOR 2019-NCOV VACCINE: ICD-10-CM

## 2022-12-23 DIAGNOSIS — F90.2 ATTENTION DEFICIT HYPERACTIVITY DISORDER (ADHD), COMBINED TYPE: Primary | ICD-10-CM

## 2022-12-23 DIAGNOSIS — J35.8 TONSILLAR EXUDATE: ICD-10-CM

## 2022-12-23 DIAGNOSIS — Z20.2 CONTACT WITH AND (SUSPECTED) EXPOSURE TO INFECTIONS WITH A PREDOMINANTLY SEXUAL MODE OF TRANSMISSION: ICD-10-CM

## 2022-12-23 DIAGNOSIS — R05.1 ACUTE COUGH: ICD-10-CM

## 2022-12-23 DIAGNOSIS — J02.0 STREPTOCOCCAL PHARYNGITIS: ICD-10-CM

## 2022-12-23 DIAGNOSIS — Z23 NEED FOR PROPHYLACTIC VACCINATION AND INOCULATION AGAINST INFLUENZA: ICD-10-CM

## 2022-12-23 DIAGNOSIS — F64.9 GENDER DYSPHORIA: ICD-10-CM

## 2022-12-23 DIAGNOSIS — R73.09 ELEVATED GLUCOSE: ICD-10-CM

## 2022-12-23 PROBLEM — I97.3 POSTOPERATIVE HYPERTENSION: Status: RESOLVED | Noted: 2022-04-19 | Resolved: 2022-12-23

## 2022-12-23 LAB
ANION GAP SERPL CALCULATED.3IONS-SCNC: 14 MMOL/L (ref 7–15)
BUN SERPL-MCNC: 15.5 MG/DL (ref 6–20)
CALCIUM SERPL-MCNC: 9.3 MG/DL (ref 8.6–10)
CHLORIDE SERPL-SCNC: 99 MMOL/L (ref 98–107)
CREAT SERPL-MCNC: 0.71 MG/DL (ref 0.51–1.17)
DEPRECATED HCO3 PLAS-SCNC: 24 MMOL/L (ref 22–29)
DEPRECATED S PYO AG THROAT QL EIA: POSITIVE
FLUAV RNA SPEC QL NAA+PROBE: NEGATIVE
FLUBV RNA RESP QL NAA+PROBE: NEGATIVE
GFR SERPL CREATININE-BSD FRML MDRD: >90 ML/MIN/1.73M2
GLUCOSE SERPL-MCNC: 120 MG/DL (ref 70–99)
HGB BLD-MCNC: 14.9 G/DL (ref 11.7–17.7)
POTASSIUM SERPL-SCNC: 4 MMOL/L (ref 3.4–5.3)
RSV RNA SPEC NAA+PROBE: NEGATIVE
SARS-COV-2 RNA RESP QL NAA+PROBE: NEGATIVE
SODIUM SERPL-SCNC: 137 MMOL/L (ref 136–145)

## 2022-12-23 PROCEDURE — 90471 IMMUNIZATION ADMIN: CPT | Performed by: FAMILY MEDICINE

## 2022-12-23 PROCEDURE — 87591 N.GONORRHOEAE DNA AMP PROB: CPT | Mod: 59 | Performed by: FAMILY MEDICINE

## 2022-12-23 PROCEDURE — 0124A COVID-19 VACCINE BIVALENT BOOSTER 12+ (PFIZER): CPT | Performed by: FAMILY MEDICINE

## 2022-12-23 PROCEDURE — 84403 ASSAY OF TOTAL TESTOSTERONE: CPT | Performed by: FAMILY MEDICINE

## 2022-12-23 PROCEDURE — 87880 STREP A ASSAY W/OPTIC: CPT | Performed by: FAMILY MEDICINE

## 2022-12-23 PROCEDURE — 80048 BASIC METABOLIC PNL TOTAL CA: CPT | Performed by: FAMILY MEDICINE

## 2022-12-23 PROCEDURE — 87491 CHLMYD TRACH DNA AMP PROBE: CPT | Mod: 59 | Performed by: FAMILY MEDICINE

## 2022-12-23 PROCEDURE — 36415 COLL VENOUS BLD VENIPUNCTURE: CPT | Performed by: FAMILY MEDICINE

## 2022-12-23 PROCEDURE — 91312 COVID-19 VACCINE BIVALENT BOOSTER 12+ (PFIZER): CPT | Performed by: FAMILY MEDICINE

## 2022-12-23 PROCEDURE — 99214 OFFICE O/P EST MOD 30 MIN: CPT | Mod: 25 | Performed by: FAMILY MEDICINE

## 2022-12-23 PROCEDURE — 85018 HEMOGLOBIN: CPT | Performed by: FAMILY MEDICINE

## 2022-12-23 PROCEDURE — 87637 SARSCOV2&INF A&B&RSV AMP PRB: CPT | Performed by: FAMILY MEDICINE

## 2022-12-23 PROCEDURE — 90686 IIV4 VACC NO PRSV 0.5 ML IM: CPT | Performed by: FAMILY MEDICINE

## 2022-12-23 RX ORDER — EMTRICITABINE AND TENOFOVIR DISOPROXIL FUMARATE 200; 300 MG/1; MG/1
1 TABLET, FILM COATED ORAL DAILY
Qty: 90 TABLET | Refills: 0 | Status: SHIPPED | OUTPATIENT
Start: 2022-12-23

## 2022-12-23 RX ORDER — PENICILLIN V POTASSIUM 500 MG/1
500 TABLET, FILM COATED ORAL 2 TIMES DAILY
Qty: 20 TABLET | Refills: 0 | Status: SHIPPED | OUTPATIENT
Start: 2022-12-23 | End: 2023-01-02

## 2022-12-23 RX ORDER — DEXTROAMPHETAMINE SACCHARATE, AMPHETAMINE ASPARTATE, DEXTROAMPHETAMINE SULFATE, AND AMPHETAMINE SULFATE 5; 5; 5; 5 MG/1; MG/1; MG/1; MG/1
20 TABLET ORAL 2 TIMES DAILY
Qty: 180 TABLET | Refills: 0 | Status: CANCELLED | OUTPATIENT
Start: 2022-12-23

## 2022-12-23 RX ORDER — DEXTROAMPHETAMINE SACCHARATE, AMPHETAMINE ASPARTATE, DEXTROAMPHETAMINE SULFATE AND AMPHETAMINE SULFATE 5; 5; 5; 5 MG/1; MG/1; MG/1; MG/1
20 TABLET ORAL 2 TIMES DAILY
Qty: 180 TABLET | Refills: 0 | Status: SHIPPED | OUTPATIENT
Start: 2022-12-23 | End: 2023-03-10

## 2022-12-23 NOTE — PATIENT INSTRUCTIONS
STI screening today.    2.   Refilled medication.    3.   Labs done today. Results by [facility].    4.   Flu and Covid shot done today.    Follow-up pending lab results.

## 2022-12-23 NOTE — PROGRESS NOTES
Assessment & Plan     Attention deficit hyperactivity disorder (ADHD), combined type  Longstanding. Currently effected by shortage with numerous pharmacy mishaps. Resending generic form. Symptoms appear well managed and actually he is getting promoted at job, which is different from previous encounters.   - amphetamine-dextroamphetamine (ADDERALL) 20 MG tablet  Dispense: 180 tablet; Refill: 0    Contact with and (suspected) exposure to infections with a predominantly sexual mode of transmission  Here for routine PReP. One known serodyscordant partner which has been the same for some time with a viral load that is undetectable. Exam sights of specific screening. Recommend visit no later than 3 months, definitely before moving to [place].     Due for BMP to assess tolerance of PReP therapy.     Vaccinated for Mpox.    - Trichomonas vaginalis DNA PCR  - Treponema Abs w Reflex to RPR and Titer  - HIV Antigen Antibody Combo    Gender dysphoria  Longstanding, doing well, missed some Testosterone due to pharmacy availability. Due for shot today, that would give us trough to determine if he is on sufficient volume to support bone health. We will also screen hemoglobin.  - Testosterone total  - Hemoglobin    Tonsillar exudate  - Streptococcus A Rapid Screen w/Reflex to PCR - Clinic Collect  Acute cough  Ill three weeks ago while traveling, was not able to get to Urgent Care. He had strep in October, but now presents with exudate on exam in the setting of right neck lymphadenopathy and cough with clear lungs. Pharyngotonsillar swelling could represent untreated Gonorrhea or Chlamydia since he does not always use barriers and has multiple sexual partners.    Will await multiplex and strep results, if negative, still potentially consider further testing for tonsillitis and tonsillar abscess or mass. 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  - Symptomatic Influenza A/B & SARS-CoV2 (COVID-19) Virus PCR Multiplex Nose    Essential hypertension  Controlled on 5 mg lisinopril. Given he is acutely ill, no changes were made now even though he feels he might need 10 mg.     Need for prophylactic vaccination and inoculation against influenza  - INFLUENZA VACCINE IM > 6 MONTHS VALENT IIV4 (AFLURIA/FLUZONE)    High priority for 2019-nCoV vaccine  - COVID-19,PF,PFIZER BOOSTER BIVALENT 12+Yrs        31 minutes spent on the date of the encounter doing chart review, history and exam, documentation and further activities per the note     No follow-ups on file.    The information in this document, created by the medical scribe for me, accurately reflects the services I personally performed and the decisions made by me. I have reviewed and approved this document for accuracy prior to leaving the patient care area.  Liz Baker MD  1:58 PM, 12/23/22  [facility] KYLAH Zhong is a 46 year old, presenting for the following health issues:  General Visit (Present for 4 month general check up ), Refill Request (Requesting generic version of Adderall ), Imm/Inj (Flu Shot), and Imm/Inj (COVID-19 VACCINE)      HPI     Patient is planning on moving to [place] in 4-5 months, without a definite date, as he is being promoted. This job will involve more traveling. He is not worried about breaking the lease he currently has.     Blood pressure  Patient believes that their blood pressure is too high as they have experienced dizziness. They have tried doubling their dose to 10 which helped with this.      Aging/Achiness  Patient reports stiffer and achier joints that he attributes to age. Additionally he ahs noticed more grey hair.    Sexual Health  Patient reports previously having a respiratory infection that he treated with Vitamin C and rest.     Patient affirms that he has had new sexual partners, sexual partner with HIV, and sexual  "encounter involving mouth and front.      Patient has had contact with someone with Mpox, but has received two shots for this.     Respiratory  Patient took antibiotics for strep throat in October, and began coughing up blood for a day.Since then, he still feels like he gets some drainage in the back of his throat that happens throughout the day. Patient reports that right side feels more swollen than left. Patient affirms that there was a period of over a week that there was pain with swallowing and that he has been sneezing. Throat swolleness has persisted for over 3 weeks.     Patient denies fevers, weight change, chills.     Review of Systems   Positive for: Dizziness, elevated blood pressure, joint stiffness/achiness, post-nasal drip, swollen neck, Pain swallowing, sneezing  Negative for: Fevers, weight change, chills.     This document serves as a record of the services and decisions personally performed and made by Liz Baker MD. It was created on his/her behalf by Tera Membreno, a trained medical scribe. The creation of this document is based the provider's statements to the medical scribe.  Scribe Tera Membreno 2:20 PM, December 23, 2022        Objective    /87   Pulse 102   Temp 98.7  F (37.1  C) (Oral)   Resp 16   Ht 1.651 m (5' 5\")   Wt 116.5 kg (256 lb 12.8 oz)   SpO2 100%   BMI 42.73 kg/m    Body mass index is 42.73 kg/m .  Physical Exam   GENERAL: healthy, alert and no distress  HENT: Uvula normal, midline. Exudates on right tonsil, bilateral tonsil enlargement, patent central airway. Swabs taken for GC/Chlamydia and strep, strep swab has blood on it. Nasal mucosa hyperaemeic, greater on right than left. Bloody drainage in nose as well.   NECK: Anterior cervical lymphadenopathy on right. Nothing posterior. Preauricular, or postauricular.   RESP: lungs clear to auscultation - no rales, rhonchi or wheezes  CV: regular rate and rhythm, normal S1 S2, no S3 or S4, no murmur, click or rub, no " peripheral edema and peripheral pulses strong  PSYCH: mentation appears normal, affect normal/bright

## 2022-12-24 LAB
C TRACH DNA SPEC QL PROBE+SIG AMP: NEGATIVE
C TRACH DNA SPEC QL PROBE+SIG AMP: NEGATIVE
N GONORRHOEA DNA SPEC QL NAA+PROBE: NEGATIVE
N GONORRHOEA DNA SPEC QL NAA+PROBE: NEGATIVE

## 2022-12-27 LAB — TESTOST SERPL-MCNC: 606 NG/DL (ref 8–950)

## 2023-01-01 NOTE — MR AVS SNAPSHOT
After Visit Summary   6/28/2017    Irma Evans    MRN: [id number]           Patient Information     Date Of Birth          1975        Visit Information        Provider Department      6/28/2017 5:15 PM Caleb Anderson MD [facility]        Today's Diagnoses     Carpal tunnel syndrome of right wrist           Follow-ups after your visit        Your next 10 appointments already scheduled     Jul 18, 2017  8:00 AM CDT   RETURN ORTHO with Kristan Turner MD   \[facility] [place] [facility]\"" [facility])    [place] [address],  [address]  [place] [address]   [phone]              Who to contact     Please call your clinic at [phone] to:    Ask questions about your health    Make or cancel appointments    Discuss your medicines    Learn about your test results    Speak to your doctor   If you have compliments or concerns about an experience at your clinic, or if you wish to file a complaint, please contact [facility] Patient Relations at [phone] or email us at [email or web address]         Additional Information About Your Visit        MyChart Information     [employer] gives you secure access to your electronic health record. If you see a primary care provider, you can also send messages to your care team and make appointments. If you have questions, please call your primary care clinic.  If you do not have a primary care provider, please call [phone] and they will assist you.      OOYYO is an electronic gateway that provides easy, online access to your medical records. With OOYYO, you can request a clinic appointment, read your test results, renew a prescription or communicate with your care team.     To access your existing account, please contact your [facility] or call [phone] for assistance.        Care EveryWhere ID     This is your Care EveryWhere ID. This could be  used by other organizations to access your [facility] medical records  [id number]         Blood Pressure from Last 3 Encounters:   05/31/17 125/83   01/17/17 124/78   12/30/16 131/84    Weight from Last 3 Encounters:   05/31/17 119.3 kg (263 lb)   12/07/16 123 kg (271 lb 3.2 oz)   11/08/16 119.2 kg (262 lb 12.8 oz)              We Performed the Following     EMG ([facility])     HC NCS MOTOR W OR W/O F-WAVE, 7 OR 8     HC NEEDLE EMG EA EXTREMTY W/PARASPINAL AREA COMPLETE        Primary Care Provider Office Phone # Fax #    Liz Baker [phone]       [facility] [address] [address]        Equal Access to Services     WILDA GREGORIO : Hadii stephany guerra hadasho Soomaali, waaxda luqadaha, qaybta kaalmada adeegyada, bogdan gary . So [facility] [phone].    ATENCIÓN: Si habla español, tiene a schuler disposición servicios gratuitos de asistencia lingüística. Llame al [phone].    We comply with applicable federal civil rights laws and [place] laws. We do not discriminate on the basis of race, color, national origin, age, disability sex, sexual orientation or gender identity.            Thank you!     Thank you for choosing [facility]  for your care. Our goal is always to provide you with excellent care. Hearing back from our patients is one way we can continue to improve our services. Please take a few minutes to complete the written survey that you may receive in the mail after your visit with us. Thank you!             Your Updated Medication List - Protect others around you: Learn how to safely use, store and throw away your medicines at www.disposemymeds.org.          This list is accurate as of: 6/28/17  5:24 PM.  Always use your most recent med list.                   Brand Name Dispense Instructions for use Diagnosis    * amphetamine-dextroamphetamine 20 MG per 24 hr capsule    ADDERALL XR    60 capsule    Take 2 capsules (40 mg) by mouth  "daily    Attention deficit hyperactivity disorder (ADHD), unspecified ADHD type       * amphetamine-dextroamphetamine 20 MG per 24 hr capsule   Start taking on:  7/30/2017    ADDERALL XR    60 capsule    Take 2 capsules (40 mg) by mouth daily    Attention deficit hyperactivity disorder (ADHD), unspecified ADHD type       FLUoxetine 20 MG capsule    PROzac    90 capsule    Take 1 capsule (20 mg) by mouth daily    SHANA (generalized anxiety disorder)       fluticasone 50 MCG/ACT spray    FLONASE    48 g    Spray 2 sprays into both nostrils daily    Chronic rhinitis       folic acid 1 MG tablet    FOLVITE    100 tablet    Take 1 tablet (1 mg) by mouth daily    HPV test positive       minocycline 100 MG capsule    MINOCIN/DYNACIN    60 capsule    Take 1 capsule (100 mg) by mouth 2 times daily    Acne vulgaris       montelukast 10 MG tablet    SINGULAIR    90 tablet    Take 1 tablet (10 mg) by mouth nightly as needed    Seasonal allergic rhinitis due to pollen       * Needle (Disp) 18G X 1-1/2\" Misc    BD DISP NEEDLES    30 each    Use to administer IM medication as instructed    Medication management       * needle (disp) 18G X 1\" Misc     15 each    1 each once a week    Gender identity disorder       * Needle (Disp) 23G X 1\" Misc    BD DISP NEEDLE    30 each    Use to administer IM medication as instructed    Medication management       * Needle (Disp) 23G X 1\" Misc     15 each    Use to inject testosterone into muscle weekly    Gender identity disorder       olopatadine 0.1 % ophthalmic solution    PATANOL    5 mL    Place 1 drop into both eyes 2 times daily    Chronic allergic conjunctivitis       polyethylene glycol powder    MIRALAX    850 g    Take 17 g (1 capful) by mouth daily    Constipation, unspecified constipation type       psyllium 0.52 G capsule     540 capsule    Take 1-2 capsules (0.52-1.04 g) by mouth daily    Constipation, unspecified constipation type       Sharps Container Misc     1 each    Dispose " sharps    Gender dysphoria       simvastatin 20 MG tablet    ZOCOR    90 tablet    Take 1 tablet (20 mg) by mouth At Bedtime    Hyperlipidemia LDL goal <100       syringe (disposable) 1 ML Misc    BD TUBERCULIN SYRINGE    30 each    BD 1ml Tuberculing syringe SLIP TIP (no needle attached). Use to administer IM medications as instructed    Medication management       testosterone cypionate 200 MG/ML injection    DEPOTESTOTERONE    10 mL    Inject 0.4 mLs (80 mg) into the muscle once a week In cottonseed oil ok    Gender identity disorder       topiramate 50 MG tablet    TOPAMAX    270 tablet    Take 3 tablets (150 mg) by mouth daily    Morbid obesity due to excess calories (H)       triamcinolone 0.1 % cream    KENALOG    80 g    Apply sparingly to affected area two times daily as needed    Dermatitis       * Notice:  This list has 6 medication(s) that are the same as other medications prescribed for you. Read the directions carefully, and ask your doctor or other care provider to review them with you.       Statement Selected

## 2023-01-08 ENCOUNTER — HEALTH MAINTENANCE LETTER (OUTPATIENT)
Age: 48
End: 2023-01-08

## 2023-02-18 DIAGNOSIS — I10 ESSENTIAL HYPERTENSION: ICD-10-CM

## 2023-02-20 RX ORDER — LISINOPRIL 5 MG/1
TABLET ORAL
Qty: 90 TABLET | Refills: 3 | Status: SHIPPED | OUTPATIENT
Start: 2023-02-20 | End: 2023-03-10

## 2023-02-20 NOTE — TELEPHONE ENCOUNTER
"Last seen : 12/23/2022   Request for medication refill:    lisinopril (ZESTRIL) 5 MG tablet    Providers if patient needs an appointment and you are willing to give a one month supply please refill for one month and  send a letter/MyChart using \".SMILLIMITEDREFILL\" .smillimited and route chart to \"P [facility] \" (Giving one month refill in non controlled medications is strongly recommended before denial)    If refill has been denied, meaning absolutely no refills without visit, please complete the smart phrase \".smirxrefuse\" and route it to the \"P SMI MED REFILLS\"  pool to inform the patient and the pharmacy.    LOYD VEGA RN       "

## 2023-02-21 ENCOUNTER — MEDICAL CORRESPONDENCE (OUTPATIENT)
Dept: HEALTH INFORMATION MANAGEMENT | Facility: CLINIC | Age: 48
End: 2023-02-21
Payer: COMMERCIAL

## 2023-03-06 PROBLEM — D06.1 CARCINOMA IN SITU OF EXOCERVIX: Status: ACTIVE | Noted: 2022-06-02

## 2023-03-10 ENCOUNTER — OFFICE VISIT (OUTPATIENT)
Dept: FAMILY MEDICINE | Facility: CLINIC | Age: 48
End: 2023-03-10
Payer: COMMERCIAL

## 2023-03-10 VITALS
HEART RATE: 88 BPM | TEMPERATURE: 98.9 F | SYSTOLIC BLOOD PRESSURE: 136 MMHG | OXYGEN SATURATION: 100 % | WEIGHT: 254 LBS | BODY MASS INDEX: 42.32 KG/M2 | HEIGHT: 65 IN | DIASTOLIC BLOOD PRESSURE: 95 MMHG

## 2023-03-10 DIAGNOSIS — D06.1 CARCINOMA IN SITU OF EXOCERVIX: Primary | ICD-10-CM

## 2023-03-10 DIAGNOSIS — F90.2 ATTENTION DEFICIT HYPERACTIVITY DISORDER (ADHD), COMBINED TYPE: ICD-10-CM

## 2023-03-10 DIAGNOSIS — Z12.11 COLON CANCER SCREENING: ICD-10-CM

## 2023-03-10 DIAGNOSIS — Z12.31 ENCOUNTER FOR SCREENING MAMMOGRAM FOR BREAST CANCER: ICD-10-CM

## 2023-03-10 DIAGNOSIS — I10 ESSENTIAL HYPERTENSION: ICD-10-CM

## 2023-03-10 DIAGNOSIS — F64.9 GENDER INCONGRUENCE: ICD-10-CM

## 2023-03-10 PROCEDURE — 87624 HPV HI-RISK TYP POOLED RSLT: CPT | Performed by: FAMILY MEDICINE

## 2023-03-10 PROCEDURE — 99214 OFFICE O/P EST MOD 30 MIN: CPT | Performed by: FAMILY MEDICINE

## 2023-03-10 PROCEDURE — 88175 CYTOPATH C/V AUTO FLUID REDO: CPT | Performed by: FAMILY MEDICINE

## 2023-03-10 RX ORDER — DEXTROAMPHETAMINE SACCHARATE, AMPHETAMINE ASPARTATE, DEXTROAMPHETAMINE SULFATE, AND AMPHETAMINE SULFATE 5; 5; 5; 5 MG/1; MG/1; MG/1; MG/1
20 TABLET ORAL 2 TIMES DAILY
Qty: 180 TABLET | Refills: 0 | Status: SHIPPED | OUTPATIENT
Start: 2023-03-10

## 2023-03-10 RX ORDER — TESTOSTERONE CYPIONATE 200 MG/ML
80 INJECTION, SOLUTION INTRAMUSCULAR WEEKLY
Qty: 13 ML | Refills: 1 | Status: SHIPPED | OUTPATIENT
Start: 2023-03-10

## 2023-03-10 RX ORDER — LISINOPRIL 5 MG/1
10 TABLET ORAL DAILY
Qty: 90 TABLET | Refills: 3 | Status: SHIPPED | OUTPATIENT
Start: 2023-03-10 | End: 2023-11-03

## 2023-03-10 ASSESSMENT — PATIENT HEALTH QUESTIONNAIRE - PHQ9: SUM OF ALL RESPONSES TO PHQ QUESTIONS 1-9: 7

## 2023-03-10 NOTE — PROGRESS NOTES
Assessment & Plan     Carcinoma in situ of exocervix  4/2022: cervical intraepithelial neoplasia 3 on pathology from hyst. Sees Dr. Love of GYN. ASCCP guidelines recommend annual pap with HPV co-testing x 3 then long term surveillance with pap/HPV co-testing every 3 years to complete 25 years of screening.   Trevin was seen today for recheck and gyn exam.  -     Pap imaged thin layer diagnostic with HPV  -     Pap diagnostic with HPV; Future    Gender incongruence  Stable. Labs completed in December.   -     testosterone cypionate (DEPOTESTOSTERONE) 200 MG/ML injection; Inject 0.4 mLs (80 mg) into the muscle once a week In cottonseed oil ok. Pt needs 3 mo supply =13ml given on single use vials    Attention deficit hyperactivity disorder (ADHD), combined type  Stable. Sent next three months worth of refill so he can get established with new provider in [place].   -     ADDERALL 20 MG tablet; Take 1 tablet (20 mg) by mouth 2 times daily    Essential hypertension  Blood pressure above goal steadily for last several visits. Increase lisinopril to 10 mg and he will need to follow-up with new provider.  -     lisinopril (ZESTRIL) 5 MG tablet; Take 2 tablets (10 mg) by mouth daily    Encounter for screening mammogram for breast cancer  Discussed breast cancer screening today and reviewed family history. He chooses to start screening at 52.  Colon cancer screening  FITT testing chosen for colorectal screening  -     Fecal colorectal cancer screen FITT; Future  -     Fecal colorectal cancer screen FITT    32 minutes spent on the date of the encounter doing chart review, history and exam, documentation and further activities per the note    No follow-ups on file.    The information in this document, created by the medical scribe for me, accurately reflects the services I personally performed and the decisions made by me. I have reviewed and approved this document for accuracy prior to leaving the patient care area.  Liz  "MD Samuel  1:47 PM, 03/10/23  [facility] KYLAH Zhong is a 47 year old, presenting for the following health issues:  RECHECK (Patient here for med follow up) and Gyn Exam (Patient here for pap)      HPI   Mood  Patient affirms that mood has been good as he continues to plan his move to [place]. Patient is moving for a job promotion in which he will be working 4 days a week with frequent travelling.     Gender  Patient has changed his legal name, gotten a new social security card and ID to reflect this change.    Preventative  Patient is currently on PReP. He has never had a mammogram and still has breast tissue. He denies having a family history of breast, ovarian, or colon cancer.    Patient denies any new sexual partners.     Review of Systems   Positive for:  Negative for:    This document serves as a record of the services and decisions personally performed and made by Liz Baker MD. It was created on his/her behalf by Tera Membreno, a trained medical scribe. The creation of this document is based the provider's statements to the medical scribe.  Scribcelso Membreno 1:47 PM, March 10, 2023        Objective    BP (!) 136/95   Pulse 88   Temp 98.9  F (37.2  C) (Oral)   Ht 1.651 m (5' 5\")   Wt 115.2 kg (254 lb)   SpO2 100%   BMI 42.27 kg/m    Body mass index is 42.27 kg/m .   BP Readings from Last 6 Encounters:   03/10/23 (!) 136/95   12/23/22 129/87   10/02/22 (!) 137/96   08/17/22 136/88   06/02/22 117/80   04/20/22 132/75       Physical Exam   GENERAL: healthy, alert and no distress  : Normal external genitalia. Cervix surgically absent. Normal rugae, thin genital walls, no discharge, blind sweep PAP preformed with scant blood, no discomfort, send diagnostic.   PSYCH: Affect bright.              "

## 2023-03-15 LAB
BKR LAB AP GYN ADEQUACY: NORMAL
BKR LAB AP GYN INTERPRETATION: NORMAL
BKR LAB AP HPV REFLEX: NORMAL
BKR LAB AP PREVIOUS ABNL DX: NORMAL
BKR LAB AP PREVIOUS ABNORMAL: NORMAL
PATH REPORT.COMMENTS IMP SPEC: NORMAL
PATH REPORT.COMMENTS IMP SPEC: NORMAL
PATH REPORT.RELEVANT HX SPEC: NORMAL

## 2023-03-16 LAB
HUMAN PAPILLOMA VIRUS 16 DNA: NEGATIVE
HUMAN PAPILLOMA VIRUS 18 DNA: NEGATIVE
HUMAN PAPILLOMA VIRUS FINAL DIAGNOSIS: ABNORMAL
HUMAN PAPILLOMA VIRUS OTHER HR: POSITIVE

## 2023-03-23 ENCOUNTER — TELEPHONE (OUTPATIENT)
Dept: FAMILY MEDICINE | Facility: CLINIC | Age: 48
End: 2023-03-23
Payer: COMMERCIAL

## 2023-03-23 DIAGNOSIS — F90.2 ATTENTION DEFICIT HYPERACTIVITY DISORDER (ADHD), COMBINED TYPE: Primary | ICD-10-CM

## 2023-03-23 NOTE — TELEPHONE ENCOUNTER
[facility] phone call message- medication clarification/question:    Full Medication Name: ADDERALL 20 MG tablet   Dose: Take 1 tablet (20 mg) by mouth 2 times daily - Oral    Question: Patient seeking generic med as opposed to name brand due to insurance     Pharmacy confirmed as    [id number] - [place], [place] - [address] AT [facility] [address]: Yes    OK to leave a message on voice mail? Yes    Primary language: English      needed? No    Call taken on March 23, 2023 at 10:01 AM by Rangel Villa

## 2023-03-24 RX ORDER — DEXTROAMPHETAMINE SACCHARATE, AMPHETAMINE ASPARTATE, DEXTROAMPHETAMINE SULFATE AND AMPHETAMINE SULFATE 5; 5; 5; 5 MG/1; MG/1; MG/1; MG/1
20 TABLET ORAL 2 TIMES DAILY
Qty: 180 TABLET | Refills: 0 | Status: SHIPPED | OUTPATIENT
Start: 2023-03-24

## 2023-04-19 ENCOUNTER — MYC MEDICAL ADVICE (OUTPATIENT)
Dept: FAMILY MEDICINE | Facility: CLINIC | Age: 48
End: 2023-04-19
Payer: COMMERCIAL

## 2023-04-21 NOTE — TELEPHONE ENCOUNTER
Called patient, he says that [facility] does not show the script from 3/10/23 where provider doubled the dos.    Called [facility], they have the script and will fill today, called patient and informed.     Patient is moving to [place] next week and will find another provider down there.    Letitia Au RN

## 2023-06-02 ENCOUNTER — HEALTH MAINTENANCE LETTER (OUTPATIENT)
Age: 48
End: 2023-06-02

## 2023-11-03 DIAGNOSIS — I10 ESSENTIAL HYPERTENSION: ICD-10-CM

## 2023-11-03 RX ORDER — LISINOPRIL 5 MG/1
10 TABLET ORAL DAILY
Qty: 90 TABLET | Refills: 3 | Status: SHIPPED | OUTPATIENT
Start: 2023-11-03

## 2023-11-03 NOTE — TELEPHONE ENCOUNTER
"Request for medication refill: lisinopril (ZESTRIL) 5 MG tablet     Providers if patient needs an appointment and you are willing to give a one month supply please refill for one month and  send a letter/MyChart using \".SMILLIMITEDREFILL\" .smillimited and route chart to \"P [facility] \" (Giving one month refill in non controlled medications is strongly recommended before denial)    If refill has been denied, meaning absolutely no refills without visit, please complete the smart phrase \".smirxrefuse\" and route it to the \"P SMI MED REFILLS\"  pool to inform the patient and the pharmacy.    Pato Stallworth, CMA     "

## 2024-06-29 ENCOUNTER — HEALTH MAINTENANCE LETTER (OUTPATIENT)
Age: 49
End: 2024-06-29

## 2025-01-28 NOTE — TELEPHONE ENCOUNTER

## 2025-02-08 ENCOUNTER — HEALTH MAINTENANCE LETTER (OUTPATIENT)
Age: 50
End: 2025-02-08

## 2025-07-13 ENCOUNTER — HEALTH MAINTENANCE LETTER (OUTPATIENT)
Age: 50
End: 2025-07-13

## (undated) DEVICE — ESU GROUND PAD UNIVERSAL W/O CORD

## (undated) DEVICE — SOL NACL 0.9% IRRIG 500ML BOTTLE 2F7123

## (undated) DEVICE — BLADE KNIFE SURG 15 371115

## (undated) DEVICE — PREP CHLORAPREP 26ML TINTED ORANGE  260815

## (undated) DEVICE — LINEN ORTHO PACK 5446

## (undated) DEVICE — DRAPE LAVH/LAPAROSCOPY W/POUCH 29474

## (undated) DEVICE — DRAPE SLEEVE 599

## (undated) DEVICE — WIPES FOLEY CARE SURESTEP PROVON DFC100

## (undated) DEVICE — TOURNIQUET SGL BLADDER 18"X4" RED 5921-218-135

## (undated) DEVICE — Device

## (undated) DEVICE — GOWN LG DISP 9515

## (undated) DEVICE — SOL ADH LIQUID BENZOIN SWAB 0.6ML C1544

## (undated) DEVICE — SU MONOCRYL 4-0 PS-2 18" UND Y496G

## (undated) DEVICE — SUCTION MANIFOLD NEPTUNE 2 SYS 4 PORT 0702-020-000

## (undated) DEVICE — SYR BULB IRRIG DOVER 60 ML LATEX FREE 67000

## (undated) DEVICE — LINEN GOWN X4 5410

## (undated) DEVICE — SU VICRYL 0 CT-1 27" J340H

## (undated) DEVICE — BLADE CLIPPER SGL USE 9680

## (undated) DEVICE — SU ETHILON 4-0 PS-2 18" BLACK 1667H

## (undated) DEVICE — GLOVE PROTEXIS BLUE W/NEU-THERA 7.0  2D73EB70

## (undated) DEVICE — DRAPE UNDER BUTTOCK 8483

## (undated) DEVICE — SOL WATER IRRIG 1000ML BOTTLE 2F7114

## (undated) DEVICE — DRAPE STOCKINETTE 4" 8544

## (undated) DEVICE — DRSG KERLIX 4 1/2"X4YDS ROLL 6730

## (undated) DEVICE — RETR PANNICULUS TRAXI FOR PT POSITIONING PRS-1030

## (undated) DEVICE — DRSG KERLIX 4 1/2"X4YDS ROLL 6715

## (undated) DEVICE — NDL 27GA 1.25" 305136

## (undated) DEVICE — COVER CAMERA IN-LIGHT DISP LT-C02

## (undated) DEVICE — BNDG ELASTIC 3"X5YDS UNSTERILE 6611-30

## (undated) DEVICE — SU VICRYL 2-0 CT-2 27" J333H

## (undated) DEVICE — PAD CHUX UNDERPAD 30X36" P3036C

## (undated) DEVICE — GLOVE PROTEXIS POWDER FREE SMT 6.5  2D72PT65X

## (undated) DEVICE — LINEN TOWEL PACK X5 5464

## (undated) DEVICE — SU VICRYL 3-0 CTX 36" UND J980H

## (undated) DEVICE — PREP CHLORAPREP 26ML TINTED HI-LITE ORANGE 930815

## (undated) DEVICE — PREP SCRUB SOL EXIDINE 4% CHG 4OZ 29002-404

## (undated) DEVICE — DRSG PRIMAPORE 02X3" 7133

## (undated) DEVICE — SOL NACL 0.9% IRRIG 1000ML BOTTLE 2F7124

## (undated) DEVICE — PREP SKIN SCRUB TRAY 4461A

## (undated) DEVICE — GLOVE PROTEXIS W/NEU-THERA 6.5  2D73TE65

## (undated) DEVICE — SU VICRYL 0 CT-1 CR 8X27" UND JJ41G

## (undated) DEVICE — CATH TRAY FOLEY SURESTEP 16FR WDRAIN BAG STLK LATEX A300316A

## (undated) DEVICE — LIGHT HANDLE X2

## (undated) DEVICE — SPONGE LAP 18X18" X8435

## (undated) DEVICE — ESU ELEC BLADE 6" COATED E1450-6

## (undated) DEVICE — SU PDS II 0 CTX 60" Z990G

## (undated) DEVICE — SU VICRYL 0 TIE 54" J608H

## (undated) DEVICE — SOL WATER IRRIG 500ML BOTTLE 2F7113

## (undated) DEVICE — DRSG XEROFORM 1X8"

## (undated) DEVICE — ADH SKIN CLOSURE PREMIERPRO EXOFIN 1.0ML 3470

## (undated) DEVICE — BRUSH SURGICAL SCRUB W/4% CHG SOL 25ML 371073

## (undated) DEVICE — PAD PERI INDIV WRAP 11" 2022A

## (undated) DEVICE — GLOVE PROTEXIS BLUE W/NEU-THERA 6.5  2D73EB65

## (undated) DEVICE — PACK HAND CUSTOM ASC

## (undated) RX ORDER — FENTANYL CITRATE 50 UG/ML
INJECTION, SOLUTION INTRAMUSCULAR; INTRAVENOUS
Status: DISPENSED
Start: 2022-04-19

## (undated) RX ORDER — HYDRALAZINE HYDROCHLORIDE 20 MG/ML
INJECTION INTRAMUSCULAR; INTRAVENOUS
Status: DISPENSED
Start: 2022-04-19

## (undated) RX ORDER — ROCURONIUM BROMIDE 50 MG/5 ML
SYRINGE (ML) INTRAVENOUS
Status: DISPENSED
Start: 2022-04-19

## (undated) RX ORDER — KETOROLAC TROMETHAMINE 30 MG/ML
INJECTION, SOLUTION INTRAMUSCULAR; INTRAVENOUS
Status: DISPENSED
Start: 2022-04-19

## (undated) RX ORDER — HYDROXYZINE HYDROCHLORIDE 25 MG/1
TABLET, FILM COATED ORAL
Status: DISPENSED
Start: 2022-04-19

## (undated) RX ORDER — LIDOCAINE HYDROCHLORIDE 20 MG/ML
INJECTION, SOLUTION EPIDURAL; INFILTRATION; INTRACAUDAL; PERINEURAL
Status: DISPENSED
Start: 2022-04-19

## (undated) RX ORDER — LABETALOL HYDROCHLORIDE 5 MG/ML
INJECTION, SOLUTION INTRAVENOUS
Status: DISPENSED
Start: 2022-04-19

## (undated) RX ORDER — LIDOCAINE HYDROCHLORIDE 20 MG/ML
INJECTION, SOLUTION EPIDURAL; INFILTRATION; INTRACAUDAL; PERINEURAL
Status: DISPENSED
Start: 2017-12-19

## (undated) RX ORDER — ACETAMINOPHEN 325 MG/1
TABLET ORAL
Status: DISPENSED
Start: 2022-04-19

## (undated) RX ORDER — DEXAMETHASONE SODIUM PHOSPHATE 4 MG/ML
INJECTION, SOLUTION INTRA-ARTICULAR; INTRALESIONAL; INTRAMUSCULAR; INTRAVENOUS; SOFT TISSUE
Status: DISPENSED
Start: 2022-04-19

## (undated) RX ORDER — KETAMINE HYDROCHLORIDE 10 MG/ML
INJECTION, SOLUTION INTRAMUSCULAR; INTRAVENOUS
Status: DISPENSED
Start: 2017-12-19

## (undated) RX ORDER — ACETAMINOPHEN 325 MG/1
TABLET ORAL
Status: DISPENSED
Start: 2017-12-19

## (undated) RX ORDER — HYDROMORPHONE HYDROCHLORIDE 1 MG/ML
INJECTION, SOLUTION INTRAMUSCULAR; INTRAVENOUS; SUBCUTANEOUS
Status: DISPENSED
Start: 2022-04-19

## (undated) RX ORDER — PROPOFOL 10 MG/ML
INJECTION, EMULSION INTRAVENOUS
Status: DISPENSED
Start: 2017-12-19

## (undated) RX ORDER — OXYCODONE HYDROCHLORIDE 5 MG/1
TABLET ORAL
Status: DISPENSED
Start: 2022-04-19

## (undated) RX ORDER — ONDANSETRON 2 MG/ML
INJECTION INTRAMUSCULAR; INTRAVENOUS
Status: DISPENSED
Start: 2022-04-19

## (undated) RX ORDER — PHENAZOPYRIDINE HYDROCHLORIDE 200 MG/1
TABLET, FILM COATED ORAL
Status: DISPENSED
Start: 2022-04-19

## (undated) RX ORDER — EPHEDRINE SULFATE 50 MG/ML
INJECTION, SOLUTION INTRAMUSCULAR; INTRAVENOUS; SUBCUTANEOUS
Status: DISPENSED
Start: 2022-04-19

## (undated) RX ORDER — BUPIVACAINE HYDROCHLORIDE 2.5 MG/ML
INJECTION, SOLUTION EPIDURAL; INFILTRATION; INTRACAUDAL
Status: DISPENSED
Start: 2017-12-19

## (undated) RX ORDER — SODIUM CHLORIDE, SODIUM LACTATE, POTASSIUM CHLORIDE, CALCIUM CHLORIDE 600; 310; 30; 20 MG/100ML; MG/100ML; MG/100ML; MG/100ML
INJECTION, SOLUTION INTRAVENOUS
Status: DISPENSED
Start: 2022-04-19

## (undated) RX ORDER — CEFAZOLIN SODIUM/WATER 3 G/30 ML
SYRINGE (ML) INTRAVENOUS
Status: DISPENSED
Start: 2022-04-19

## (undated) RX ORDER — FENTANYL CITRATE-0.9 % NACL/PF 10 MCG/ML
PLASTIC BAG, INJECTION (ML) INTRAVENOUS
Status: DISPENSED
Start: 2022-04-19

## (undated) RX ORDER — PROPOFOL 10 MG/ML
INJECTION, EMULSION INTRAVENOUS
Status: DISPENSED
Start: 2022-04-19

## (undated) RX ORDER — GABAPENTIN 300 MG/1
CAPSULE ORAL
Status: DISPENSED
Start: 2017-12-19